# Patient Record
Sex: FEMALE | Race: WHITE | Employment: UNEMPLOYED | URBAN - METROPOLITAN AREA
[De-identification: names, ages, dates, MRNs, and addresses within clinical notes are randomized per-mention and may not be internally consistent; named-entity substitution may affect disease eponyms.]

---

## 2019-03-04 PROBLEM — N32.81 OVERACTIVE BLADDER: Status: ACTIVE | Noted: 2019-03-04

## 2019-04-05 PROBLEM — H66.90 OTITIS MEDIA: Status: ACTIVE | Noted: 2019-04-05

## 2019-04-05 PROBLEM — J98.01 ACUTE BRONCHOSPASM: Status: ACTIVE | Noted: 2019-04-05

## 2019-04-05 PROBLEM — H61.20 IMPACTED CERUMEN: Status: ACTIVE | Noted: 2019-04-05

## 2019-04-05 PROBLEM — N92.0 MENORRHAGIA WITH REGULAR CYCLE: Status: ACTIVE | Noted: 2018-04-30

## 2019-04-05 PROBLEM — Z01.818 PRE-OPERATIVE EXAMINATION: Status: ACTIVE | Noted: 2019-04-05

## 2019-04-05 PROBLEM — J45.31 MILD PERSISTENT ASTHMA WITH (ACUTE) EXACERBATION: Status: ACTIVE | Noted: 2019-04-05

## 2019-04-05 PROBLEM — L02.91 ABSCESS: Status: ACTIVE | Noted: 2017-05-08

## 2019-04-05 PROBLEM — Z86.2 H/O HYPERCOAGULABLE STATE: Status: ACTIVE | Noted: 2018-07-03

## 2019-04-05 PROBLEM — N39.0 URINARY TRACT INFECTION: Status: ACTIVE | Noted: 2019-04-05

## 2019-04-05 PROBLEM — K58.0 IRRITABLE BOWEL SYNDROME WITH DIARRHEA: Status: ACTIVE | Noted: 2018-05-01

## 2019-04-05 PROBLEM — R35.0 URINARY FREQUENCY: Status: ACTIVE | Noted: 2019-04-05

## 2019-04-05 PROBLEM — D25.1 INTRAMURAL LEIOMYOMA OF UTERUS: Status: ACTIVE | Noted: 2019-03-06

## 2019-04-05 PROBLEM — J45.30 MILD PERSISTENT ASTHMA, UNCOMPLICATED: Status: ACTIVE | Noted: 2019-04-05

## 2019-04-05 PROBLEM — B35.1 DERMATOPHYTOSIS OF NAIL: Status: ACTIVE | Noted: 2019-04-05

## 2019-04-05 PROBLEM — G31.84 MILD COGNITIVE IMPAIRMENT: Status: ACTIVE | Noted: 2018-05-01

## 2019-04-05 PROBLEM — G40.909 EPILEPSY, UNSPECIFIED, NOT INTRACTABLE, WITHOUT STATUS EPILEPTICUS (HCC): Status: ACTIVE | Noted: 2018-05-01

## 2019-04-05 PROBLEM — J30.9 ALLERGIC RHINITIS: Status: ACTIVE | Noted: 2018-05-01

## 2019-04-05 PROBLEM — Z86.718 PERSONAL HISTORY OF VENOUS THROMBOSIS AND EMBOLISM: Status: ACTIVE | Noted: 2018-05-01

## 2019-04-05 PROBLEM — R05.9 COUGH: Status: ACTIVE | Noted: 2019-04-05

## 2019-04-05 PROBLEM — L03.119 CELLULITIS AND ABSCESS OF FOOT EXCLUDING TOE: Status: ACTIVE | Noted: 2019-04-05

## 2019-04-05 PROBLEM — N93.9 ABNORMAL UTERINE BLEEDING (AUB): Status: ACTIVE | Noted: 2019-03-06

## 2019-04-05 PROBLEM — B37.31 CANDIDIASIS OF VULVA AND VAGINA: Status: ACTIVE | Noted: 2019-04-05

## 2019-04-05 PROBLEM — D68.59 OTHER PRIMARY THROMBOPHILIA (HCC): Status: ACTIVE | Noted: 2018-05-01

## 2019-04-05 PROBLEM — R30.0 DYSURIA: Status: ACTIVE | Noted: 2019-04-05

## 2019-04-05 PROBLEM — N76.0 VAGINITIS AND VULVOVAGINITIS: Status: ACTIVE | Noted: 2019-04-05

## 2019-04-05 PROBLEM — L02.619 CELLULITIS AND ABSCESS OF FOOT EXCLUDING TOE: Status: ACTIVE | Noted: 2019-04-05

## 2019-04-05 PROBLEM — F41.1 GENERALIZED ANXIETY DISORDER: Status: ACTIVE | Noted: 2018-05-01

## 2019-04-05 PROBLEM — I63.9 CEREBRAL INFARCTION (HCC): Status: ACTIVE | Noted: 2018-05-01

## 2019-04-05 PROBLEM — Z96.89 S/P INSERTION OF SPINAL CORD STIMULATOR: Status: ACTIVE | Noted: 2018-03-02

## 2019-05-29 PROBLEM — Z01.419 ENCOUNTER FOR ROUTINE GYNECOLOGICAL EXAMINATION: Status: ACTIVE | Noted: 2019-05-09

## 2019-05-29 PROBLEM — R60.9 EDEMA, UNSPECIFIED: Status: ACTIVE | Noted: 2018-04-26

## 2019-06-18 ENCOUNTER — OFFICE VISIT (OUTPATIENT)
Dept: ORTHOPEDIC SURGERY | Age: 54
End: 2019-06-18

## 2019-06-18 VITALS
HEIGHT: 68 IN | RESPIRATION RATE: 18 BRPM | HEART RATE: 62 BPM | SYSTOLIC BLOOD PRESSURE: 117 MMHG | DIASTOLIC BLOOD PRESSURE: 81 MMHG | OXYGEN SATURATION: 98 % | BODY MASS INDEX: 36.68 KG/M2 | TEMPERATURE: 97.5 F | WEIGHT: 242 LBS

## 2019-06-18 DIAGNOSIS — E66.01 SEVERE OBESITY (HCC): ICD-10-CM

## 2019-06-18 DIAGNOSIS — Z45.42 BATTERY END OF LIFE OF SPINAL CORD STIMULATOR: Primary | ICD-10-CM

## 2019-06-18 DIAGNOSIS — G89.4 CHRONIC PAIN SYNDROME: ICD-10-CM

## 2019-06-18 RX ORDER — LIDOCAINE 50 MG/G
1 PATCH TOPICAL AS NEEDED
COMMUNITY

## 2019-06-18 RX ORDER — HYDROCODONE BITARTRATE AND ACETAMINOPHEN 5; 325 MG/1; MG/1
1 TABLET ORAL AS NEEDED
COMMUNITY
End: 2020-02-11 | Stop reason: ALTCHOICE

## 2019-06-18 NOTE — PROGRESS NOTES
[address]  [address], [address]  [facility], [address]  Phone: [phone]  Fax: [phone]  INITIAL CONSULTATION  Patient: Rufino Suarez                MRN: [id number]       SSN: [id number]  YOB: 1965        AGE: 49 y.o. SEX: female  Body mass index is 36.8 kg/m². PCP: Waldo Briones MD  06/18/19    Chief Complaint   Patient presents with    Back Pain     low back pain         HISTORY OF PRESENT ILLNESS, RADIOGRAPHS, and PLAN:         HISTORY OF PRESENT ILLNESS:  Ms. Julio Reynolds is seen today at the request of Dr. Jeanne Alanis and Dr. Martha Scott. Ms. 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 Eventually, she underwent spinal cord stimulator placement in 2011 by Dr. Antonio Chau and was subsequently revised several times. She has a multi-lead paddle in place, which she uses a No Feel setting batter placed by Dr. Martha Scott. The battery is at its end of useful life and has run out, and she needs a battery replacement. She gets tremendous relief from the [facility] setting with 90% pain relief. She denies any other issue other than the severe pain now that the battery is losing its effectiveness with severe back pain and leg pain, right worse than left. She has been diagnosed of post-laminectomy syndrome with arachnoiditis. She has also had a DVT. She had what sounds like sepsis with wound sepsis, staph infection, dural injury, and the like from her initial surgeries. Her wounds are healed and dry. The battery is not working. She is concerned that on the [facility] setting, which is the most effective for her with a non-rechargeable battery, her battery only lasted one year.       ASSESSMENT/PLAN: We discussed finding other companies batteries that may last longer versus replacing it with the [employer], which gave her one years use the last time. I explained to her that I cannot state with certainty whether another companies battery will last longer as a non-rechargeable and that No Feel settings can cause higher battery drains, and that there is always a risk in changing the company that manufactured the programming and the generator. Our options are to investigate and attempt to use another companys product versus replacing it with the known, effective [employer] product. After extensive discussion, the patient wishes to proceed at this time with a [employer] revision but would like to investigate other companies products should this batter not last long. The risks, benefits, complications, and alternatives were discussed. The patient consents. cc: Ben Gonzalez D.O. Past Medical History:   Diagnosis Date    Asthma     well controlled    Coagulation disorder ([facility] [address]     hypercoagulable    Depressive disorder 1/3/2013    Epilepsy ([facility] [address]     Ill-defined condition     Benign Granuloma Annularea    OAB (overactive bladder)     Obesity (BMI 30.0-34.9) 11/3/2016    Seizures ([facility] [address]     grand mal    Spastic bladder     Thromboembolus ([facility] [address]     s/p vincenzo filter    Unspecified adverse effect of anesthesia     bronchospasm on awakening       No family history on file. Current Outpatient Medications   Medication Sig Dispense Refill    HYDROcodone-acetaminophen (NORCO) 5-325 mg per tablet Take 1 Tab by mouth.  lidocaine (LIDODERM) 5 % Apply 1 Patch to affected area.  mirabegron ER (MYRBETRIQ) 50 mg ER tablet Take 1 Tab by mouth daily. 90 Tab 3    LORazepam (ATIVAN) 0.5 mg tablet Take  by mouth.  tiZANidine (ZANAFLEX) 4 mg capsule Take 4 mg by mouth three (3) times daily.  budesonide-formoterol (SYMBICORT) 160-4.5 mcg/actuation HFAA Take 2 Puffs by inhalation two (2) times a day.       naproxen (NAPROSYN) 500 mg tablet Take 500 mg by mouth two (2) times daily (with meals).  divalproex sodium (DEPAKOTE PO) Take  by mouth.  topiramate (TOPAMAX) 100 mg tablet Take 100 mg by mouth two (2) times a day.  montelukast (SINGULAIR) 10 mg tablet Take 10 mg by mouth daily. Indications: ASTHMA PREVENTION      ibuprofen (MOTRIN) 800 mg tablet Take 800 mg by mouth as needed for Pain.  tolterodine ER (DETROL LA) 4 mg ER capsule Take 1 Cap by mouth daily. (Patient not taking: Reported on 6/18/2019) 90 Cap 3       Allergies   Allergen Reactions    Adhesive Other (comments)     Layers of skin peeled off    Dilantin [Phenytoin Sodium Extended] Other (comments)     Throat swelled up rash all over    Other Medication Other (comments)     Surgical skin prep caused layers of skin to peel off. Needs benedryl before anesthesia.     Phenobarbital Other (comments)     Throat swelled up ,rash    Rifampin Rash       Past Surgical History:   Procedure Laterality Date    HX BACK SURGERY      dural repair    HX BACK SURGERY      nerve stimulator    HX LUMBAR FUSION  2006    HX LUMBAR LAMINECTOMY  2006       Past Medical History:   Diagnosis Date    Asthma     well controlled    Coagulation disorder ([address]     hypercoagulable    Depressive disorder 1/3/2013    Epilepsy ([address]     Ill-defined condition     Benign Granuloma Annularea    OAB (overactive bladder)     Obesity (BMI 30.0-34.9) 11/3/2016    Seizures ([address]     grand mal    Spastic bladder     Thromboembolus ([address]     s/p vincenzo filter    Unspecified adverse effect of anesthesia     bronchospasm on awakening       Social History     Socioeconomic History    Marital status:      Spouse name: Not on file    Number of children: Not on file    Years of education: Not on file    Highest education level: Not on file   Occupational History    Not on file   Social Needs    Financial resource strain: Not on file    Food insecurity:     Worry: Not on file     Inability: Not on file   Juany Arechiga Transportation needs:     Medical: Not on file     Non-medical: Not on file   Tobacco Use    Smoking status: Former Smoker    Smokeless tobacco: Never Used   Substance and Sexual Activity    Alcohol use: Yes     Comment: 1 per month    Drug use: No    Sexual activity: Not on file   Lifestyle    Physical activity:     Days per week: Not on file     Minutes per session: Not on file    Stress: Not on file   Relationships    Social connections:     Talks on phone: Not on file     Gets together: Not on file     Attends Jainism service: Not on file     Active member of club or organization: Not on file     Attends meetings of clubs or organizations: Not on file     Relationship status: Not on file    Intimate partner violence:     Fear of current or ex partner: Not on file     Emotionally abused: Not on file     Physically abused: Not on file     Forced sexual activity: Not on file   Other Topics Concern    Not on file   Social History Narrative    Not on file           REVIEW OF SYSTEMS:   CONSTITUTIONAL SYMPTOMS:  Negative. EYES:  Negative. EARS, NOSE, THROAT AND MOUTH:  Negative. CARDIOVASCULAR:  Negative. RESPIRATORY:  Negative. GENITOURINARY: Per HPI. GASTROINTESTINAL:  Per HPI. INTEGUMENTARY (SKIN AND/OR BREAST):  Negative. MUSCULOSKELETAL: Per HPI.   ENDOCRINE/RHEUMATOLOGIC:  Negative. NEUROLOGICAL:  Per HPI. HEMATOLOGIC/LYMPHATIC:  Negative. ALLERGIC/IMMUNOLOGIC:  Negative. PSYCHIATRIC:  Negative. PHYSICAL EXAMINATION:   Visit Vitals  /81   Pulse 62   Temp 97.5 °F (36.4 °C)   Resp 18   Ht 5' 8\" (1.727 m)   Wt 242 lb (109.8 kg)   SpO2 98%   BMI 36.80 kg/m²    PAIN SCALE: 4/10    CONSTITUTIONAL: The patient is in no apparent distress and is alert and oriented x 3. HEENT: Normocephalic. Hearing grossly intact. NECK: Supple and symmetric. no tenderness, or masses were felt. RESPIRATORY: No labored breathing.   CARDIOVASCULAR: The carotid pulses were normal. Peripheral pulses were 2+. CHEST: Normal AP diameter and normal contour without any kyphoscoliosis. LYMPHATIC: No lymphadenopathy was appreciated in the neck, axillae or groin. SKIN:  Negative for scars, rashes, lesions, or ulcers on the right upper, right lower, left upper, left lower and trunk. NEUROLOGICAL: Alert and oriented x 3. Ambulation without assistive device. FWB. EXTREMITIES:  See musculoskeletal.  MUSCULOSKELETAL:   Head and Neck:  Negative for misalignment, asymmetry, crepitation, defects, tenderness masses or effusions.  Left Upper Extremity: Inspection, percussion and palpation performed. Serratos sign is negative.  Right Upper Extremity: Inspection, percussion and palpation performed. Serratos sign is negative.  Spine, Ribs and Pelvis: Low back pain. Inspection, percussion and palpation performed. Negative for misalignment, asymmetry, crepitation, defects, tenderness masses or effusions.  Left Lower Extremity: Inspection, percussion and palpation performed. Negative straight leg raise.  Right Lower Extremity: Inspection, percussion and palpation performed. Negative straight leg raise. SPINE EXAM:     Cervical spine: Neck is midline. Normal muscle tone. No focal atrophy is noted. Lumbar spine: No rash, ecchymosis, or gross obliquity. No focal atrophy is noted. ASSESSMENT    ICD-10-CM ICD-9-CM    1. Battery end of life of spinal cord stimulator Z45.42 V53.02    2. Severe obesity ([facility] [address] E66.01 278.01    3. Chronic pain syndrome G89.4 338.4        Written by Alivia Kilgore, as dictated by Gage Cardoza MD.    I, Dr. Gage Cardoza MD, confirm that all documentation is accurate.

## 2019-06-18 NOTE — PROGRESS NOTES
[address] [facility]   Pre-Surgical Worksheet    Patient: Lise Lopez                         MRN: [id number]     Age:  49 y.o.,      Sex: female    YOB: 1965           CAROLINE: June 18, 2019  PCP: Niyah Mukherjee MD    Allergies   Allergen Reactions    Adhesive Other (comments)     Layers of skin peeled off    Dilantin [Phenytoin Sodium Extended] Other (comments)     Throat swelled up rash all over    Other Medication Other (comments)     Surgical skin prep caused layers of skin to peel off. Needs benedryl before anesthesia.  Phenobarbital Other (comments)     Throat swelled up ,rash    Rifampin Rash         ICD-10-CM ICD-9-CM    1. Battery end of life of spinal cord stimulator Z45.42 V53.02    2. Severe obesity ([address] E66.01 278.01    3. Chronic pain syndrome G89.4 338.4        Surgery: Revise Battery. Pain Assessment   Pain Assessment  6/18/2019   Location of Pain Back   Location Modifiers Inferior   Severity of Pain 4   Quality of Pain Throbbing; Sharp;Dull   Duration of Pain Persistent   Frequency of Pain Constant   Aggravating Factors Standing; Other (Comment)   Aggravating Factors Comment sitting   Limiting Behavior Yes   Relieving Factors Rest;Other (Comment); Ice   Relieving Factors Comment laying down   Result of Injury Yes       Visit Vitals  /81   Pulse 62   Temp 97.5 °F (36.4 °C)   Resp 18   Ht 5' 8\" (1.727 m)   Wt 242 lb (109.8 kg)   SpO2 98%   BMI 36.80 kg/m²       ADL Limits: Bathing, Dressing and Cane    Spine Surgery?: Yes When 2006    Spinal Injections?: No    Physical Therapy?: No    NSAID's?: Yes    Pain Medications?: Yes  Type: Norco     In Pain Management: No    Current Outpatient Medications   Medication Sig    HYDROcodone-acetaminophen (NORCO) 5-325 mg per tablet Take 1 Tab by mouth.  lidocaine (LIDODERM) 5 % Apply 1 Patch to affected area.  mirabegron ER (MYRBETRIQ) 50 mg ER tablet Take 1 Tab by mouth daily.     LORazepam (ATIVAN) 0.5 mg tablet Take  by mouth.  tiZANidine (ZANAFLEX) 4 mg capsule Take 4 mg by mouth three (3) times daily.  budesonide-formoterol (SYMBICORT) 160-4.5 mcg/actuation HFAA Take 2 Puffs by inhalation two (2) times a day.  naproxen (NAPROSYN) 500 mg tablet Take 500 mg by mouth two (2) times daily (with meals).  divalproex sodium (DEPAKOTE PO) Take  by mouth.  topiramate (TOPAMAX) 100 mg tablet Take 100 mg by mouth two (2) times a day.  montelukast (SINGULAIR) 10 mg tablet Take 10 mg by mouth daily. Indications: ASTHMA PREVENTION    ibuprofen (MOTRIN) 800 mg tablet Take 800 mg by mouth as needed for Pain.  tolterodine ER (DETROL LA) 4 mg ER capsule Take 1 Cap by mouth daily. (Patient not taking: Reported on 6/18/2019)     No current facility-administered medications for this visit. Past Medical History:   Diagnosis Date    Asthma     well controlled    Coagulation disorder ([address]     hypercoagulable    Depressive disorder 1/3/2013    Epilepsy ([facility] [address]     Ill-defined condition     Benign Granuloma Annularea    OAB (overactive bladder)     Obesity (BMI 30.0-34.9) 11/3/2016    Seizures ([address]     grand mal    Spastic bladder     Thromboembolus ([facility] [address]     s/p vincenzo filter    Unspecified adverse effect of anesthesia     bronchospasm on awakening       Past Surgical History:   Procedure Laterality Date    HX BACK SURGERY      dural repair    HX BACK SURGERY      nerve stimulator    HX LUMBAR FUSION  2006    HX LUMBAR LAMINECTOMY  2006       Social History     Socioeconomic History    Marital status:      Spouse name: Not on file    Number of children: Not on file    Years of education: Not on file    Highest education level: Not on file   Tobacco Use    Smoking status: Former Smoker    Smokeless tobacco: Never Used   Substance and Sexual Activity    Alcohol use: Yes     Comment: 1 per month    Drug use:  No

## 2019-06-20 ENCOUNTER — HOSPITAL ENCOUNTER (OUTPATIENT)
Dept: PREADMISSION TESTING | Age: 54
Discharge: HOME | End: 2019-06-20
Payer: OTHER MISCELLANEOUS

## 2019-06-20 DIAGNOSIS — Z01.818 PRE-OP EXAMINATION: ICD-10-CM

## 2019-06-20 LAB
ALBUMIN SERPL-MCNC: 3.5 G/DL (ref 3.4–5)
ALBUMIN/GLOB SERPL: 1 {RATIO} (ref 0.8–1.7)
ALP SERPL-CCNC: 79 U/L (ref 45–117)
ALT SERPL-CCNC: 13 U/L (ref 13–56)
ANION GAP SERPL CALC-SCNC: 7 MMOL/L (ref 3–18)
APTT PPP: 25.6 SEC (ref 23–36.4)
AST SERPL-CCNC: 8 U/L (ref 15–37)
ATRIAL RATE: 64 BPM
BACTERIA SPEC CULT: NORMAL
BILIRUB SERPL-MCNC: 0.3 MG/DL (ref 0.2–1)
BUN SERPL-MCNC: 16 MG/DL (ref 7–18)
BUN/CREAT SERPL: 17 (ref 12–20)
CALCIUM SERPL-MCNC: 8.8 MG/DL (ref 8.5–10.1)
CALCULATED P AXIS, ECG09: 65 DEGREES
CALCULATED R AXIS, ECG10: 74 DEGREES
CALCULATED T AXIS, ECG11: 59 DEGREES
CHLORIDE SERPL-SCNC: 111 MMOL/L (ref 100–108)
CO2 SERPL-SCNC: 24 MMOL/L (ref 21–32)
CREAT SERPL-MCNC: 0.94 MG/DL (ref 0.6–1.3)
DIAGNOSIS, 93000: NORMAL
ERYTHROCYTE [DISTWIDTH] IN BLOOD BY AUTOMATED COUNT: 13.8 % (ref 11.6–14.5)
GLOBULIN SER CALC-MCNC: 3.4 G/DL (ref 2–4)
GLUCOSE SERPL-MCNC: 93 MG/DL (ref 74–99)
HCT VFR BLD AUTO: 39.2 % (ref 35–45)
HGB BLD-MCNC: 12.3 G/DL (ref 12–16)
INR PPP: 0.9 (ref 0.8–1.2)
MCH RBC QN AUTO: 29.7 PG (ref 24–34)
MCHC RBC AUTO-ENTMCNC: 31.4 G/DL (ref 31–37)
MCV RBC AUTO: 94.7 FL (ref 74–97)
P-R INTERVAL, ECG05: 136 MS
PLATELET # BLD AUTO: 211 K/UL (ref 135–420)
PMV BLD AUTO: 9.9 FL (ref 9.2–11.8)
POTASSIUM SERPL-SCNC: 4.2 MMOL/L (ref 3.5–5.5)
PROT SERPL-MCNC: 6.9 G/DL (ref 6.4–8.2)
PROTHROMBIN TIME: 12.3 SEC (ref 11.5–15.2)
Q-T INTERVAL, ECG07: 398 MS
QRS DURATION, ECG06: 94 MS
QTC CALCULATION (BEZET), ECG08: 410 MS
RBC # BLD AUTO: 4.14 M/UL (ref 4.2–5.3)
SERVICE CMNT-IMP: NORMAL
SODIUM SERPL-SCNC: 142 MMOL/L (ref 136–145)
VENTRICULAR RATE, ECG03: 64 BPM
WBC # BLD AUTO: 4.9 K/UL (ref 4.6–13.2)

## 2019-06-20 PROCEDURE — 85730 THROMBOPLASTIN TIME PARTIAL: CPT

## 2019-06-20 PROCEDURE — 80053 COMPREHEN METABOLIC PANEL: CPT

## 2019-06-20 PROCEDURE — 93005 ELECTROCARDIOGRAM TRACING: CPT

## 2019-06-20 PROCEDURE — 87641 MR-STAPH DNA AMP PROBE: CPT

## 2019-06-20 PROCEDURE — 85027 COMPLETE CBC AUTOMATED: CPT

## 2019-06-20 PROCEDURE — 36415 COLL VENOUS BLD VENIPUNCTURE: CPT

## 2019-06-20 PROCEDURE — 85610 PROTHROMBIN TIME: CPT

## 2019-06-30 ENCOUNTER — ANESTHESIA EVENT (OUTPATIENT)
Dept: SURGERY | Age: 54
End: 2019-06-30
Payer: OTHER MISCELLANEOUS

## 2019-07-01 ENCOUNTER — ANESTHESIA (OUTPATIENT)
Dept: SURGERY | Age: 54
End: 2019-07-01
Payer: OTHER MISCELLANEOUS

## 2019-07-01 ENCOUNTER — HOSPITAL ENCOUNTER (OUTPATIENT)
Age: 54
Setting detail: OUTPATIENT SURGERY
Discharge: HOME | End: 2019-07-01
Attending: ORTHOPAEDIC SURGERY | Admitting: ORTHOPAEDIC SURGERY
Payer: OTHER MISCELLANEOUS

## 2019-07-01 VITALS
HEART RATE: 76 BPM | OXYGEN SATURATION: 96 % | DIASTOLIC BLOOD PRESSURE: 64 MMHG | TEMPERATURE: 97.3 F | HEIGHT: 67 IN | BODY MASS INDEX: 36.97 KG/M2 | WEIGHT: 235.56 LBS | SYSTOLIC BLOOD PRESSURE: 125 MMHG | RESPIRATION RATE: 16 BRPM

## 2019-07-01 LAB — HCG UR QL: NEGATIVE

## 2019-07-01 PROCEDURE — 77030032490 HC SLV COMPR SCD KNE COVD -B: Performed by: ORTHOPAEDIC SURGERY

## 2019-07-01 PROCEDURE — 81025 URINE PREGNANCY TEST: CPT

## 2019-07-01 PROCEDURE — 74011000250 HC RX REV CODE- 250

## 2019-07-01 PROCEDURE — 74011000250 HC RX REV CODE- 250: Performed by: ORTHOPAEDIC SURGERY

## 2019-07-01 PROCEDURE — 74011250637 HC RX REV CODE- 250/637

## 2019-07-01 PROCEDURE — 77030037875 HC DRSG MEPILEX <16IN BORD MOLN -A: Performed by: ORTHOPAEDIC SURGERY

## 2019-07-01 PROCEDURE — 74011000272 HC RX REV CODE- 272: Performed by: ORTHOPAEDIC SURGERY

## 2019-07-01 PROCEDURE — 76210000021 HC REC RM PH II 0.5 TO 1 HR: Performed by: ORTHOPAEDIC SURGERY

## 2019-07-01 PROCEDURE — 77030010507 HC ADH SKN DERMBND J&J -B: Performed by: ORTHOPAEDIC SURGERY

## 2019-07-01 PROCEDURE — 74011250636 HC RX REV CODE- 250/636

## 2019-07-01 PROCEDURE — 77030003029 HC SUT VCRL J&J -B: Performed by: ORTHOPAEDIC SURGERY

## 2019-07-01 PROCEDURE — 74011250636 HC RX REV CODE- 250/636: Performed by: ANESTHESIOLOGY

## 2019-07-01 PROCEDURE — 77030018836 HC SOL IRR NACL ICUM -A: Performed by: ORTHOPAEDIC SURGERY

## 2019-07-01 PROCEDURE — 74011250637 HC RX REV CODE- 250/637: Performed by: ANESTHESIOLOGY

## 2019-07-01 PROCEDURE — 76010000138 HC OR TIME 0.5 TO 1 HR: Performed by: ORTHOPAEDIC SURGERY

## 2019-07-01 PROCEDURE — 77030020268 HC MISC GENERAL SUPPLY: Performed by: ORTHOPAEDIC SURGERY

## 2019-07-01 PROCEDURE — 76210000006 HC OR PH I REC 0.5 TO 1 HR: Performed by: ORTHOPAEDIC SURGERY

## 2019-07-01 PROCEDURE — C1767 GENERATOR, NEURO NON-RECHARG: HCPCS | Performed by: ORTHOPAEDIC SURGERY

## 2019-07-01 PROCEDURE — 76060000032 HC ANESTHESIA 0.5 TO 1 HR: Performed by: ORTHOPAEDIC SURGERY

## 2019-07-01 PROCEDURE — 77030033138 HC SUT PGA STRATFX J&J -B: Performed by: ORTHOPAEDIC SURGERY

## 2019-07-01 PROCEDURE — 74011250636 HC RX REV CODE- 250/636: Performed by: ORTHOPAEDIC SURGERY

## 2019-07-01 PROCEDURE — 77030034475 HC MISC IMPL SPN: Performed by: ORTHOPAEDIC SURGERY

## 2019-07-01 PROCEDURE — 77030020782 HC GWN BAIR PAWS FLX 3M -B: Performed by: ORTHOPAEDIC SURGERY

## 2019-07-01 RX ORDER — EPHEDRINE SULFATE/0.9% NACL/PF 25 MG/5 ML
SYRINGE (ML) INTRAVENOUS AS NEEDED
Status: DISCONTINUED | OUTPATIENT
Start: 2019-07-01 | End: 2019-07-01 | Stop reason: HOSPADM

## 2019-07-01 RX ORDER — NALOXONE HYDROCHLORIDE 0.4 MG/ML
0.4 INJECTION, SOLUTION INTRAMUSCULAR; INTRAVENOUS; SUBCUTANEOUS AS NEEDED
Status: DISCONTINUED | OUTPATIENT
Start: 2019-07-01 | End: 2019-07-01 | Stop reason: HOSPADM

## 2019-07-01 RX ORDER — SODIUM CHLORIDE 0.9 % (FLUSH) 0.9 %
5-40 SYRINGE (ML) INJECTION EVERY 8 HOURS
Status: DISCONTINUED | OUTPATIENT
Start: 2019-07-01 | End: 2019-07-01 | Stop reason: HOSPADM

## 2019-07-01 RX ORDER — ONDANSETRON 2 MG/ML
INJECTION INTRAMUSCULAR; INTRAVENOUS AS NEEDED
Status: DISCONTINUED | OUTPATIENT
Start: 2019-07-01 | End: 2019-07-01 | Stop reason: HOSPADM

## 2019-07-01 RX ORDER — HYDROMORPHONE HYDROCHLORIDE 2 MG/ML
INJECTION, SOLUTION INTRAMUSCULAR; INTRAVENOUS; SUBCUTANEOUS AS NEEDED
Status: DISCONTINUED | OUTPATIENT
Start: 2019-07-01 | End: 2019-07-01 | Stop reason: HOSPADM

## 2019-07-01 RX ORDER — ROCURONIUM BROMIDE 10 MG/ML
INJECTION, SOLUTION INTRAVENOUS AS NEEDED
Status: DISCONTINUED | OUTPATIENT
Start: 2019-07-01 | End: 2019-07-01 | Stop reason: HOSPADM

## 2019-07-01 RX ORDER — SODIUM CHLORIDE, SODIUM LACTATE, POTASSIUM CHLORIDE, CALCIUM CHLORIDE 600; 310; 30; 20 MG/100ML; MG/100ML; MG/100ML; MG/100ML
125 INJECTION, SOLUTION INTRAVENOUS CONTINUOUS
Status: DISCONTINUED | OUTPATIENT
Start: 2019-07-01 | End: 2019-07-01 | Stop reason: HOSPADM

## 2019-07-01 RX ORDER — FENTANYL CITRATE 50 UG/ML
INJECTION, SOLUTION INTRAMUSCULAR; INTRAVENOUS AS NEEDED
Status: DISCONTINUED | OUTPATIENT
Start: 2019-07-01 | End: 2019-07-01 | Stop reason: HOSPADM

## 2019-07-01 RX ORDER — LIDOCAINE HYDROCHLORIDE 20 MG/ML
INJECTION, SOLUTION EPIDURAL; INFILTRATION; INTRACAUDAL; PERINEURAL AS NEEDED
Status: DISCONTINUED | OUTPATIENT
Start: 2019-07-01 | End: 2019-07-01 | Stop reason: HOSPADM

## 2019-07-01 RX ORDER — FLUMAZENIL 0.1 MG/ML
0.2 INJECTION INTRAVENOUS
Status: DISCONTINUED | OUTPATIENT
Start: 2019-07-01 | End: 2019-07-01 | Stop reason: HOSPADM

## 2019-07-01 RX ORDER — SODIUM CHLORIDE 0.9 % (FLUSH) 0.9 %
5-40 SYRINGE (ML) INJECTION AS NEEDED
Status: DISCONTINUED | OUTPATIENT
Start: 2019-07-01 | End: 2019-07-01 | Stop reason: HOSPADM

## 2019-07-01 RX ORDER — DIPHENHYDRAMINE HYDROCHLORIDE 50 MG/ML
INJECTION, SOLUTION INTRAMUSCULAR; INTRAVENOUS AS NEEDED
Status: DISCONTINUED | OUTPATIENT
Start: 2019-07-01 | End: 2019-07-01 | Stop reason: HOSPADM

## 2019-07-01 RX ORDER — DEXAMETHASONE SODIUM PHOSPHATE 4 MG/ML
INJECTION, SOLUTION INTRA-ARTICULAR; INTRALESIONAL; INTRAMUSCULAR; INTRAVENOUS; SOFT TISSUE AS NEEDED
Status: DISCONTINUED | OUTPATIENT
Start: 2019-07-01 | End: 2019-07-01 | Stop reason: HOSPADM

## 2019-07-01 RX ORDER — CEFAZOLIN SODIUM/WATER 2 G/20 ML
2 SYRINGE (ML) INTRAVENOUS ONCE
Status: COMPLETED | OUTPATIENT
Start: 2019-07-01 | End: 2019-07-01

## 2019-07-01 RX ORDER — CIPROFLOXACIN 750 MG/1
750 TABLET, FILM COATED ORAL EVERY 12 HOURS
Qty: 14 TAB | Refills: 0 | Status: SHIPPED | OUTPATIENT
Start: 2019-07-01 | End: 2019-07-08

## 2019-07-01 RX ORDER — OXYCODONE AND ACETAMINOPHEN 5; 325 MG/1; MG/1
1 TABLET ORAL
Status: COMPLETED | OUTPATIENT
Start: 2019-07-01 | End: 2019-07-01

## 2019-07-01 RX ORDER — FENTANYL CITRATE 50 UG/ML
50 INJECTION, SOLUTION INTRAMUSCULAR; INTRAVENOUS AS NEEDED
Status: DISCONTINUED | OUTPATIENT
Start: 2019-07-01 | End: 2019-07-01 | Stop reason: HOSPADM

## 2019-07-01 RX ORDER — PROPOFOL 10 MG/ML
INJECTION, EMULSION INTRAVENOUS AS NEEDED
Status: DISCONTINUED | OUTPATIENT
Start: 2019-07-01 | End: 2019-07-01 | Stop reason: HOSPADM

## 2019-07-01 RX ORDER — ALBUTEROL SULFATE 90 UG/1
AEROSOL, METERED RESPIRATORY (INHALATION) AS NEEDED
Status: DISCONTINUED | OUTPATIENT
Start: 2019-07-01 | End: 2019-07-01 | Stop reason: HOSPADM

## 2019-07-01 RX ORDER — KETOROLAC TROMETHAMINE 30 MG/ML
INJECTION, SOLUTION INTRAMUSCULAR; INTRAVENOUS AS NEEDED
Status: DISCONTINUED | OUTPATIENT
Start: 2019-07-01 | End: 2019-07-01 | Stop reason: HOSPADM

## 2019-07-01 RX ADMIN — DIPHENHYDRAMINE HYDROCHLORIDE 25 MG: 50 INJECTION, SOLUTION INTRAMUSCULAR; INTRAVENOUS at 09:27

## 2019-07-01 RX ADMIN — KETOROLAC TROMETHAMINE 30 MG: 30 INJECTION, SOLUTION INTRAMUSCULAR; INTRAVENOUS at 09:55

## 2019-07-01 RX ADMIN — PROPOFOL 200 MG: 10 INJECTION, EMULSION INTRAVENOUS at 09:34

## 2019-07-01 RX ADMIN — ALBUTEROL SULFATE 2 PUFF: 90 AEROSOL, METERED RESPIRATORY (INHALATION) at 09:27

## 2019-07-01 RX ADMIN — ONDANSETRON 4 MG: 2 INJECTION INTRAMUSCULAR; INTRAVENOUS at 09:55

## 2019-07-01 RX ADMIN — FENTANYL CITRATE 25 MCG: 50 INJECTION, SOLUTION INTRAMUSCULAR; INTRAVENOUS at 10:41

## 2019-07-01 RX ADMIN — SODIUM CHLORIDE, SODIUM LACTATE, POTASSIUM CHLORIDE, AND CALCIUM CHLORIDE 125 ML/HR: 600; 310; 30; 20 INJECTION, SOLUTION INTRAVENOUS at 10:37

## 2019-07-01 RX ADMIN — LIDOCAINE HYDROCHLORIDE 100 MG: 20 INJECTION, SOLUTION EPIDURAL; INFILTRATION; INTRACAUDAL; PERINEURAL at 09:34

## 2019-07-01 RX ADMIN — Medication 2 G: at 09:41

## 2019-07-01 RX ADMIN — DEXAMETHASONE SODIUM PHOSPHATE 4 MG: 4 INJECTION, SOLUTION INTRA-ARTICULAR; INTRALESIONAL; INTRAMUSCULAR; INTRAVENOUS; SOFT TISSUE at 09:46

## 2019-07-01 RX ADMIN — Medication 10 MG: at 10:07

## 2019-07-01 RX ADMIN — FENTANYL CITRATE 100 MCG: 50 INJECTION, SOLUTION INTRAMUSCULAR; INTRAVENOUS at 09:30

## 2019-07-01 RX ADMIN — HYDROMORPHONE HYDROCHLORIDE 1 MG: 2 INJECTION, SOLUTION INTRAMUSCULAR; INTRAVENOUS; SUBCUTANEOUS at 09:48

## 2019-07-01 RX ADMIN — SODIUM CHLORIDE, SODIUM LACTATE, POTASSIUM CHLORIDE, AND CALCIUM CHLORIDE 125 ML/HR: 600; 310; 30; 20 INJECTION, SOLUTION INTRAVENOUS at 09:12

## 2019-07-01 RX ADMIN — OXYCODONE HYDROCHLORIDE AND ACETAMINOPHEN 1 TABLET: 5; 325 TABLET ORAL at 12:04

## 2019-07-01 RX ADMIN — ALBUTEROL SULFATE 3 PUFF: 90 AEROSOL, METERED RESPIRATORY (INHALATION) at 10:12

## 2019-07-01 RX ADMIN — ROCURONIUM BROMIDE 50 MG: 10 INJECTION, SOLUTION INTRAVENOUS at 09:34

## 2019-07-01 NOTE — PERIOP NOTES
TRANSFER - IN REPORT:    Verbal report received from [address] [place] [address], [address] [place] [address] nurse (name) on Moise Sanchezvels  being received from OR (unit) for routine progression of care      Report consisted of patients Situation, Background, Assessment and   Recommendations(SBAR). Information from the following report(s) OR Summary, Procedure Summary, Intake/Output and MAR was reviewed with the receiving nurse. Opportunity for questions and clarification was provided. Assessment completed upon patients arrival to unit and care assumed.

## 2019-07-01 NOTE — PERIOP NOTES
Spoke with PAUL Almeida in [facility], asked her to let Dr. Jeronimo Hines know that patient last had her aspirin 4 days ago. Binh Dave said that Dr. Jeronimo Hines said that it is okay to proceed.

## 2019-07-01 NOTE — ANESTHESIA POSTPROCEDURE EVALUATION
Post-Anesthesia Evaluation and Assessment    Cardiovascular Function/Vital Signs  Visit Vitals  /74   Pulse 89   Temp 36.4 °C (97.5 °F)   Resp 12   Ht 5' 7\" (1.702 m)   Wt 106.9 kg (235 lb 9 oz)   SpO2 99%   BMI 36.89 kg/m²       Patient is status post Procedure(s):  SPINAL CORD STIMULATOR BATTERY EXCHANGE(NO ANESTHESIA CONSULT), \"SPEC POP\". Nausea/Vomiting: Controlled. Postoperative hydration reviewed and adequate. Pain:  Pain Scale 1: Numeric (0 - 10) (07/01/19 1045)  Pain Intensity 1: 2 (07/01/19 1045)   Managed. Neurological Status:   Neuro (WDL): Within Defined Limits (07/01/19 1045)   At baseline. Mental Status and Level of Consciousness: Arousable. Pulmonary Status:   O2 Device: Nasal cannula (07/01/19 1030)   Adequate oxygenation and airway patent. Complications related to anesthesia: None    Post-anesthesia assessment completed. No concerns. Patient has met all discharge requirements.     Signed By: Aram Rivera MD    July 1, 2019

## 2019-07-01 NOTE — PERIOP NOTES
Reviewed PTA medication list with patient/caregiver and patient/caregiver denies any additional medications. Patient admits to having a responsible adult care for them at home for at least 24 hours after surgery. Patient denies luis daniel chewing/swallowing difficulties. Patient encouraged to use Nadia paws warming system and informed that using Nadia paws to regulate body temperature prior to a procedure has been shown to help reduce the risks of blood clots and infection. Dual skin assessment & fall risk band verification completed with Monalisa Malik RN.

## 2019-07-01 NOTE — OP NOTES
OPERATIVE NOTE    Patient: Chen Noble MRN: [id number]  SSN: [id number]    YOB: 1965  Age: 49 y.o. Sex: female      Indications: This is a 49y.o. year-old female who presents with chronic back pain. She was positive for relief from a temporary spinal cord stimulator. Previous stimulator had been placed and additional battery exchanges noted. Battery since failed and requires revision. The patient was admitted for surgery as conservative measures have failed. Date of Procedure: 7/1/2019     Preoperative Diagnosis: CHRONIC PAIN SYNDROME    Postoperative Diagnosis: Chronic Pain Syndrome with failure of Spinal Cord Stimulator Battery. Procedure: Procedure(s):  SPINAL CORD STIMULATOR BATTERY EXCHANGE(NO ANESTHESIA CONSULT), \"SPEC POP\"    Surgeon: Vazquez Jane DO    Assistant: Circ-1: Mariano Ramirez RN  Scrub Tech-1: Teofilo Dan RN-Relief: Estrellita Puente RN  Surg Asst-1: Juan Govea Staff: [facility]    Anesthesia: @ORANEST    Estimated Blood Loss: 10cc    Specimens: * No specimens in log *     Drains: none    Implants:   Implant Name Type Inv. Item Serial No.  Lot No. LRB No. Used Action   PRECISION NOVI IMPLANTABLE PULSE GENERATOR KIT   [id number] [employer]  N/A 1 Implanted       Complications: None; patient tolerated the procedure well. Procedure: The patient was greeted by anesthesia and taken to the operative suite, where the patient underwent general endotracheal anesthesia. The patient was positioned in the prone position on a standard radiolucent Min spine table with the Erick frame. The previous incision was utilized and old battery was removed. There was additional non infectious fluid encountered in the pocket. Patient had mentioned that she had a lot of mobilization of the battery after as if it were sliding in the pocket. [employer] pulse generator battery was placed.   The pocket was nice and secure around this battery. The incision was irrigated with 1000ml of sterile saline with bacitracin utilizing pulse lavage. The incision over the gluteal pocket was reapproximated with 2-0 Vicryl in subcutaneous fashion, and running 3-0 Stratofix in subcuticular fashion. A final layer of Dermabolnd skin sealant was placed as well as soft sterile dressing and tape.

## 2019-07-01 NOTE — ANESTHESIA PREPROCEDURE EVALUATION
Relevant Problems   No relevant active problems       Anesthetic History          Comments: Multiple allergies. Breaks out in rash from unknown irritant after most surgeries. Bronchospasm in PACU after most surgeries. Review of Systems / Medical History  Patient summary reviewed, nursing notes reviewed and pertinent labs reviewed    Pulmonary            Asthma : well controlled  Pertinent negatives: No smoker     Neuro/Psych     seizures: well controlled  CVA  TIA and psychiatric history (h/o depression)  Pertinent negatives: Neuromuscular disease: Left lower leg spastistiy since CVA 2006. Cardiovascular                Pertinent negatives: No hypertension, valvular problems/murmurs, dysrhythmias, angina and CHF  Exercise tolerance: >4 METS     GI/Hepatic/Renal         Renal disease (mild renal insufficiency, normal Cr, being monitored by PCP)    Pertinent negatives: No GERD and liver disease   Endo/Other      Hypothyroidism: well controlled  Obesity     Other Findings   Comments: H/o DVT s/p vincenzo filter placement         Physical Exam    Airway  Mallampati: III  TM Distance: < 4 cm  Neck ROM: decreased range of motion   Mouth opening: Normal     Cardiovascular    Rhythm: regular  Rate: normal         Dental  No notable dental hx       Pulmonary  Breath sounds clear to auscultation               Abdominal  GI exam deferred       Other Findings            Anesthetic Plan    ASA: 3  Anesthesia type: general          Induction: Intravenous  Anesthetic plan and risks discussed with: Patient      Plan GETA. Will have patient take inhaler pre-op because of h/o periop bronchospasm. Benadryl pre-op due to h/o rash perioperatively. All questions answered. Consent signed.

## 2019-07-01 NOTE — PERIOP NOTES
TRANSFER - OUT REPORT:    Verbal report given to Glynn Franco RN (name) on Tonny Bhardwaj  being transferred to Phase II (unit) for routine progression of care       Report consisted of patients Situation, Background, Assessment and   Recommendations(SBAR). Information from the following report(s) SBAR, Kardex, OR Summary and Procedure Summary was reviewed with the receiving nurse. Lines:   Peripheral IV 07/01/19 Left Antecubital (Active)   Site Assessment Clean, dry, & intact 7/1/2019 10:37 AM   Phlebitis Assessment 0 7/1/2019 10:37 AM   Infiltration Assessment 0 7/1/2019 10:37 AM   Dressing Status Clean, dry, & intact 7/1/2019 10:37 AM   Dressing Type Transparent;Tape 7/1/2019 10:37 AM   Hub Color/Line Status Infusing 7/1/2019 10:37 AM   Alcohol Cap Used No 7/1/2019  9:01 AM        Opportunity for questions and clarification was provided.       Patient transported with:   O2 @ 2 liters  Registered Nurse

## 2019-07-01 NOTE — H&P
History and Physical        Patient: Anthony Salugero               Sex: female          DOA: (Not on file)         YOB: 1965      Age:  49 y.o.        LOS:  LOS: 0 days        HPI:     Anthony Salguero is a 49 y.o. female who has a history of back pain. Their pain is typically across the lower back and is chronic in nature. She denies numbness/tingling in the same distribution of their pain. She denies weakness in the bilateral lower extremity. Their pain is worse with ambulation and better at rest. She has failed conservative care including anti-inflammatories, analgesics, physical therapy and injections. Their pain affects their activities of daily living and would like to move forward with surgical intervention. Past Medical History:   Diagnosis Date    Adverse effect of anesthesia      bronchospasm,     Asthma     well controlled    Coagulation disorder (HCC)     hypercoagulable    Depressive disorder 1/3/2013    Epilepsy ([facility]     Ill-defined condition     Benign Granuloma Annularea    OAB (overactive bladder)     Obesity (BMI 30.0-34.9) 11/3/2016    Right leg DVT ([facility]     DVT    Seizures ([facility]     grand mal    Spastic bladder     Stroke ([facility] [facility] 2006    mini-stroke    Thromboembolus [facility])     s/p vincenzo filter    Thyroid disease     Unspecified adverse effect of anesthesia     bronchospasm on awakening       Past Surgical History:   Procedure Laterality Date    HX BACK SURGERY      dural repair    HX BACK SURGERY      nerve stimulator    HX LUMBAR FUSION  2006    HX LUMBAR LAMINECTOMY  2006       History reviewed. No pertinent family history.     Social History     Socioeconomic History    Marital status:      Spouse name: Not on file    Number of children: Not on file    Years of education: Not on file    Highest education level: Not on file   Tobacco Use    Smoking status: Never Smoker    Smokeless tobacco: Never Used   Substance and Sexual Activity    Alcohol use: Yes     Comment: 1 per month    Drug use: No       Prior to Admission medications    Medication Sig Start Date End Date Taking? Authorizing Provider   levothyroxine (SYNTHROID) 25 mcg tablet Take 25 mcg by mouth Daily (before breakfast). Provider, Historical   fluticasone propion-salmeterol (ADVAIR HFA) 115-21 mcg/actuation inhaler Take 2 Puffs by inhalation two (2) times a day. Provider, Historical   aspirin delayed-release 81 mg tablet Take 81 mg by mouth daily. Provider, Historical   HYDROcodone-acetaminophen (NORCO) 5-325 mg per tablet Take 1 Tab by mouth. Provider, Historical   lidocaine (LIDODERM) 5 % Apply 1 Patch to affected area. Provider, Historical   LORazepam (ATIVAN) 0.5 mg tablet Take  by mouth. Provider, Historical   tiZANidine (ZANAFLEX) 4 mg capsule Take 4 mg by mouth three (3) times daily. Provider, Historical   naproxen (NAPROSYN) 500 mg tablet Take 500 mg by mouth two (2) times daily (with meals). Provider, Historical   divalproex sodium (DEPAKOTE PO) Take 500 mg by mouth two (2) times a day. Provider, Historical   topiramate (TOPAMAX) 100 mg tablet Take 100 mg by mouth two (2) times a day. Provider, Historical   montelukast (SINGULAIR) 10 mg tablet Take 10 mg by mouth daily. Indications: ASTHMA PREVENTION    Provider, Historical   ibuprofen (MOTRIN) 800 mg tablet Take 800 mg by mouth as needed for Pain. Provider, Historical       Allergies   Allergen Reactions    Adhesive Other (comments)     Layers of skin peeled off    Copper Rash and Swelling    Dilantin [Phenytoin Sodium Extended] Other (comments)     Throat swelled up rash all over    Other Medication Other (comments)     Surgical skin prep caused layers of skin to peel off. Needs benedryl before anesthesia.     Phenobarbital Other (comments)     Throat swelled up ,rash    Rifampin Rash       Review of Systems  A comprehensive review of systems was negative except for that written in the History of Present Illness. Physical Exam:      There were no vitals taken for this visit. Physical Exam:  General: Alert and Oriented X 3  Lungs: Clear to ausculation bilaterally  Cardiovascular: Regular Rate and Rhythm, without murmur  Abdomen: Soft, nontender with positive bowel sounds in all four quadrants  Gential/Rectal: deferred  Musculoskeletal: The paitent has full range of motion of the lumbar spine in flexion, extension and side bending bilaterally. The patient has pain with all spheres. There is not pain with internal and external rotation of the bilaterally hip. The patient is tender across the left paralumbar, right paralumbar muscles. The patient is neurologically intact in the lower extremities. There is a Negative straight leg raise. His pulses are 2+. Calves are nontender. Labs Reviewed: All lab results for the last 24 hours reviewed. Assessment/Plan     Principal Problem:    Battery end of life of spinal cord stimulator (10/7/2014)        At this time, we discussed that we will move forward with a battery exchange. The risks versus the benefits as well as the alternatives were fully explained to the patient.

## 2019-07-01 NOTE — INTERVAL H&P NOTE
H&P Update:  Lucy Pascal was seen and examined. History and physical has been reviewed. The patient has been examined. There have been no significant clinical changes since the completion of the originally dated History and Physical.  Patient identified by surgeon; surgical site was confirmed by patient and surgeon.

## 2019-07-01 NOTE — DISCHARGE INSTRUCTIONS
DISCHARGE SUMMARY from Nurse    PATIENT INSTRUCTIONS:    After general anesthesia or intravenous sedation, for 24 hours or while taking prescription Narcotics:  · Limit your activities  · Do not drive and operate hazardous machinery  · Do not make important personal or business decisions  · Do  not drink alcoholic beverages  · If you have not urinated within 8 hours after discharge, please contact your surgeon on call. Report the following to your surgeon:  · Excessive pain, swelling, redness or odor of or around the surgical area  · Temperature over 100.5  · Nausea and vomiting lasting longer than 4 hours or if unable to take medications  · Any signs of decreased circulation or nerve impairment to extremity: change in color, persistent  numbness, tingling, coldness or increase pain  · Any questions    What to do at Home:  [address]    If you experience any of the following symptoms heavy bleeding, fevers, severe pain, circulation changes, please follow up with dr Coco Wyman    *  Please give a list of your current medications to your Primary Care Provider. *  Please update this list whenever your medications are discontinued, doses are      changed, or new medications (including over-the-counter products) are added. *  Please carry medication information at all times in case of emergency situations. These are general instructions for a healthy lifestyle:    No smoking/ No tobacco products/ Avoid exposure to second hand smoke  Surgeon General's Warning:  Quitting smoking now greatly reduces serious risk to your health.     Obesity, smoking, and sedentary lifestyle greatly increases your risk for illness    A healthy diet, regular physical exercise & weight monitoring are important for maintaining a healthy lifestyle    You may be retaining fluid if you have a history of heart failure or if you experience any of the following symptoms:  Weight gain of 3 pounds or more overnight or 5 pounds in a week, increased swelling in our hands or feet or shortness of breath while lying flat in bed. Please call your doctor as soon as you notice any of these symptoms; do not wait until your next office visit. The discharge information has been reviewed with the patient and caregiver. The patient and caregiver verbalized understanding. Discharge medications reviewed with the patient and caregiver and appropriate educational materials and side effects teaching were provided. ___________________________________________________________________________________________________________________________________[facility]  Dr. Cowan Postin Instructions for Spinal Cord Stimulator placement    Diet:  1. Begin with liquids and light foods such as Jell-O and soups. 2. Advance as tolerated to your regular diet if not nauseated. First 24 hours:  1. Be in the care of a responsible adult. 2. Do not drive or operate machinery. 3. Do not drink alcoholic beverages. Wound Care:  1. Maintain your postoperative dressing for 72 hours then remove pad and replace with gauze. Then change dressing daily. 2.  A home health nurse will come to your house to help you with dressing changes. 2. Keep the surgical incisions dry until follow-up. Medications:  1. Strong oral narcotic pain medications have been prescribed for the first few days. Use only as directed. No pain medication is capable of taking away all the pain. Taking your pills at regular intervals will give you the best chance of having less pain. 2. If you need a refill PLEASE PLAN AHEAD. Call our office during regular hours (8-5). 3. Do not combine with alcoholic beverages. 4. Be careful as you walk, climb stairs or drive as mild dizziness is not unusual.  5. Do not take medications that have not been prescribed by your surgeon.   6. You may switch to over the counter pain medication of your choice as you become more comfortable. WHEN TO CALL YOUR SURGEON:  1. Unrelenting pain  2. Fever or Chills  3. Redness around incisions  4. Color change in foot or toes  5. Continuous drainage or bleeding from wounds (a small amount of drainage is expected)  6. Any other worrisome condition    WHEN TO CALL YOUR REGULAR DOCTOR:  1. Flare up of any of your regular medical conditions    WHEN TO CALL 911:  1. Chest Pain  2. Shortness of Breath  3. Any other acute serious condition    CALL THE OFFICE:  If you have severe pain unrelieved by the medications; If you have a fever of 101.0°F or greater; If you notice excessive swelling, redness, or persistent drainage from the incision or IV site; The [facility] office number is [phone] from 8:00am to 5:00pm Monday through Friday. After 5:00pm, on weekends, or holidays, please leave a message with our answering service and the doctor on-call will get back to you shortly.       Patient armband removed and shredded

## 2019-10-04 ENCOUNTER — DOCUMENTATION ONLY (OUTPATIENT)
Dept: ORTHOPEDIC SURGERY | Age: 54
End: 2019-10-04

## 2019-10-04 NOTE — PROGRESS NOTES
Spoke to Armen with [employer] advised ok to schedule patient for appointment. Stated ok to refer out for pain management. Best contact number for Armen [phone]. Attempted to contact patient to schedule appointment no answer left generic voice mail for return call to office.

## 2019-10-10 ENCOUNTER — OFFICE VISIT (OUTPATIENT)
Dept: ORTHOPEDIC SURGERY | Age: 54
End: 2019-10-10

## 2019-10-10 VITALS
BODY MASS INDEX: 36.89 KG/M2 | DIASTOLIC BLOOD PRESSURE: 84 MMHG | HEART RATE: 67 BPM | RESPIRATION RATE: 18 BRPM | SYSTOLIC BLOOD PRESSURE: 139 MMHG | HEIGHT: 67 IN | TEMPERATURE: 97.5 F

## 2019-10-10 DIAGNOSIS — I69.398 SPASTICITY DUE TO OLD STROKE: ICD-10-CM

## 2019-10-10 DIAGNOSIS — Z86.73 HISTORY OF STROKE: ICD-10-CM

## 2019-10-10 DIAGNOSIS — R25.2 SPASTICITY DUE TO OLD STROKE: ICD-10-CM

## 2019-10-10 DIAGNOSIS — M46.1 SACROILIITIS (HCC): Primary | ICD-10-CM

## 2019-10-10 DIAGNOSIS — M62.838 MUSCLE SPASM: ICD-10-CM

## 2019-10-10 RX ORDER — TIZANIDINE 4 MG/1
4 TABLET ORAL
Qty: 90 TAB | Refills: 5 | Status: SHIPPED | OUTPATIENT
Start: 2019-10-10 | End: 2020-04-07

## 2019-10-10 NOTE — H&P (VIEW-ONLY)
[address] 
[address], [address] Phone: [phone] Fax: [phone] [place] : 1965 PCP: Cornelius Miranda MD 
10/10/2019 NEW PATIENT HISTORY OF PRESENT ILLNESS Royal Bishop is a 49 y.o. female c/o chronic low back pain extending into the RLE that she describes as a burning pain. She also c/o numbness and foot drop in the LLE that has improved with surgery (L5-S1 fusion) and SCS. She continues to have some residual foot drop and shakiness/tremor of her foot along with LLE cramping. She notes that her neurologist did an emergency EEG to see if it was due to her complex regional seizures, but they were not. She has found significant relief with Tizanidine, especially with the distal LLE cramping. She notes that she did not tolerate other muscle relaxants due to somnolence. She finds significant relief from the SCS and a Lidoderm 12 hr patch. Pt reports that she has had two EMGs in the past revealing a chronic left L5 radiculopathy. Pt notes that she previously had a dx of sacroiliitis, and she previously found relief from an SI joint injection. She had a right-sided lacunar stroke affecting the temporal lobe - LLE weakness, mild LUE weakness, cognitive impairment, smiling on the right side. 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 She saw Dr. Monse Sewell 19 in regards to a spinal cord stimulator malfunction. She had SCS placement in  by Dr. Saira Riddle, and it was subsequently revised several times. She has a mutli-lead paddle in place, which she uses a \"No Feel\" setting better placed by Dr. Rona Merino. She reported that she has had multiple issues with the SCS's over the years related to battery life. Due to her cognitive impairment, she is unable to use a rechargable batteries.   Per Dr. Monse Sewell, she has also had a DVT.  She had what sounds like sepsis with wound sepsis, staph infection, dural injury, and the like from her initial surgeries. She notes that she has since had the SCS corrected. She previously worked as a Physical Therapist, so she performs her stretches and exercises at home, but is willing to attend formal PT. ASSESSMENT Her RLE symptoms are likely related to sacroiliitis on the R. She had a 4/5 positive sacroiliitis provocative testing on the R. Her LLE symptoms are likely residual neurological symptoms related to her stroke and is managed with Tizanidine and Lidocaine patches. It is unlikely that more spinal intervention will be helpful outside of management of her spinal stimulator. Given her track record of issues after surgery, it would be appropriate to avoid further surgery at all costs. PLAN 1. Refill Tizanidine 4 mg TID PRN. 2. Referral to PT Adriane Ulrich 3. Right SI joint injection. 4. Sacroiliac belt. Pt will f/u in 2 weeks after injection or sooner if needed. Diagnoses and all orders for this visit: 
 
1. Sacroiliitis (HCC) 
-     REFERRAL TO PHYSICAL THERAPY 
-     SCHEDULE SURGERY 
-     AMB SUPPLY ORDER 
 
2. History of stroke 
-     REFERRAL TO PHYSICAL THERAPY 
-     tiZANidine (ZANAFLEX) 4 mg tablet; Take 1 Tab by mouth three (3) times daily as needed for Pain. 3. Muscle spasm 
-     REFERRAL TO PHYSICAL THERAPY 
-     tiZANidine (ZANAFLEX) 4 mg tablet; Take 1 Tab by mouth three (3) times daily as needed for Pain. 4. Spasticity due to old stroke -     tiZANidine (ZANAFLEX) 4 mg tablet; Take 1 Tab by mouth three (3) times daily as needed for Pain. CHIEF COMPLAINT Kindra Diamond is seen today in consultation at the request of Melissa Brambila MD for complaints of chronic low back pain radiating into BLE (R>L). PAST MEDICAL HISTORY Past Medical History:  
Diagnosis Date  Adverse effect of anesthesia   
  bronchospasm,  Asthma well controlled  Coagulation disorder (HCC)   
 hypercoagulable  Depressive disorder 1/3/2013  Epilepsy ([facility] [address]  Ill-defined condition Benign Granuloma Annularea  OAB (overactive bladder)  Obesity (BMI 30.0-34.9) 11/3/2016  Right leg DVT ([facility] [address] DVT  Seizures ([facility] [address] grand mal  
 Spastic bladder  Stroke [facility]) 2006  
 mini-stroke  Thromboembolus ([facility] [address]   
 s/p vincenzo filter  Thyroid disease  Unspecified adverse effect of anesthesia   
 bronchospasm on awakening Past Surgical History:  
Procedure Laterality Date  HX BACK SURGERY    
 dural repair  HX BACK SURGERY    
 nerve stimulator  HX LUMBAR FUSION  2006  HX LUMBAR LAMINECTOMY  2006 MEDICATIONS Current Outpatient Medications Medication Sig Dispense Refill  levothyroxine (SYNTHROID) 25 mcg tablet Take 25 mcg by mouth Daily (before breakfast).  fluticasone propion-salmeterol (ADVAIR HFA) 115-21 mcg/actuation inhaler Take 2 Puffs by inhalation two (2) times a day.  HYDROcodone-acetaminophen (NORCO) 5-325 mg per tablet Take 1 Tab by mouth.  lidocaine (LIDODERM) 5 % Apply 1 Patch to affected area.  LORazepam (ATIVAN) 0.5 mg tablet Take  by mouth.  tiZANidine (ZANAFLEX) 4 mg capsule Take 4 mg by mouth three (3) times daily.  divalproex sodium (DEPAKOTE PO) Take 500 mg by mouth two (2) times a day.  topiramate (TOPAMAX) 100 mg tablet Take 100 mg by mouth two (2) times a day.  montelukast (SINGULAIR) 10 mg tablet Take 10 mg by mouth daily. Indications: ASTHMA PREVENTION    
 
 
ALLERGIES Allergies Allergen Reactions  Adhesive Other (comments) Layers of skin peeled off  Copper Rash and Swelling  Dilantin [Phenytoin Sodium Extended] Other (comments) Throat swelled up rash all over  Other Medication Other (comments) Surgical skin prep caused layers of skin to peel off. Needs benedryl before anesthesia.  Phenobarbital Other (comments) Throat swelled up ,rash  Rifampin Rash SOCIAL HISTORY   
[unfilled] FAMILY HISTORY No family history on file. REVIEW OF SYSTEMS Review of Systems Musculoskeletal: Positive for back pain. BLE paraesthesia PHYSICAL EXAMINATION There were no vitals taken for this visit. Pain Assessment  6/18/2019 Location of Pain Back Location Modifiers Inferior Severity of Pain 4 Quality of Pain Throbbing; Sharp;Dull Duration of Pain Persistent Frequency of Pain Constant Aggravating Factors Standing; Other (Comment) Aggravating Factors Comment sitting Limiting Behavior Yes Relieving Factors Rest;Other (Comment); Ice Relieving Factors Comment laying down Result of Injury Yes Constitutional:  Well developed, well nourished, in no acute distress. Psychiatric: Affect and mood are appropriate. HEENT: Normocephalic, atraumatic. Extraocular movements intact. Integumentary: No rashes or abrasions noted on exposed areas. Cardiovascular: Regular rate and rhythm. Pulmonary: Clear to auscultation bilaterally. SPINE/MUSCULOSKELETAL EXAM 
 
Cervical spine: 
Neck is midline. Normal muscle tone. No focal atrophy is noted. ROM pain free. Shoulder ROM intact. No tenderness to palpation. Negative Spurling's sign. Negative Tinel's sign. Negative Serrato's sign. Sensation in the bilateral arms grossly intact to light touch. Lumbar spine: No rash, ecchymosis, or gross obliquity. No fasciculations. No focal atrophy is noted. No pain with hip ROM. Full range of motion. No tenderness to palpation. No tenderness to palpation at the sciatic notch. SI joints tender bilaterally, R>L Trochanters non tender. Sensation in the bilateral legs grossly intact to light touch. LEFT RIGHT CLAY TEST - +  
P4 TEST - + COMPRESSION TEST - -  
DISTRACTION TEST - + GAENSLEN'S TEST - + MOTOR:   
  Biceps  Triceps Deltoids Wrist Ext Wrist Flex Hand Intrin Right 5/5 5/5 5/5 5/5 5/5 5/5 Left 5/5 5/5 5/5 5/5 5/5 5/5 Hip Flex  Quads Hamstrings Ankle DF EHL Ankle PF Right 5/5 5/5 5/5 5/5 5/5 5/5 Left 5/5 5/5 5/5 5/5 5/5 5/5 DTRs are 1+ biceps, triceps, brachioradialis, patella, and Achilles. Negative Straight Leg raise. Squat not tested. No difficulty with tandem gait. Ambulation with single point cane. FWB. RADIOGRAPHS Lumbar XR images taken on 2/16/18 personally reviewed with patient: 
Right posterior upper hip generator device with dorsal intraspinal stimulator wires, and the T10-11 level with the wire tips at the T7-T8 level. Postoperative changes of previous L5 decompression laminectomy with solid and are osseous circumferential fusion at L5-S1. No findings of hardware failure. A right-sided IVC filter is present at the L2-L3 level. Mild multilevel degenerative spondylosis similar prior examination with no evidence of listhesis. The vertebral bodies maintain normal height and alignment. No acute obstructive osseous abnormality. ___________________ IMPRESSION IMPRESSION: 
 
1. Stable post operative circumferential fusion L5-S1 with L5 decompression laminectomy. 2.  Dorsal stimulator wire tip at the T7-T8 level. 3.  Mild degenerative lumbar spine spondylosis. No acute obstructive osseous abnormality.  reviewed Ms. Yessi Lopez has a reminder for a \"due or due soon\" health maintenance. I have asked that she contact her primary care provider for follow-up on this health maintenance. 44 minutes of face-to-face contact were spent with the patient during today's visit extensively discussing symptoms and treatment plan. All questions were answered. More than half of this visit today was spent on counseling. Written by Karlo Ott, as dictated by Dr. Bard Larson. I, Dr. Estrellita Singh, confirm that all documentation is accurate.

## 2019-10-10 NOTE — PROGRESS NOTES
[address]  [address], [address]  [place], [address] [place] [address] Street  Phone: [phone]  Fax: [phone]        Polina Snowden  : 1965  PCP: Dinorah Burnham MD  10/10/2019    NEW PATIENT      HISTORY OF PRESENT ILLNESS  Jerrica Gudino is a 49 y.o. female c/o chronic low back pain extending into the RLE that she describes as a burning pain. She also c/o numbness and foot drop in the LLE that has improved with surgery (L5-S1 fusion) and SCS. She continues to have some residual foot drop and shakiness/tremor of her foot along with LLE cramping. She notes that her neurologist did an emergency EEG to see if it was due to her complex regional seizures, but they were not. She has found significant relief with Tizanidine, especially with the distal LLE cramping. She notes that she did not tolerate other muscle relaxants due to somnolence. She finds significant relief from the SCS and a Lidoderm 12 hr patch. Pt reports that she has had two EMGs in the past revealing a chronic left L5 radiculopathy. Pt notes that she previously had a dx of sacroiliitis, and she previously found relief from an SI joint injection. She had a right-sided lacunar stroke affecting the temporal lobe - LLE weakness, mild LUE weakness, cognitive impairment, smiling on the right side. 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 She saw Dr. Job Silva 19 in regards to a spinal cord stimulator malfunction. She had SCS placement in  by Dr. Ramón Brown, and it was subsequently revised several times. She has a mutli-lead paddle in place, which she uses a \"No Feel\" setting better placed by Dr. Sarah Molina. She reported that she has had multiple issues with the SCS's over the years related to battery life. Due to her cognitive impairment, she is unable to use a rechargable batteries. Per Dr. Job Silva, she has also had a DVT.   She had what sounds like sepsis with wound sepsis, staph infection, dural injury, and the like from her initial surgeries. She notes that she has since had the SCS corrected. She previously worked as a Physical Therapist, so she performs her stretches and exercises at home, but is willing to attend formal PT.     ASSESSMENT  Her RLE symptoms are likely related to sacroiliitis on the R. She had a 4/5 positive sacroiliitis provocative testing on the R. Her LLE symptoms are likely residual neurological symptoms related to her stroke and is managed with Tizanidine and Lidocaine patches. It is unlikely that more spinal intervention will be helpful outside of management of her spinal stimulator. Given her track record of issues after surgery, it would be appropriate to avoid further surgery at all costs. PLAN  1. Refill Tizanidine 4 mg TID PRN. 2. Referral to PT Devyn Cline  3. Right SI joint injection. 4. Sacroiliac belt. Pt will f/u in 2 weeks after injection or sooner if needed. Diagnoses and all orders for this visit:    1. Sacroiliitis (HCC)  -     REFERRAL TO PHYSICAL THERAPY  -     SCHEDULE SURGERY  -     AMB SUPPLY ORDER    2. History of stroke  -     REFERRAL TO PHYSICAL THERAPY  -     tiZANidine (ZANAFLEX) 4 mg tablet; Take 1 Tab by mouth three (3) times daily as needed for Pain. 3. Muscle spasm  -     REFERRAL TO PHYSICAL THERAPY  -     tiZANidine (ZANAFLEX) 4 mg tablet; Take 1 Tab by mouth three (3) times daily as needed for Pain. 4. Spasticity due to old stroke  -     tiZANidine (ZANAFLEX) 4 mg tablet; Take 1 Tab by mouth three (3) times daily as needed for Pain. CHIEF COMPLAINT  Radha Girard is seen today in consultation at the request of Tammy Otoole MD for complaints of chronic low back pain radiating into BLE (R>L).        PAST MEDICAL HISTORY   Past Medical History:   Diagnosis Date    Adverse effect of anesthesia      bronchospasm,     Asthma     well controlled    Coagulation disorder ([facility]     hypercoagulable    Depressive disorder 1/3/2013    [facility]     Ill-defined condition     Benign Granuloma Annularea    OAB (overactive bladder)     Obesity (BMI 30.0-34.9) 11/3/2016    Right leg DVT (HCC)     DVT    Seizures (HCC)     grand mal    Spastic bladder     Stroke ([facility] [facility] 2006    mini-stroke    Thromboembolus [facility])     s/p vincenzo filter    Thyroid disease     Unspecified adverse effect of anesthesia     bronchospasm on awakening       Past Surgical History:   Procedure Laterality Date    HX BACK SURGERY      dural repair    HX BACK SURGERY      nerve stimulator    HX LUMBAR FUSION  2006    HX LUMBAR LAMINECTOMY  2006       MEDICATIONS    Current Outpatient Medications   Medication Sig Dispense Refill    levothyroxine (SYNTHROID) 25 mcg tablet Take 25 mcg by mouth Daily (before breakfast).  fluticasone propion-salmeterol (ADVAIR HFA) 115-21 mcg/actuation inhaler Take 2 Puffs by inhalation two (2) times a day.  HYDROcodone-acetaminophen (NORCO) 5-325 mg per tablet Take 1 Tab by mouth.  lidocaine (LIDODERM) 5 % Apply 1 Patch to affected area.  LORazepam (ATIVAN) 0.5 mg tablet Take  by mouth.  tiZANidine (ZANAFLEX) 4 mg capsule Take 4 mg by mouth three (3) times daily.  divalproex sodium (DEPAKOTE PO) Take 500 mg by mouth two (2) times a day.  topiramate (TOPAMAX) 100 mg tablet Take 100 mg by mouth two (2) times a day.  montelukast (SINGULAIR) 10 mg tablet Take 10 mg by mouth daily. Indications: ASTHMA PREVENTION         ALLERGIES  Allergies   Allergen Reactions    Adhesive Other (comments)     Layers of skin peeled off    Copper Rash and Swelling    Dilantin [Phenytoin Sodium Extended] Other (comments)     Throat swelled up rash all over    Other Medication Other (comments)     Surgical skin prep caused layers of skin to peel off. Needs benedryl before anesthesia.     Phenobarbital Other (comments)     Throat swelled up ,rash    Rifampin Rash          SOCIAL HISTORY    [unfilled]    FAMILY HISTORY  No family history on file. REVIEW OF SYSTEMS  Review of Systems   Musculoskeletal: Positive for back pain. BLE paraesthesia         PHYSICAL EXAMINATION  There were no vitals taken for this visit. Pain Assessment  6/18/2019   Location of Pain Back   Location Modifiers Inferior   Severity of Pain 4   Quality of Pain Throbbing; Sharp;Dull   Duration of Pain Persistent   Frequency of Pain Constant   Aggravating Factors Standing; Other (Comment)   Aggravating Factors Comment sitting   Limiting Behavior Yes   Relieving Factors Rest;Other (Comment); Ice   Relieving Factors Comment laying down   Result of Injury Yes         Constitutional:  Well developed, well nourished, in no acute distress. Psychiatric: Affect and mood are appropriate. HEENT: Normocephalic, atraumatic. Extraocular movements intact. Integumentary: No rashes or abrasions noted on exposed areas. Cardiovascular: Regular rate and rhythm. Pulmonary: Clear to auscultation bilaterally. SPINE/MUSCULOSKELETAL EXAM    Cervical spine:  Neck is midline. Normal muscle tone. No focal atrophy is noted. ROM pain free. Shoulder ROM intact. No tenderness to palpation. Negative Spurling's sign. Negative Tinel's sign. Negative Serrato's sign. Sensation in the bilateral arms grossly intact to light touch. Lumbar spine:  No rash, ecchymosis, or gross obliquity. No fasciculations. No focal atrophy is noted. No pain with hip ROM. Full range of motion. No tenderness to palpation. No tenderness to palpation at the sciatic notch. SI joints tender bilaterally, R>L   Trochanters non tender. Sensation in the bilateral legs grossly intact to light touch.          LEFT RIGHT   CLAY TEST - +   P4 TEST - +   COMPRESSION TEST - -   DISTRACTION TEST - +   GAENSLEN'S TEST - +         MOTOR:      Biceps  Triceps Deltoids Wrist Ext Wrist Flex Hand Intrin   Right 5/5 5/5 5/5 5/5 5/5 5/5   Left 5/5 5/5 5/5 5/5 5/5 5/5             Hip Flex  Quads Hamstrings Ankle DF EHL Ankle PF   Right 5/5 5/5 5/5 5/5 5/5 5/5   Left 5/5 5/5 5/5 5/5 5/5 5/5     DTRs are 1+ biceps, triceps, brachioradialis, patella, and Achilles. Negative Straight Leg raise. Squat not tested. No difficulty with tandem gait. Ambulation with single point cane. FWB. RADIOGRAPHS  Lumbar XR images taken on 2/16/18 personally reviewed with patient:  Right posterior upper hip generator device with dorsal intraspinal stimulator wires, and the T10-11 level with the wire tips at the T7-T8 level. Postoperative changes of previous L5 decompression laminectomy with solid and are osseous circumferential fusion at L5-S1. No findings of hardware failure. A right-sided IVC filter is present at the L2-L3 level. Mild multilevel degenerative spondylosis similar prior examination with no evidence of listhesis. The vertebral bodies maintain normal height and alignment. No acute obstructive osseous abnormality. ___________________  IMPRESSION  IMPRESSION:    1. Stable post operative circumferential fusion L5-S1 with L5 decompression laminectomy. 2.  Dorsal stimulator wire tip at the T7-T8 level. 3.  Mild degenerative lumbar spine spondylosis. No acute obstructive osseous abnormality.  reviewed    Ms. Mariya Velez has a reminder for a \"due or due soon\" health maintenance. I have asked that she contact her primary care provider for follow-up on this health maintenance. 44 minutes of face-to-face contact were spent with the patient during today's visit extensively discussing symptoms and treatment plan. All questions were answered. More than half of this visit today was spent on counseling. Written by Sasha Blake, as dictated by Dr. Umair Griffiths. I, Dr. Umair Griffiths, confirm that all documentation is accurate.

## 2019-10-14 ENCOUNTER — TELEPHONE (OUTPATIENT)
Dept: ORTHOPEDIC SURGERY | Age: 54
End: 2019-10-14

## 2019-10-14 DIAGNOSIS — Z86.73 HISTORY OF STROKE: ICD-10-CM

## 2019-10-14 DIAGNOSIS — M62.838 MUSCLE SPASM: ICD-10-CM

## 2019-10-14 DIAGNOSIS — M46.1 SACROILIITIS (HCC): ICD-10-CM

## 2019-10-14 DIAGNOSIS — M62.89 PELVIC FLOOR DYSFUNCTION IN FEMALE: Primary | ICD-10-CM

## 2019-10-14 NOTE — TELEPHONE ENCOUNTER
Received copy of [facility] records as requested. Dr. Alberto Ho reviewed notes and stated he will change up patient's PT order to address Pelvic floor first prior to SI. Called patient left voicemail to return call.

## 2019-10-16 ENCOUNTER — APPOINTMENT (OUTPATIENT)
Dept: PHYSICAL THERAPY | Age: 54
End: 2019-10-16

## 2019-10-16 ENCOUNTER — TELEPHONE (OUTPATIENT)
Dept: ORTHOPEDIC SURGERY | Age: 54
End: 2019-10-16

## 2019-10-16 DIAGNOSIS — M46.1 SACROILIITIS (HCC): Primary | ICD-10-CM

## 2019-10-16 DIAGNOSIS — M62.838 MUSCLE SPASM: ICD-10-CM

## 2019-10-16 DIAGNOSIS — M62.89 PELVIC FLOOR DYSFUNCTION IN FEMALE: ICD-10-CM

## 2019-10-16 DIAGNOSIS — Z86.73 HISTORY OF STROKE: ICD-10-CM

## 2019-10-16 NOTE — TELEPHONE ENCOUNTER
Received call from Paul Bass with [employer] [facility] [place])     She advised she received a voicemail from patient stating DA wants patient to go to the [address] [place] [address] for aqua therapy but Paul Bass advised [facility] doesn't have that program there. If patient needs aqua therapy then she should stay with [facility] [place] for aqua therapy. I advised would send clinical a note.        If needed, Paul Bass can be reached at: [phone]

## 2019-10-16 NOTE — TELEPHONE ENCOUNTER
Received another call from Gaston Cerna who advised she spoke with the patient and gained some clarity. She advised DA wanted patient to go to [address] [place] [address] for the pelvic floor program. Gaston Cerna advised the pelvic floor [facility]. Patient will have to go to a different company for pelvic floor program.     Gaston Cerna wants to know if HARSHIL wants patient to complete the aqua therapy program at the [facility] location - patient has an appt scheduled for today.      Gaston Cerna can be reached at: [phone]

## 2019-10-16 NOTE — TELEPHONE ENCOUNTER
Estephania Garcia with [employer] needs to know if the patient's Pelvic Floor Therapy is need for workers comp injury to her back. Please call Estephania Garcia back at [phone].  Will need Pelvic Floor Therapy faxed to her [phone]

## 2019-10-16 NOTE — TELEPHONE ENCOUNTER
Please discuss with Dr. Erma Wood.  Not clear in the notes, only states: Referral to PT Addy Mobley)

## 2019-10-16 NOTE — TELEPHONE ENCOUNTER
[facility] is located near patient and offers Pelvic floor therapy. Patient referral will be changed again. Patient is aware.

## 2019-10-17 NOTE — TELEPHONE ENCOUNTER
Spoke with Annemarie Fitzgerald from [employer] and faxed the order for PT to her and she will set up PT

## 2019-10-22 ENCOUNTER — HOSPITAL ENCOUNTER (OUTPATIENT)
Age: 54
Setting detail: OUTPATIENT SURGERY
Discharge: HOME | End: 2019-10-22
Attending: PHYSICAL MEDICINE & REHABILITATION | Admitting: PHYSICAL MEDICINE & REHABILITATION
Payer: OTHER MISCELLANEOUS

## 2019-10-22 ENCOUNTER — APPOINTMENT (OUTPATIENT)
Dept: GENERAL RADIOLOGY | Age: 54
End: 2019-10-22
Attending: PHYSICAL MEDICINE & REHABILITATION
Payer: OTHER MISCELLANEOUS

## 2019-10-22 VITALS
SYSTOLIC BLOOD PRESSURE: 137 MMHG | RESPIRATION RATE: 18 BRPM | HEIGHT: 67 IN | OXYGEN SATURATION: 100 % | TEMPERATURE: 97.8 F | WEIGHT: 235 LBS | DIASTOLIC BLOOD PRESSURE: 47 MMHG | BODY MASS INDEX: 36.88 KG/M2 | HEART RATE: 75 BPM

## 2019-10-22 LAB — HCG UR QL: NEGATIVE

## 2019-10-22 PROCEDURE — 74011636320 HC RX REV CODE- 636/320: Performed by: PHYSICAL MEDICINE & REHABILITATION

## 2019-10-22 PROCEDURE — 81025 URINE PREGNANCY TEST: CPT

## 2019-10-22 PROCEDURE — 77030039433 HC TY MYLEOGRAM BD -B: Performed by: PHYSICAL MEDICINE & REHABILITATION

## 2019-10-22 PROCEDURE — 74011250636 HC RX REV CODE- 250/636: Performed by: PHYSICAL MEDICINE & REHABILITATION

## 2019-10-22 PROCEDURE — 76010000009 HC PAIN MGT 0 TO 30 MIN PROC: Performed by: PHYSICAL MEDICINE & REHABILITATION

## 2019-10-22 PROCEDURE — 74011000250 HC RX REV CODE- 250: Performed by: PHYSICAL MEDICINE & REHABILITATION

## 2019-10-22 RX ORDER — DIAZEPAM 5 MG/1
2.5-1 TABLET ORAL ONCE
Status: DISCONTINUED | OUTPATIENT
Start: 2019-10-22 | End: 2019-10-22 | Stop reason: HOSPADM

## 2019-10-22 RX ORDER — DEXAMETHASONE SODIUM PHOSPHATE 100 MG/10ML
INJECTION INTRAMUSCULAR; INTRAVENOUS AS NEEDED
Status: DISCONTINUED | OUTPATIENT
Start: 2019-10-22 | End: 2019-10-22 | Stop reason: HOSPADM

## 2019-10-22 RX ORDER — LIDOCAINE HYDROCHLORIDE 10 MG/ML
INJECTION, SOLUTION EPIDURAL; INFILTRATION; INTRACAUDAL; PERINEURAL AS NEEDED
Status: DISCONTINUED | OUTPATIENT
Start: 2019-10-22 | End: 2019-10-22 | Stop reason: HOSPADM

## 2019-10-22 NOTE — INTERVAL H&P NOTE
H&P Update:  Kindra Diamond was seen and briefly examined. History and physical has been reviewed.  There have been no significant clinical changes since the completion of the originally dated History and Physical.

## 2019-10-22 NOTE — PROCEDURES
Procedure Note    Patient Name: Eduardo Briseno    Date of Procedure: October 22, 2019    Preoperative Diagnosis:Sacroiliac Dysfunction    Post Operative Diagnosis: same    Procedure: SI Joint Injection, Intra-articular and extra-articular - Unilateral  right    Consent: Informed consent was obtained prior to the procedure. The patient was given the opportunity to ask questions regarding the procedure and its associated risks. In addition to the potential risks associated with the procedure itself, the patient was informed both verbally and in writing of potential side effects of the use of corticosteroids. The patient appeared to comprehend the informed consent and desired to have the procedure performed. Procedure: The patient was placed in the prone position on the flouroscopy table and the back was prepped and draped in the usual sterile manner. After local Lidocaine 1% infiltration, a #22 gauge spinal needle was then advanced to lie within the Sacroiliac Joint. Yes a small amount of Isovue was used to confirm placement, no vascular uptake was identified. A total of 10 mg of Dexamethasone  and 5 ml of Lidocaine was introduced in and around the joint. The injection area was cleaned and bandaids applied. No excessive bleeding was noted. Patient dressed and was discharged to home with instructions. Discussion:  The patient tolerated the procedure well.         Natalia Owen MD  October 22, 2019

## 2019-10-22 NOTE — DISCHARGE INSTRUCTIONS
[facility]   ([facility])  Dr. Shivani Jalloh, Dr. Alexander Aguilar, Dr. Eunice Skiff not drive a car, operate heavy machinery or dangerous equipment for 24 hours. * Activity as tolerated; rest for the remainder of the day. * Resume pre-block medications including those for your family doctor. * Do not drink alcoholic beverages for 24 hours. Alcohol and the medications you have received may interact and cause an adverse reaction. * You may feel better this evening and worse tomorrow, as the numbing medications wears off and the steroid has yet to begin to work. After 48 hrs the steroid should begin to release bringing you relief. * You may shower this evening and remove any bandages. * Avoid hot tubs and heating pads for 24 hours. You may use cold packs on the procedure site as tolerated for the first 24 hours. * If a headache develops, drink plenty of fluids and rest.  Take over the counter medications for headache if needed. If the headache continues longer than 24 hours, call MD at the [address] [place] [address]. [phone]    * Continue taking pain medications as needed. * You may resume your regular diet if tolerated. Otherwise, start with sips of water and advance slowly. * If Diabetic: check your blood sugar three times a day for the next 3 days. If your sugar is greater than 300 call your family doctor. If your sugar is greater than 400, have someone transport you to the nearest Emergency Room. * If you experience any of the following problems, Please Call the [address] [place] [address] at [phone].         * Shortness of Breath    * Fever of 101 or higher    * Nausea / Vomiting    * Severe Headache    * Weakness or numbness in arms or legs that is not      resolving    * Prolonged increase in pain greater than 4 days      DISCHARGE SUMMARY from Nurse      PATIENT INSTRUCTIONS:    After oral sedation, for 24 hours or while taking prescription Narcotics:  · Limit your activities  · Do not drive and operate hazardous machinery  · Do not make important personal or business decisions  · Do  not drink alcoholic beverages  · If you have not urinated within 8 hours after discharge, please contact your surgeon on call. Report the following to your surgeon:  · Excessive pain, swelling, redness or odor of or around the surgical area  · Temperature over 101  · Nausea and vomiting lasting longer than 4 hours or if unable to take medications  · Any signs of decreased circulation or nerve impairment to extremity: change in color, persistent  numbness, tingling, coldness or increase pain  · Any questions            What to do at Home:  Recommended activity: Activity as tolerated, NO DRIVING FOR 12 Hours post injection          *  Please give a list of your current medications to your Primary Care Provider. *  Please update this list whenever your medications are discontinued, doses are      changed, or new medications (including over-the-counter products) are added. *  Please carry medication information at all times in case of emergency situations. These are general instructions for a healthy lifestyle:    No smoking/ No tobacco products/ Avoid exposure to second hand smoke    Surgeon General's Warning:  Quitting smoking now greatly reduces serious risk to your health. Obesity, smoking, and sedentary lifestyle greatly increases your risk for illness    A healthy diet, regular physical exercise & weight monitoring are important for maintaining a healthy lifestyle    You may be retaining fluid if you have a history of heart failure or if you experience any of the following symptoms:  Weight gain of 3 pounds or more overnight or 5 pounds in a week, increased swelling in our hands or feet or shortness of breath while lying flat in bed.   Please call your doctor as soon as you notice any of these symptoms; do not wait until your next office visit. Recognize signs and symptoms of STROKE:    F-face looks uneven    A-arms unable to move or move unevenly    S-speech slurred or non-existent    T-time-call 911 as soon as signs and symptoms begin-DO NOT go       Back to bed or wait to see if you get better-TIME IS BRAIN.

## 2019-11-12 ENCOUNTER — OFFICE VISIT (OUTPATIENT)
Dept: ORTHOPEDIC SURGERY | Age: 54
End: 2019-11-12

## 2019-11-12 VITALS
WEIGHT: 240.6 LBS | SYSTOLIC BLOOD PRESSURE: 129 MMHG | RESPIRATION RATE: 18 BRPM | DIASTOLIC BLOOD PRESSURE: 85 MMHG | TEMPERATURE: 97.5 F | HEART RATE: 69 BPM | BODY MASS INDEX: 37.76 KG/M2 | HEIGHT: 67 IN

## 2019-11-12 DIAGNOSIS — M62.838 MUSCLE SPASM: ICD-10-CM

## 2019-11-12 DIAGNOSIS — R25.2 SPASTICITY DUE TO OLD STROKE: ICD-10-CM

## 2019-11-12 DIAGNOSIS — M62.89 PELVIC FLOOR DYSFUNCTION IN FEMALE: ICD-10-CM

## 2019-11-12 DIAGNOSIS — Z86.73 HISTORY OF STROKE: ICD-10-CM

## 2019-11-12 DIAGNOSIS — I69.398 SPASTICITY DUE TO OLD STROKE: ICD-10-CM

## 2019-11-12 DIAGNOSIS — M46.1 SACROILIITIS (HCC): Primary | ICD-10-CM

## 2019-11-12 NOTE — PROGRESS NOTES
[address]  [address], [address]  [facility], [address]  Phone: [phone]  Fax: [phone]        Margarita Reece  : 1965  PCP: Asia Lacy MD  2019    PROGRESS NOTE      HISTORY OF PRESENT ILLNESS  Eduardo Briseno is a 49 y.o. female who was seen as a new patient 10/10/19 with c/o chronic low back pain extending into the RLE that she describes as a burning pain. She also c/o numbness and foot drop in the LLE that has improved with surgery (L5-S1 fusion) and SCS. She continues to have some residual foot drop and shakiness/tremor of her foot along with LLE cramping. She notes that her neurologist did an emergency EEG to see if it was due to her complex regional seizures, but they were not. She has found significant relief with Tizanidine, especially with the distal LLE cramping. She notes that she did not tolerate other muscle relaxants due to somnolence. She finds significant relief from the SCS and a Lidoderm 12 hr patch. Pt reports that she has had two EMGs in the past revealing a chronic left L5 radiculopathy. Pt notes that she previously had a dx of sacroiliitis, and she previously found relief from an SI joint injection. She had a right-sided lacunar stroke affecting the temporal lobe - LLE weakness, mild LUE weakness, cognitive impairment, smiling on the right side. 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 She saw Dr. Jeffry Coronel 19 in regards to a spinal cord stimulator malfunction. She had SCS placement in  by Dr. Indu Mari, and it was subsequently revised several times. She has a mutli-lead paddle in place, which she uses a \"No Feel\" setting better placed by Dr. Nathan Child. She reported that she has had multiple issues with the SCS's over the years related to battery life. Due to her cognitive impairment, she is unable to use a rechargable batteries.   Per  Job Silva, she has also had a DVT.  She had what sounds like sepsis with wound sepsis, staph infection, dural injury, and the like from her initial surgeries. She notes that she has since had the SCS corrected. She previously worked as a Physical Therapist, so she performs her stretches and exercises at home, but is willing to attend formal PT. I refilled her Tizanidine, referred her to pelvic floor PT, and prescribed an SI belt. She was also referred for a right SI joint injection. Jerrica Gudino comes in to the office today for f/u. She had a  Right SI joint injection (10/22/19; Dr. Sania Gomez) that provided significant relief. Pt notes that since she had the injection, she has already doubled her daily steps. She has been using Tizanidine less. She has not begun PT yet, but is scheduled to begin pelvic floor therapy at [facility] soon. She continues to take Tizanidine, Topamax, and Depakote prescribed by her neurologist. She maintains an HEP of walking and yoga. She rates her pain as a 3/10 today. ASSESSMENT  Her RLE symptoms are likely related to sacroiliitis on the R that was significantly improved from a right SI joint injection. Her LLE symptoms are likely residual neurological symptoms related to her stroke and is managed with Tizanidine and Lidocaine patches. It is unlikely that more spinal intervention will be helpful outside of management of her spinal stimulator. Given her track record of issues after surgery, it would be appropriate to avoid further surgery at all costs. PLAN  1. We discussed adding variety to her HEP. 2. Proceed with pelvic floor PT ([place])    Pt will f/u in 3 months or sooner as needed. Diagnoses and all orders for this visit:    1. Sacroiliitis ([facility] [address]    2. History of stroke    3. Muscle spasm    4.  Pelvic floor dysfunction in female               PAST MEDICAL HISTORY   Past Medical History:   Diagnosis Date    Adverse effect of anesthesia      bronchospasm,     Asthma well controlled    Coagulation disorder ([facility] [address]     hypercoagulable    Depressive disorder 1/3/2013    Epilepsy ([facility] [address]     Ill-defined condition     Benign Granuloma Annularea    OAB (overactive bladder)     Obesity (BMI 30.0-34.9) 11/3/2016    Right leg DVT (HCC)     DVT    Seizures (HCC)     grand mal    Spastic bladder     Stroke ([facility] [address] 2006    mini-stroke    Thromboembolus [facility])     s/p vincenzo filter    Thyroid disease     Unspecified adverse effect of anesthesia     bronchospasm on awakening       Past Surgical History:   Procedure Laterality Date    HX BACK SURGERY      dural repair    HX BACK SURGERY      nerve stimulator    HX LUMBAR FUSION  2006    HX LUMBAR LAMINECTOMY  2006   . MEDICATIONS    Current Outpatient Medications   Medication Sig Dispense Refill    tiZANidine (ZANAFLEX) 4 mg tablet Take 1 Tab by mouth three (3) times daily as needed for Pain. 90 Tab 5    levothyroxine (SYNTHROID) 25 mcg tablet Take 25 mcg by mouth Daily (before breakfast).  fluticasone propion-salmeterol (ADVAIR HFA) 115-21 mcg/actuation inhaler Take 2 Puffs by inhalation two (2) times a day.  lidocaine (LIDODERM) 5 % Apply 1 Patch to affected area as needed.  LORazepam (ATIVAN) 0.5 mg tablet Take  by mouth.  divalproex sodium (DEPAKOTE PO) Take 500 mg by mouth two (2) times a day.  topiramate (TOPAMAX) 100 mg tablet Take 100 mg by mouth two (2) times a day.  montelukast (SINGULAIR) 10 mg tablet Take 10 mg by mouth daily. Indications: ASTHMA PREVENTION      HYDROcodone-acetaminophen (NORCO) 5-325 mg per tablet Take 1 Tab by mouth as needed. ALLERGIES  Allergies   Allergen Reactions    Adhesive Other (comments)     Layers of skin peeled off    Copper Rash and Swelling    Dilantin [Phenytoin Sodium Extended] Other (comments)     Throat swelled up rash all over    Other Medication Other (comments)     Surgical skin prep caused layers of skin to peel off.  Needs benedryl before anesthesia.  Phenobarbital Other (comments)     Throat swelled up ,rash    Rifampin Rash          SOCIAL HISTORY    Social History     Socioeconomic History    Marital status:      Spouse name: Not on file    Number of children: Not on file    Years of education: Not on file    Highest education level: Not on file   Tobacco Use    Smoking status: Never Smoker    Smokeless tobacco: Never Used   Substance and Sexual Activity    Alcohol use: Yes     Comment: 1 per month    Drug use: No       FAMILY HISTORY  No family history on file. REVIEW OF SYSTEMS  Review of Systems   Musculoskeletal: Positive for back pain. BLE paraesthesia           PHYSICAL EXAMINATION  Visit Vitals  /85   Pulse 69   Temp 97.5 °F (36.4 °C) (Oral)   Resp 18   Ht 5' 7\" (1.702 m)   Wt 240 lb 9.6 oz (109.1 kg)   BMI 37.68 kg/m²       Pain Assessment  11/12/2019   Location of Pain Back   Location Modifiers -   Severity of Pain 3   Quality of Pain Aching; Sharp   Quality of Pain Comment -   Duration of Pain Persistent   Frequency of Pain Constant   Aggravating Factors -   Aggravating Factors Comment -   Limiting Behavior -   Relieving Factors -   Relieving Factors Comment -   Result of Injury -           Constitutional:  Well developed, well nourished, in no acute distress. Psychiatric: Affect and mood are appropriate. Integumentary: No rashes or abrasions noted on exposed areas. SPINE/MUSCULOSKELETAL EXAM    Cervical spine:  Neck is midline. Normal muscle tone. No focal atrophy is noted. ROM pain free. Shoulder ROM intact. No tenderness to palpation. Negative Spurling's sign. Negative Tinel's sign. Negative Serrato's sign. Sensation in the bilateral arms grossly intact to light touch.      Lumbar spine:  No rash, ecchymosis, or gross obliquity.    No fasciculations. No focal atrophy is noted. No pain with hip ROM. Full range of motion. No tenderness to palpation. No tenderness to palpation at the sciatic notch. SI joints tender bilaterally, R>L   Trochanters non tender. Sensation in the bilateral legs grossly intact to light touch.             LEFT RIGHT   CLAY TEST - +   P4 TEST - +   COMPRESSION TEST - -   DISTRACTION TEST - +   GAENSLEN'S TEST - +       MOTOR:      Biceps  Triceps Deltoids Wrist Ext Wrist Flex Hand Intrin   Right 5/5 5/5 5/5 5/5 5/5 5/5   Left 5/5 5/5 5/5 5/5 5/5 5/5             Hip Flex  Quads Hamstrings Ankle DF EHL Ankle PF   Right 5/5 5/5 5/5 5/5 5/5 5/5   Left 5/5 5/5 5/5 5/5 5/5 5/5     DTRs are 1+ biceps, triceps, brachioradialis, patella, and Achilles.     Negative Straight Leg raise. Squat not tested. No difficulty with tandem gait.      Ambulation with single point cane. FWB.       RADIOGRAPHS  Lumbar XR images taken on 2/16/18 personally reviewed with patient:  Right posterior upper hip generator device with dorsal intraspinal stimulator wires, and the T10-11 level with the wire tips at the T7-T8 level. Postoperative changes of previous L5 decompression laminectomy with solid and are osseous circumferential fusion at L5-S1. No findings of hardware failure. A right-sided IVC filter is present at the L2-L3 level. Mild multilevel degenerative spondylosis similar prior examination with no evidence of listhesis. The vertebral bodies maintain normal height and alignment. No acute obstructive osseous abnormality. ___________________  IMPRESSION  IMPRESSION:    1. Stable post operative circumferential fusion L5-S1 with L5 decompression laminectomy. 2.  Dorsal stimulator wire tip at the T7-T8 level. 3.  Mild degenerative lumbar spine spondylosis. No acute obstructive osseous abnormality.     30 minutes of face-to-face contact were spent with the patient during today's visit extensively discussing symptoms and treatment plan. All questions were answered. More than half of this visit today was spent on counseling.      Written by Didi Low as dictated by Juliana Hayden MD

## 2019-11-14 ENCOUNTER — HOSPITAL ENCOUNTER (OUTPATIENT)
Dept: PHYSICAL THERAPY | Age: 54
Discharge: HOME | End: 2019-11-14
Payer: OTHER MISCELLANEOUS

## 2019-11-14 PROCEDURE — 97163 PT EVAL HIGH COMPLEX 45 MIN: CPT

## 2019-11-14 PROCEDURE — 97110 THERAPEUTIC EXERCISES: CPT

## 2019-11-14 PROCEDURE — 97530 THERAPEUTIC ACTIVITIES: CPT

## 2019-11-14 NOTE — PROGRESS NOTES
[facility]  [address], [address] [facility] [address]  Ph [phone]  Fx [phone]    Plan of Care/ Statement of Necessity for Physical Therapy Services    Patient name: Angie Osborne Start of Care: 2019   Referral source: Gini Khan MD : 1965    Medical Diagnosis: Low back pain [M54.5]  Right buttock pain [M79.18]  Sacroiliac pain [M53.3]   Onset Date:    Treatment Diagnosis: LBP/SI dysfunction/radiculopathy/spasticity/gait imbalance/pelvic floor dysfunction                                              ICD-10: M46.1, M54.5,R26.81, M62.89   Prior Hospitalization: see medical history Provider#: [id number]   Medications: Verified on Patient summary List    Comorbidities: left leg spasticity, gait dysfunction, s/p TIA/CVA, PF dysfunction, bilateral RCT   Prior Level of Function: 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 Assessment/ key information: 49 YOF is presenting to PT with c/o fluctuating levels of LB/buttock pain/paresthesia, intermittent saddle type numbness, pain ranging from 3-8/10, pelvic floor dysfunction in regards to fecal/bladder incontinence and limited function overall as per self reported FOTO score of 40/100. Patient is s/p history of 3 spinal surgeries with complications including chord injury (as per patient) and pelvic floor dysfunction, s/p TIA/CVA. She is using a Neuro-stimulator for pain management. Objective findings include limited spinal ROM , postural alignment /asymmetries, altered gait pattern (LS overcompensation, stepper type gait with DF weakness), left leg spasticity, marked core and leg strength deficit, TTP over LS and right SI region, difficulty with transfers, balance deficit and limited overall functional tolerances.  Patient is a good candidate for PT to address above deficits and would benefit from a dynamic AFO to improve her gait pattern, left foot clearance and reduce compensatory LS movements. Evaluation Complexity History HIGH Complexity :3+ comorbidities / personal factors will impact the outcome/ POC ; Examination HIGH Complexity : 4+ Standardized tests and measures addressing body structure, function, activity limitation and / or participation in recreation  ;Presentation HIGH Complexity : Unstable and unpredictable characteristics  ; Clinical Decision Making MEDIUM Complexity : FOTO score of 26-74 : FOTO score = an established functional score where 100 = no disability  Overall Complexity Rating: HIGH  Problem List: pain affecting function, decrease ROM, decrease strength, impaired gait/ balance, decrease ADL/ functional abilitiies, decrease activity tolerance, decrease flexibility/ joint mobility and decrease transfer abilities   Treatment Plan may include any combination of the following: Therapeutic exercise, Therapeutic activities, Neuromuscular re-education, Physical agent/modality, Gait/balance training, Manual therapy, Patient education and Other: pelvic floor dysfunction  Patient / Family readiness to learn indicated by: trying to perform skills and interest  Persons(s) to be included in education: patient (P)  Barriers to Learning/Limitations: None  Measures taken if barriers to learning: n/a  Patient Goal (s): get better, be able to function, improve my pelvic floor  Patient Self Reported Health Status: fair  Rehabilitation Potential: good    Short Term Goals: To be accomplished in 4 weeks:   1. Patient is compliant with daily HEP to assure progress in function and improved breathing pattern  Status at Eval: patient education    2.  Patient is willing to use AFO to improve gait pattern, left foot clearance and reduction in compensatory LS movements in order to reduce LBP  Status at Eval: patient education in regards to AFO, patient was given info materials    3. Reduction in pain to <or= 5/10 for increased ease with basic function and ADL  Status at Eval: pain ranging 3-8/10    Long Term Goals: To be accomplished in 8 weeks:   1. Improved FOTO score to >or= 48/100 as evidence of improved function  Status at Eval: FOTO 40/100    2. Improved gait pattern and functional speed as evidenced by improved TUG score by >or= 4 seconds  Status at Eval: altered gait, slowed speed, left leg spasticity, lack of foot clearance compensated through left hip hiking, TUG 32 seconds    3. Reduction in pain to <or= 3/10 with ADL for improved overall function and tolerance to ADL  Status at Eval: pain 3-8/10    4. Improved functional core strength as evidenced by ability to perform prone plank for >or= 5 seconds without increase in pain to improve functional LS stability  Status at Eval: extremely weak core and PF,  unable to lie in supine or prone position due to pain    5. Patient reports improved control of bowel and bladder for improved quality of life  Status at Eval: patient reports intermittent bowel and bladder incontinence, c/o PF spasms    Frequency / Duration: Patient to be seen 3 times per week for 8 weeks. Patient/ Caregiver education and instruction: Diagnosis, prognosis, exercises   [x]  Plan of care has been reviewed with PTA    Certification Period: 11/14/19-1/13/20  Clarissa Galeas PT 11/14/2019 11:19 AM    ________________________________________________________________________    I certify that the above Therapy Services are being furnished while the patient is under my care. I agree with the treatment plan and certify that this therapy is necessary.     Physician's Signature:_____________________Date:____________TIME:________    [employer], Date and Time must be completed for valid certification **  Please sign and return to [facility] [address], [address] [facility]  Ph [phone]  Fx [phone]

## 2019-11-14 NOTE — PROGRESS NOTES
PT DAILY TREATMENT NOTE/LUMBAR EVAL 10-18    Patient Name: Hiwot Ambriz  Date:2019  : 1965  [x]  Patient  Verified  Payor: Armida Bucio / Plan: [facility] [employer] MEDICARE CHOICE PPO/PFFS / Product Type: Managed Care Medicare /    In time:1115  Out time:1210  Total Treatment Time (min): 55  Visit #: 1 of 16    Medicare/BCBS Only   Total Timed Codes (min):  40 1:1 Treatment Time:  55     Treatment Area: Low back pain [M54.5]  Right buttock pain [M79.18]  Sacroiliac pain [M53.3]  SUBJECTIVE  Pain Level (0-10 scale): 4-5 LB  []constant []intermittent []improving []worsening []no change since onset    Any medication changes, allergies to medications, adverse drug reactions, diagnosis change, or new procedure performed?: [x] No    [] Yes (see summary sheet for update)  Subjective functional status/changes:\"I got to get better. I am starting to get anxiety. \"   49 YOF patient is reporting to PT with LB and intermittent right buttock pain ranging from 3-8/10 . 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 2006 2 surgeries for laminectomy followed by fusion in . Patient states that during the fusion surgery her nerve root and dura were damaged resulting in partial chord injury. Since then she has been having left leg weakness, leg spasticity and bowel/bladder incontinence. She also reports s/p CVA while on ICU after one of the surgeries resulting in CVA/right hemiparesis. 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 Patient is interested in Pelvic Floor therapy to address PF dysfunction and tension. As part of pain management she has had multiple SI injections and is also using a Neuro-stimulator. During a recent battery replacement surgery she suffered from a TIA resulting in increase of her leg spasticity.  Patient reports 80-85% relief of current pain with stimulator. 2 previous falls down her stairs post surgeries have resulted in bilateral RC injuries. Recent onset of anxiety attacks.        PLOF: chronic LB and buttock pain left  Limitations to PLOF: difficulty with all ADL as a result of LBP/leg spasticity  Mechanism of Injury: initial work related injury 2006, 3 spinal surgeries including laminectomy and fusion, chord injury during surgery as per patient resulting in left leg weakness/spasticity, partial loss of bowel/bladder control  Current symptoms/Complaints: right buttock/SI/LBP, left leg spasticity/weakness, bowel/bladder incontinence,   Previous Treatment/Compliance:FCE's x2, PT  PMHx/Surgical Hx: laminectomy and fusion 2006  Work Hx: previous work as PT, unable to work since injury and resulting surgeries in 2006, FCE determined inability to lift >35#, working from home as therapist  Living Situation: with , 2 level home with bedroom downstairs  Pt Goals: get better, be able to function, improve my pelvic floor  Barriers: []pain []financial []time []transportation []other  Motivation: excellent  Substance use: []Alcohol []Tobacco []other:   FABQ Score: []low []elevate  Cognition: A & O x 3    Other:    OBJECTIVE/EXAMINATION  Domestic Life: limited  Activity/Recreational Limitations: limited  Mobility: limited  Self Care: independent        Modality rationale: decrease pain and increase tissue extensibility to improve the patients ability to perform ADL   Min Type Additional Details    [] Estim:  []Unatt       []IFC  []Premod                        []Other:  []w/ice   []w/heat  Position:  Location:    [] Estim: []Att    []TENS instruct  []NMES                    []Other:  []w/US   []w/ice   []w/heat  Position:  Location:    []  Traction: [] Cervical       []Lumbar                       [] Prone          []Supine                       []Intermittent   []Continuous Lbs:  [] before manual  [] after manual    []  Ultrasound: []Continuous   [] Pulsed                           []1MHz   []3MHz Location:  W/cm2:    []  Iontophoresis with dexamethasone         Location: [] Take home patch   [] In clinic   10 combined with TA []  Ice     []  heat  []  Ice massage  []  Laser   []  Anodyne Position:left SL with right leg on bolster  Location:right buttock/LB    []  Laser with stim  []  Other: Position:  Location:    []  Vasopneumatic Device Pressure:       [] lo [] med [] hi   Temperature: [] lo [] med [] hi     []redness - adverse reaction:     20 min [x]Eval                  []Re-Eval       10 min Therapeutic Exercise:  [x] See flow sheet :   Rationale: increase ROM, increase strength, improve coordination, improve balance and increase proprioception to improve the patients ability to return to PLOF    25 min Therapeutic Activity:  [x]  See flow sheet :patient education about proper breathing pattern and balloon breathing to improve core activation and reduce strain on pelvic floor, STM right SI region/piriformis in SL   Rationale: increase ROM, increase strength, improve coordination, improve balance and increase proprioception  to improve the patients ability to return to PLOF               With   [] TE   [x] TA   [] neuro   [] other: Patient Education: [x] Review HEP    [] Progressed/Changed HEP based on:   [x] positioning   [] body mechanics   [] transfers   [] heat/ice application    [] other:      Other Objective/Functional Measures: as per eval    Physical Therapy Evaluation - Lumbar Spine ([facility])    SUBJECTIVE  Chief Complaint:LB, right buttock pain/numbness, left leg spasticity/weakness, spastic pelvic floor affected by extremely hot and cold weather    Mechanism of injury:multiple spinal surgeries post work injury    Symptoms:  Aggravated by:   [x] Bending [x] Sitting [] Standing [x] Walking   [] Moving [] Cough [] Sneeze [] Valsalva   [] AM  [] PM  Lying:  [] sup   [] pro   [x] sidelying right, patient has not been in a prone position for extended time   [x] Other:altered gait pattern due to leg spasticity     Eased by:    [] Bending [] Sitting [x] Standing/spinal Ext [] Walking   [] Moving [] AM  [] PM  Lying: [] sup  [] pro  [x] sidelying left   [x] Other:rest, stretching     General Health:  Red Flags Indicated? [x] Yes    [] No  [] Yes [] No Recent weight change (If yes, due to dieting?  [] Yes  [] No)   [x] Yes [] No Weakness in legs during walking, left leg   [] Yes [] No Unremitting pain at night  [] Yes [] No Abdominal pain or problems  [] Yes [] No Rectal bleeding  [] Yes [] No Feet more cold or painful in cold weather  [] Yes [] No Menstrual irregularities  [] Yes [] No Blood or pain with urination  [x] Yes [] No Dysfunction of bowel or bladder due to partial chord injury  [] Yes [] No Recent illness within past 3 weeks (i.e, cold, flu)  [x] Yes [] No Numbness/tingling in buttock/genitalia region, intermittent and dependent on posture, mostly with Flex    Past History/Treatments: PT post surgeries    Diagnostic Tests: [] Lab work [x] X-rays    [] CT [x] MRI     [] Other:  Results:EMG    Functional Status  Prior level of function:full function prior to initial work injury, full time work  Present functional limitations:altered gait, limited ADL, lifting , material handling  What position do you sleep in?:    OBJECTIVE  Posture:FHP, protracted shoulders  Lateral Shift: [] R    [] L     [x] +  [] -, scoliosis in seated position due to increased WB on left hemipelvis to reduce LBP/paresthesia  Kyphosis: [x] Increased [] Decreased   []  WNL  Lordosis:  [x] Increased [] Decreased   [] WNL  Pelvic symmetry: [] WNL    [x] Other:asymmetry due to left leg spasticity and asymmetrical gait pattern    Gait:  [] Normal     [x] Abnormal:at times with SC, currently no AD, deficit in foot clearance due to spasticity (left stepper gait) and weakness causing compensatory LS hyperlordosis and left hip hiking which is contributing to increased LS tension , asymmetries, muscle imbalance and abnormal movement pattern    Active Movements: [] N/A   [] Too acute   [] Other:  ROM % AROM % PROM Comments:pain, area   Forward flexion 40-60 50%  Onset of pain/saddle type numbness with prolonged posture   Extension 20-30 25%  Reduction in chord symptoms, paresthesia   SB right 20-30 25%  pain   SB left 20-30 25%  pain   Rotation right 5-10 25%  tight   Rotation left 5-10 25%  tight         Neuro Screen [] WNL  Myotome/Dermatome/Reflexes:reflexes tested, no response,  Comments:patient reports that her reflexes have always been weak    Dural Mobility:  SLR Sitting: [x] R    [x] L    [] +    [x] -  @ (degrees):           Supine: [x] R    [x] L    [] +    [x] -  @ (degrees):   Slump Test: [] R    [] L    [] +    [] -  @ (degrees):   Prone Knee Bend: [] R    [] L    [] +    [] -     Palpation  [] Min  [x] Mod  [] Severe    Location:right SI and LS      Strength   L(0-5) R (0-5) N/T   Hip Flexion (L1,2) 3- tone 4- []   Knee Extension (L3,4) 4 5 []   Ankle Dorsiflexion (L4) 4- 5- []   Great Toe Extension (L5) 4- 5- []   Ankle Plantarflexion (S1) 2 3- []   Knee Flexion (S1,2) 3-* 4 []   Upper Abdominals 3- 3- []   Lower Abdominals 3- 3- []   Paraspinals   []   Back Rotators   []   Gluteus Nick   []   Other * due to spasticity   []     Special Tests  Lumbar:  Lumb.  Compression: [] Pos  [] Neg               Lumbar Distraction:   [] Pos  [] Neg    Quadrant:  [] Pos  [] Neg   [] Flex  [] Ext    Sacroilliac:  Gaenslen's: [] R    [] L    [] +    [] -     Compression: [] +    [] -     Gapping:  [] +    [] -     Thigh Thrust: [] R    [] L    [] +    [] -     Leg Length: [] +    [] -   Position:    Crests:    ASIS:    PSIS:    Sacral Sulcus:    Mobility: Standing flex:     Sitting flex:     Supine to sit:     Prone knee bend:         Hip: Gladystine Avers:  [] R    [] L    [] +    [] -     Scour:  [] R    [] L    [] +    [] -     Piriformis: [] R    [] L    [] +    [] - Deficits: Benjy's: [] R    [] L    [] +    [] - , unable to test    Angely Sierra: [x] R    [x] L    [x] +    [] -     Hamstrings 90/90:70/70    Gastrocsoleus (to neutral): Right:+ Left:+       Global Muscular Weakness:  Abdominals:weak  Quadratus Lumborum:tight  Paraspinals:LS tight  Other:    Other tests/comments:left leg spasticity, pelvic floor weakness, scoliosis       Pain Level (0-10 scale) post treatment: 2-3    ASSESSMENT/Changes in Function: limited tolerance to seated spinal Flex, responding well to gentle spinal Ext in standing and STM, poor tolerance to supine position as demonstrated during last visit     Patient will continue to benefit from skilled PT services to address ROM deficits, address strength deficits, analyze and address soft tissue restrictions, analyze and modify body mechanics/ergonomics and assess and modify postural abnormalities to attain remaining goals.      [x]  See Plan of Care  []  See progress note/recertification  []  See Discharge Summary         Progress towards goals / Updated goals:  Reduction in pain    PLAN  []  Upgrade activities as tolerated     [x]  Continue plan of care with focus on core strength, alignment and PF  []  Update interventions per flow sheet       []  Discharge due to:_  []  Other:_      Josesito Conroy, PT 11/14/2019  11:19 AM

## 2019-11-15 ENCOUNTER — HOSPITAL ENCOUNTER (OUTPATIENT)
Dept: PHYSICAL THERAPY | Age: 54
Discharge: HOME | End: 2019-11-15
Payer: OTHER MISCELLANEOUS

## 2019-11-15 PROCEDURE — 97530 THERAPEUTIC ACTIVITIES: CPT

## 2019-11-15 PROCEDURE — 97140 MANUAL THERAPY 1/> REGIONS: CPT

## 2019-11-15 PROCEDURE — 97110 THERAPEUTIC EXERCISES: CPT

## 2019-11-15 NOTE — PROGRESS NOTES
PT DAILY TREATMENT NOTE    Patient Name: Mikel Thurston  Date:11/15/2019  : 1965  [x]  Patient  Verified  Payor: Mel Sat / Plan: [id number] / Product Type: Workers Comp /    In [employer] time:1205  Total Treatment Time (min): 65  Total Timed Codes (min): 55  1:1 Treatment Time (MC/BCBS only): 55    Visit #: 2 of 16    Treatment Area: Low back pain [M54.5]  Right buttock pain [M79.18]  Sacroiliac pain [M53.3]    SUBJECTIVE  Pain Level (0-10 scale): 7-8  Any medication changes, allergies to medications, adverse drug reactions, diagnosis change, or new procedure performed?: [x] No    [] Yes (see summary sheet for update)  Subjective functional status/changes:   [] No changes reported  LB/ buttock pain/paresthesia left to knee level and right to mid thigh/posterior, patient reports at times having saddle type numbness romaine with flexed postures  She reports that she had significant reduction in pain/numbness after recent injection but increased symptoms after straining during defacation when rushing to make it to her first PT visit on 19.  Also reports going to speech therapy for receptive aphasia (speed of speech processing affected)    OBJECTIVE  Modalities Rationale:     decrease pain and increase tissue extensibility to improve patient's ability to improve pain with ADL   min [] Estim, type/location:                                      []  att     []  unatt     []  w/US     []  w/ice    []  w/heat    min []  Mechanical Traction: type/lbs                   []  pro   []  sup   []  int   []  cont    []  before manual    []  after manual    min []  Ultrasound, settings/location:      min []  Iontophoresis w/ dexamethasone, location:                                               []  take home patch       []  in clinic   10 min []  Ice     [x]  Heat    location/position: Left SL with airex pad between knees/feet    min []  Vasopneumatic Device, press/temp: min []  Other:    [] Skin assessment post-treatment (if applicable):    []  intact    []  redness- no adverse reaction     []redness - adverse reaction:            15 min Therapeutic Exercise:  [x] See flow sheet :   Rationale: increase ROM, increase strength, improve coordination, improve balance and increase proprioception to improve the patients ability to maintain improved alignment with ADL    30 min Therapeutic Activity:  [x]  See flow sheet :further testing , discussed benefit from Turbomed AFO to improve walking pattern, reduction in fatigue and better foot clearance to reduce LS compensatory hyperlordosis, balance activities, facilitation of postural symmetry in sitting /standing   Rationale: More symmetrical alignment  to improve the patients ability to perform ADL       10 min Manual Therapy:  Gentle STM/MFR right SI/buttock region in left SL   Rationale: decrease pain to improve tolerance to dynamic activities with ADL              With   [] TE   [] TA   [] neuro   [] other: Patient Education: [x] Review HEP    [] Progressed/Changed HEP based on:   [] positioning   [] body mechanics   [] transfers   [] heat/ice application    [] other:      Other Objective/Functional Measures:   Stress/anxiety affecting breathing pattern, tremor, tone and speech during therapy   Patient hesitant in regards to AFO, was given info material  Onset of increased saddle type numbness, tremors, anxiety and SOB with seated spinal Flexion  Scoliotic alignment in sitting due to marked increase in WB on left hemipelvis to control symptoms  In standing patient demonstrates increased right leg WB due to deficit in left leg control/spasticity         Pain Level (0-10 scale) post treatment: 3    ASSESSMENT/Changes in Function: reduction in pain after STM and positioning with MH in left SL , even in upright posture still reporting 3/10 pain     Patient will continue to benefit from skilled PT services to address functional mobility deficits, address ROM deficits, address strength deficits, analyze and address soft tissue restrictions, analyze and cue movement patterns and assess and modify postural abnormalities to attain remaining goals.      [x]  See Plan of Care           Progress towards goals / Updated goals:  pending    PLAN  []  Upgrade activities as tolerated     [x]  Continue plan of care with focus on core strength, tone inhibition, facilitation of normal right leg movement patterns, gait training with AFO to reduce compensation, PF therapy and pain/symptom control  []  Update interventions per flow sheet       []  Discharge due to:_  []  Other:_      Britt Osuna PT 11/15/2019  12:04 PM    Future Appointments   Date Time Provider Catherine Sanchez   11/19/2019  9:00 AM Elfida Mar, [facility]   11/20/2019  9:45 AM Mimi Carbajal MD [id number] [facility]   11/22/2019  9:00 AM Rocky Echavarria [facility]   11/25/2019  8:30 AM Elfida Mar, [facility]   11/27/2019  8:30 AM Elfida Mar, [facility]   12/2/2019  9:30 AM Elfida Mar, [facility]   12/4/2019  9:00 AM Rocky Echavarria PT [facility]   12/9/2019 10:00 AM Elfida Mar, [facility]   12/11/2019  9:00 AM Rocky Echavarria PT [facility]   12/16/2019  9:00 AM Elfida Mar, [facility]   12/18/2019  9:00 AM Rocky Echavarria PT [facility]   12/23/2019  9:00 AM Rocky Echavarria PT [facility]   12/27/2019 10:00 AM Elfida Mar, [facility]   12/30/2019  9:15 AM Elfida Mar, [facility]   1/2/2020 10:00 AM Elfida Mar, [facility]   1/6/2020  9:00 AM Shantelle Collier PTA [facility]   1/8/2020  9:00 AM Rocky Echavarria PT [facility]   2/11/2020 11:15 AM Avril Kaplan  [id number]

## 2019-11-19 ENCOUNTER — HOSPITAL ENCOUNTER (OUTPATIENT)
Dept: PHYSICAL THERAPY | Age: 54
Discharge: HOME | End: 2019-11-19
Payer: OTHER MISCELLANEOUS

## 2019-11-19 PROCEDURE — 97530 THERAPEUTIC ACTIVITIES: CPT

## 2019-11-19 PROCEDURE — 97112 NEUROMUSCULAR REEDUCATION: CPT

## 2019-11-19 PROCEDURE — 97140 MANUAL THERAPY 1/> REGIONS: CPT

## 2019-11-19 NOTE — PROGRESS NOTES
PT DAILY TREATMENT NOTE    Patient Name: Hans Miller  Date:2019  : 1965  [x]  Patient  Verified  Payor: Xavier Duggan / Plan: [id number] / Product Type: Workers Comp /    In [employer] time:1055  Total Treatment Time (min): 50  Total Timed Codes (min): 50  1:1 Treatment Time (MC only): 50   Visit #: 3 of 16    Treatment Area: Low back pain [M54.5]  Right buttock pain [M79.18]  Sacroiliac pain [M53.3]    SUBJECTIVE  Pain Level (0-10 scale): 7/10  Any medication changes, allergies to medications, adverse drug reactions, diagnosis change, or new procedure performed?: [x] No    [] Yes (see summary sheet for update)  Subjective functional status/changes:   [] No changes reported  Pt states that she is having a hard time with the exercises because they seem to be exacerbating her symptoms.   She states that she is having fecal and urinary incontinence partially caused by spasticity of both bowel and bladder    OBJECTIVE    Modalities Rationale:     decrease pain and increase tissue extensibility to improve patient's ability to complete ADLs   min [] Estim, type/location:                                      []  att     []  unatt     []  w/US     []  w/ice    []  w/heat    min []  Mechanical Traction: type/lbs                   []  pro   []  sup   []  int   []  cont    []  before manual    []  after manual    min []  Ultrasound, settings/location:      min []  Iontophoresis w/ dexamethasone, location:                                               []  take home patch       []  in clinic   With treatment min []  Ice     [x]  Heat    location/position: Inclined bound angle groin    min []  Vasopneumatic Device, press/temp:     min []  Other:    [x] Skin assessment post-treatment (if applicable):    [x]  intact    [x]  redness- no adverse reaction     []redness - adverse reaction:          27 min Therapeutic ACTIVITY:  [] See flow sheet :   Rationale: increase ROM, increase strength, improve coordination, improve balance and increase proprioception to improve the patients ability to complete ADLs    15 min Neuromuscular Re-education:  []  See flow sheet :restorative yoga   Rationale: increase ROM, improve coordination and increase proprioception  to improve the patients ability to empty bowel and bladder with ease    8 min Manual Therapy:  Right hip PROM manual stretching   Rationale: decrease pain, increase ROM, increase tissue extensibility, decrease edema  and decrease trigger points to improve the patients ability to ambulate community distances    With   [] TE   [] TA   [x] neuro   [] other: Patient Education: [x] Review HEP    [] Progressed/Changed HEP based on:   [] positioning   [] body mechanics   [] transfers   [x] heat/ice application    [x] other: inclined bound angle at home with heat     Other Objective/Functional Measures:     Pain Level (0-10 scale) post treatment: 6    ASSESSMENT/Changes in Function: Pt demonstrates significant pain with movement and certain positions. Educated pt extensively on fecal incontinence and bowel irritants providing handouts and bowel logs to help ascertain bowel irritants. Pt returned demonstration of inclined bound angle which she tolerated and felt increased relaxation for 10 minutes however started to flare after 10 minutes. Pt encouraged to set up at home and hold for 10 minutes or less to help relax pelvic floor. Patient will continue to benefit from skilled PT services to address functional mobility deficits, address ROM deficits, address strength deficits, analyze and address soft tissue restrictions, analyze and cue movement patterns, analyze and modify body mechanics/ergonomics and assess and modify postural abnormalities to attain remaining goals. []  See Plan of Care  []  See progress note/recertification  []  See Discharge Summary         Progress towards goals / Updated goals:  Short Term Goals:  To be accomplished in 4 weeks:               1. Patient is compliant with daily HEP to assure progress in function and improved breathing pattern  Status at Eval: patient education  Current: Compliant but challenged     2. Patient is willing to use AFO to improve gait pattern, left foot clearance and reduction in compensatory LS movements in order to reduce LBP  Status at Eval: patient education in regards to AFO, patient was given info materials     3. Reduction in pain to <or= 5/10 for increased ease with basic function and ADL  Status at Eval: pain ranging 3-8/10  Current: Little change 11-19-19     Long Term Goals: To be accomplished in 8 weeks:               1. Improved FOTO score to >or= 48/100 as evidence of improved function  Status at Eval: FOTO 40/100     2. Improved gait pattern and functional speed as evidenced by improved TUG score by >or= 4 seconds  Status at Eval: altered gait, slowed speed, left leg spasticity, lack of foot clearance compensated through left hip hiking, TUG 32 seconds     3. Reduction in pain to <or= 3/10 with ADL for improved overall function and tolerance to ADL  Status at Eval: pain 3-8/10     4. Improved functional core strength as evidenced by ability to perform prone plank for >or= 5 seconds without increase in pain to improve functional LS stability  Status at Eval: extremely weak core and PF,  unable to lie in supine or prone position due to pain     5. Patient reports improved control of bowel and bladder for improved quality of life  Status at Eval: patient reports intermittent bowel and bladder incontinence, c/o PF spasms   Current: Pt educated on bowel irritants, provided with bowel logs and sent home with PF stretches for home.     PLAN  []  Upgrade activities as tolerated     []  Continue plan of care  []  Update interventions per flow sheet       []  Discharge due to:_  []  Other:_      Yola Dumont, MARTY 11/19/2019  9:20 AM    Future Appointments   Date Time Provider Catherine Sanchez   11/22/2019  9:00 AM Jessiemichelle Kendall, [facility]   11/25/2019  8:30 AM Grupo Stark [facility]   11/27/2019  8:30 AM Grupo Stark [facility]   12/2/2019  9:30 AM Grupo Stark [facility]   12/4/2019  9:00 AM Jessiemichelle Kendall, [facility]   12/9/2019 10:00 AM Grupo Stark, [facility]   12/11/2019  9:00 AM Jessiemichelle Kendall, [facility]   12/16/2019  9:00 AM Grupo Stark [facility]   12/18/2019  9:00 AM Jessiemichelle Kendall, [facility]   12/23/2019  9:00 AM Jessiemichelle Kendall, [facility]   12/27/2019 10:00 AM Grupo Stark [facility]   12/30/2019  9:15 AM Grupo Stark [facility]   1/2/2020 10:00 AM Grupo Stark [facility]   1/6/2020  9:00 AM Grupo Stark [facility]   1/8/2020  9:00 AM Jessiemichelle Kendall, [facility]   2/11/2020 11:15 AM Dalton Freedman  [address]

## 2019-11-22 ENCOUNTER — HOSPITAL ENCOUNTER (OUTPATIENT)
Dept: PHYSICAL THERAPY | Age: 54
Discharge: HOME | End: 2019-11-22
Payer: OTHER MISCELLANEOUS

## 2019-11-22 PROCEDURE — 97110 THERAPEUTIC EXERCISES: CPT

## 2019-11-22 PROCEDURE — 97530 THERAPEUTIC ACTIVITIES: CPT

## 2019-11-22 NOTE — PROGRESS NOTES
PT DAILY TREATMENT NOTE    Patient Name: Allison Martinez  Date:2019  : 1965  [x]  Patient  Verified  Payor: Latasha Giles / Plan: [id number] / Product Type:  Workers Comp /    In [employer] time:1005  Total Treatment Time (min): 67  Total Timed Codes (min): 67  1:1 Treatment Time (MC/BCBS only): n/a    Visit #: 4 of 12     Treatment Area: Low back pain [M54.5]  Right buttock pain [M79.18]  Sacroiliac pain [M53.3]    SUBJECTIVE  Pain Level (0-10 scale): 3 LB/buttock  Any medication changes, allergies to medications, adverse drug reactions, diagnosis change, or new procedure performed?: [x] No    [] Yes (see summary sheet for update)  Subjective functional status/changes:   [] No changes reported  Patient reports being more open to get an AFO but would prefer to get al [employer] AFO    OBJECTIVE  Modalities Rationale:     decrease pain and increase tissue extensibility to improve patient's ability to perform ADL with more ease   min [] Estim, type/location:                                      []  att     []  unatt     []  w/US     []  w/ice    []  w/heat    min []  Mechanical Traction: type/lbs                   []  pro   []  sup   []  int   []  cont    []  before manual    []  after manual    min []  Ultrasound, settings/location:      min []  Iontophoresis w/ dexamethasone, location:                                               []  take home patch       []  in clinic   PD min []  Ice     []  Heat    location/position:     min []  Vasopneumatic Device, press/temp:     min []  Other:            20 min Therapeutic Exercise:  [x] See flow sheet :   Rationale: increase ROM, increase strength, improve coordination, improve balance and increase proprioception to improve the patients ability to perform ADL    47 min Therapeutic Activity:  [x]  See flow sheet :floor transfer, initiated prone ex with focus on Gluts and quad stretching, QP activities on Airex pads to minimize knee pain, initiated 90/90 activities for core activation but patient unable to tolerate > 5 min due to 'pressure' on LB including wires of stimulator. Rationale: increase ROM and control leg tone during functional activities  to improve the patients ability to perform ADL               With   [] TE   [] TA   [] neuro   [] other: Patient Education: [x] Review HEP    [] Progressed/Changed HEP based on:   [] positioning   [] body mechanics   [] transfers   [] heat/ice application    [] other:      Other Objective/Functional Measures:   mildly increased numbness left posterior leg and rectal region with 5 min on Nustep- bike might be a better option for warm up due to less flexed LS position  Patient reports right knee pain with kneeling but is eager to try transfer, reports hx of left patella dislocation during fall in 2008  Marked trembling/SOB after doing just 5 reps of hip Ext in standing, also reports of left low arm numbness  Requires multiple rest periods to calm down symptoms  Patient also reports:\"2006 post back surgery filter was implanted after I had the stroke to prevent further blood clots\"  \"I think I have a blood clot in my arm\"  Staying on aspirin as a preventative to blood clots  Patient interested in getting Rasheed MOLINAO, will contract rep for trial  Noted SOB/hyperventilation/tremors after multiple PPT in 90/90 and advised patient to transfer into SL to calm symptoms  Patient states that 90/90 position on hard treatment surface causes compression of LS stimulator wires  Patient required min assitance of 2 to transfer from 1/2 kneeling to standing  Excellent tolerance to prone position activities    Lengthy discussion about current intermittent symptoms of SOB/hyperventilation associated with trembling/tremor in trunk /arms and  left low arm numbness which are similar to symptoms of an anxiety attack.  By no means am I implying that the patient is suffering from anxiety attacks but is just demonstrating similar symptoms during certain activities while attending therapy. Patient is insisting that her symptoms are physiological in nature and not of mental origin. Pain Level (0-10 scale) post treatment: 3    ASSESSMENT/Changes in Function: see under objective measures    Patient will continue to benefit from skilled PT services to address ROM deficits, address strength deficits, analyze and address soft tissue restrictions, analyze and cue movement patterns, assess and modify postural abnormalities and address imbalance/dizziness to attain remaining goals. [x]  See Plan of Care  []  See progress note/recertification  []  See Discharge Summary         Progress towards goals / Updated goals:  ASSESSMENT/Changes in Function: patient tolerated activities well with only minimal increase in back pain during certain positions. Patient will continue to benefit from skilled PT services to address functional mobility deficits, address ROM deficits, address strength deficits, analyze and address soft tissue restrictions, analyze and cue movement patterns, analyze and modify body mechanics/ergonomics and assess and modify postural abnormalities to attain remaining goals. [x]? See Plan of Care  []? See progress note/recertification  []? See Discharge Summary         Progress towards goals / Updated goals:  Short Term Goals: To be accomplished in 4 weeks:               1. Patient is compliant with daily HEP to assure progress in function and improved breathing pattern  Status at [facility]: patient education  Current: Compliant but challenged     2.  Patient is willing to use AFO to improve gait pattern, left foot clearance and reduction in compensatory LS movements in order to reduce LBP  Status at Eval: patient education in regards to AFO, patient was given info materials  Status on 11/22/19: patient is interested in trial of [employer] AFO, rep will be contacted     3. Reduction in pain to <or= 5/10 for increased ease with basic function and ADL  Status at [facility]: pain ranging 3-8/10  Current: Little change 11-19-19     Long Term Goals: To be accomplished in 8 weeks:               1. Improved FOTO score to >or= 48/100 as evidence of improved function  Status at [facility]: FOTO 40/100     2. Improved gait pattern and functional speed as evidenced by improved TUG score by >or= 4 seconds  Status at Eval: altered gait, slowed speed, left leg spasticity, lack of foot clearance compensated through left hip hiking, TUG 32 seconds     3. Reduction in pain to <or= 3/10 with ADL for improved overall function and tolerance to ADL  Status at [facility]: pain 3-8/10     4. Improved functional core strength as evidenced by ability to perform prone plank for >or= 5 seconds without increase in pain to improve functional LS stability  Status at [facility]: extremely weak core and PF,  unable to lie in supine or prone position due to pain  Status on 11/22/19: good tolerance to floor transfer and prone position, progressing     5.  Patient reports improved control of bowel and bladder for improved quality of life  Status at Eval: patient reports intermittent bowel and bladder incontinence, c/o PF spasms   Current: Pt educated on bowel irritants, provided with bowel logs and sent home with PF stretches for home.       PLAN  []  Upgrade activities as tolerated     [x]  Continue plan of care  []  Update interventions per flow sheet       []  Discharge due to:_  []  Other:_      Shahana East, PT 11/22/2019  9:04 AM    Future Appointments   Date Time Provider Catherine Sanchez   11/25/2019  8:30 AM Joel Tate PTA [facility]   11/27/2019  8:30 AM Joel Tate [facility]   12/2/2019  9:30 AM Joel Tate [facility]   12/4/2019  9:00 AM Joel Tate [facility]   12/9/2019 10:00 AM Joel Tate [facility]   12/11/2019  9:00 AM Joel Tate [facility]   12/16/2019  9:00 AM [facility] 12/18/2019  9:00 AM Esdras Ferreira PT [facility]   12/23/2019  9:00 AM Esdras Ferreira PT [facility]   12/27/2019 10:00 AM Alyx Etienne [facility]   12/30/2019  9:15 AM Alyx Etienne PTA [facility]   1/2/2020 10:00 AM Alyx Etienne PTA [facility]   1/6/2020  9:00 AM Alyx Etienne PTA [facility]   1/8/2020  9:00 AM Esdras Ferreira PT [facility]   2/11/2020 11:15 AM Sorin Simeon  [address]

## 2019-11-25 ENCOUNTER — HOSPITAL ENCOUNTER (OUTPATIENT)
Dept: PHYSICAL THERAPY | Age: 54
Discharge: HOME | End: 2019-11-25
Payer: OTHER MISCELLANEOUS

## 2019-11-25 PROCEDURE — 97140 MANUAL THERAPY 1/> REGIONS: CPT

## 2019-11-25 PROCEDURE — 97110 THERAPEUTIC EXERCISES: CPT

## 2019-11-25 NOTE — PROGRESS NOTES
PT DAILY TREATMENT NOTE    Patient Name: Aileen Wilburn  Date:2019  : 1965  [x]  Patient  Verified  Payor: Thiago Dm / Plan: [id number] / Product Type: Workers Comp /    In [employer] time:940  Total Treatment Time (min): 67  Total Timed Codes (min): 57  1:1 Treatment Time ( only): 57   Visit #: 5 of 12    Treatment Area: Low back pain [M54.5]  Right buttock pain [M79.18]  Sacroiliac pain [M53.3]    SUBJECTIVE  Pain Level (0-10 scale): 3/10  Any medication changes, allergies to medications, adverse drug reactions, diagnosis change, or new procedure performed?: [x] No    [] Yes (see summary sheet for update)  Subjective functional status/changes:   [] No changes reported  Pt reports that she has been having spasms in her left glute that make her feel like her hip is subluxing.   OBJECTIVE    Modalities Rationale:     decrease pain and increase tissue extensibility to improve patient's ability to ambulate community distances   min [] Estim, type/location:                                      []  att     []  unatt     []  w/US     []  w/ice    []  w/heat    min []  Mechanical Traction: type/lbs                   []  pro   []  sup   []  int   []  cont    []  before manual    []  after manual    min []  Ultrasound, settings/location:      min []  Iontophoresis w/ dexamethasone, location:                                               []  take home patch       []  in clinic   10 min []  Ice     [x]  Heat    location/position: Right sidelying left hip    min []  Vasopneumatic Device, press/temp:     min []  Other:    [x] Skin assessment post-treatment (if applicable):    [x]  intact    [x]  redness- no adverse reaction     []redness - adverse reaction:          42 min Therapeutic Exercise:  [] See flow sheet :   Rationale: increase ROM, increase strength, improve coordination, improve balance and increase proprioception to improve the patients ability to ambulate community distances     15 min Manual:  []  See flow sheet :DTM to left QL, glute medius, ITB, manual hip flexor and hamstring stretch on left   Rationale: increase ROM, increase strength, improve coordination, improve balance and increase proprioception  to improve the patients ability to ambulate community distances            With   [x] TE   [] TA   [] neuro   [] other: Patient Education: [x] Review HEP    [] Progressed/Changed HEP based on:   [x] positioning   [x] body mechanics   [x] transfers   [] heat/ice application    [] other:      Other Objective/Functional Measures:   Pain Level (0-10 scale) post treatment: 1    ASSESSMENT/Changes in Function: Pt demonstrates improving balance in modified rhomberg, requiring intermittent use of hands only. She continues to transfer and complete bed mobility slowly due to tone and pain which improved slightly with manual work. Patient will continue to benefit from skilled PT services to address functional mobility deficits, address ROM deficits, address strength deficits, analyze and address soft tissue restrictions, analyze and cue movement patterns, analyze and modify body mechanics/ergonomics and assess and modify postural abnormalities to attain remaining goals. []  See Plan of Care  []  See progress note/recertification  []  See Discharge Summary         Progress towards goals / Updated goals:  Short Term Goals: To be accomplished in 4 weeks:               1. Patient is compliant with daily HEP to assure progress in function and improved breathing pattern  Status at Eval: patient education  Current: Compliant but challenged     2.  Patient is willing to use AFO to improve gait pattern, left foot clearance and reduction in compensatory LS movements in order to reduce LBP  Status at Eval: patient education in regards to AFO, patient was given info materials  Status on 11/22/19: patient is interested in trial of [facility] AFO, rep will be contacted     3. Reduction in pain to <or= 5/10 for increased ease with basic function and ADL  Status at Eval: pain ranging 3-8/10  Current: Little change 11-19-19     Long Term Goals: To be accomplished in 8 weeks:               1. Improved FOTO score to >or= 48/100 as evidence of improved function  Status at Eval: FOTO 40/100     2. Improved gait pattern and functional speed as evidenced by improved TUG score by >or= 4 seconds  Status at Eval: altered gait, slowed speed, left leg spasticity, lack of foot clearance compensated through left hip hiking, TUG 32 seconds     3. Reduction in pain to <or= 3/10 with ADL for improved overall function and tolerance to ADL  Status at Eval: pain 3-8/10     4. Improved functional core strength as evidenced by ability to perform prone plank for >or= 5 seconds without increase in pain to improve functional LS stability  Status at Eval: extremely weak core and PF,  unable to lie in supine or prone position due to pain  Status on 11/22/19: good tolerance to floor transfer and prone position, progressing     5.  Patient reports improved control of bowel and bladder for improved quality of life  Status at Eval: patient reports intermittent bowel and bladder incontinence, c/o PF spasms   Current: Pt educated on bowel irritants, provided with bowel logs and sent home with PF stretches for home.       PLAN  []  Upgrade activities as tolerated     []  Continue plan of care  []  Update interventions per flow sheet       []  Discharge due to:_  []  Other:_      Roger Gupta PTA 11/25/2019  8:37 AM    Future Appointments   Date Time Provider Catherine Sanchez   11/27/2019  8:30 AM Huy Oneida [facility]   12/2/2019  9:30 AM Huy Oneida, [facility]   12/4/2019  9:00 AM Huy Oneida [facility]   12/9/2019 10:00 AM Huy Oneida [facility]   12/11/2019  9:00 AM Huy Nguyeno [facility]   12/16/2019  9:00 AM [facility]   12/18/2019 9:00 AM Armida Denton [facility]   12/23/2019  9:00 AM Armida Denton [facility]   12/27/2019 10:00 AM [place] [facility]   12/30/2019  9:15 AM [place] [facility]   1/2/2020 10:00 AM Delia Sizer, [facility]   1/6/2020  9:00 AM Delia Sizer, [facility]   1/8/2020  9:00 AM Armida Denton [facility]   2/11/2020 11:15 AM Vickey Tucker  [address]

## 2019-11-27 ENCOUNTER — HOSPITAL ENCOUNTER (OUTPATIENT)
Dept: PHYSICAL THERAPY | Age: 54
Discharge: HOME | End: 2019-11-27
Payer: OTHER MISCELLANEOUS

## 2019-11-27 PROCEDURE — 97110 THERAPEUTIC EXERCISES: CPT

## 2019-11-27 PROCEDURE — 97140 MANUAL THERAPY 1/> REGIONS: CPT

## 2019-11-27 NOTE — PROGRESS NOTES
PT DAILY TREATMENT NOTE    Patient Name: Pamela Morales  Date:2019  : 1965  [x]  Patient  Verified  Payor: Fela Souza / Plan: [id number] / Product Type: Workers Comp /    In Cap That time:936  Total Treatment Time (min): 63  Total Timed Codes (min): 53  1:1 Treatment Time ( only): 53   Visit #: 6 of 12    Treatment Area: Low back pain [M54.5]  Right buttock pain [M79.18]  Sacroiliac pain [M53.3]    SUBJECTIVE  Pain Level (0-10 scale): 1-2/10  Any medication changes, allergies to medications, adverse drug reactions, diagnosis change, or new procedure performed?: [x] No    [] Yes (see summary sheet for update)  Subjective functional status/changes:   [] No changes reported  Ptt reports that she was sore after the last visit but that she has had less pain since the last visit.     OBJECTIVE    Modalities Rationale:     decrease pain and increase tissue extensibility to improve patient's ability to ambulate community distances   min [] Estim, type/location:                                      []  att     []  unatt     []  w/US     []  w/ice    []  w/heat    min []  Mechanical Traction: type/lbs                   []  pro   []  sup   []  int   []  cont    []  before manual    []  after manual    min []  Ultrasound, settings/location:      min []  Iontophoresis w/ dexamethasone, location:                                               []  take home patch       []  in clinic   10 min []  Ice     [x]  Heat    location/position: Prone low back    min []  Vasopneumatic Device, press/temp:     min []  Other:    [x] Skin assessment post-treatment (if applicable):    [x]  intact    [x]  redness- no adverse reaction     []redness - adverse reaction:          28 min Therapeutic Exercise:  [] See flow sheet :   Rationale: increase ROM, increase strength, improve coordination, improve balance and increase proprioception to improve the patients ability to ambulate commnity distances      25 min Manual Therapy: myofascial leg pull on left to correct left posterior innominate rotation, bilateral hip PROM, bilateral myofascial arm pulls, MFR to diaphragm   Rationale: decrease pain, increase ROM, increase tissue extensibility, decrease edema  and decrease trigger points to improve the patients ability to ambulate community distances    With   [x] TE   [] TA   [] neuro   [] other: Patient Education: [x] Review HEP    [] Progressed/Changed HEP based on:   [] positioning   [] body mechanics   [] transfers   [] heat/ice application    [] other:      Other Objective/Functional Measures:       Pain Level (0-10 scale) post treatment: 1    ASSESSMENT/Changes in Function: Pt demonstrates left posterior innominate rotation that corrected with myofascial work. She is very sensitive to hip mobility work with increased pain noted. Pt tolerated staggered balance and narrowed stance with head turns and eyes closed without LOB however close CGA. Patient will continue to benefit from skilled PT services to address functional mobility deficits, address ROM deficits, address strength deficits, analyze and address soft tissue restrictions, analyze and cue movement patterns, analyze and modify body mechanics/ergonomics, assess and modify postural abnormalities and address imbalance/dizziness to attain remaining goals. []  See Plan of Care  []  See progress note/recertification  []  See Discharge Summary         Progress towards goals / Updated goals:  Short Term Goals: To be accomplished in 4 weeks:               1. Patient is compliant with daily HEP to assure progress in function and improved breathing pattern  Status at Eval: patient education  Current: Compliant but challenged     2.  Patient is willing to use AFO to improve gait pattern, left foot clearance and reduction in compensatory LS movements in order to reduce LBP  Status at Eval: patient education in regards to AFO, patient was given info materials  Status on 11/22/19: patient is interested in trial of Rasheed MOLINAO, rep will be contacted     3. Reduction in pain to <or= 5/10 for increased ease with basic function and ADL  Status at Eval: pain ranging 3-8/10  Current: 1-3/10 MET 11-27-19     Long Term Goals: To be accomplished in 8 weeks:               1. Improved FOTO score to >or= 48/100 as evidence of improved function  Status at Eval: FOTO 40/100     2. Improved gait pattern and functional speed as evidenced by improved TUG score by >or= 4 seconds  Status at Eval: altered gait, slowed speed, left leg spasticity, lack of foot clearance compensated through left hip hiking, TUG 32 seconds     3. Reduction in pain to <or= 3/10 with ADL for improved overall function and tolerance to ADL  Status at Eval: pain 3-8/10  Current: Progressing 1-3/10 11-27-19     4. Improved functional core strength as evidenced by ability to perform prone plank for >or= 5 seconds without increase in pain to improve functional LS stability  Status at Eval: extremely weak core and PF,  unable to lie in supine or prone position due to pain  Status on 11/22/19: good tolerance to floor transfer and prone position, progressing     5.  Patient reports improved control of bowel and bladder for improved quality of life  Status at Eval: patient reports intermittent bowel and bladder incontinence, c/o PF spasms   Current: Pt educated on bowel irritants, provided with bowel logs and sent home with PF stretches for home.          PLAN  []  Upgrade activities as tolerated     []  Continue plan of care  []  Update interventions per flow sheet       []  Discharge due to:_  []  Other:_      Yola Dumont PTA 11/27/2019  8:35 AM    Future Appointments   Date Time Provider Catherine Sanchez   12/2/2019  9:30 AM Giana Oconnor PTA [facility]   12/4/2019  9:00 AM Giana Oconnor PTA [facility]   12/9/2019 10:00 AM Giana Oconnor [facility]   12/11/2019  9:00 AM Trung Shi Luis [facility]   12/16/2019  9:00 AM Lillian Lopez [email or web address]   12/18/2019  9:00 AM Otemilyia Cathyase, [facility]   12/23/2019  9:00 AM Otemilyia Cathyase, [facility]   12/27/2019 10:00 AM Lillian Lopez [email or web address]   12/30/2019  9:15 AM Lillian Lopez [email or web address]   1/2/2020 10:00 AM Lillian Lopez [email or web address]   1/6/2020  9:00 AM Lillian Lopez [email or web address]   1/8/2020  9:00 AM Malinda Keller, [facility]   2/11/2020 11:15 AM Jay Clements  [address]

## 2019-12-02 ENCOUNTER — HOSPITAL ENCOUNTER (OUTPATIENT)
Dept: PHYSICAL THERAPY | Age: 54
Discharge: HOME | End: 2019-12-02
Payer: OTHER MISCELLANEOUS

## 2019-12-02 PROCEDURE — 97530 THERAPEUTIC ACTIVITIES: CPT

## 2019-12-02 PROCEDURE — 97140 MANUAL THERAPY 1/> REGIONS: CPT

## 2019-12-02 NOTE — PROGRESS NOTES
PT DAILY TREATMENT NOTE    Patient Name: Allison Martinez  Date:2019  : 1965  [x]  Patient  Verified  Payor: Afua Paredes / Plan: [facility] HUMAN MEDICARE CHOICE PPO/PFFS / Product Type: Managed Care Medicare /    In time:923  Out time:1024  Total Treatment Time (min): 61  Total Timed Codes (min): 51  1:1 Treatment Time (MC only): 51   Visit #: 7 of 12    Treatment Area: Low back pain [M54.5]  Right buttock pain [M79.18]  Sacroiliac pain [M53.3]    SUBJECTIVE  Pain Level (0-10 scale): 410  Any medication changes, allergies to medications, adverse drug reactions, diagnosis change, or new procedure performed?: [x] No    [] Yes (see summary sheet for update)  Subjective functional status/changes:   [] No changes reported  Pt states that she has had a flare after every visit the last three times with noted trigger points in her abdomen and left thigh. She states that she wants to pursue the AFO to help her pelvis  But also feels that she may be doing too much at therapy.     OBJECTIVE    Modalities Rationale:     decrease pain and increase tissue extensibility to improve patient's ability to ambulate community distances   min [] Estim, type/location:                                      []  att     []  unatt     []  w/US     []  w/ice    []  w/heat    min []  Mechanical Traction: type/lbs                   []  pro   []  sup   []  int   []  cont    []  before manual    []  after manual    min []  Ultrasound, settings/location:      min []  Iontophoresis w/ dexamethasone, location:                                               []  take home patch       []  in clinic   10 min []  Ice     [x]  Heat    location/position: 90/90 groin, abdomen    min []  Vasopneumatic Device, press/temp:     min []  Other:    [x] Skin assessment post-treatment (if applicable):    [x]  intact    [x]  redness- no adverse reaction     []redness - adverse reaction:          15 min Therapeutic Activity:  []  See flow sheet :PF therapy, self management of symptoms   Rationale: increase ROM, increase strength, improve coordination, improve balance and increase proprioception  to improve the patients ability to ambulate community distances     36 min Manual Therapy: myofascial leg pull on left, PROM bilateral hips, MFR to abdomen and diaphragm   Rationale: decrease pain, increase ROM, increase tissue extensibility, decrease edema  and decrease trigger points to improve the patients ability to ambulate community distance          With   [] TE   [] TA   [] neuro   [] other: Patient Education: [x] Review HEP    [] Progressed/Changed HEP based on:   [] positioning   [] body mechanics   [] transfers   [] heat/ice application    [x] other: PF therapy     Other Objective/Functional Measures:       Pain Level (0-10 scale) post treatment: 1/10    ASSESSMENT/Changes in Function: Pt demonstrates continued tone and spasticity in left leg however right hip PROM very limited today. Pt reports lower pain with manual work alone and manual combined with therex/NMED post treatment however with pt's reports of increased pain post combination treatment treated with manual work alone. Educated pt extensively on pelvic floor therapy and limitations treating PF without proper PF evaluation. Pt encouraged to seek PF therapy when able. Patient will continue to benefit from skilled PT services to address functional mobility deficits, address ROM deficits, address strength deficits, analyze and address soft tissue restrictions, analyze and cue movement patterns, analyze and modify body mechanics/ergonomics, assess and modify postural abnormalities and address imbalance/dizziness to attain remaining goals. []  See Plan of Care  []  See progress note/recertification  []  See Discharge Summary         Progress towards goals / Updated goals:  Short Term Goals: To be accomplished in 4 weeks:               1.  Patient is compliant with daily HEP to assure progress in function and improved breathing pattern  Status at Eval: patient education  Current: Compliant but challenged     2. Patient is willing to use AFO to improve gait pattern, left foot clearance and reduction in compensatory LS movements in order to reduce LBP  Status at Eval: patient education in regards to AFO, patient was given info materials  Status on 11/22/19: patient is interested in trial of Rasheed AFO, rep will be contacted   Current: AFO ordered 12-2-19  3. Reduction in pain to <or= 5/10 for increased ease with basic function and ADL  Status at Eval: pain ranging 3-8/10  Current: 1-3/10 MET 11-27-19     Long Term Goals: To be accomplished in 8 weeks:               1. Improved FOTO score to >or= 48/100 as evidence of improved function  Status at Eval: FOTO 40/100     2. Improved gait pattern and functional speed as evidenced by improved TUG score by >or= 4 seconds  Status at Eval: altered gait, slowed speed, left leg spasticity, lack of foot clearance compensated through left hip hiking, TUG 32 seconds  Current: No change due to lack of foot clearance 12-2-19     3. Reduction in pain to <or= 3/10 with ADL for improved overall function and tolerance to ADL  Status at Eval: pain 3-8/10  Current: Progressing 1-3/10 11-27-19   Current: NO CHANGE SINCE START OF CARE with recent flare of pain 12-2-19  4. Improved functional core strength as evidenced by ability to perform prone plank for >or= 5 seconds without increase in pain to improve functional LS stability  Status at Eval: extremely weak core and PF,  unable to lie in supine or prone position due to pain  Status on 11/22/19: good tolerance to floor transfer and prone position, progressing  Current: Pt asked to reduce challenge due to increased pain after last 3 visits 12-2-19     5.  Patient reports improved control of bowel and bladder for improved quality of life  Status at Eval: patient reports intermittent bowel and bladder incontinence, c/o PF spasms   Current: Pt educated on bowel irritants, provided with bowel logs and sent home with PF stretches for home.      Current: Pt treated manually externally to pelvis, to release diaphragm and pelvic floor. Pt tolerated but pt has not noticed any change since start of care due to limitations with pelvic floor treatment. Pt educated on need to seek PF referral/treatment when PF therapist arrives to resolve pain.     PLAN  []  Upgrade activities as tolerated     [x]  Continue plan of care  []  Update interventions per flow sheet       []  Discharge due to:_  []  Other:_      Alexandra Celis PTA 12/2/2019  10:11 AM    Future Appointments   Date Time Provider Catherine Sanchez   [facility]2019 [facility] [place] [facility]   12/9/2019 10:00 AM [facility]   12/11/2019  9:00 AM [facility]   12/16/2019  [facility] [place]   12/18/2019  9:00 AM Quentin Hampton PT [facility]   12/23/2019  9:00 AM Quentin Hampton PT [facility]   12/27/2019 10:00 [facility] [place]   12/30/2019  9:15 [facility] [place]   1/2/2020 10:00 [facility] [place]   1/6/2020  9:[facility] [place] [facility]   1/8/2020  9:00 AM Quentin Hampton PT [facility]   2/11/2020 11:15 AM Zion Sommer  [address]

## 2019-12-04 ENCOUNTER — HOSPITAL ENCOUNTER (OUTPATIENT)
Dept: PHYSICAL THERAPY | Age: 54
Discharge: HOME | End: 2019-12-04
Payer: OTHER MISCELLANEOUS

## 2019-12-04 PROCEDURE — 97140 MANUAL THERAPY 1/> REGIONS: CPT

## 2019-12-04 PROCEDURE — 97110 THERAPEUTIC EXERCISES: CPT

## 2019-12-04 NOTE — PROGRESS NOTES
PT DAILY TREATMENT NOTE    Patient Name: Kindra Diamond  Date:2019  : 1965  [x]  Patient  Verified  Payor: Gretta Patel / Plan: [facility] HUMAN MEDICARE CHOICE PPO/PFFS / Product Type: Managed Care Medicare /    In time:1000  Out time:1100  Total Treatment Time (min): 60  Total Timed Codes (min): 50  1:1 Treatment Time (MC only): 50  Visit #: 8 of 12    Treatment Area: Low back pain [M54.5]  Right buttock pain [M79.18]  Sacroiliac pain [M53.3]    SUBJECTIVE  Pain Level (0-10 scale): 3/10  Any medication changes, allergies to medications, adverse drug reactions, diagnosis change, or new procedure performed?: [x] No    [] Yes (see summary sheet for update)  Subjective functional status/changes:   [] No changes reported  Pt states that she had more relief after abdominal myofascial work that really lasted.     OBJECTIVE    Modalities Rationale:     decrease pain and increase tissue extensibility to improve patient's ability to ambulate community distance   min [] Estim, type/location:                                      []  att     []  unatt     []  w/US     []  w/ice    []  w/heat    min []  Mechanical Traction: type/lbs                   []  pro   []  sup   []  int   []  cont    []  before manual    []  after manual    min []  Ultrasound, settings/location:      min []  Iontophoresis w/ dexamethasone, location:                                               []  take home patch       []  in clinic   10 min []  Ice     [x]  Heat    location/position: 90/90 low back and abdomen    min []  Vasopneumatic Device, press/temp:     min []  Other:    [x] Skin assessment post-treatment (if applicable):    [x]  intact    [x]  redness- no adverse reaction     []redness - adverse reaction:            30   min Therapeutic Exercise:  [] See flow sheet :   Rationale: increase ROM, increase strength, improve coordination, improve balance and increase proprioception to improve the patients ability to ambulate community distances      20 min Manual Therapy:  MFR to abdomen and SI joint, myofascial leg pull on left   Rationale: decrease pain, increase ROM, increase tissue extensibility, decrease edema  and decrease trigger points to improve the patients ability to ambulate community distances            With   [] TE   [] TA   [] neuro   [] other: Patient Education: [x] Review HEP    [] Progressed/Changed HEP based on:   [] positioning   [] body mechanics   [] transfers   [] heat/ice application    [] other:      Other Objective/Functional Measures:     Pain Level (0-10 scale) post treatment: 4/10    ASSESSMENT/Changes in Function: Pt demonstrates improved softening through abdomen and neutral innominate today for first time since start of care. She continues to be challenged with addition of therex with slight increase in pain. Encouraged pt to slowly incorporate exercise at home within tolerance    Patient will continue to benefit from skilled PT services to address functional mobility deficits, address ROM deficits, address strength deficits, analyze and address soft tissue restrictions, analyze and cue movement patterns, analyze and modify body mechanics/ergonomics, assess and modify postural abnormalities and address imbalance/dizziness to attain remaining goals. []  See Plan of Care  []  See progress note/recertification  []  See Discharge Summary         Progress towards goals / Updated goals:  Short Term Goals: To be accomplished in 4 weeks:               1. Patient is compliant with daily HEP to assure progress in function and improved breathing pattern  Status at Eval: patient education  Current: Compliant but challenged     2.  Patient is willing to use AFO to improve gait pattern, left foot clearance and reduction in compensatory LS movements in order to reduce LBP  Status at Eval: patient education in regards to AFO, patient was given info materials  Status on 11/22/19: patient is interested in trial of Rasheed AFO, rep will be contacted   Current: AFO ordered 12-2-19    3. Reduction in pain to <or= 5/10 for increased ease with basic function and ADL  Status at Eval: pain ranging 3-8/10  Current: 1-3/10 MET 11-27-19     Long Term Goals: To be accomplished in 8 weeks:               1. Improved FOTO score to >or= 48/100 as evidence of improved function  Status at Eval: FOTO 40/100     2. Improved gait pattern and functional speed as evidenced by improved TUG score by >or= 4 seconds  Status at Eval: altered gait, slowed speed, left leg spasticity, lack of foot clearance compensated through left hip hiking, TUG 32 seconds  Current: No change due to lack of foot clearance 12-2-19     3. Reduction in pain to <or= 3/10 with ADL for improved overall function and tolerance to ADL  Status at Eval: pain 3-8/10  Current: Progressing 1-3/10 11-27-19   Current: NO CHANGE SINCE START OF CARE with recent flare of pain 12-2-19  4. Improved functional core strength as evidenced by ability to perform prone plank for >or= 5 seconds without increase in pain to improve functional LS stability  Status at Eval: extremely weak core and PF,  unable to lie in supine or prone position due to pain  Status on 11/22/19: good tolerance to floor transfer and prone position, progressing  Current: Pt asked to reduce challenge due to increased pain after last 3 visits 12-2-19     5. Patient reports improved control of bowel and bladder for improved quality of life  Status at Eval: patient reports intermittent bowel and bladder incontinence, c/o PF spasms   Current: Pt educated on bowel irritants, provided with bowel logs and sent home with PF stretches for home.      Current: Pt treated manually externally to pelvis, to release diaphragm and pelvic floor. Pt tolerated but pt has not noticed any change since start of care due to limitations with pelvic floor treatment.   Pt educated on need to seek PF referral/treatment when PF therapist arrives to resolve pain.       PLAN  []  Upgrade activities as tolerated     [x]  Continue plan of care  []  Update interventions per flow sheet       []  Discharge due to:_  []  Other:_      Yoana Fitch PTA 12/4/2019  10:29 AM    Future Appointments   Date Time Provider Catherine Sanchez   12/9/2019 10:00 AM Reyes Phillips, [facility]   12/11/2019  9:00 AM Reyes Phillips, [facility]   12/16/2019  9:00 AM Reyes Phillips, [facility]   12/18/2019  9:00 AM Enrico Carrasquillo [facility]   12/23/2019  9:00 AM Jovan Danielle [facility]   12/27/2019 10:00 AM Reyes Phillips, [facility]   12/30/2019  9:15 AM Reyes Phillips, [facility]   1/2/2020 10:00 AM Reyes Phillips, [facility]   1/6/2020  9:15 AM Reyes Phillips, [facility]   1/8/2020  9:00 AM Enrico Carrasquillo [facility]   2/11/2020 11:15 AM Cookie Humphreys  [address]

## 2019-12-09 ENCOUNTER — HOSPITAL ENCOUNTER (OUTPATIENT)
Dept: PHYSICAL THERAPY | Age: 54
Discharge: HOME | End: 2019-12-09
Payer: OTHER MISCELLANEOUS

## 2019-12-09 PROCEDURE — 97530 THERAPEUTIC ACTIVITIES: CPT

## 2019-12-09 PROCEDURE — 97110 THERAPEUTIC EXERCISES: CPT

## 2019-12-09 NOTE — PROGRESS NOTES
PT DAILY TREATMENT NOTE    Patient Name: Isabelle Frazier  Date:2019  : 1965  [x]  Patient  Verified  Payor: Donnell Curling / Plan: [facility] HUMAN MEDICARE CHOICE PPO/PFFS / Product Type: Managed Care Medicare /    In time:1000  Out time:1045  Total Treatment Time (min): 45  Total Timed Codes (min): 45  1:1 Treatment Time (MC only): 45   Visit #: 9 of 12    Treatment Area: Low back pain [M54.5]  Right buttock pain [M79.18]  Sacroiliac pain [M53.3]    SUBJECTIVE  Pain Level (0-10 scale): 3/10  Any medication changes, allergies to medications, adverse drug reactions, diagnosis change, or new procedure performed?: [x] No    [] Yes (see summary sheet for update)  Subjective functional status/changes:   [] No changes reported  Pt reports that she slept great and was able to do up to 10 squats each day of the weekend    OBJECTIVE        30 min Therapeutic Exercise:  [] See flow sheet :   Rationale: increase ROM, increase strength, improve coordination, improve balance and increase proprioception to improve the patients ability to ambulate safely through the community    15 min Therapeutic Activity:  []  See flow sheet :orthotic assessment with Rasheed   Rationale: increase ROM, improve coordination, improve balance and increase proprioception  to improve the patients ability to ambulate safely           With   [x] TE   [x] TA   [] neuro   [] other: Patient Education: [x] Review HEP    [] Progressed/Changed HEP based on:   [] positioning   [] body mechanics   [] transfers   [] heat/ice application    [] other:      Other Objective/Functional Measures:      Pain Level (0-10 scale) post treatment: 4/10    ASSESSMENT/Changes in Function: Pt demonstrates improving strength and tolerance to therex today. Orthotic/brace consultant attempted to fit her for a brace however his prefit braces were not a good fit for her.   He will contact [facility] to ask for a consult as she needs a custom orthotic to address her supination with ambulation/fatigue. Patient will continue to benefit from skilled PT services to address functional mobility deficits, address ROM deficits, address strength deficits, analyze and address soft tissue restrictions, analyze and cue movement patterns, analyze and modify body mechanics/ergonomics, assess and modify postural abnormalities and address imbalance/dizziness to attain remaining goals. []  See Plan of Care  []  See progress note/recertification  []  See Discharge Summary         Progress towards goals / Updated goals:  Short Term Goals: To be accomplished in 4 weeks:               1. Patient is compliant with daily HEP to assure progress in function and improved breathing pattern  Status at Eval: patient education  Current: Compliant but challenged     2. Patient is willing to use AFO to improve gait pattern, left foot clearance and reduction in compensatory LS movements in order to reduce LBP  Status at Eval: patient education in regards to AFO, patient was given info materials  Status on 11/22/19: patient is interested in trial of [employer] AFO, rep will be contacted   Current: AFO ordered 12-2-19   Current: Attempted to fit however prefit braces that will not address her supination with ambulation. Cesar Clements will be contacted to provide custom orthotic 12-9-19     3. Reduction in pain to <or= 5/10 for increased ease with basic function and ADL  Status at Eval: pain ranging 3-8/10  Current: 1-3/10 MET 11-27-19     Long Term Goals: To be accomplished in 8 weeks:               1. Improved FOTO score to >or= 48/100 as evidence of improved function  Status at [facility]: FOTO 40/100     2. Improved gait pattern and functional speed as evidenced by improved TUG score by >or= 4 seconds  Status at Eval: altered gait, slowed speed, left leg spasticity, lack of foot clearance compensated through left hip hiking, TUG 32 seconds  Current: No change due to lack of foot clearance 12-2-19     3.  Reduction in pain to <or= 3/10 with ADL for improved overall function and tolerance to ADL  Status at [facility]: pain 3-8/10  Current: Progressing 1-3/10 11-27-19   Current: NO CHANGE SINCE START OF CARE with recent flare of pain 12-2-19  4. Improved functional core strength as evidenced by ability to perform prone plank for >or= 5 seconds without increase in pain to improve functional LS stability  Status at [facility]: extremely weak core and PF,  unable to lie in supine or prone position due to pain  Status on 11/22/19: good tolerance to floor transfer and prone position, progressing  Current: Pt asked to reduce challenge due to increased pain after last 3 visits 12-2-19     5.  Patient reports improved control of bowel and bladder for improved quality of life  Status at [facility]: patient reports intermittent bowel and bladder incontinence, c/o PF spasms   Current: Pt educated on bowel irritants, provided with bowel logs and sent home with PF stretches for home.      Current: Pt treated manually externally to pelvis, to release diaphragm and pelvic floor.  Pt tolerated but pt has not noticed any change since start of care due to limitations with pelvic floor treatment.  Pt educated on need to seek PF referral/treatment when PF therapist arrives to resolve pain.       PLAN  []  Upgrade activities as tolerated     []  Continue plan of care  []  Update interventions per flow sheet       []  Discharge due to:_  []  Other:_      Dima Laguerre PTA 12/9/2019  10:08 AM    Future Appointments   Date Time Provider Catherine Sanchez   12/11/2019  9:00 AM Erich Mehta [facility]   12/16/2019  9:00 AM Erich Mehta [facility]   12/18/2019  9:00 AM Lucy Lama PT [facility]   12/23/2019  9:00 AM Namita Davis PT [facility]   12/27/2019 10:00 AM Erich Mehta [facility]   12/30/2019  9:15 AM Erich Mehta [facility]   1/2/2020 10:00 AM Erich Mehta [facility]   1/6/2020  9:15 AM Erich Mehta PTA [facility] 1/8/2020  9:00 AM Luz Elena Eubanks PT [facility]   2/11/2020 11:15 AM River Martel  [address]

## 2019-12-11 ENCOUNTER — HOSPITAL ENCOUNTER (OUTPATIENT)
Dept: PHYSICAL THERAPY | Age: 54
Discharge: HOME | End: 2019-12-11
Payer: OTHER MISCELLANEOUS

## 2019-12-11 PROCEDURE — 97140 MANUAL THERAPY 1/> REGIONS: CPT

## 2019-12-11 PROCEDURE — 97530 THERAPEUTIC ACTIVITIES: CPT

## 2019-12-11 PROCEDURE — 97110 THERAPEUTIC EXERCISES: CPT

## 2019-12-11 NOTE — PROGRESS NOTES
[facility] at [address] Memo Anguiano, [address]  Ph [phone]  Fx [phone]    Physical Therapy Progress Note       Patient name: Royal Bishop Start of Care: 2019   Referral source: Sorin Simeon MD : 1965                Medical Diagnosis: Low back pain [M54.5]  Right buttock pain [M79.18]  Sacroiliac pain [M53.3]    Onset Date:                Treatment Diagnosis: LBP/SI dysfunction/radiculopathy/spasticity/gait imbalance/pelvic floor dysfunction                                              ICD-10: M46.1, M54.5,R26.81, M62.89   Prior Hospitalization: see medical history Provider#: [id number]   Medications: Verified on Patient summary List    Comorbidities: left leg spasticity, gait dysfunction, s/p TIA/CVA, PF dysfunction, bilateral RCT   Prior Level of Function: 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 She continues to have pelvic floor issues that have not resolved as she needs specific pelvic floor therapy. She will pursue a specific referral and the focus will be on gait/mobility with addition of orthotic and pain control for LS. [address] [facility] will be contacted in order to initiate AFO. I recommend continuation of PT for 4 visits/ 1 visit per week prior to initiation of PFT.      Patient will continue to benefit from skilled PT services to address functional mobility deficits, address ROM deficits, address strength deficits, analyze and address soft tissue restrictions, analyze and cue movement patterns, analyze and modify body mechanics/ergonomics, assess and modify postural abnormalities and address imbalance/dizziness to attain remaining goals.               Progress towards goals / Updated goals:  Short Term Goals: To be accomplished in 4 weeks:               1. Patient is compliant with daily HEP to assure progress in function and improved breathing pattern  Status at Eval: patient education  Current: Compliant but challenged     2. Patient is willing to use AFO to improve gait pattern, left foot clearance and reduction in compensatory LS movements in order to reduce LBP  Status at Eval: patient education in regards to AFO, patient was given info materials  Status on 11/22/19: [facility], [facility] will be contacted   Current: AFO ordered 12-2-19   Current: Attempted to fit however prefit braces that will not address her supination with ambulation. Mirtha Burris will be contacted to provide custom orthotic 12-9-19   Current: In contact with      3. Reduction in pain to <or= 5/10 for increased ease with basic function and ADL  Status at Eval: pain ranging 3-8/10  Current: 1-3/10 MET 11-27-19     Long Term Goals: To be accomplished in 8 weeks:               1. Improved FOTO score to >or= 48/100 as evidence of improved function  Status at Eval: FOTO 40/100   Current: 39/100 SLIGHT REGRESSION 12-11-19    2. Improved gait pattern and functional speed as evidenced by improved TUG score by >or= 4 seconds  Status at Eval: altered gait, slowed speed, left leg spasticity, lack of foot clearance compensated through left hip hiking, TUG 32 seconds  Current: No change due to lack of foot clearance 12-2-19   Current: 15 seconds 12-11-19    3. Reduction in pain to <or= 3/10 with ADL for improved overall function and tolerance to ADL  Status at Eval: pain 3-8/10  Current: Progressing 1-3/10 11-27-19   Current: NO CHANGE SINCE START OF CARE with recent flare of pain 12-2-19  Current: 2-4/10 NEARLY MET 12-11-19    4.  Improved functional core strength as evidenced by ability to perform prone plank for >or= 5 seconds without increase in pain to improve functional LS stability  Status at [facility]: extremely weak core and PF,  unable to lie in supine or prone position due to pain  Status on 11/22/19: good tolerance to floor transfer and prone position, progressing  Current: Pt asked to reduce challenge due to increased pain after last 3 visits 12-2-19     5.  Patient reports improved control of bowel and bladder for improved quality of life  Status at Eval: patient reports intermittent bowel and bladder incontinence, c/o PF spasms   Current: Pt educated on bowel irritants, provided with bowel logs and sent home with PF stretches for home.       Key Functional Changes: improved speed with ambulation, improved strength      ASSESSMENT/RECOMMENDATIONS:  [x]Continue therapy per initial plan/protocol at a frequency of  1 x per week for 4 weeks while patient is being fitted for proper AFO to improve gait pattern and reduce compensatory LS movements  []Continue therapy with the following recommended changes:_____________________ _____________________________ ________________________________________  []Discontinue therapy progressing towards or have reached established goals  []Discontinue therapy due to lack of appreciable progress towards goals  []Discontinue therapy due to lack of attendance or compliance  []Await Physician's recommendations/decisions regarding therapy  []Other:________________________________________________________________    Thank you for this referral.   Jess Cruz, PT 12/11/2019 10:24 AM    NOTE TO PHYSICIAN:  PLEASE COMPLETE THE ORDERS BELOW AND   FAX TO [facility]: [phone]  If you are unable to process this request in 24 hours please contact our office: [phone]      ____ I have read the above report and request that my patient continue therapy with the following changes/special instructions:  ____ I have read the above report and request that my patient be discharged from therapy    Physician's Signature:____________Date:_________TIME:________    [facility], Date and Time must be completed for valid certification **

## 2019-12-11 NOTE — PROGRESS NOTES
PT DAILY TREATMENT NOTE    Patient Name: Dejah Rinaldi  Date:2019  : 1965  [x]  Patient  Verified  Payor: Keshia Hearing / [facility]/PFFS / Product Type: Managed Care Medicare /    In time:858  Out time:1006  Total Treatment Time (min): 68  Total Timed Codes (min): 58  1:1 Treatment Time ( only): 58   Visit #: 10 of 12    Treatment Area: Low back pain [M54.5]  Right buttock pain [M79.18]  Sacroiliac pain [M53.3]    SUBJECTIVE  Pain Level (0-10 scale): 4/10  Any medication changes, allergies to medications, adverse drug reactions, diagnosis change, or new procedure performed?: [x] No    [] Yes (see summary sheet for update)  Subjective functional status/changes:   [] No changes reported  Pt reports that she is going upstairs with increased ease however still challenged with long distances and mobility. She does report overall reduction in pain 2-10.   She is interested in pursuing pelvic floor therapy to address ongoing PF issues    OBJECTIVE    Modalities Rationale:     decrease pain and increase tissue extensibility to improve patient's ability to ambulate community distances   min [] Estim, type/location:                                      []  att     []  unatt     []  w/US     []  w/ice    []  w/heat    min []  Mechanical Traction: type/lbs                   []  pro   []  sup   []  int   []  cont    []  before manual    []  after manual    min []  Ultrasound, settings/location:      min []  Iontophoresis w/ dexamethasone, location:                                               []  take home patch       []  in clinic   10 min []  Ice     [x]  Heat    location/position: Low back, 90/90    min []  Vasopneumatic Device, press/temp:     min []  Other:    [x] Skin assessment post-treatment (if applicable):    [x]  intact    [x]  redness- no adverse reaction     []redness - adverse reaction:          38 min Therapeutic Exercise:  [] See flow sheet :   Rationale: increase ROM, increase strength, improve coordination, improve balance and increase proprioception to improve the patients ability to ambulate community distances    10 min Therapeutic Activity:  []  See flow sheet :mobility, PF therapy   Rationale: increase ROM, improve coordination and increase proprioception  to improve the patients ability to ambulate community distances     10 min Manual Therapy:  Myofascial arm pulls, MFR to abdomen and sacrum   Rationale: decrease pain, increase ROM, increase tissue extensibility, decrease edema  and decrease trigger points to improve the patients ability to ambulate community distances          With   [x] TE   [] TA   [] neuro   [] other: Patient Education: [x] Review HEP    [] Progressed/Changed HEP based on:   [x] positioning   [x] body mechanics   [] transfers   [] heat/ice application    [] other:      Other Objective/Functional Measures:      Pain Level (0-10 scale) post treatment: 2/10    ASSESSMENT/Changes in Function: Pt demonstrates improved strengthening and mobility with noted reduction in pain since start of care. She continues to have pelvic floor issues that have not resolved as she needs specifica pelvic floor therapy. She will pursue a specific referral and the focus will be on gait/mobility with addition of orthotic. Conferred with PT    Patient will continue to benefit from skilled PT services to address functional mobility deficits, address ROM deficits, address strength deficits, analyze and address soft tissue restrictions, analyze and cue movement patterns, analyze and modify body mechanics/ergonomics, assess and modify postural abnormalities and address imbalance/dizziness to attain remaining goals. []  See Plan of Care  []  See progress note/recertification  []  See Discharge Summary         Progress towards goals / Updated goals:  Short Term Goals: To be accomplished in 4 weeks:               1.  Patient is compliant with daily HEP to assure progress in function and improved breathing pattern  Status at Eval: patient education  Current: Compliant but challenged     2. Patient is willing to use AFO to improve gait pattern, left foot clearance and reduction in compensatory LS movements in order to reduce LBP  Status at Eval: patient education in regards to AFO, patient was given info materials  Status on 11/22/19: patient is interested in trial of [employer] AFO, rep will be contacted   Current: AFO ordered 12-2-19   Current: Attempted to fit however prefit braces that will not address her supination with ambulation. Meena Adan will be contacted to provide custom orthotic 12-9-19   Current: In contact with    3. Reduction in pain to <or= 5/10 for increased ease with basic function and ADL  Status at Eval: pain ranging 3-8/10  Current: 1-3/10 MET 11-27-19     Long Term Goals: To be accomplished in 8 weeks:               1. Improved FOTO score to >or= 48/100 as evidence of improved function  Status at Eval: FOTO 40/100   Current: 39/100 SLIGHT REGRESSION 12-11-19  2. Improved gait pattern and functional speed as evidenced by improved TUG score by >or= 4 seconds  Status at Eval: altered gait, slowed speed, left leg spasticity, lack of foot clearance compensated through left hip hiking, TUG 32 seconds  Current: No change due to lack of foot clearance 12-2-19   Current: 15 seconds 12-11-19  3. Reduction in pain to <or= 3/10 with ADL for improved overall function and tolerance to ADL  Status at Eval: pain 3-8/10  Current: Progressing 1-3/10 11-27-19   Current: NO CHANGE SINCE START OF CARE with recent flare of pain 12-2-19  Current: 2-4/10 NEARLY MET 12-11-19  4.  Improved functional core strength as evidenced by ability to perform prone plank for >or= 5 seconds without increase in pain to improve functional LS stability  Status at Eval: extremely weak core and PF,  unable to lie in supine or prone position due to pain  Status on 11/22/19: good tolerance to floor transfer and prone position, progressing  Current: Pt asked to reduce challenge due to increased pain after last 3 visits 12-2-19     5.  Patient reports improved control of bowel and bladder for improved quality of life  Status at al: patient reports intermittent bowel and bladder incontinence, c/o PF spasms   Current: Pt educated on bowel irritants, provided with bowel logs and sent home with PF stretches for home.      Current: Pt treated manually externally to pelvis, to release diaphragm and pelvic floor.  Pt tolerated but pt has not noticed any change since start of care due to limitations with pelvic floor treatment.  Pt educated on need to seek PF referral/treatment when PF therapist arrives to resolve pain.          PLAN  []  Upgrade activities as tolerated     []  Continue plan of care  []  Update interventions per flow sheet       []  Discharge due to:_  []  Other:_      Yoana Fitch PTA 12/11/2019  9:15 AM    Future Appointments   Date Time Provider Catherine Sanchez   12/16/2019  9:00 AM Reyes Phillips, [facility]   12/18/2019  9:00 AM Enrico Carrasquillo PT [facility]   12/23/2019  9:00 AM Jovan Danielle [facility]   12/27/2019 10:00 AM Reyes Phillips, [facility]   12/30/2019  9:15 AM Reyes Phillips, [facility]   1/2/2020 10:00 AM Reyes Phillips, [facility]   1/6/2020  9:15 AM Reyes Phillips, [facility]   1/8/2020  9:00 AM Enrico Carrasquillo PT [facility]   2/11/2020 11:15 AM Cookie Humphreys  [address]

## 2019-12-16 ENCOUNTER — APPOINTMENT (OUTPATIENT)
Dept: PHYSICAL THERAPY | Age: 54
End: 2019-12-16
Payer: OTHER MISCELLANEOUS

## 2019-12-18 ENCOUNTER — HOSPITAL ENCOUNTER (OUTPATIENT)
Dept: PHYSICAL THERAPY | Age: 54
Discharge: HOME | End: 2019-12-18
Payer: OTHER MISCELLANEOUS

## 2019-12-18 ENCOUNTER — TELEPHONE (OUTPATIENT)
Dept: ORTHOPEDIC SURGERY | Age: 54
End: 2019-12-18

## 2019-12-18 DIAGNOSIS — R29.898 LEFT LEG WEAKNESS: ICD-10-CM

## 2019-12-18 DIAGNOSIS — M21.372 LEFT FOOT DROP: Primary | ICD-10-CM

## 2019-12-18 PROCEDURE — 97124 MASSAGE THERAPY: CPT

## 2019-12-18 PROCEDURE — 97110 THERAPEUTIC EXERCISES: CPT

## 2019-12-18 NOTE — TELEPHONE ENCOUNTER
Patient called stating that her physical therapist sent over a letter on 12/11/19 stating that the patient needs a referral an orthotic for her leg. She asked if this referral can be written and sent to [facility] in [facility]. Please advise patient at [phone] when completed.

## 2019-12-18 NOTE — PROGRESS NOTES
PT DAILY TREATMENT NOTE    Patient Name: Juan Luis Balderas  Date:2019  : 1965  [x]  Patient  Verified  Payor: Mac Blood / Plan: [facility] [employer] MEDICARE CHOICE PPO/PFFS / Product Type: Managed Care Medicare /    In time:903  Out time:1005  Total Treatment Time (min): 62  Total Timed Codes (min): 52  1:1 Treatment Time (MC/BCBS only): n/a    Visit #: 11 of 14    Treatment Area: Low back pain [M54.5]  Right buttock pain [M79.18]  Sacroiliac pain [M53.3]    SUBJECTIVE  Pain Level (0-10 scale): 0  Any medication changes, allergies to medications, adverse drug reactions, diagnosis change, or new procedure performed?: [x] No    [] Yes (see summary sheet for update)  Subjective functional status/changes:   [] No changes reported  Reports doing pretty good. Doing stairs at her house 2-3x daily which she hasn't been able to do for years. Still descending at a 45 deg ankle. Feels like left pelvic/SI is giving out at times associated with sharp pain. Spasticity left leg seems to be also triggering factor. Injections have been mostly on right side. Feels like back is getting stronger. Reports overall increase in tremor.          OBJECTIVE  Modalities Rationale:     decrease pain and increase tissue extensibility to improve patient's ability to return to more active life style   min [] Estim, type/location:                                      []  att     []  unatt     []  w/US     []  w/ice    []  w/heat    min []  Mechanical Traction: type/lbs                   []  pro   []  sup   []  int   []  cont    []  before manual    []  after manual    min []  Ultrasound, settings/location:      min []  Iontophoresis w/ dexamethasone, location:                                               []  take home patch       []  in clinic   10 min []  Ice     [x]  Heat    location/position: 90/90    min []  Vasopneumatic Device, press/temp:     min []  Other:            42 min Therapeutic Exercise:  [x] See flow sheet :focus on tone inhibition, facilitation of pelvic stability and reduction in left SI pain   Rationale: increase ROM and increase strength to improve the patients ability to perform ADL with minimal pain      10 min Manual Therapy:  Gentle left SI mobs/passive ROM/stretching, left leg distraction for pain control   Rationale: decrease pain to improve functional tolerances              With   [x] TE   [] TA   [] neuro   [] other: Patient Education: [x] Review HEP    [] Progressed/Changed HEP based on:   [] positioning   [] body mechanics   [] transfers   [] heat/ice application    [] other:      Other Objective/Functional Measures:   mild pinching left SI pain during activities, relieved with manual leg distraction  Patient to contact MD office for AFO script  Script needs to be faxed to  to initiate appointment for AFO     Pain Level (0-10 scale) post treatment: 0    ASSESSMENT/Changes in Function: good tolerance to activities with intermittent left SI pain and occasional increase in left leg spasticity    Patient will continue to benefit from skilled PT services to address ROM deficits, address strength deficits, analyze and address soft tissue restrictions and analyze and cue movement patterns to attain remaining goals. [x]  See Plan of Care  []  See progress note/recertification  []  See Discharge Summary         Progress towards goals / Updated goals:  Short Term Goals: To be accomplished in 4 weeks:               1. Patient is compliant with daily HEP to assure progress in function and improved breathing pattern  Status at Eval: patient education  Current: Compliant but challenged     2.  Patient is willing to use AFO to improve gait pattern, left foot clearance and reduction in compensatory LS movements in order to reduce LBP  Status at Eval: patient education in regards to AFO, patient was given info materials  Status on 11/22/19: patient is interested in trial of [facility] AFO, rep will be contacted   Current: AFO ordered 12-2-19   Current: Attempted to fit however prefit braces that will not address her supination with ambulation. Liane Sarah will be contacted to provide custom orthotic 12-9-19   Current: In contact with       3. Reduction in pain to <or= 5/10 for increased ease with basic function and ADL  Status at Eval: pain ranging 3-8/10  Current: 1-3/10 MET 11-27-19     Long Term Goals: To be accomplished in 8 weeks:               1. Improved FOTO score to >or= 48/100 as evidence of improved function  Status at Eval: FOTO 40/100   Current: 39/100 SLIGHT REGRESSION 12-11-19     2. Improved gait pattern and functional speed as evidenced by improved TUG score by >or= 4 seconds  Status at Eval: altered gait, slowed speed, left leg spasticity, lack of foot clearance compensated through left hip hiking, TUG 32 seconds  Current: No change due to lack of foot clearance 12-2-19   Current: 15 seconds 12-11-19, goal met     3. Reduction in pain to <or= 3/10 with ADL for improved overall function and tolerance to ADL  Status at Eval: pain 3-8/10  Current: Progressing 1-3/10 11-27-19   Current: NO CHANGE SINCE START OF CARE with recent flare of pain 12-2-19  Current: 2-4/10 NEARLY MET 12-11-19     4. Improved functional core strength as evidenced by ability to perform prone plank for >or= 5 seconds without increase in pain to improve functional LS stability  Status at Eval: extremely weak core and PF,  unable to lie in supine or prone position due to pain  Status on 11/22/19: good tolerance to floor transfer and prone position, progressing  Current: Pt asked to reduce challenge due to increased pain after last 3 visits 12-2-19     5.  Patient reports improved control of bowel and bladder for improved quality of life  Status at Eval: patient reports intermittent bowel and bladder incontinence, c/o PF spasms   Current: Pt educated on bowel irritants, provided with bowel logs and sent home with PF stretches for home.     PLAN  []  Upgrade activities as tolerated     [x]  Continue plan of care  []  Update interventions per flow sheet       []  Discharge due to:_  []  Other:_      Yue Sparks PT 12/18/2019  9:09 AM    Future Appointments   Date Time Provider Catherine Sanchez   12/20/2019  9:00 AM Burke Araujo [facility]   1/2/2020 10:00 AM Oh Farrar, PTA [facility]   1/8/2020  9:00 AM Jason Mcdaniels, PT [facility]   2/11/2020 11:15 AM Germain Shearer  [address]

## 2019-12-19 NOTE — TELEPHONE ENCOUNTER
MD requested to contact PT and see what they need from him. Spoke with Therapist who stated the patient was interested in a otterbox AFO and she would just need a generalized Left AFO order. VO received, Order printed awaiting MD signature.

## 2019-12-20 ENCOUNTER — HOSPITAL ENCOUNTER (OUTPATIENT)
Dept: PHYSICAL THERAPY | Age: 54
Discharge: HOME | End: 2019-12-20
Payer: OTHER MISCELLANEOUS

## 2019-12-20 PROCEDURE — 97110 THERAPEUTIC EXERCISES: CPT

## 2019-12-20 PROCEDURE — 97140 MANUAL THERAPY 1/> REGIONS: CPT

## 2019-12-20 NOTE — PROGRESS NOTES
PT DAILY TREATMENT NOTE    Patient Name: Haris Edwards  Date:2019  : 1965  [x]  Patient  Verified  Payor: Tracy Fraire / Plan: [facility] [employer] MEDICARE CHOICE PPO/PFFS / Product Type: Managed Care Medicare /    In time:9:02  Out time:10:07  Total Treatment Time (min): 65  Total Timed Codes (min): 65  1:1 Treatment Time (MC only): NA   Visit #: 12  14    Treatment Area: Low back pain [M54.5]  Right buttock pain [M79.18]  Sacroiliac pain [M53.3]      SUBJECTIVE  Pain Level (0-10 scale): 1/10 sore  Any medication changes, allergies to medications, adverse drug reactions, diagnosis change, or new procedure performed?: [x] No    [] Yes (see summary sheet for update)  Subjective functional status/changes:   [] No changes reported  \"I don't have a lot of pain at the moment, more sore. \"    OBJECTIVE      Modalities Rationale:     decrease edema, decrease inflammation and decrease pain to improve patient's ability to return to prior level of physical activity. 10 min []  Ice     [x]  Heat    location/position: Supine/low back   [x] Skin assessment post-treatment (if applicable):    [x]  intact    []  redness- no adverse reaction     []redness - adverse reaction:          45 min Therapeutic Exercise:  [x] See flow sheet :   Rationale: increase ROM, increase strength and improve coordination to improve the patients ability to return to prior level of physical activity. 10 min Manual Therapy:  DTM to Left gluteal and piriformis, Distraction to left    Rationale: decrease pain, increase ROM and increase tissue extensibility to improve the patients ability to return to prior level of physical activity.      With   [] TE   [] TA   [] neuro   [] other: Patient Education: [x] Review HEP    [] Progressed/Changed HEP based on:   [] positioning   [] body mechanics   [] transfers   [] heat/ice application    [] other:      Other Objective/Functional Measures:      Pain Level (0-10 scale) post treatment: 0/10    ASSESSMENT/Changes in Function: Pt tolerated session well. Pt demonstrated moderate difficulty with bed mobility towards end of tx but able to perform. Pt received MHP post manual tx. Patient will continue to benefit from skilled PT services to modify and progress therapeutic interventions, address functional mobility deficits, address ROM deficits, address strength deficits, analyze and address soft tissue restrictions, analyze and cue movement patterns, analyze and modify body mechanics/ergonomics and assess and modify postural abnormalities to attain remaining goals. []  See Plan of Care  []  See progress note/recertification  []  See Discharge Summary         Progress towards goals / Updated goals:  Short Term Goals: To be accomplished in 4 weeks:               1. Patient is compliant with daily HEP to assure progress in function and improved breathing pattern  Status at Eval: patient education  Current: Compliant but challenged     2. Patient is willing to use AFO to improve gait pattern, left foot clearance and reduction in compensatory LS movements in order to reduce LBP  Status at Eval: patient education in regards to AFO, patient was given info materials  Status on 11/22/19: patient is interested in trial of [facility] AFO, rep will be contacted   Current: AFO ordered 12-2-19   Current: Attempted to fit however prefit braces that will not address her supination with ambulation. Juanis Garcia will be contacted to provide custom orthotic 12-9-19   Current: Pt reported AFO is confirmed and will be with patient soon 12/20/19     3. Reduction in pain to <or= 5/10 for increased ease with basic function and ADL  Status at Eval: pain ranging 3-8/10  Current: 1-3/10 MET 11-27-19     Long Term Goals: To be accomplished in 8 weeks:               1. Improved FOTO score to >or= 48/100 as evidence of improved function  Status at Eval: FOTO 40/100   Current: 39/100 SLIGHT REGRESSION 12-11-19     2.  Improved gait pattern and functional speed as evidenced by improved TUG score by >or= 4 seconds  Status at Eval: altered gait, slowed speed, left leg spasticity, lack of foot clearance compensated through left hip hiking, TUG 32 seconds  Current: No change due to lack of foot clearance 12-2-19   Current: 15 seconds 12-11-19, goal met     3. Reduction in pain to <or= 3/10 with ADL for improved overall function and tolerance to ADL  Status at [facility]: pain 3-8/10  Current: Progressing 1-3/10 11-27-19   Current: NO CHANGE SINCE START OF CARE with recent flare of pain 12-2-19  Current: 2-4/10 NEARLY MET 12-11-19     4. Improved functional core strength as evidenced by ability to perform prone plank for >or= 5 seconds without increase in pain to improve functional LS stability  Status at [facility]: extremely weak core and PF,  unable to lie in supine or prone position due to pain  Status on 11/22/19: good tolerance to floor transfer and prone position, progressing  Current: Pt asked to reduce challenge due to increased pain after last 3 visits 12-2-19     5. Patient reports improved control of bowel and bladder for improved quality of life  Status at Eval: patient reports intermittent bowel and bladder incontinence, c/o PF spasms   Current: Pt educated on bowel irritants, provided with bowel logs and sent home with PF stretches for home.     PLAN  [x]  Upgrade activities as tolerated     [x]  Continue plan of care  []  Update interventions per flow sheet       []  Discharge due to:_  []  Other:_      Romina Veronica PTA 12/20/2019  9:16 AM    Future Appointments   Date Time Provider Catherine Sanchez   1/2/2020 10:00 AM Sherley Lee PTA [facility]   1/8/2020  9:00 AM Jeanna Stevenson PT [facility]   2/11/2020 11:15 AM Concetta Matthew  [address]

## 2019-12-23 ENCOUNTER — APPOINTMENT (OUTPATIENT)
Dept: PHYSICAL THERAPY | Age: 54
End: 2019-12-23
Payer: OTHER MISCELLANEOUS

## 2019-12-27 ENCOUNTER — APPOINTMENT (OUTPATIENT)
Dept: PHYSICAL THERAPY | Age: 54
End: 2019-12-27
Payer: OTHER MISCELLANEOUS

## 2019-12-30 ENCOUNTER — APPOINTMENT (OUTPATIENT)
Dept: PHYSICAL THERAPY | Age: 54
End: 2019-12-30
Payer: OTHER MISCELLANEOUS

## 2020-01-02 ENCOUNTER — HOSPITAL ENCOUNTER (OUTPATIENT)
Dept: PHYSICAL THERAPY | Age: 55
Discharge: HOME | End: 2020-01-02
Payer: OTHER MISCELLANEOUS

## 2020-01-02 PROCEDURE — 97110 THERAPEUTIC EXERCISES: CPT

## 2020-01-02 NOTE — PROGRESS NOTES
PT DAILY TREATMENT NOTE    Patient Name: Laura Sole  Date:2020  : 1965  [x]  Patient  Verified  Payor: Nico Chacon / Plan: [facility] [employer] MEDICARE CHOICE PPO/PFFS / Product Type: Managed Care Medicare /    In time:1002  Out time:1103  Total Treatment Time (min): 61  Total Timed Codes (min): 51  1:1 Treatment Time ( only): 51   Visit #: 13 of 14    Treatment Area: Low back pain [M54.5]  Right buttock pain [M79.18]  Sacroiliac pain [M53.3]    SUBJECTIVE  Pain Level (0-10 scale): 0  Any medication changes, allergies to medications, adverse drug reactions, diagnosis change, or new procedure performed?: [x] No    [] Yes (see summary sheet for update)  Subjective functional status/changes:   [] No changes reported  Pt reports that she has been feeling so much better, so good she carried her groceries in from the store.     OBJECTIVE    Modalities Rationale:     decrease pain and increase tissue extensibility to improve patient's ability to stand more than 30 minutes   min [] Estim, type/location:                                      []  att     []  unatt     []  w/US     []  w/ice    []  w/heat    min []  Mechanical Traction: type/lbs                   []  pro   []  sup   []  int   []  cont    []  before manual    []  after manual    min []  Ultrasound, settings/location:      min []  Iontophoresis w/ dexamethasone, location:                                               []  take home patch       []  in clinic   `10 min []  Ice     [x]  Heat    location/position: Left sidelying, right hip    min []  Vasopneumatic Device, press/temp:     min []  Other:    [x] Skin assessment post-treatment (if applicable):    [x]  intact    [x]  redness- no adverse reaction     []redness - adverse reaction:          51 min Therapeutic Exercise:  [] See flow sheet :   Rationale: increase ROM, increase strength, improve coordination, improve balance and increase proprioception to improve the patients ability to stand more than 30 minutes        With   [] TE   [] TA   [] neuro   [] other: Patient Education: [x] Review HEP    [] Progressed/Changed HEP based on:   [] positioning   [] body mechanics   [] transfers   [] heat/ice application    [] other:      Other Objective/Functional Measures:     Pain Level (0-10 scale) post treatment:2-3/10    ASSESSMENT/Changes in Function: Pt demonstrates increased tone and pain with supine position today. She wasn't able to complete all tasks at the same frequency due to apparent fatigue. Pt encouraged to be more careful with activities and energy. Patient will continue to benefit from skilled PT services to address functional mobility deficits, address ROM deficits, address strength deficits, analyze and address soft tissue restrictions, analyze and cue movement patterns, analyze and modify body mechanics/ergonomics, assess and modify postural abnormalities and address imbalance/dizziness to attain remaining goals. []  See Plan of Care  []  See progress note/recertification  []  See Discharge Summary         Progress towards goals / Updated goals:  Short Term Goals: To be accomplished in 4 weeks:               1. Patient is compliant with daily HEP to assure progress in function and improved breathing pattern  Status at [facility]: patient education  Current: Compliant but challenged     2. Patient is willing to use AFO to improve gait pattern, left foot clearance and reduction in compensatory LS movements in order to reduce LBP  Status at [facility]: patient education in regards to AFO, patient was given info materials  Status on 11/22/19: patient is interested in trial of [facility] AFO, rep will be contacted   Current: AFO ordered 12-2-19   Current: Attempted to fit however prefit braces that will not address her supination with ambulation. Juliocesar Miranda will be contacted to provide custom orthotic 12-9-19   Current: In contact with [facility]      3.  Reduction in pain to <or= 5/10 for increased ease with basic function and ADL  Status at Eval: pain ranging 3-8/10  Current: 1-3/10 MET 11-27-19     Long Term Goals: To be accomplished in 8 weeks:               1. Improved FOTO score to >or= 48/100 as evidence of improved function  Status at Eval: FOTO 40/100   Current: 39/100 SLIGHT REGRESSION 12-11-19     2. Improved gait pattern and functional speed as evidenced by improved TUG score by >or= 4 seconds  Status at Eval: altered gait, slowed speed, left leg spasticity, lack of foot clearance compensated through left hip hiking, TUG 32 seconds  Current: No change due to lack of foot clearance 12-2-19   Current: 15 seconds 12-11-19, goal met     3. Reduction in pain to <or= 3/10 with ADL for improved overall function and tolerance to ADL  Status at Eval: pain 3-8/10  Current: Progressing 1-3/10 11-27-19   Current: NO CHANGE SINCE START OF CARE with recent flare of pain 12-2-19  Current: 2-4/10 NEARLY MET 12-11-19     4. Improved functional core strength as evidenced by ability to perform prone plank for >or= 5 seconds without increase in pain to improve functional LS stability  Status at Eval: extremely weak core and PF,  unable to lie in supine or prone position due to pain  Status on 11/22/19: good tolerance to floor transfer and prone position, progressing  Current: Pt asked to reduce challenge due to increased pain after last 3 visits 12-2-19     5.  Patient reports improved control of bowel and bladder for improved quality of life  Status at Eval: patient reports intermittent bowel and bladder incontinence, c/o PF spasms   Current: Pt educated on bowel irritants, provided with bowel logs and sent home with PF stretches for home.       PLAN  []  Upgrade activities as tolerated     []  Continue plan of care  []  Update interventions per flow sheet       []  Discharge due to:_  []  Other:_      Luz Calvert PTA 1/2/2020  9:43 AM    Future Appointments   Date Time Provider Catherine Sanchez   1/2/2020 10:00 AM Gabe James, [facility]   1/8/2020  9:00 AM Nathalia Cantrell [facility] [place]   2/11/2020 11:15 AM Isatu Morris MD [facility]

## 2020-01-06 ENCOUNTER — APPOINTMENT (OUTPATIENT)
Dept: PHYSICAL THERAPY | Age: 55
End: 2020-01-06
Payer: OTHER MISCELLANEOUS

## 2020-01-08 ENCOUNTER — APPOINTMENT (OUTPATIENT)
Dept: PHYSICAL THERAPY | Age: 55
End: 2020-01-08
Payer: OTHER MISCELLANEOUS

## 2020-01-08 ENCOUNTER — TELEPHONE (OUTPATIENT)
Dept: ORTHOPEDIC SURGERY | Age: 55
End: 2020-01-08

## 2020-01-08 NOTE — TELEPHONE ENCOUNTER
Patient states she takes the Tizanidine 4mg once a day. She wants to know if she can increase to twice a day because her pain is increasing.     Her# [phone]

## 2020-01-16 ENCOUNTER — APPOINTMENT (OUTPATIENT)
Dept: PHYSICAL THERAPY | Age: 55
End: 2020-01-16
Payer: OTHER MISCELLANEOUS

## 2020-02-04 ENCOUNTER — HOSPITAL ENCOUNTER (OUTPATIENT)
Dept: PHYSICAL THERAPY | Age: 55
Discharge: HOME | End: 2020-02-04
Payer: OTHER MISCELLANEOUS

## 2020-02-04 PROCEDURE — 97140 MANUAL THERAPY 1/> REGIONS: CPT

## 2020-02-04 PROCEDURE — 97110 THERAPEUTIC EXERCISES: CPT

## 2020-02-04 PROCEDURE — 97116 GAIT TRAINING THERAPY: CPT

## 2020-02-04 NOTE — PROGRESS NOTES
PT DAILY TREATMENT NOTE    Patient Name: Julianna Gao  Date:2020  : 1965  [x]  Patient  Verified  Payor: Spencer Dave / Plan: [facility] [employer] MEDICARE CHOICE PPO/PFFS / Product Type: Managed Care Medicare /    In time:0900  Out time:0955  Total Treatment Time (min): 55  Total Timed Codes (min): 45  1:1 Treatment Time (MC only): 45   Visit #: 14 of 14    Treatment Area: Low back pain [M54.5]  Right buttock pain [M79.18]  Sacroiliac pain [M53.3]    SUBJECTIVE  Pain Level (0-10 scale): 2/10  Any medication changes, allergies to medications, adverse drug reactions, diagnosis change, or new procedure performed?: [x] No    [] Yes (see summary sheet for update)  Subjective functional status/changes:   [] No changes reported  Pt reports getting AFO, can walk longer with increased stride, decreased energy consumption, and can perform activities with more ease. Pt reports inability to walk up steps possibly due to angle of AFO. OBJECTIVE      28 min Therapeutic Exercise:  [x] See flow sheet :   Rationale: increase strength and improve coordination to improve the patients ability to remain stable during SLS during gait activities. 7 min Manual Therapy:  L hamstring static stretch, contract/relaxL hip flexor   Rationale: increase ROM and increase tissue extensibility to improve the patients ability to wait without gait deviations. 10 min Gait Trainin feet with AFO device on level surfaces with SBA level of assistance   Rationale: To improve ambulation safety and efficiency in order to improve patient's ability to safely ambulate at home for self care.            With   [] TE   [] TA   [] neuro   [] other: Patient Education: [x] Review HEP    [] Progressed/Changed HEP based on:   [] positioning   [] body mechanics   [] transfers   [] heat/ice application    [] other:        Pain Level (0-10 scale) post treatment: 2/10    ASSESSMENT/Changes in Function: Pt is limited somewhat by fatigue and spasticity during session, but shows good motivation and effort. Pt displays increased knee flexion and lack of hip extension during gait activities, potentially due to tightness in hip flexors and fit of AFO. Pt will continue to benefit from SLS activities to strengthen hips and core for increased efficiency during ambulation. Patient will continue to benefit from skilled PT services to modify and progress therapeutic interventions, address functional mobility deficits, address ROM deficits, address strength deficits, analyze and cue movement patterns and analyze and modify body mechanics/ergonomics to attain remaining goals. []  See Plan of Care  []  See progress note/recertification  [x]  See Discharge Summary         Progress towards goals / Updated goals:  Short Term Goals: To be accomplished in 4 weeks:               1.  Patient is compliant with daily HEP to assure progress in function and improved breathing pattern  Status at [facility]: patient education  2/4/20: Compliant and have added yoga exercises, goal MET     2. Patient is willing to use AFO to improve gait pattern, left foot clearance and reduction in compensatory LS movements in order to reduce LBP  Status at [facility]: patient education in regards to AFO, patient was given info materials  Status on 11/22/19: patient is interested in trial of [facility] AFO, rep will be contacted  12-2-19: AFO ordered    2/4/20: Pt compliant with wearing schedule      3. Reduction in pain to <or= 5/10 for increased ease with basic function and ADL  Status at [facility]: pain ranging 3-8/10  11-27-19: 1-3/10 MET      Long Term Goals: To be accomplished in 8 weeks:   1. Improved FOTO score to >or= 48/100 as evidence of improved function  Status at [facility]: FOTO 40/100  12-11-19: 39/100 SLIGHT REGRESSION  2/4/20:  44/100, 50% MET     2. Improved gait pattern and functional speed as evidenced by improved TUG score by >or= 4 seconds  Status at [facility]: altered gait, slowed speed, left leg spasticity, lack of foot clearance compensated through left hip hiking, TUG 32 seconds  Current: No change due to lack of foot clearance 12-2-19 12-11-19: 15 seconds , goal MET     3. Reduction in pain to <or= 3/10 with ADL for improved overall function and tolerance to ADL  Status at [facility]: pain 3-8/10  12-11-19: 2-4/10   2/4/20: 2-3/10,  MET      4. Improved functional core strength as evidenced by ability to perform prone plank for >or= 5 seconds without increase in pain to improve functional LS stability  Status at [facility]: extremely weak core and PF,  unable to lie in supine or prone position due to pain  Status on 11/22/19: good tolerance to floor transfer and prone position, progressing  12-2-19: Pt asked to reduce challenge due to increased pain after last 3 visits, Goal modified and d/c     5.  Patient reports improved control of bowel and bladder for improved quality of life  Status at Eval: patient reports intermittent bowel and bladder incontinence, c/o PF spasms  2/4/20: Pt compliant with PF stretches for home, goal MET     PLAN  []  Upgrade activities as tolerated     []  Continue plan of care  []  Update interventions per flow sheet       [x]  Discharge due to: Patient has reached or is progressing toward set goals  []  Other:_      Wesly Sage 2/4/2020  8:59 AM    Future Appointments   Date Time Provider Catherine Sanchez   2/4/2020  9:00 AM [facility]   2/11/2020 11:15 AM Antoinette Russ  [id number]

## 2020-02-04 NOTE — PROGRESS NOTES
[facility]  [address] [place], [address]  Ph [phone]  Fx [phone]    Physical Therapy Discharge Summary    [facility]   Referral source: Rohith Moreno MD : 1965                Medical Diagnosis: Low back pain [M54.5]  Right buttock pain [M79.18]  Sacroiliac pain [M53.3]    Onset Date:                Treatment Diagnosis: LBP/SI dysfunction/radiculopathy/spasticity/gait imbalance/pelvic floor dysfunction                                              ICD-10: M46.1, M54.5,R26.81, M62.89   Prior Hospitalization: see medical history Provider#: [id number]   Medications: Verified on Patient summary List    Comorbidities: left leg spasticity, gait dysfunction, s/p TIA/CVA, PF dysfunction, bilateral RCT   Prior Level of Function: 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 Pt is adherent to HEP and was recently fitted for L AFO to improve gait mechanics. Pt has achieved set goals and has reduced pain and increased energy during ADLs. Pt has been instructed to return to PT if symptoms are exacerbated. Short Term Goals: To be accomplished in 4 weeks:               1.  Patient is compliant with daily HEP to assure progress in function and improved breathing pattern  Status at [facility]: patient education  20: Compliant and have added yoga exercises, goal MET     2. Patient is willing to use AFO to improve gait pattern, left foot clearance and reduction in compensatory LS movements in order to reduce LBP  Status at Eval: patient education in regards to AFO, patient was given info materials  Status on 19: patient is interested [facility]FO, rep will be contacted  19: AFO ordered    20: Pt compliant with wearing schedule      3. Reduction in pain to <or= 5/10 for increased ease with basic function and ADL  Status at Eval: pain ranging 3-8/10  11-27-19: 1-3/10 MET      Long Term Goals: To be accomplished in 8 weeks:   1. Improved FOTO score to >or= 48/100 as evidence of improved function  Status at Eval: FOTO 40/100  12-11-19: 39/100 SLIGHT REGRESSION  2/4/20:  44/100, 50% MET     2. Improved gait pattern and functional speed as evidenced by improved TUG score by >or= 4 seconds  Status at Eval: altered gait, slowed speed, left leg spasticity, lack of foot clearance compensated through left hip hiking, TUG 32 seconds  Current: No change due to lack of foot clearance 12-2-19 12-11-19: 15 seconds , goal MET     3. Reduction in pain to <or= 3/10 with ADL for improved overall function and tolerance to ADL  Status at Eval: pain 3-8/10  12-11-19: 2-4/10   2/4/20: 2-3/10,  MET      4. Improved functional core strength as evidenced by ability to perform prone plank for >or= 5 seconds without increase in pain to improve functional LS stability  Status at Eval: extremely weak core and PF,  unable to lie in supine or prone position due to pain  Status on 11/22/19: good tolerance to floor transfer and prone position, progressing  12-2-19: Pt asked to reduce challenge due to increased pain after last 3 visits, Goal modified and d/c     5.  Patient reports improved control of bowel and bladder for improved quality of life  Status at Eval: patient reports intermittent bowel and bladder incontinence, c/o PF spasms  2/4/20: Pt compliant with PF stretches for home, goal MET    ASSESSMENT/RECOMMENDATIONS:  []Discontinue therapy: [x]Patient has reached or is progressing toward set goals      []Patient is non-compliant or has abdicated      []Due to lack of appreciable progress towards set goals    Urban Ceja 2/4/2020 9:58 AM

## 2020-02-11 ENCOUNTER — OFFICE VISIT (OUTPATIENT)
Dept: ORTHOPEDIC SURGERY | Age: 55
End: 2020-02-11

## 2020-02-11 VITALS
RESPIRATION RATE: 18 BRPM | TEMPERATURE: 97.3 F | BODY MASS INDEX: 37.35 KG/M2 | HEART RATE: 70 BPM | OXYGEN SATURATION: 100 % | WEIGHT: 238 LBS | HEIGHT: 67 IN | DIASTOLIC BLOOD PRESSURE: 80 MMHG | SYSTOLIC BLOOD PRESSURE: 147 MMHG

## 2020-02-11 DIAGNOSIS — M62.838 MUSCLE SPASM: ICD-10-CM

## 2020-02-11 DIAGNOSIS — M21.372 LEFT FOOT DROP: ICD-10-CM

## 2020-02-11 DIAGNOSIS — Z86.73 HISTORY OF STROKE: ICD-10-CM

## 2020-02-11 DIAGNOSIS — M46.1 SACROILIITIS (HCC): ICD-10-CM

## 2020-02-11 DIAGNOSIS — M54.50 LUMBAR SPINE PAIN: ICD-10-CM

## 2020-02-11 DIAGNOSIS — M62.89 PELVIC FLOOR DYSFUNCTION IN FEMALE: Primary | ICD-10-CM

## 2020-02-11 RX ORDER — ALBUTEROL SULFATE 90 UG/1
AEROSOL, METERED RESPIRATORY (INHALATION)
COMMUNITY
Start: 2019-07-25

## 2020-02-11 RX ORDER — LACOSAMIDE 100 MG/1
100 TABLET ORAL 2 TIMES DAILY
COMMUNITY
Start: 2020-01-16 | End: 2021-09-15

## 2020-02-11 NOTE — PROGRESS NOTES
[address]  [address], [address]  [place], [address] [place] [address] Street  Phone: [phone]  Fax: [phone]        Linda Gonsalez  : 1965  PCP: Monserrat Tong MD  2020    PROGRESS NOTE      HISTORY OF PRESENT ILLNESS  Blanche Hernandez is a 49 y.o. female who was seen as a new patient 10/10/19 with c/o chronic low back pain extending into the RLE that she describes as a burning pain. She also c/o numbness and foot drop in the LLE that has improved with surgery (L5-S1 fusion) and SCS. She continues to have some residual foot drop and shakiness/tremor of her foot along with LLE cramping. She notes that her neurologist did an emergency EEG to see if it was due to her complex regional seizures, but they were not. She has found significant relief with Tizanidine, especially with the distal LLE cramping. She notes that she did not tolerate other muscle relaxants due to somnolence. She finds significant relief from the SCS and a Lidoderm 12 hr patch. Pt reports that she has had two EMGs in the past revealing a chronic left L5 radiculopathy. Pt notes that she previously had a dx of sacroiliitis, and she previously found relief from an SI joint injection. She had a right-sided lacunar stroke affecting the temporal lobe - LLE weakness, mild LUE weakness, cognitive impairment, smiling on the right side. 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 She saw Dr. Gonsalo Wood 19 in regards to a spinal cord stimulator malfunction. She had SCS placement in  by Dr. Kristian Winston, and it was subsequently revised several times. She has a mutli-lead paddle in place, which she uses a \"No Feel\" setting better placed by Dr. Harmony Chirinos. She reported that she has had multiple issues with the SCS's over the years related to battery life.  Due to her cognitive impairment, she is unable to use a rechargable batteries.  Per  Mikaylakatia Eisenberg, she has also had a DVT.  She had what sounds like sepsis with wound sepsis, staph infection, dural injury, and the like from her initial surgeries. She notes that she has since had the SCS corrected. She previously worked as a Physical Therapist, so she performs her stretches and exercises at home, but is willing to attend formal PT. I refilled her Tizanidine, referred her to pelvic floor PT, and prescribed an SI belt. She was also referred for a right SI joint injection. She had a  Right SI joint injection (10/22/19; Dr. Richard Bey) that provided significant relief. Pt notes that since she had the injection, she has already doubled her daily steps. She has been using Tizanidine less. She has not begun PT yet, but is scheduled to begin pelvic floor therapy at [facility] soon. She continues to take Tizanidine, Topamax, and Depakote prescribed by her neurologist. She maintains an HEP of walking and yoga. Annalee Beauchamp comes in to the office today for f/u. She has seen some improvement with PT (11/14/19-2/4/2020; [facility]). She has been using a left AFO as recommended by PT. She reports that her LLE now feels \"at rest\" and she no longer has her tremor. Pt notes that they were able to do external pelvic floor evaluation but they did not have the capability to do internal. We discussed the [facility]. Pt notes that during one session of PT, she was having manual, myofascial release on her external pelvis and believes it elicited a seizure at the time. Pt notes that her anti-seizure medications were recently changed as well. She has been experiencing constipation, weight gain, and somnolence with Tizanidine but she feels the benefits outweigh these side effects. She rates her pain as a 3/10 today. ASSESSMENT  Her residual back pain is likely present in part to pelvic floor dysfunction.  Her residual neurological symptoms related to her stroke are currently well-managed with Tizanidine. She appears to be doing well from a sacroiliitis standpoint. She is doing well with the AFO brace for her left foot drop and also seems to be providing some tone-reducing function. PLAN  1. Referral to PT for pelvic floor evaluation [facility]) - while not directly injured by her previous work related injury, this is a factor in limited response to some direct treatments. When all factors contributory to pain are not addressed, we can expect that recovery may be limited. Pt will f/u in 3 months or sooner as needed. Diagnoses and all orders for this visit:    1. Pelvic floor dysfunction in female  -     REFERRAL TO PHYSICAL THERAPY    2. History of stroke    3. Left foot drop    4. Sacroiliitis ([address]    5. Muscle spasm    6. Lumbar spine pain         PAST MEDICAL HISTORY   Past Medical History:   Diagnosis Date    Adverse effect of anesthesia      bronchospasm,     Asthma     well controlled    Coagulation disorder (HCC)     hypercoagulable    Depressive disorder 1/3/2013    Epilepsy ([address]     Ill-defined condition     Benign Granuloma Annularea    OAB (overactive bladder)     Obesity (BMI 30.0-34.9) 11/3/2016    Right leg DVT ([address]     DVT    Seizures ([address]     grand mal    Spastic bladder     Stroke ([facility] [address] 2006    mini-stroke    Thromboembolus [facility])     s/p vincenzo filter    Thyroid disease     Unspecified adverse effect of anesthesia     bronchospasm on awakening       Past Surgical History:   Procedure Laterality Date    HX BACK SURGERY      dural repair    HX BACK SURGERY      nerve stimulator    HX LUMBAR FUSION  2006    HX LUMBAR LAMINECTOMY  2006   . MEDICATIONS    Current Outpatient Medications   Medication Sig Dispense Refill    tiZANidine (ZANAFLEX) 4 mg tablet Take 1 Tab by mouth three (3) times daily as needed for Pain. 90 Tab 5    levothyroxine (SYNTHROID) 25 mcg tablet Take 25 mcg by mouth Daily (before breakfast).  fluticasone propion-salmeterol (ADVAIR HFA) 115-21 mcg/actuation inhaler Take 2 Puffs by inhalation two (2) times a day.  HYDROcodone-acetaminophen (NORCO) 5-325 mg per tablet Take 1 Tab by mouth as needed.  lidocaine (LIDODERM) 5 % Apply 1 Patch to affected area as needed.  LORazepam (ATIVAN) 0.5 mg tablet Take  by mouth.  divalproex sodium (DEPAKOTE PO) Take 500 mg by mouth two (2) times a day.  topiramate (TOPAMAX) 100 mg tablet Take 100 mg by mouth two (2) times a day.  montelukast (SINGULAIR) 10 mg tablet Take 10 mg by mouth daily. Indications: ASTHMA PREVENTION          ALLERGIES  Allergies   Allergen Reactions    Adhesive Other (comments)     Layers of skin peeled off    Copper Rash and Swelling    Dilantin [Phenytoin Sodium Extended] Other (comments)     Throat swelled up rash all over    Other Medication Other (comments)     Surgical skin prep caused layers of skin to peel off. Needs benedryl before anesthesia.  Phenobarbital Other (comments)     Throat swelled up ,rash    Rifampin Rash          SOCIAL HISTORY    Social History     Socioeconomic History    Marital status:      Spouse name: Not on file    Number of children: Not on file    Years of education: Not on file    Highest education level: Not on file   Tobacco Use    Smoking status: Never Smoker    Smokeless tobacco: Never Used   Substance and Sexual Activity    Alcohol use: Yes     Comment: 1 per month    Drug use: No       FAMILY HISTORY  No family history on file. REVIEW OF SYSTEMS  Review of Systems   Musculoskeletal: Positive for back pain. Left foot drop          PHYSICAL EXAMINATION  Visit Vitals  /80   Pulse 70   Temp 97.3 °F (36.3 °C)   Resp 18   Ht 5' 7\" (1.702 m)   Wt 238 lb (108 kg)   SpO2 100%   BMI 37.28 kg/m²       Pain Assessment  11/12/2019   Location of Pain Back   Location Modifiers -   Severity of Pain 3   Quality of Pain Aching; Sharp   Quality of Pain Comment -   Duration of Pain Persistent   Frequency of Pain Constant   Aggravating Factors -   Aggravating Factors Comment -   Limiting Behavior -   Relieving Factors -   Relieving Factors Comment -   Result of Injury -           Constitutional:  Well developed, well nourished, in no acute distress. Psychiatric: Affect and mood are appropriate. Integumentary: No rashes or abrasions noted on exposed areas. SPINE/MUSCULOSKELETAL EXAM    Cervical spine:  Neck is midline. Normal muscle tone. No focal atrophy is noted. ROM pain free. Shoulder ROM intact.   No tenderness to palpation. Negative Spurling's sign. Negative Tinel's sign. Negative Serrato's sign.      Sensation in the bilateral arms grossly intact to light touch.      Lumbar spine:  No rash, ecchymosis, or gross obliquity. No fasciculations. No focal atrophy is noted. No pain with hip ROM. Full range of motion. No tenderness to palpation.   No tenderness to palpation at the sciatic notch. SI joints tender bilaterally, R>L   Trochanters non tender.     Sensation in the bilateral legs grossly intact to light touch.             LEFT RIGHT   CLAY TEST - +   P4 TEST - +   COMPRESSION TEST - -   DISTRACTION TEST - +   GAENSLEN'S TEST - +         MOTOR:      Biceps  Triceps Deltoids Wrist Ext Wrist Flex Hand Intrin   Right 5/5 5/5 5/5 5/5 5/5 5/5   Left 5/5 5/5 5/5 5/5 5/5 5/5             Hip Flex  Quads Hamstrings Ankle DF EHL Ankle PF   Right 5/5 5/5 5/5 5/5 5/5 5/5   Left 5/5 5/5 5/5 5/5 5/5 5/5     DTRs are 1+ biceps, triceps, brachioradialis, patella, and Achilles.     Negative Straight Leg raise. Squat not tested. No difficulty with tandem gait.      Ambulation with single point cane.  FWB.       RADIOGRAPHS  Lumbar XR images taken on 2/16/18 personally reviewed with patient:  Right posterior upper hip generator device with dorsal intraspinal stimulator wires, and the T10-11 level with the wire tips at the T7-T8 level.  Postoperative changes of previous L5 decompression laminectomy with solid and are osseous circumferential fusion at L5-S1.  No findings of hardware failure. A right-sided IVC filter is present at the L2-L3 level. Mild multilevel degenerative spondylosis similar prior examination with no evidence of listhesis.  The vertebral bodies maintain normal height and alignment.  No acute obstructive osseous abnormality. IMPRESSION:    1. Stable post operative circumferential fusion L5-S1 with L5 decompression laminectomy. 2.  Dorsal stimulator wire tip at the T7-T8 level. 3.  Mild degenerative lumbar spine spondylosis.  No acute obstructive osseous abnormality. 30 minutes of face-to-face contact were spent with the patient during today's visit extensively discussing symptoms and treatment plan. All questions were answered. More than half of this visit today was spent on counseling.      Written by Kiesha Qiu as dictated by Damaris Douglas MD

## 2020-03-24 ENCOUNTER — TELEPHONE (OUTPATIENT)
Dept: ORTHOPEDIC SURGERY | Age: 55
End: 2020-03-24

## 2020-03-24 RX ORDER — METHYLPREDNISOLONE 4 MG/1
TABLET ORAL
Qty: 1 DOSE PACK | Refills: 0 | Status: SHIPPED | OUTPATIENT
Start: 2020-03-24 | End: 2020-04-01 | Stop reason: CLARIF

## 2020-03-24 NOTE — TELEPHONE ENCOUNTER
Patient called crying in intense pain. She had to discontinue the orthotic due to her pain and she has been trying the Lidocaine patches to no avail and has been getting severe headaches due to her pain. Her hip itself has been giving way as well. Patient [phone]  [address] [facility] and [address] [place] [address].

## 2020-03-25 NOTE — TELEPHONE ENCOUNTER
Patient son Ricardo Friday  (on hipaa )  called and said that due to the pain the patient is having that someone told them that [facility] will see the patient tomorrow 3/26/20 at [facility]. Said the patient is in a lot of pain. Mr. Sara Madison. [phone].

## 2020-03-26 NOTE — TELEPHONE ENCOUNTER
Patient states she spoke with dr Mira Avila last night through the service. States he told her to come in today. Per PAC instructions dr Mira Avila is not ready for VV until Friday? Patient describes numbness hip and feels like it's subluxing.     Please advise patient call her at [phone]

## 2020-03-27 ENCOUNTER — VIRTUAL VISIT (OUTPATIENT)
Dept: ORTHOPEDIC SURGERY | Age: 55
End: 2020-03-27

## 2020-03-27 DIAGNOSIS — M79.18 LEFT BUTTOCK PAIN: ICD-10-CM

## 2020-03-27 DIAGNOSIS — M53.3 SACROILIAC JOINT DYSFUNCTION OF LEFT SIDE: ICD-10-CM

## 2020-03-27 DIAGNOSIS — M70.62 GREATER TROCHANTERIC BURSITIS OF LEFT HIP: Primary | ICD-10-CM

## 2020-03-27 DIAGNOSIS — M62.89 PELVIC FLOOR DYSFUNCTION IN FEMALE: ICD-10-CM

## 2020-03-27 DIAGNOSIS — G57.02 PIRIFORMIS SYNDROME OF LEFT SIDE: ICD-10-CM

## 2020-03-27 NOTE — PROGRESS NOTES
03/27/20 Umer Maciel is a 49 y.o. female is here for   Chief Complaint   Patient presents with    Hip Pain     left    Follow-up   . Vitals:    03/27/20 1250   PainSc:   8   PainLoc: Hip    There is no height or weight on file to calculate BMI. Chart reviewed including medical/surgical/family history. Allergies   Allergen Reactions    Adhesive Other (comments)     Layers of skin peeled off    Copper Rash and Swelling    Dilantin [Phenytoin Sodium Extended] Other (comments)     Throat swelled up rash all over    Other Medication Other (comments)     Surgical skin prep caused layers of skin to peel off. Needs benedryl before anesthesia.  Phenobarbital Other (comments)     Throat swelled up ,rash    Rifampin Rash    has a current medication list which includes the following prescription(s): methylprednisolone, albuterol, lacosamide, tizanidine, levothyroxine, lidocaine, lorazepam, topiramate, and montelukast.   Social History     Tobacco Use    Smoking status: Never Smoker    Smokeless tobacco: Never Used   Substance Use Topics    Alcohol use: Yes     Comment: 1 per month    Drug use: No    Received         Pain Assessment:  Pain Assessment  3/27/2020   Location of Pain Hip;Leg; Ankle   Location Modifiers Left   Severity of Pain 8   Quality of Pain -   Quality of Pain Comment -   Duration of Pain Persistent   Frequency of Pain Constant   Aggravating Factors -   Aggravating Factors Comment -   Limiting Behavior -   Relieving Factors -   Relieving Factors Comment -   Result of Injury -       Depression Screening:  No flowsheet data found. Learning Assessment:  No flowsheet data found. Abuse Screening:  No flowsheet data found. Fall Risk  No flowsheet data found. OPIOID RISK TOOL  No flowsheet data found. Coordination of Care:  1. Have you been to the ER, urgent care clinic since your last visit? no  Hospitalized since your last visit? no    2.  Have you seen or consulted any other health care providers outside of the [facility] since your last visit? Yes  Include any pap smears or colon screening.  no

## 2020-03-27 NOTE — PROGRESS NOTES
[address]  [place], [address] [place] [address] Street  Phone: [phone]  Fax: [phone]        Madie Lee  : 1965  PCP: Jodie Phelps MD  3/27/2020    PROGRESS NOTE    Lorene Woodard is a 49 y.o. female who was seen by Dr. Parish Bowling, from the [address] [place] [address], via synchronous (real-time) audio-video technology on 3/27/2020. HISTORY OF PRESENT ILLNESS  Lorene Woodard is a 49 y.o. female who was seen as a new patient 10/10/19 with c/o chronic low back pain extending into the RLE that she describes as a burning pain. She also c/o numbness and foot drop in the LLE that has improved with surgery (L5-S1 fusion) and SCS. She continues to have some residual foot drop and shakiness/tremor of her foot along with LLE cramping. She notes that her neurologist did an emergency EEG to see if it was due to her complex regional seizures, but they were not. She has found significant relief with Tizanidine, especially with the distal LLE cramping. She notes that she did not tolerate other muscle relaxants due to somnolence. She finds significant relief from the SCS and a Lidoderm 12 hr patch. Pt reports that she has had two EMGs in the past revealing a chronic left L5 radiculopathy. Pt notes that she previously had a dx of sacroiliitis, and she previously found relief from an SI joint injection. She had a right-sided lacunar stroke affecting the temporal lobe - LLE weakness, mild LUE weakness, cognitive impairment, smiling on the right side. 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 She saw Dr. Neyda Moore 19 in regards to a spinal cord stimulator malfunction. She had SCS placement in  by Dr. Laura Mcgee, and it was subsequently revised several times. She has a mutli-lead paddle in place, which she uses a \"No Feel\" setting better placed by Dr. Hemal Ott.  She reported that she has had multiple issues with the SCS's over the years related to battery life. Due to her cognitive impairment, she is unable to use a rechargable batteries.  Per Dr. Usama Dobson, she has also had a DVT.  She had what sounds like sepsis with wound sepsis, staph infection, dural injury, and the like from her initial surgeries. She notes that she has since had the SCS corrected. She previously worked as a Physical Therapist, so she performs her stretches and exercises at home, but is willing to attend formal PT. She was prescribed an SI belt. She was also referred for a right SI joint injection. She had a  Right SI joint injection (10/22/19; Dr. Crystal Ambrose) that provided significant relief. Pt notes that since she had the injection, she has already doubled her daily steps. She has been using Tizanidine less. She has not begun PT yet, but is scheduled to begin pelvic floor therapy at [facility] soon. She continues to take Tizanidine, Topamax, and Depakote prescribed by her neurologist. She maintains an HEP of walking and yoga. She has seen some improvement with PT (11/14/19-2/4/2020; [facility]). She has been using a left AFO as recommended by PT. She reports that her LLE now feels \"at rest\" and she no longer has her tremor. Pt notes that they were able to do external pelvic floor evaluation but they did not have the capability to do internal. We discussed the option of a referral to [facility] for an internal pelvic floor evaluation. Pt notes that during one session of PT, she was having manual, myofascial release on her external pelvis and believes it elicited a seizure at the time. Pt notes that her anti-seizure medications were recently changed as well. She has been experiencing constipation, weight gain, and somnolence with Tizanidine but she feels the benefits outweigh these side effects. Monica Sales is seen virtually for f/u.  Pt notes that she saw her orthotist who provided an orthotic that has helped with her ambulation, but since then she has felt that her left hip was elevated. She notes that her left hip was elevated by 1/8th of an inch. She was advised to obtain a gel insert for the right side, but it did not provide any benefit. Her son has provided long access distraction has helped with the muscle spasms, but she has had burning and numbness slightly posterior to the trochanter. She has significant sharp pain with weight bearing that radiates into the ankle. She has seen some benefit from oral steroids, especially with her SI joint pain. However, she continues to have some pain near the trochanter and the left gluteus medius near the insertion. She has had to use a cane to ambulate around her house. Pt denies groin pain. Pain Scale: 8/10    Treatments patient has tried:  Physical therapy:Yes  Doing HEP: Yes  Non-opioid medications: Yes - Tizanidine, Topamax, oral steroids  Spinal injections: Right SI joint injection (10/22/19; Dr. Opal Olson)  Spinal surgery- Yes - fusion L5-S1; SCS  Last MRI - None.  reviewed. PMHx of epilepsy, hypothyroidism, CVA, had three failed surgeries in 2006 leaving her with arachnoiditis. L5-S1 fusion, SCS. ASSESSMENT  Her symptoms likely remain due to left trochanteric enthesopathy/bursitis, sacroiliac joint dysfunction, left piriformis syndrome, and pelvic floor dysfunction. The most likely precipitating factor is ankle bracing affecting her biomechanics. PLAN  1. Referral to Dr. Dago Reagan for evaluation of external rotators on left side and left greater trochanter s/p orthosis    Pt will f/u in 2-4 weeks after intervention with Dr. Dago Reagan. Diagnoses and all orders for this visit:    1. Greater trochanteric bursitis of left hip  -     REFERRAL TO SPORTS MEDICINE    2. Sacroiliac joint dysfunction of left side  -     REFERRAL TO SPORTS MEDICINE    3. Piriformis syndrome of left side  -     REFERRAL TO SPORTS MEDICINE    4.  Left buttock pain  -     REFERRAL TO SPORTS MEDICINE    5. Pelvic floor dysfunction in female  -     REFERRAL TO SPORTS MEDICINE       PAST MEDICAL HISTORY   Past Medical History:   Diagnosis Date    Adverse effect of anesthesia      bronchospasm,     Asthma     well controlled    Coagulation disorder ([facility] [place] [facility]     hypercoagulable    Depressive disorder 1/3/2013    Epilepsy ([facility] [place] [facility]     Ill-defined condition     Benign Granuloma Annularea    OAB (overactive bladder)     Obesity (BMI 30.0-34.9) 11/3/2016    Right leg DVT ([facility] [place] [facility]     DVT    Seizures (HCC)     grand mal    Spastic bladder     Stroke ([facility] [place] [facility] 2006    mini-stroke    Thromboembolus [facility])     s/p vincenzo filter    Thyroid disease     Unspecified adverse effect of anesthesia     bronchospasm on awakening       Past Surgical History:   Procedure Laterality Date    HX BACK SURGERY      dural repair    HX BACK SURGERY      nerve stimulator    HX LUMBAR FUSION  2006    HX LUMBAR LAMINECTOMY  2006   . MEDICATIONS    Current Outpatient Medications   Medication Sig Dispense Refill    methylPREDNISolone (MEDROL DOSEPACK) 4 mg tablet Per dose pack instructions 1 Dose Pack 0    albuterol (PROVENTIL HFA, VENTOLIN HFA, PROAIR HFA) 90 mcg/actuation inhaler INHALE 2 PUFFS EVERY 6 HOURS AS NEEDED FOR WHEEZING OR SHORTNESS OF BREATH      lacosamide (VIMPAT) 100 mg tab tablet Take 100 mg by mouth.  tiZANidine (ZANAFLEX) 4 mg tablet Take 1 Tab by mouth three (3) times daily as needed for Pain. 90 Tab 5    levothyroxine (SYNTHROID) 25 mcg tablet Take 25 mcg by mouth Daily (before breakfast).  lidocaine (LIDODERM) 5 % Apply 1 Patch to affected area as needed.  LORazepam (ATIVAN) 0.5 mg tablet Take  by mouth.  topiramate (TOPAMAX) 100 mg tablet Take 100 mg by mouth two (2) times a day.  montelukast (SINGULAIR) 10 mg tablet Take 10 mg by mouth daily.  Indications: ASTHMA PREVENTION          ALLERGIES  Allergies   Allergen Reactions    Adhesive Other (comments)     Layers of skin peeled off    Copper Rash and Swelling    Dilantin [Phenytoin Sodium Extended] Other (comments)     Throat swelled up rash all over    Other Medication Other (comments)     Surgical skin prep caused layers of skin to peel off. Needs benedryl before anesthesia.  Phenobarbital Other (comments)     Throat swelled up ,rash    Rifampin Rash          SOCIAL HISTORY    Social History     Socioeconomic History    Marital status:      Spouse name: Not on file    Number of children: Not on file    Years of education: Not on file    Highest education level: Not on file   Tobacco Use    Smoking status: Never Smoker    Smokeless tobacco: Never Used   Substance and Sexual Activity    Alcohol use: Yes     Comment: 1 per month    Drug use: No       FAMILY HISTORY  No family history on file. REVIEW OF SYSTEMS  Review of Systems   Musculoskeletal: Positive for back pain. Left foot drop         PHYSICAL EXAMINATION    Pain Assessment  3/27/2020   Location of Pain Hip;Leg; Ankle   Location Modifiers Left   Severity of Pain 8   Quality of Pain Aching;Burning; Other (Comment)   Quality of Pain Comment numbness   Duration of Pain Persistent   Frequency of Pain Constant   Aggravating Factors -   Aggravating Factors Comment -   Limiting Behavior -   Relieving Factors -   Relieving Factors Comment -   Result of Injury -         Constitutional:  Well developed, well nourished, in no acute distress. Psychiatric: Affect and mood are appropriate. Integumentary: No rashes or abrasions noted on exposed areas. Cardiopulmonary: Normal work of breathing. Musculoskeletal: He is able to move all limbs volitionally. At least 4/5 strength. Due to this being a TeleHealth evaluation, many elements of the physical examination are unable to be assessed.        RADIOGRAPHS  Lumbar XR images taken on 2/16/18 personally reviewed with patient:  Right posterior upper hip generator device with dorsal intraspinal stimulator wires, and the T10-11 level with the wire tips at the T7-T8 level. Postoperative changes of previous L5 decompression laminectomy with solid and are osseous circumferential fusion at L5-S1.  No findings of hardware failure. A right-sided IVC filter is present at the L2-L3 level. Mild multilevel degenerative spondylosis similar prior examination with no evidence of listhesis. The vertebral bodies maintain normal height and alignment.  No acute obstructive osseous abnormality. IMPRESSION:    1. Stable post operative circumferential fusion L5-S1 with L5 decompression laminectomy. 2.  Dorsal stimulator wire tip at the T7-T8 level. 3.  Mild degenerative lumbar spine spondylosis.  No acute obstructive osseous abnormality.  reviewed    Ms. Jethro Rodriguez has a reminder for a \"due or due soon\" health maintenance. I have asked that she contact her primary care provider for follow-up on this health maintenance. Pursuant to the emergency declaration under the [address] [place] [address] waiver authority and the [employer] and [employer] Act, this Virtual  Visit was conducted, with patient's consent, to reduce the patient's risk of exposure to COVID-19 and provide continuity of care for an established patient. Services were provided through a video synchronous discussion virtually to substitute for in-person clinic visit. CPT Codes 76530-64838 for Established Patients may apply to this Telehealth Visit  Time-based coding, delete if not needed: I spent at least 20 minutes with this established patient, and >50% of the time was spent counseling and/or coordinating care. Written by Calvin Valenzuela, as dictated by Dr. Zhang Rutledge. I, Dr. Zhang Rutledge, confirm that all documentation is accurate.

## 2020-04-01 ENCOUNTER — APPOINTMENT (OUTPATIENT)
Dept: VASCULAR SURGERY | Age: 55
End: 2020-04-01
Attending: PHYSICIAN ASSISTANT
Payer: MEDICARE

## 2020-04-01 ENCOUNTER — HOSPITAL ENCOUNTER (EMERGENCY)
Age: 55
Discharge: HOME | End: 2020-04-01
Attending: EMERGENCY MEDICINE
Payer: MEDICARE

## 2020-04-01 ENCOUNTER — APPOINTMENT (OUTPATIENT)
Dept: GENERAL RADIOLOGY | Age: 55
End: 2020-04-01
Attending: PHYSICIAN ASSISTANT
Payer: MEDICARE

## 2020-04-01 VITALS
WEIGHT: 220 LBS | RESPIRATION RATE: 22 BRPM | HEIGHT: 67 IN | SYSTOLIC BLOOD PRESSURE: 149 MMHG | HEART RATE: 68 BPM | BODY MASS INDEX: 34.53 KG/M2 | OXYGEN SATURATION: 98 % | TEMPERATURE: 97.6 F | DIASTOLIC BLOOD PRESSURE: 80 MMHG

## 2020-04-01 DIAGNOSIS — G89.29 CHRONIC LEFT-SIDED LOW BACK PAIN WITH LEFT-SIDED SCIATICA: Primary | ICD-10-CM

## 2020-04-01 DIAGNOSIS — M54.42 CHRONIC LEFT-SIDED LOW BACK PAIN WITH LEFT-SIDED SCIATICA: Primary | ICD-10-CM

## 2020-04-01 PROCEDURE — 99285 EMERGENCY DEPT VISIT HI MDM: CPT

## 2020-04-01 PROCEDURE — 73502 X-RAY EXAM HIP UNI 2-3 VIEWS: CPT

## 2020-04-01 PROCEDURE — 74011250637 HC RX REV CODE- 250/637: Performed by: PHYSICIAN ASSISTANT

## 2020-04-01 PROCEDURE — 96372 THER/PROPH/DIAG INJ SC/IM: CPT

## 2020-04-01 PROCEDURE — 72110 X-RAY EXAM L-2 SPINE 4/>VWS: CPT

## 2020-04-01 PROCEDURE — 93971 EXTREMITY STUDY: CPT

## 2020-04-01 PROCEDURE — 74011250636 HC RX REV CODE- 250/636: Performed by: PHYSICIAN ASSISTANT

## 2020-04-01 RX ORDER — PREDNISONE 10 MG/1
TABLET ORAL
Qty: 21 TAB | Refills: 0 | Status: SHIPPED | OUTPATIENT
Start: 2020-04-01 | End: 2020-06-08 | Stop reason: ALTCHOICE

## 2020-04-01 RX ORDER — ONDANSETRON 4 MG/1
4 TABLET, ORALLY DISINTEGRATING ORAL
Qty: 20 TAB | Refills: 0 | Status: SHIPPED | OUTPATIENT
Start: 2020-04-01 | End: 2021-09-15

## 2020-04-01 RX ORDER — ONDANSETRON 4 MG/1
4 TABLET, ORALLY DISINTEGRATING ORAL
Status: COMPLETED | OUTPATIENT
Start: 2020-04-01 | End: 2020-04-01

## 2020-04-01 RX ORDER — OXYCODONE AND ACETAMINOPHEN 5; 325 MG/1; MG/1
1 TABLET ORAL
Status: COMPLETED | OUTPATIENT
Start: 2020-04-01 | End: 2020-04-01

## 2020-04-01 RX ORDER — OXYCODONE AND ACETAMINOPHEN 5; 325 MG/1; MG/1
1 TABLET ORAL
Qty: 16 TAB | Refills: 0 | Status: SHIPPED | OUTPATIENT
Start: 2020-04-01 | End: 2020-04-06

## 2020-04-01 RX ORDER — KETOROLAC TROMETHAMINE 30 MG/ML
60 INJECTION, SOLUTION INTRAMUSCULAR; INTRAVENOUS
Status: COMPLETED | OUTPATIENT
Start: 2020-04-01 | End: 2020-04-01

## 2020-04-01 RX ORDER — DEXAMETHASONE 4 MG/1
20 TABLET ORAL ONCE
Status: COMPLETED | OUTPATIENT
Start: 2020-04-01 | End: 2020-04-01

## 2020-04-01 RX ADMIN — KETOROLAC TROMETHAMINE 60 MG: 30 INJECTION, SOLUTION INTRAMUSCULAR at 09:57

## 2020-04-01 RX ADMIN — ONDANSETRON 4 MG: 4 TABLET, ORALLY DISINTEGRATING ORAL at 09:57

## 2020-04-01 RX ADMIN — OXYCODONE HYDROCHLORIDE AND ACETAMINOPHEN 1 TABLET: 5; 325 TABLET ORAL at 09:57

## 2020-04-01 RX ADMIN — DEXAMETHASONE 20 MG: 4 TABLET ORAL at 09:57

## 2020-04-01 NOTE — ED TRIAGE NOTES
Pt reports having L4 and L5 nerve root damage and reports mild stroke in 2006. She reports that she has been having PT and needed and orthotic on her left side and reports that it was not adjusted to the right height. She reports that she was \"always walking unbalanced. \" Within the last 4 weeks she states having increased trouble of picking her leg up , and reports that 2 days she was standing and felt it \"pop out and buckle, \" but states she didn't fall. She states since then she is unable to put weight on her left leg. She reports a pain of 8/10 with ambulation and 6/10 with resting. She is ANO X4 and is NAD at this time.

## 2020-04-01 NOTE — ED PROVIDER NOTES
EMERGENCY DEPARTMENT HISTORY AND PHYSICAL EXAM    Date: 4/1/2020  Patient Name: Neil Daily    History of Presenting Illness     Chief Complaint   Patient presents with    Hip Pain         History Provided By: Patient    Chief Complaint: left hip pain    HPI(Context):   9:50 AM  Neil Daily is a 49 y.o. female with PMHX of chronic low back pain, hypothyroidism, CVA, epilepsy, asthma, left sided foot drop who presents to the emergency department C/O left hip pain. Pain began one week ago. Associated sxs include \"spasms\" to left calf and left buttocks. Pt is [facility] in [place] (Dr. Angeli Zimmerman, physiatrist). Pt feels her sxs started after she was fitted for AFO as she has hx of CVA with left sided residual deficits described as \"mild\" by patient. She reports the AFO was not fitted correctly and this has caused her pain over last week. Pain worse with movement and pt reports using cane for ambulation. Pt endorses constipation for which she takes dulcolax. Pt had virtual visit (due to [place] pandemic) with pain specialist on 03/27/2020 for same complaint and dx with bursitis of left hip, SI joint dysfunction of left side, piriformis syndrome, and pelvic floor dysfunction. Pt has had success with oral steroids in past. She was Rx medrol dose oma that she completed with no relief. Pt was referred to Dr. Brittni Holman. Pt denies fever, dysuria, hematuria, fecal or urinary incontinence, urinary retention, falls, and any other sxs or complaints. PCP: Hobert Runner, MD    Current Outpatient Medications   Medication Sig Dispense Refill    oxyCODONE-acetaminophen (Percocet) 5-325 mg per tablet Take 1 Tab by mouth every four (4) hours as needed for Pain for up to 5 days. Max Daily Amount: 6 Tabs. 16 Tab 0    predniSONE (STERAPRED DS) 10 mg dose pack Take with food. Start on Thursday.  21 Tab 0    ondansetron (Zofran ODT) 4 mg disintegrating tablet Take 1 Tab by mouth every eight (8) hours as needed for Nausea (or vomiting.). Allow to dissolve on tongue 20 Tab 0    albuterol (PROVENTIL HFA, VENTOLIN HFA, PROAIR HFA) 90 mcg/actuation inhaler INHALE 2 PUFFS EVERY 6 HOURS AS NEEDED FOR WHEEZING OR SHORTNESS OF BREATH      lacosamide (VIMPAT) 100 mg tab tablet Take 100 mg by mouth.  tiZANidine (ZANAFLEX) 4 mg tablet Take 1 Tab by mouth three (3) times daily as needed for Pain. 90 Tab 5    levothyroxine (SYNTHROID) 25 mcg tablet Take 25 mcg by mouth Daily (before breakfast).  lidocaine (LIDODERM) 5 % Apply 1 Patch to affected area as needed.  LORazepam (ATIVAN) 0.5 mg tablet Take  by mouth.  topiramate (TOPAMAX) 100 mg tablet Take 100 mg by mouth two (2) times a day.  montelukast (SINGULAIR) 10 mg tablet Take 10 mg by mouth daily. Indications: ASTHMA PREVENTION         Past History     Past Medical History:  Past Medical History:   Diagnosis Date    Adverse effect of anesthesia      bronchospasm,     Asthma     well controlled    Coagulation disorder ([facility]     hypercoagulable    Depressive disorder 1/3/2013    Epilepsy ([facility]     Ill-defined condition     Benign Granuloma Annularea    OAB (overactive bladder)     Obesity (BMI 30.0-34.9) 11/3/2016    Right leg DVT ([facility]     DVT    Seizures (HCC)     grand mal    Spastic bladder     Stroke ([facility] 2006    mini-stroke    Thromboembolus [facility])     s/p vincenzo filter    Thyroid disease     Unspecified adverse effect of anesthesia     bronchospasm on awakening       Past Surgical History:  Past Surgical History:   Procedure Laterality Date    HX BACK SURGERY      dural repair    HX BACK SURGERY      nerve stimulator    HX LUMBAR FUSION  2006    HX LUMBAR LAMINECTOMY  2006       Family History:  No family history on file.     Social History:  Social History     Tobacco Use    Smoking status: Never Smoker    Smokeless tobacco: Never Used   Substance Use Topics    Alcohol use: Yes     Comment: 1 per month    Drug use: No       Allergies: Allergies   Allergen Reactions    Adhesive Other (comments)     Layers of skin peeled off    Copper Rash and Swelling    Dilantin [Phenytoin Sodium Extended] Other (comments)     Throat swelled up rash all over    Other Medication Other (comments)     Surgical skin prep caused layers of skin to peel off. Needs benedryl before anesthesia.  Phenobarbital Other (comments)     Throat swelled up ,rash    Rifampin Rash         Review of Systems   Review of Systems   Cardiovascular: Negative for leg swelling. Gastrointestinal: Negative for abdominal pain. Genitourinary: Negative for difficulty urinating. Musculoskeletal: Positive for back pain and myalgias. Neurological: Positive for weakness (LLE foot drop) and numbness (chronic LLE neuropathy). All other systems reviewed and are negative. Physical Exam     Vitals:    04/01/20 1004 04/01/20 1005 04/01/20 1015 04/01/20 1145   BP: (!) 156/93 (!) 156/93 132/88 149/80   Pulse:  68     Resp:  22     Temp:       SpO2: 100%  99% 98%   Weight:       Height:         Physical Exam  Vitals signs and nursing note reviewed. Constitutional:       General: She is not in acute distress. Appearance: Normal appearance. Comments:  female that appears uncomfortable. Alert. HENT:      Head: Normocephalic and atraumatic. Right Ear: External ear normal.      Left Ear: External ear normal.      Nose: Nose normal.   Neck:      Musculoskeletal: Normal range of motion. Cardiovascular:      Rate and Rhythm: Normal rate and regular rhythm. Pulses: Normal pulses. Dorsalis pedis pulses are 2+ on the right side and 2+ on the left side. Posterior tibial pulses are 2+ on the right side and 2+ on the left side. Heart sounds: Normal heart sounds. No friction rub. Pulmonary:      Effort: Pulmonary effort is normal. No respiratory distress. Breath sounds: Normal breath sounds. Musculoskeletal:        Legs:    Skin:     General: Skin is warm. Capillary Refill: Capillary refill takes less than 2 seconds. Neurological:      Mental Status: She is alert and oriented to person, place, and time. Comments: 4/5 strength in LLE   Psychiatric:         Behavior: Behavior normal.             Diagnostic Study Results     Labs -   No results found for this or any previous visit (from the past 12 hour(s)). XR HIP LT W OR WO PELV 2-3 VWS   Final Result   IMPRESSION:      No evidence of acute fracture or dislocation. XR SPINE LUMB MIN 4 V   Final Result   IMPRESSION:      No evidence of acute fracture or listhesis. Lower lumbar spinal fusion. CT Results  (Last 48 hours)    None        CXR Results  (Last 48 hours)    None          Medications given in the ED-  Medications   dexAMETHasone (DECADRON) tablet 20 mg (20 mg Oral Given 4/1/20 0957)   ketorolac tromethamine (TORADOL) 60 mg/2 mL injection 60 mg (60 mg IntraMUSCular Given 4/1/20 0957)   ondansetron (ZOFRAN ODT) tablet 4 mg (4 mg Oral Given 4/1/20 0957)   oxyCODONE-acetaminophen (PERCOCET) 5-325 mg per tablet 1 Tab (1 Tab Oral Given 4/1/20 0957)         Medical Decision Making   I am the first provider for this patient. I reviewed the vital signs, available nursing notes, past medical history, past surgical history, family history and social history. Vital Signs-Reviewed the patient's vital signs. Pulse Oximetry Analysis - 100% on RA     Records Reviewed: Nursing Notes, Old Medical Records, Previous Radiology Studies and Previous Laboratory Studies    Provider Notes (Medical Decision Making): CC of back pain that is reproducible on exam w/ movement/ROM/palpation. Reports it is consistent with prior episodes of back pain. No abdominal complaints/abdomen non tender on exam. No pulsatile mass appreciated. Normal neurologic exam. Not immunosupressed. Doubt epidural abscess, infection, neoplastic etiology.  Not consistent with cauda equina. No trauma or midline tenderness. Doubt fracture. Most c/w musculoskeletal etiology. The patient shows no signs or symptoms of developing complication requiring inpatient treatment or management. Further laboratory or radiological investigation is not indicated. Will treat symptomatically with return immediately for new or worsening symptoms. The importance of close follow-up with PMD emphasized to patient. Procedures:  Procedures    ED Course:   9:50 AM Initial assessment performed. The patients presenting problems have been discussed, and they are in agreement with the care plan formulated and outlined with them. I have encouraged them to ask questions as they arise throughout their visit. Diagnosis and Disposition       Feels better. Hx of chronic low back pain with LLE sciatica and chronic left sided weakness. No alarm sxs. Imaging unremarkable. Will tx pain. FU with Ortho and pain management. Reasons to RTED discussed with pt. All questions answered. Pt feels comfortable going home at this time. Pt expressed understanding and she agrees with plan. 1. Chronic left-sided low back pain with left-sided sciatica        PLAN:  1. D/C Home  2. Discharge Medication List as of 4/1/2020 11:39 AM      START taking these medications    Details   oxyCODONE-acetaminophen (Percocet) 5-325 mg per tablet Take 1 Tab by mouth every four (4) hours as needed for Pain for up to 5 days. Max Daily Amount: 6 Tabs., Print, Disp-16 Tab, R-0      predniSONE (STERAPRED DS) 10 mg dose pack Take with food. Start on Thursday. , Print, Disp-21 Tab, R-0      ondansetron (Zofran ODT) 4 mg disintegrating tablet Take 1 Tab by mouth every eight (8) hours as needed for Nausea (or vomiting.).  Allow to dissolve on tongue, Normal, Disp-20 Tab, R-0         CONTINUE these medications which have NOT CHANGED    Details   albuterol (PROVENTIL HFA, VENTOLIN HFA, PROAIR HFA) 90 mcg/actuation inhaler INHALE 2 PUFFS EVERY 6 HOURS AS NEEDED FOR WHEEZING OR SHORTNESS OF BREATH, Historical Med      lacosamide (VIMPAT) 100 mg tab tablet Take 100 mg by mouth., Historical Med      tiZANidine (ZANAFLEX) 4 mg tablet Take 1 Tab by mouth three (3) times daily as needed for Pain., Normal, Disp-90 Tab, R-5      levothyroxine (SYNTHROID) 25 mcg tablet Take 25 mcg by mouth Daily (before breakfast). , Historical Med      lidocaine (LIDODERM) 5 % Apply 1 Patch to affected area as needed., Historical Med      LORazepam (ATIVAN) 0.5 mg tablet Take  by mouth., Historical Med      topiramate (TOPAMAX) 100 mg tablet Take 100 mg by mouth two (2) times a day., Historical Med      montelukast (SINGULAIR) 10 mg tablet Take 10 mg by mouth daily. Indications: ASTHMA PREVENTION, Historical Med           3. Follow-up Information     Follow up With Specialties Details Gerald Gill, [address] MD Orthopedic Surgery   [address] [place] [address] [address] [facility],[address]      Andie Jacobsen MD Internal Medicine   [id number] [place] [address] [address] [facility] [address]      [facility] EMERGENCY DEPT Emergency Medicine  As needed, If symptoms worsen [address] Alfred Ng [phone]        _______________________________    Attestations: This note is prepared by Army Wesley PA-C.  _______________________________        Please note that this dictation was completed with [employer], the [employer] voice recognition software. Quite often unanticipated grammatical, syntax, homophones, and other interpretive errors are inadvertently transcribed by the computer software. Please disregard these errors. Please excuse any errors that have escaped final proofreading.

## 2020-04-02 ENCOUNTER — TELEPHONE (OUTPATIENT)
Dept: ORTHOPEDIC SURGERY | Age: 55
End: 2020-04-02

## 2020-04-02 ENCOUNTER — PATIENT OUTREACH (OUTPATIENT)
Dept: FAMILY MEDICINE CLINIC | Facility: CLINIC | Age: 55
End: 2020-04-02

## 2020-04-02 NOTE — TELEPHONE ENCOUNTER
[employer]y [employer] called for [employer]. Ms. Kamaljit Lutz said that the patient was referred to a Sports Medicine Doctor , Dr. Kathe Gary. Ms. Kamaljit Lutz would like to know why [facility] referred the patient. That [facility] notes do not state why. Ms. Solanosameer Chanelrico would like a call back at tel. [phone].

## 2020-04-02 NOTE — PROGRESS NOTES
Transition Of Care Follow Up/Concern For Covid 19    Patient Outreached Attempted. Received patient's voicemail box. Message left requesting call back.

## 2020-04-02 NOTE — TELEPHONE ENCOUNTER
Spoke with Xuan Mehta and MD about the referral. Dr. Lucas Frausto is not able to justify the referral for the hip pain to the caused by the back injury. Patient was notified and it was suggested to use regular insurance. Patient was not happy about MD lack of convincing the Adjustor. After attempting to clarify reasoning to patient, she stated she would like a call explaining this to her. She is confused as to why and stated she was just in the ER for this.

## 2020-04-06 ENCOUNTER — PATIENT OUTREACH (OUTPATIENT)
Dept: FAMILY MEDICINE CLINIC | Facility: CLINIC | Age: 55
End: 2020-04-06

## 2020-04-06 NOTE — PROGRESS NOTES
Transition Of care Follow Up      Patient Outreached Attempted. Received patient's voicemail box. Message left requesting call back.

## 2020-04-07 DIAGNOSIS — Z86.73 HISTORY OF STROKE: ICD-10-CM

## 2020-04-07 DIAGNOSIS — I69.398 SPASTICITY DUE TO OLD STROKE: ICD-10-CM

## 2020-04-07 DIAGNOSIS — M62.838 MUSCLE SPASM: ICD-10-CM

## 2020-04-07 DIAGNOSIS — R25.2 SPASTICITY DUE TO OLD STROKE: ICD-10-CM

## 2020-04-07 RX ORDER — TIZANIDINE 4 MG/1
TABLET ORAL
Qty: 90 TAB | Refills: 5 | Status: SHIPPED | OUTPATIENT
Start: 2020-04-07 | End: 2020-09-17

## 2020-04-10 ENCOUNTER — PATIENT OUTREACH (OUTPATIENT)
Dept: FAMILY MEDICINE CLINIC | Facility: CLINIC | Age: 55
End: 2020-04-10

## 2020-04-10 NOTE — PROGRESS NOTES
Covid Care Transition    Patient Outreached Attempted. Received patient's voicemail box. Mailbox is full unable to leave message. This episode is resolved.

## 2020-06-08 ENCOUNTER — OFFICE VISIT (OUTPATIENT)
Dept: ORTHOPEDIC SURGERY | Age: 55
End: 2020-06-08

## 2020-06-08 VITALS
HEART RATE: 80 BPM | WEIGHT: 234 LBS | RESPIRATION RATE: 24 BRPM | HEIGHT: 67 IN | TEMPERATURE: 98.3 F | DIASTOLIC BLOOD PRESSURE: 76 MMHG | BODY MASS INDEX: 36.73 KG/M2 | SYSTOLIC BLOOD PRESSURE: 111 MMHG | OXYGEN SATURATION: 96 %

## 2020-06-08 DIAGNOSIS — G57.12 MERALGIA PARAESTHETICA, LEFT: ICD-10-CM

## 2020-06-08 DIAGNOSIS — G57.02 PIRIFORMIS SYNDROME OF LEFT SIDE: ICD-10-CM

## 2020-06-08 DIAGNOSIS — M79.18 LEFT BUTTOCK PAIN: ICD-10-CM

## 2020-06-08 DIAGNOSIS — M53.3 COCCYDYNIA: ICD-10-CM

## 2020-06-08 DIAGNOSIS — G57.11 MERALGIA PARAESTHETICA, RIGHT: ICD-10-CM

## 2020-06-08 DIAGNOSIS — M70.62 GREATER TROCHANTERIC BURSITIS OF LEFT HIP: Primary | ICD-10-CM

## 2020-06-08 DIAGNOSIS — M53.3 SACROILIAC JOINT DYSFUNCTION OF LEFT SIDE: ICD-10-CM

## 2020-06-08 DIAGNOSIS — M62.89 PELVIC FLOOR DYSFUNCTION IN FEMALE: ICD-10-CM

## 2020-06-08 RX ORDER — DICLOFENAC SODIUM 75 MG/1
75 TABLET, DELAYED RELEASE ORAL 2 TIMES DAILY
COMMUNITY
Start: 2020-05-15 | End: 2020-12-15 | Stop reason: ALTCHOICE

## 2020-06-08 RX ORDER — DICLOFENAC SODIUM 10 MG/G
2 GEL TOPICAL
COMMUNITY
Start: 2020-04-27 | End: 2022-06-07

## 2020-06-08 RX ORDER — FLUTICASONE PROPIONATE AND SALMETEROL XINAFOATE 115; 21 UG/1; UG/1
2 AEROSOL, METERED RESPIRATORY (INHALATION) 2 TIMES DAILY
COMMUNITY
Start: 2020-04-29

## 2020-06-08 NOTE — PROGRESS NOTES
[address]  [address], [address] [facility],[address]  [facility], [address]  Phone: [phone]  Fax: [phone]        Dionte Cage  : 1965  PCP: Umang Saravia MD  2020    PROGRESS NOTE      HISTORY OF PRESENT ILLNESS  Sarah Morrell is a 49 y.o. female who was seen as a new patient 10/10/19 with c/o chronic low back pain extending into the RLE that she described as a burning pain. She also c/o numbness and foot drop in the LLE that improved some with surgery (L5-S1 fusion) and SCS. She continued to have some residual foot drop and shakiness/tremor of her foot along with LLE cramping. She noted that her neurologist did an emergency EEG to see if it was due to her complex regional seizures, but they were not. She found significant relief with Tizanidine, especially with the distal LLE cramping. She noted that she did not tolerate other muscle relaxants due to somnolence. She found significant relief from the SCS and a Lidoderm 12 hr patch. Pt reported that she has had two EMGs in the past revealing a chronic left L5 radiculopathy. Pt noted that she previously had a dx of sacroiliitis, and she previously found relief from an SI joint injection. She had a right-sided lacunar stroke affecting the temporal lobe - LLE weakness, mild LUE weakness, cognitive impairment, smiling on the right side. 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 She saw Dr. Corbin Manley 19 in regards to a spinal cord stimulator malfunction. She had SCS placement in  by Dr. Malcolm Cotton, and it was subsequently revised several times. She reported that she has had multiple issues with the SCS's over the years related to battery life. Due to her cognitive impairment, she is unable to use a rechargable batteries. She previously worked as a Physical Therapist. She was prescribed an SI belt.  and underwent a Right SI joint injection (10/22/19; Dr. Hemalatha Tang) that provided significant relief and she doubled her daily steps. She continued to take Tizanidine, Topamax, and Depakote prescribed by her neurologist. She maintained an HEP of walking and yoga. She saw some improvement with PT (11/14/19-2/4/2020; [facility]). She used a left AFO as recommended by PT. She reported no longer having a tremor in her LLE. Pt noted that during one session of PT, she was having manual, myofascial release on her external pelvis and believed it elicited a seizure at the time. She experienced constipation, weight gain, and somnolence with Tizanidine but she felt the benefits outweighed the side effects. Pt noted that she saw her orthotist who provided an orthotic that has helped with her ambulation. She noted that her left hip was elevated by 1/8th of an inch. She was advised to obtain a gel insert for the right side, but it did not provide any benefit. Her son provided long access distraction that helped with the muscle spasms, but she had burning and numbness slightly posterior to the trochanter. She had significant sharp pain with weight bearing that radiated into the ankle. She saw some benefit from oral steroids, especially with her SI joint pain. However, she continued to have some pain near the trochanter and the left gluteus medius near the insertion. She had to use a cane to ambulate around her house. Pt denied groin pain. Virgen Granado comes in to the office today for f/u. She has not yet been seen by Dr. Frieda Bernheim office because she was not aware that her referral was approved. She reports with pelvic pains, sacral pain and tailbone numbness. She also reports some BLE itching and redness. She finds benefit with walking/movement. Pain score: 4/10.     Treatments patient has tried:  Physical therapy:Yes  Doing HEP: Yes  Non-opioid medications: Yes - Tizanidine, Topamax, oral steroids, Diclofenac  Spinal injections: Right SI joint injection (10/22/19; Dr. Lulu Rasmussen)  Spinal surgery- Yes - fusion L5-S1; SCS  Last MRI - None.      reviewed. PMHx of epilepsy, hypothyroidism, CVA, had three failed surgeries in 2006 leaving her with arachnoiditis. L5-S1 fusion, SCS. ASSESSMENT  Her symptoms likely remain due to left trochanteric enthesopathy/bursitis, sacroiliac joint dysfunction, left piriformis syndrome, and pelvic floor dysfunction and coccydynia. Her increase of work/sitting may be causing a bilateral meralgia paraesthetica. Given history of arachnoiditis, more peripheral treatment would be indicated. PLAN  1. Referral for ganglion impar block to address sacral/coccyx pain  2. Delay meeting with Dr. Collette Michael until results of ganglion impar block are apparent. Pt will f/u after ganglion impar block or sooner as needed. Diagnoses and all orders for this visit:    1. Greater trochanteric bursitis of left hip    2. Sacroiliac joint dysfunction of left side    3. Piriformis syndrome of left side    4. Left buttock pain    5. Pelvic floor dysfunction in female    6. Coccydynia  -     REFERRAL TO PAIN MANAGEMENT    7. Meralgia paraesthetica, left    8.  Meralgia paraesthetica, right           PAST MEDICAL HISTORY   Past Medical History:   Diagnosis Date    Adverse effect of anesthesia      bronchospasm,     Asthma     well controlled    Coagulation disorder (HCC)     hypercoagulable    Depressive disorder 1/3/2013    Epilepsy ([address]     Ill-defined condition     Benign Granuloma Annularea    OAB (overactive bladder)     Obesity (BMI 30.0-34.9) 11/3/2016    Right leg DVT ([address]     DVT    Seizures (HCC)     grand mal    Spastic bladder     Stroke ([address] 2006    mini-stroke    Thromboembolus [facility])     s/p vincenzo filter    Thyroid disease     Unspecified adverse effect of anesthesia     bronchospasm on awakening       Past Surgical History:   Procedure Laterality Date    HX BACK SURGERY      dural repair    HX BACK SURGERY      nerve stimulator  HX LUMBAR FUSION  2006    HX LUMBAR LAMINECTOMY  2006   . MEDICATIONS    Current Outpatient Medications   Medication Sig Dispense Refill    tiZANidine (ZANAFLEX) 4 mg tablet TAKE 1 TABLET BY MOUTH THREE TIMES DAILY AS NEEDED FOR PAIN 90 Tab 5    predniSONE (STERAPRED DS) 10 mg dose pack Take with food. Start on Thursday. 21 Tab 0    ondansetron (Zofran ODT) 4 mg disintegrating tablet Take 1 Tab by mouth every eight (8) hours as needed for Nausea (or vomiting.). Allow to dissolve on tongue 20 Tab 0    albuterol (PROVENTIL HFA, VENTOLIN HFA, PROAIR HFA) 90 mcg/actuation inhaler INHALE 2 PUFFS EVERY 6 HOURS AS NEEDED FOR WHEEZING OR SHORTNESS OF BREATH      lacosamide (VIMPAT) 100 mg tab tablet Take 100 mg by mouth.  levothyroxine (SYNTHROID) 25 mcg tablet Take 25 mcg by mouth Daily (before breakfast).  lidocaine (LIDODERM) 5 % Apply 1 Patch to affected area as needed.  LORazepam (ATIVAN) 0.5 mg tablet Take  by mouth.  topiramate (TOPAMAX) 100 mg tablet Take 100 mg by mouth two (2) times a day.  montelukast (SINGULAIR) 10 mg tablet Take 10 mg by mouth daily. Indications: ASTHMA PREVENTION          ALLERGIES  Allergies   Allergen Reactions    Adhesive Other (comments)     Layers of skin peeled off    Copper Rash and Swelling    Dilantin [Phenytoin Sodium Extended] Other (comments)     Throat swelled up rash all over    Other Medication Other (comments)     Surgical skin prep caused layers of skin to peel off. Needs benedryl before anesthesia.     Phenobarbital Other (comments)     Throat swelled up ,rash    Rifampin Rash          SOCIAL HISTORY    Social History     Socioeconomic History    Marital status:      Spouse name: Not on file    Number of children: Not on file    Years of education: Not on file    Highest education level: Not on file   Tobacco Use    Smoking status: Never Smoker    Smokeless tobacco: Never Used   Substance and Sexual Activity    Alcohol use: Yes     Comment: 1 per month    Drug use: No       FAMILY HISTORY  No family history on file. REVIEW OF SYSTEMS  Review of Systems   Musculoskeletal: Positive for back pain. Left foot drop  Left trochanter and SI pain          PHYSICAL EXAMINATION  There were no vitals taken for this visit. Pain Assessment  3/27/2020   Location of Pain Hip;Leg; Ankle   Location Modifiers Left   Severity of Pain 8   Quality of Pain Aching;Burning; Other (Comment)   Quality of Pain Comment numbness   Duration of Pain Persistent   Frequency of Pain Constant   Aggravating Factors -   Aggravating Factors Comment -   Limiting Behavior -   Relieving Factors -   Relieving Factors Comment -   Result of Injury -           Constitutional:  Well developed, well nourished, in no acute distress. Psychiatric: Affect and mood are appropriate. Integumentary: No rashes or abrasions noted on exposed areas. SPINE/MUSCULOSKELETAL EXAM    Cervical spine:  Neck is midline. Normal muscle tone. No focal atrophy is noted. ROM pain free. Shoulder ROM intact.   No tenderness to palpation. Negative Spurling's sign. Negative Tinel's sign. Negative Serrato's sign.      Sensation in the bilateral arms grossly intact to light touch.      Lumbar spine:  No rash, ecchymosis, or gross obliquity. No fasciculations. No focal atrophy is noted. No pain with hip ROM. Full range of motion. No tenderness to palpation.   No tenderness to palpation at the sciatic notch.    SI joints tender bilaterally, R>L   Trochanters non tender.     Sensation in the bilateral legs grossly intact to light touch.             LEFT RIGHT   CLAY TEST - +   P4 TEST - +   COMPRESSION TEST - -   DISTRACTION TEST - +   GAENSLEN'S TEST - +        MOTOR:      Biceps  Triceps Deltoids Wrist Ext Wrist Flex Hand Intrin   Right 5/5 5/5 5/5 5/5 5/5 5/5   Left 5/5 5/5 5/5 5/5 5/5 5/5             Hip Flex  Quads Hamstrings Ankle DF EHL Ankle PF Right 5/5 5/5 5/5 5/5 5/5 5/5   Left 5/5 5/5 5/5 5/5 5/5 5/5     DTRs are 1+ biceps, triceps, brachioradialis, patella, and Achilles.     Negative Straight Leg raise. Squat not tested. No difficulty with tandem gait.      Ambulation with single point cane. FWB.       RADIOGRAPHS  4V Lumbar XR images taken on 4/1/2020 personally reviewed with patient:  BONES: Status post L5-S1 posterior spinal fusion and interbody disc cage. L5  laminectomy. No evidence of acute fracture or listhesis. Overlying spinal  stimulator and IVC filter partially obscure underlying osseous detail. Vertebral  body heights are maintained. No osseous lytic or blastic lesion. Mild multilevel  spondylosis.     SOFT TISSUES: Unremarkable.     OTHER: None.     _______________     IMPRESSION  IMPRESSION:     No evidence of acute fracture or listhesis.     Lower lumbar spinal fusion. LT HIP XR images taken on 4/1/2020 personally reviewed with patient:  BONES: No evidence of acute fracture or dislocation. Joint spaces and alignment  are maintained. No aggressive appearing osseous lytic or blastic lesion.      SOFT TISSUES: Unremarkable.     _______________     IMPRESSION  IMPRESSION:     No evidence of acute fracture or dislocation. Lumbar XR images taken on 2/16/18 personally reviewed with patient:  Right posterior upper hip generator device with dorsal intraspinal stimulator wires, and the T10-11 level with the wire tips at the T7-T8 level. Postoperative changes of previous L5 decompression laminectomy with solid and are osseous circumferential fusion at L5-S1.  No findings of hardware failure. A right-sided IVC filter is present at the L2-L3 level. Mild multilevel degenerative spondylosis similar prior examination with no evidence of listhesis.  The vertebral bodies maintain normal height and alignment.  No acute obstructive osseous abnormality.      IMPRESSION:    1. Stable post operative circumferential fusion L5-S1 with L5 decompression laminectomy. 2.  Dorsal stimulator wire tip at the T7-T8 level. 3.  Mild degenerative lumbar spine spondylosis.  No acute obstructive osseous abnormality. 26 minutes of face-to-face contact were spent with the patient during today's visit extensively discussing symptoms and treatment plan. All questions were answered. More than half of this visit today was spent on counseling.      Written by Cindy Downey as dictated by Horacio Willams MD

## 2020-07-07 ENCOUNTER — VIRTUAL VISIT (OUTPATIENT)
Dept: ORTHOPEDIC SURGERY | Age: 55
End: 2020-07-07

## 2020-07-07 DIAGNOSIS — G57.12 MERALGIA PARAESTHETICA, LEFT: ICD-10-CM

## 2020-07-07 DIAGNOSIS — M70.62 GREATER TROCHANTERIC BURSITIS OF LEFT HIP: Primary | ICD-10-CM

## 2020-07-07 DIAGNOSIS — G57.11 MERALGIA PARAESTHETICA, RIGHT: ICD-10-CM

## 2020-07-07 DIAGNOSIS — M53.3 COCCYDYNIA: ICD-10-CM

## 2020-07-07 DIAGNOSIS — M79.18 LEFT BUTTOCK PAIN: ICD-10-CM

## 2020-07-07 DIAGNOSIS — G57.02 PIRIFORMIS SYNDROME OF LEFT SIDE: ICD-10-CM

## 2020-07-07 DIAGNOSIS — M53.3 SACROILIAC JOINT DYSFUNCTION OF LEFT SIDE: ICD-10-CM

## 2020-07-07 DIAGNOSIS — M62.89 PELVIC FLOOR DYSFUNCTION IN FEMALE: ICD-10-CM

## 2020-07-07 RX ORDER — SIMVASTATIN 10 MG/1
10 TABLET, FILM COATED ORAL
COMMUNITY
End: 2022-06-07

## 2020-07-07 NOTE — PROGRESS NOTES
[address], [address]  [facility], [address]  Phone: [phone]  Fax: [phone]        Jordy Alvarezporter  : 1965  PCP: Emmett Richards MD  2020    PROGRESS NOTE    Consent: Alex Thomas is a 48 y.o. female who was seen by synchronous (real-time) audio-video technology, and/or her healthcare decision maker, is aware that this patient-initiated, Telehealth encounter on 2020 is a billable service, with coverage as determined by his/her insurance carrier. He/She is aware that he/she may receive a bill and has provided verbal consent to proceed: Yes. The visit was conducted in the office with the patient being in home through a Doxy platform. Visit video time start: 4:01 PM end: 4:25 PM      HISTORY OF PRESENT ILLNESS  Alex Thomas is a 48 y.o. female who was seen as a new patient 10/10/19 with c/o chronic low back pain extending into the RLE that she described as a burning pain. She also c/o numbness and foot drop in the LLE that improved some with surgery (L5-S1 fusion) and SCS. She continued to have some residual foot drop and shakiness/tremor of her foot along with LLE cramping. She noted that her neurologist did an emergency EEG to see if it was due to her complex regional seizures, but they were not. She found significant relief with Tizanidine, especially with the distal LLE cramping. She noted that she did not tolerate other muscle relaxants due to somnolence. She found significant relief from the SCS and a Lidoderm 12 hr patch. Pt reported that she has had two EMGs in the past revealing a chronic left L5 radiculopathy. Pt noted that she previously had a dx of sacroiliitis, and she previously found relief from an SI joint injection. She had a right-sided lacunar stroke affecting the temporal lobe - LLE weakness, mild LUE weakness, cognitive impairment, smiling on the right side.  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 She saw Dr. Paolo Zuniga 6/18/19 in regards to a spinal cord stimulator malfunction. She had SCS placement in 2011 by Dr. Michela Cheng, and it was subsequently revised several times. She reported that she has had multiple issues with the SCS's over the years related to battery life. Due to her cognitive impairment, she is unable to use a rechargable batteries. She previously worked as a Physical Therapist. She was prescribed an SI belt. and underwent a Right SI joint injection (10/22/19; Dr. Noelle Mullins) that provided significant relief and she doubled her daily steps. She continued to take Tizanidine, Topamax, and Depakote prescribed by her neurologist. She maintained an HEP of walking and yoga. She saw some improvement with PT (11/14/19-2/4/2020; [facility]). She used a left AFO as recommended by PT. She reported no longer having a tremor in her LLE. Pt noted that during one session of PT, she was having manual, myofascial release on her external pelvis and believed it elicited a seizure at the time. She experienced constipation, weight gain, and somnolence with Tizanidine but she felt the benefits outweighed the side effects. Pt noted that she saw her orthotist who provided an orthotic that has helped with her ambulation. She noted that her left hip was elevated by 1/8th of an inch. She was advised to obtain a gel insert for the right side, but it did not provide any benefit. Her son provided long access distraction that helped with the muscle spasms, but she had burning and numbness slightly posterior to the trochanter. She had significant sharp pain with weight bearing that radiated into the ankle. She saw some benefit from oral steroids, especially with her SI joint pain. However, she continued to have some pain near the trochanter and the left gluteus medius near the insertion. She had to use a cane to ambulate around her house.  Pt denied groin pain. She returned with c/o pelvic pains, sacral pain and tailbone numbness. She also reported some BLE itching and redness. She found benefit with walking/movement. Alex Thomas is seen virtually for f/u. She saw Dr. Segre Jc and he suggested she may do well with seeing his wife at [facility] PT. She saw Dr. Eusebio Gutierrez, and he suggested the ganglion impar injection for coccyx pain if she decided to proceed with that option. However, he noted, it would not likely resolve her other symptoms. Pt notes that she walked 1.1 miles one day, and both of her legs began turning in. Pain Scale: 4/10    Treatments patient has tried:  Physical therapy:Yes  Doing HEP: Yes  Non-opioid medications: Yes - Tizanidine, Topamax, oral steroids, Diclofenac  Spinal injections: Right SI joint injection (10/22/19; Dr. Leopoldo Sites)  Spinal surgery- Yes - fusion L5-S1; SCS  Last MRI - None.      reviewed. 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 L5-S1 fusion, SCS. ASSESSMENT  Her symptoms likely remain due to left trochanteric enthesopathy/bursitis, sacroiliac joint dysfunction, left piriformis syndrome, and pelvic floor dysfunction and coccydynia. Her increase of work/sitting may be causing a bilateral meralgia paraesthetica. Given history of arachnoiditis, more peripheral treatment would be indicated. PLAN  1. We will see if Dr. Serge Jc can do the ganglion impar injection. Pt will f/u in 3 months or sooner as needed. Diagnoses and all orders for this visit:    1. Greater trochanteric bursitis of left hip    2. Sacroiliac joint dysfunction of left side    3. Piriformis syndrome of left side    4. Left buttock pain    5. Pelvic floor dysfunction in female    6. Coccydynia    7. Meralgia paraesthetica, left    8.  Meralgia paraesthetica, right               PAST MEDICAL HISTORY   Past Medical History:   Diagnosis Date    Adverse effect of anesthesia bronchospasm,     Asthma     well controlled    Coagulation disorder ([facility] [address]     hypercoagulable    Depressive disorder 1/3/2013    Epilepsy ([facility] [address]     Ill-defined condition     Benign Granuloma Annularea    OAB (overactive bladder)     Obesity (BMI 30.0-34.9) 11/3/2016    Right leg DVT ([facility] [address]     DVT    Seizures (HCC)     grand mal    Spastic bladder     Stroke ([facility] [address] 2006    mini-stroke    Thromboembolus [facility])     s/p vincenzo filter    Thyroid disease     Unspecified adverse effect of anesthesia     bronchospasm on awakening       Past Surgical History:   Procedure Laterality Date    HX BACK SURGERY      dural repair    HX BACK SURGERY      nerve stimulator    HX LUMBAR FUSION  2006    HX LUMBAR LAMINECTOMY  2006   . MEDICATIONS    Current Outpatient Medications   Medication Sig Dispense Refill    diclofenac (VOLTAREN) 1 % gel Apply 2 g to affected area daily as needed.  diclofenac EC (VOLTAREN) 75 mg EC tablet Take 75 mg by mouth two (2) times a day.  Advair -21 mcg/actuation inhaler Take 2 Puffs by inhalation two (2) times a day.  tiZANidine (ZANAFLEX) 4 mg tablet TAKE 1 TABLET BY MOUTH THREE TIMES DAILY AS NEEDED FOR PAIN 90 Tab 5    ondansetron (Zofran ODT) 4 mg disintegrating tablet Take 1 Tab by mouth every eight (8) hours as needed for Nausea (or vomiting.). Allow to dissolve on tongue 20 Tab 0    albuterol (PROVENTIL HFA, VENTOLIN HFA, PROAIR HFA) 90 mcg/actuation inhaler INHALE 2 PUFFS EVERY 6 HOURS AS NEEDED FOR WHEEZING OR SHORTNESS OF BREATH      lacosamide (VIMPAT) 100 mg tab tablet Take 100 mg by mouth two (2) times a day.  levothyroxine (SYNTHROID) 25 mcg tablet Take 25 mcg by mouth Daily (before breakfast).  lidocaine (LIDODERM) 5 % Apply 1 Patch to affected area as needed.  LORazepam (ATIVAN) 0.5 mg tablet Take  by mouth.  topiramate (TOPAMAX) 100 mg tablet Take 100 mg by mouth two (2) times a day.       montelukast (SINGULAIR) 10 mg tablet Take 10 mg by mouth daily. Indications: ASTHMA PREVENTION          ALLERGIES  Allergies   Allergen Reactions    Adhesive Other (comments)     Layers of skin peeled off    Copper Rash and Swelling    Dilantin [Phenytoin Sodium Extended] Other (comments)     Throat swelled up rash all over    Other Medication Other (comments)     Surgical skin prep caused layers of skin to peel off. Needs benedryl before anesthesia.  Phenobarbital Other (comments)     Throat swelled up ,rash    Rifampin Rash          SOCIAL HISTORY    Social History     Socioeconomic History    Marital status:      Spouse name: Not on file    Number of children: Not on file    Years of education: Not on file    Highest education level: Not on file   Tobacco Use    Smoking status: Never Smoker    Smokeless tobacco: Never Used   Substance and Sexual Activity    Alcohol use: Yes     Comment: 1 per month    Drug use: No       FAMILY HISTORY  No family history on file. REVIEW OF SYSTEMS  Review of Systems   Musculoskeletal: Positive for back pain.         Left foot drop  Left trochanter and SI pain   Coccydynia         PHYSICAL EXAMINATION    Pain Assessment  7/7/2020   Location of Pain Leg   Pain Location Comment left, right hip   Location Modifiers -   Severity of Pain 4   Quality of Pain Burning;Aching   Quality of Pain Comment tingling   Duration of Pain Persistent   Frequency of Pain Intermittent   Aggravating Factors Other (Comment)   Aggravating Factors Comment sitting, walking too far   Limiting Behavior Yes   Relieving Factors Ice   Relieving Factors Comment -   Result of Injury -           -Constitutional: Healthy appearing, well developed, alert, in no acute distress  - Eyes: Conjunctiva clear, lids unremarkable, sclera anicteric  - Ears, nose, mouth, and throat: External inspection head, face, ears and nose identifies normal appearance without obvious deformity.  -Neck: No asymmetry, no masses, trachea is midline, and normal overall appearance.  -Respiratory: Respirations are even and unlabored. -Musculoskeletal: No cyanosis, clubbing, or edema observed    -Musculoskeletal: Inspection of the following identifies no gross deformity, misalignment, or asymmetry:   -Head/neck   -Spine   -Ribs/pelvis   -Right upper extremity    -Left upper extremity      -Musculoskeletal: Assessment of range of motion of the following identifies no gross limitations:    -Head/neck   -Spine   -Ribs/pelvis   -Right upper extremity    -Left upper extremity     -Skin: Head and neck skin with no lesions or rash  -Neurologic: Cranial nerves 3-12 grossly intact. -Psychiatric: Judgement and insight intact. The limitations of a telemedicine visit including the inability to perform a detailed physical examination may miss significant findings was presented to the patient, and the patient still elected to proceed with the telemedicine visit. RADIOGRAPHS  4V Lumbar XR images taken on 4/1/2020 personally reviewed with patient:  BONES: Status post L5-S1 posterior spinal fusion and interbody disc cage. L5  laminectomy. No evidence of acute fracture or listhesis. Overlying spinal  stimulator and IVC filter partially obscure underlying osseous detail. Vertebral  body heights are maintained. No osseous lytic or blastic lesion. Mild multilevel  spondylosis.     SOFT TISSUES: Unremarkable.     OTHER: None.     _______________     IMPRESSION  IMPRESSION:     No evidence of acute fracture or listhesis.     Lower lumbar spinal fusion.     LT HIP XR images taken on 4/1/2020 personally reviewed with patient:  BONES: No evidence of acute fracture or dislocation. Joint spaces and alignment  are maintained. No aggressive appearing osseous lytic or blastic lesion.      SOFT TISSUES: Unremarkable.     _______________     IMPRESSION  IMPRESSION:     No evidence of acute fracture or dislocation.     Lumbar XR images taken on 2/16/18 personally reviewed with patient:  Right posterior upper hip generator device with dorsal intraspinal stimulator wires, and the T10-11 level with the wire tips at the T7-T8 level. Postoperative changes of previous L5 decompression laminectomy with solid and are osseous circumferential fusion at L5-S1.  No findings of hardware failure. A right-sided IVC filter is present at the L2-L3 level. Mild multilevel degenerative spondylosis similar prior examination with no evidence of listhesis.  The vertebral bodies maintain normal height and alignment.  No acute obstructive osseous abnormality. IMPRESSION:    1. Stable post operative circumferential fusion L5-S1 with L5 decompression laminectomy. 2.  Dorsal stimulator wire tip at the T7-T8 level. 3.  Mild degenerative lumbar spine spondylosis.  No acute obstructive osseous abnormality.  reviewed    Ms. Rona Mansfield has a reminder for a \"due or due soon\" health maintenance. I have asked that she contact her primary care provider for follow-up on this health maintenance. Pursuant to the emergency declaration under the [address] [place] [address], [facility] waiver authority and the [employer] Act, this Virtual  Visit was conducted, with patient's consent, to reduce the patient's risk of exposure to COVID-19 and provide continuity of care for an established patient. Services were provided through a video synchronous discussion virtually to substitute for in-person clinic visit. CPT Codes 71377-15165 for Established Patients may apply to this Telehealth Visit  Time-based coding, delete if not needed: I spent at least 25 minutes with this established patient, and >50% of the time was spent counseling and/or coordinating care. Written by Mario Nixon, as dictated by Dr. Maribell Vasquez. I, Dr. Maribell Vasquez, confirm that all documentation is accurate.

## 2020-07-07 NOTE — PROGRESS NOTES
Clare Leyva presents today for No chief complaint on file. Is someone accompanying this pt? no    Is the patient using any DME equipment during OV? no    Coordination of Care:  1. Have you been to the ER, urgent care clinic since your last visit? no  Hospitalized since your last visit? no    2. Have you seen or consulted any other health care providers outside of the [address] [place] [address] since your last visit? Yes, pcp and Dr. Plasencia Ship any pap smears or colon screening.  none

## 2020-08-24 ENCOUNTER — TELEPHONE (OUTPATIENT)
Dept: ORTHOPEDIC SURGERY | Age: 55
End: 2020-08-24

## 2020-08-24 NOTE — TELEPHONE ENCOUNTER
Patient states his right hip pain is worsening significantly at a 7/10. Patient states at last visit it was mentioned he could give another referral.  She is checking the status of this process.   Please advise patient [phone]

## 2020-08-26 NOTE — TELEPHONE ENCOUNTER
Returned call to patient, verified Name/, per patient she is wanting to have another injection with Dr. Leopoldo Sites, that is what helped the best.      Upon further review, patient noted to have Right SI injection with Dr. Leopoldo Sites on 10/22/2019. Patient began to further describe her pain, how it intensifies with certain movement. Per patient she is unable to really describe where her pain is coming from, she just hurts. Patient scheduled to see Dr. Samra Whitehead next Tuesday to determine what injection would be best.    Patient very thankful, no further action required at this time.

## 2020-08-26 NOTE — TELEPHONE ENCOUNTER
Check with patient and see if she wants a referral to ortho for her hip or have Dr Serge Jc evaluate it when she goes there.

## 2020-09-01 ENCOUNTER — OFFICE VISIT (OUTPATIENT)
Dept: ORTHOPEDIC SURGERY | Age: 55
End: 2020-09-01

## 2020-09-01 VITALS
DIASTOLIC BLOOD PRESSURE: 82 MMHG | TEMPERATURE: 97.9 F | SYSTOLIC BLOOD PRESSURE: 144 MMHG | OXYGEN SATURATION: 97 % | HEART RATE: 78 BPM

## 2020-09-01 DIAGNOSIS — M62.89 PELVIC FLOOR DYSFUNCTION IN FEMALE: ICD-10-CM

## 2020-09-01 DIAGNOSIS — G57.11 MERALGIA PARAESTHETICA, RIGHT: ICD-10-CM

## 2020-09-01 DIAGNOSIS — G57.12 MERALGIA PARAESTHETICA, LEFT: ICD-10-CM

## 2020-09-01 DIAGNOSIS — M53.3 COCCYDYNIA: ICD-10-CM

## 2020-09-01 DIAGNOSIS — G57.02 PIRIFORMIS SYNDROME OF LEFT SIDE: ICD-10-CM

## 2020-09-01 DIAGNOSIS — M79.18 LEFT BUTTOCK PAIN: ICD-10-CM

## 2020-09-01 DIAGNOSIS — M70.62 GREATER TROCHANTERIC BURSITIS OF LEFT HIP: Primary | ICD-10-CM

## 2020-09-01 DIAGNOSIS — M53.3 SACROILIAC JOINT DYSFUNCTION OF LEFT SIDE: ICD-10-CM

## 2020-09-01 DIAGNOSIS — M53.3 SACROILIAC JOINT DYSFUNCTION OF RIGHT SIDE: ICD-10-CM

## 2020-09-01 NOTE — PROGRESS NOTES
[address], [address]  [facility], [address]  Phone: [phone]  Fax: [phone]        Sarkis Rutherford  : 1965  PCP: Santiago Brown MD  2020    PROGRESS NOTE      HISTORY OF PRESENT ILLNESS  Mey Mckee is a 48 y.o. female who was seen as a new patient 10/10/19 with c/o chronic low back pain extending into the RLE that she described as a burning pain. She also c/o numbness and foot drop in the LLE that improved some with surgery (L5-S1 fusion) and SCS. She continued to have some residual foot drop and shakiness/tremor of her foot along with LLE cramping. She noted that her neurologist did an emergency EEG to see if it was due to her complex regional seizures, but they were not. She found significant relief with Tizanidine, especially with the distal LLE cramping. She noted that she did not tolerate other muscle relaxants due to somnolence. She found significant relief from the SCS and a Lidoderm 12 hr patch. Pt reported that she has had two EMGs in the past revealing a chronic left L5 radiculopathy. Pt noted that she previously had a dx of sacroiliitis, and she previously found relief from an SI joint injection. She had a right-sided lacunar stroke affecting the temporal lobe - LLE weakness, mild LUE weakness, cognitive impairment, smiling on the right side. 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 She saw Dr. Radha Moore 19 in regards to a spinal cord stimulator malfunction. She had SCS placement in  by Dr. Sravani Massey, and it was subsequently revised several times. She reported that she has had multiple issues with the SCS's over the years related to battery life. Due to her cognitive impairment, she is unable to use a rechargable batteries.  She previously worked as a Physical Therapist. She was prescribed an SI belt and underwent a Right SI joint injection (10/22/19; Dr. Jeff Heath) that provided significant relief and she doubled her daily steps. She continued to take Tizanidine, Topamax, and Depakote prescribed by her neurologist. She maintained an HEP of walking and yoga. She saw some improvement with PT (11/14/19-2/4/2020; [facility]). She used a left AFO as recommended by PT. She reported no longer having a tremor in her LLE. Pt noted that during one session of PT, she was having manual, myofascial release on her external pelvis and believed it elicited a seizure at the time. She experienced constipation, weight gain, and somnolence with Tizanidine but she felt the benefits outweighed the side effects. Pt noted that she saw her orthotist who provided an orthotic that has helped with her ambulation. She noted that her left hip was elevated by 1/8th of an inch. She was advised to obtain a gel insert for the right side, but it did not provide any benefit. Her son provided long access distraction that helped with the muscle spasms, but she had burning and numbness slightly posterior to the trochanter. She had significant sharp pain with weight bearing that radiated into the ankle. She saw some benefit from oral steroids, especially with her SI joint pain. However, she continued to have some pain near the trochanter and the left gluteus medius near the insertion. She had to use a cane to ambulate around her house. Pt denied groin pain. She returned with c/o pelvic pains, sacral pain and tailbone numbness. She also reported some BLE itching and redness. She found benefit with walking/movement. She saw Dr. Jorge Barker and he suggested she may do well with seeing his wife at [facility]. She saw Dr. Cindy Agarwal, and he suggested the ganglion impar injection for coccyx pain if she decided to proceed with that option. However, he noted, it would not likely resolve her other symptoms. Pt notes that she walked 1.1 miles one day, and both of her legs began turning in.     Joe Llanos comes in to the office today for f/u. She returns today with c/o exacerbation of her tailbone pain. She is interested in another SI joint injection. She plans to have a left rotator cuff repair surgery. Pain Score: 6/10. Treatments patient has tried:  Physical therapy:Yes  Doing HEP: Yes  Non-opioid medications: Yes - Tizanidine, Topamax, oral steroids, Diclofenac  Spinal injections: Right SI joint injection (10/22/19; Dr. Francisco Chávez)  Spinal surgery- Yes - fusion L5-S1; SCS  Last MRI - None.      reviewed. 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 L5-S1 fusion, SCS. ASSESSMENT  Her symptoms likely remain due to left trochanteric enthesopathy/bursitis, sacroiliac joint dysfunction, left piriformis syndrome, and pelvic floor dysfunction and coccydynia. Some of her symptoms may relate to meralgia paraesthetica. Given history of arachnoiditis, more peripheral treatment would be ideal.     PLAN  1. Right SI joint injection. Pt will f/u in 2-4 weeks after injection or sooner as needed. Diagnoses and all orders for this visit:    1. Greater trochanteric bursitis of left hip    2. Sacroiliac joint dysfunction of left side    3. Piriformis syndrome of left side    4. Left buttock pain    5. Pelvic floor dysfunction in female    6. Coccydynia    7. Meralgia paraesthetica, left    8. Meralgia paraesthetica, right    9.  Sacroiliac joint dysfunction of right side  -     SCHEDULE SURGERY               PAST MEDICAL HISTORY   Past Medical History:   Diagnosis Date    Adverse effect of anesthesia      bronchospasm,     Asthma     well controlled    Coagulation disorder (HCC)     hypercoagulable    Depressive disorder 1/3/2013    Epilepsy ([facility] [address]     Ill-defined condition     Benign Granuloma Annularea    OAB (overactive bladder)     Obesity (BMI 30.0-34.9) 11/3/2016    Right leg DVT ([facility])     DVT    Seizures ([facility])     grand mal    Spastic bladder     Stroke [facility]) 2006    mini-stroke    Thromboembolus [facility])     s/p vincenzo filter    Thyroid disease     Unspecified adverse effect of anesthesia     bronchospasm on awakening       Past Surgical History:   Procedure Laterality Date    HX BACK SURGERY      dural repair    HX BACK SURGERY      nerve stimulator    HX LUMBAR FUSION  2006    HX LUMBAR LAMINECTOMY  2006   . MEDICATIONS    Current Outpatient Medications   Medication Sig Dispense Refill    simvastatin (ZOCOR) 10 mg tablet Take 10 mg by mouth nightly.  diclofenac (VOLTAREN) 1 % gel Apply 2 g to affected area daily as needed.  diclofenac EC (VOLTAREN) 75 mg EC tablet Take 75 mg by mouth two (2) times a day.  Advair -21 mcg/actuation inhaler Take 2 Puffs by inhalation two (2) times a day.  tiZANidine (ZANAFLEX) 4 mg tablet TAKE 1 TABLET BY MOUTH THREE TIMES DAILY AS NEEDED FOR PAIN 90 Tab 5    ondansetron (Zofran ODT) 4 mg disintegrating tablet Take 1 Tab by mouth every eight (8) hours as needed for Nausea (or vomiting.). Allow to dissolve on tongue 20 Tab 0    albuterol (PROVENTIL HFA, VENTOLIN HFA, PROAIR HFA) 90 mcg/actuation inhaler INHALE 2 PUFFS EVERY 6 HOURS AS NEEDED FOR WHEEZING OR SHORTNESS OF BREATH      lacosamide (VIMPAT) 100 mg tab tablet Take 100 mg by mouth two (2) times a day.  levothyroxine (SYNTHROID) 25 mcg tablet Take 25 mcg by mouth Daily (before breakfast).  lidocaine (LIDODERM) 5 % Apply 1 Patch to affected area as needed.  LORazepam (ATIVAN) 0.5 mg tablet Take  by mouth.  topiramate (TOPAMAX) 100 mg tablet Take 100 mg by mouth two (2) times a day.  montelukast (SINGULAIR) 10 mg tablet Take 10 mg by mouth daily.  Indications: ASTHMA PREVENTION          ALLERGIES  Allergies   Allergen Reactions    Adhesive Other (comments)     Layers of skin peeled off    Copper Rash and Swelling    Dilantin [Phenytoin Sodium Extended] Other (comments)     Throat swelled up rash all over    Other Medication Other (comments)     Surgical skin prep caused layers of skin to peel off. Needs benedryl before anesthesia.  Phenobarbital Other (comments)     Throat swelled up ,rash    Rifampin Rash          SOCIAL HISTORY    Social History     Socioeconomic History    Marital status:      Spouse name: Not on file    Number of children: Not on file    Years of education: Not on file    Highest education level: Not on file   Tobacco Use    Smoking status: Never Smoker    Smokeless tobacco: Never Used   Substance and Sexual Activity    Alcohol use: Yes     Comment: 1 per month    Drug use: No       FAMILY HISTORY  No family history on file. REVIEW OF SYSTEMS  Review of Systems   Musculoskeletal: Positive for back pain. R SI joint pain  Coccydynia  Left foot drop  Left trochanter and SI pain   Coccydynia            PHYSICAL EXAMINATION  Visit Vitals  /82 (BP 1 Location: Right arm, BP Patient Position: Sitting)   Pulse 78   Temp 97.9 °F (36.6 °C) (Oral)   SpO2 97%       Pain Assessment  9/1/2020   Location of Pain Back   Pain Location Comment -   Location Modifiers -   Severity of Pain 7   Quality of Pain Sharp;Burning   Quality of Pain Comment numbness/ stinging   Duration of Pain -   Frequency of Pain Constant   Aggravating Factors -   Aggravating Factors Comment -   Limiting Behavior -   Relieving Factors -   Relieving Factors Comment -   Result of Injury -           Constitutional:  Well developed, well nourished, in no acute distress. Psychiatric: Affect and mood are appropriate. Integumentary: No rashes or abrasions noted on exposed areas. SPINE/MUSCULOSKELETAL EXAM    Cervical spine:  Neck is midline. Normal muscle tone. No focal atrophy is noted. ROM pain free. Shoulder ROM intact.   No tenderness to palpation. Negative Spurling's sign. Negative Tinel's sign.    Negative Serrato's sign.      Sensation in the bilateral arms grossly intact to light touch.      Lumbar spine:  No rash, ecchymosis, or gross obliquity. No fasciculations. No focal atrophy is noted. No pain with hip ROM. Full range of motion. No tenderness to palpation.   No tenderness to palpation at the sciatic notch. SI joints tender bilaterally, R>L   Trochanters non tender.     Sensation in the bilateral legs grossly intact to light touch.             LEFT RIGHT   CLAY TEST - +   P4 TEST - +   COMPRESSION TEST - -   DISTRACTION TEST - +   GAENSLEN'S TEST - +         Updates 9/1/2020:    SI joint on R tender to palpation     LEFT RIGHT   CLAY TEST - +   P4 TEST - +   COMPRESSION TEST - -   DISTRACTION TEST - -   GAENSLEN'S TEST - +       MOTOR:      Biceps  Triceps Deltoids Wrist Ext Wrist Flex Hand Intrin   Right 5/5 5/5 5/5 5/5 5/5 5/5   Left 5/5 5/5 5/5 5/5 5/5 5/5             Hip Flex  Quads Hamstrings Ankle DF EHL Ankle PF   Right 5/5 5/5 5/5 5/5 5/5 5/5   Left 5/5 5/5 5/5 5/5 5/5 5/5     DTRs are 1+ biceps, triceps, brachioradialis, patella, and Achilles.     Negative Straight Leg raise. Squat not tested. No difficulty with tandem gait.      Ambulation with single point cane. FWB. RADIOGRAPHS  4V Lumbar XR images taken on 4/1/2020 personally reviewed with patient:  BONES: Status post L5-S1 posterior spinal fusion and interbody disc cage. L5  laminectomy. No evidence of acute fracture or listhesis. Overlying spinal  stimulator and IVC filter partially obscure underlying osseous detail. Vertebral  body heights are maintained. No osseous lytic or blastic lesion. Mild multilevel  spondylosis.     SOFT TISSUES: Unremarkable.     OTHER: None.     _______________     IMPRESSION  IMPRESSION:     No evidence of acute fracture or listhesis.     Lower lumbar spinal fusion.     LT HIP XR images taken on 4/1/2020 personally reviewed with patient:  BONES: No evidence of acute fracture or dislocation. Joint spaces and alignment  are maintained.  No aggressive appearing osseous lytic or blastic lesion.      SOFT TISSUES: Unremarkable.     _______________     IMPRESSION  IMPRESSION:     No evidence of acute fracture or dislocation. Lumbar XR images taken on 2/16/18 personally reviewed with patient:  Right posterior upper hip generator device with dorsal intraspinal stimulator wires, and the T10-11 level with the wire tips at the T7-T8 level. Postoperative changes of previous L5 decompression laminectomy with solid and are osseous circumferential fusion at L5-S1.  No findings of hardware failure. A right-sided IVC filter is present at the L2-L3 level. Mild multilevel degenerative spondylosis similar prior examination with no evidence of listhesis.  The vertebral bodies maintain normal height and alignment.  No acute obstructive osseous abnormality. IMPRESSION:    1. Stable post operative circumferential fusion L5-S1 with L5 decompression laminectomy. 2.  Dorsal stimulator wire tip at the T7-T8 level. 3.  Mild degenerative lumbar spine spondylosis.  No acute obstructive osseous abnormality. 13 minutes of face-to-face contact were spent with the patient during today's visit extensively discussing symptoms and treatment plan. All questions were answered. More than half of this visit today was spent on counseling.      Written by Merle Hickey as dictated by Judit Guerra MD

## 2020-09-01 NOTE — H&P (VIEW-ONLY)
[address] 
[address], [address] Phone: [phone] Fax: [phone] Kori Olson : 1965 PCP: Brenda Chaney MD 
2020 PROGRESS NOTE HISTORY OF PRESENT ILLNESS Neo Maravilla is a 48 y.o. female who was seen as a new patient 10/10/19 with c/o chronic low back pain extending into the RLE that she described as a burning pain. She also c/o numbness and foot drop in the LLE that improved some with surgery (L5-S1 fusion) and SCS. She continued to have some residual foot drop and shakiness/tremor of her foot along with LLE cramping. She noted that her neurologist did an emergency EEG to see if it was due to her complex regional seizures, but they were not. She found significant relief with Tizanidine, especially with the distal LLE cramping. She noted that she did not tolerate other muscle relaxants due to somnolence. She found significant relief from the SCS and a Lidoderm 12 hr patch. Pt reported that she has had two EMGs in the past revealing a chronic left L5 radiculopathy. Pt noted that she previously had a dx of sacroiliitis, and she previously found relief from an SI joint injection. She had a right-sided lacunar stroke affecting the temporal lobe - LLE weakness, mild LUE weakness, cognitive impairment, smiling on the right side. 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 She saw Dr. Michele Moffett 19 in regards to a spinal cord stimulator malfunction. She had SCS placement in  by Dr. Nicole Romberg, and it was subsequently revised several times. She reported that she has had multiple issues with the SCS's over the years related to battery life. Due to her cognitive impairment, she is unable to use a rechargable batteries.  She previously worked as a Physical Therapist. She was prescribed an [address] and underwent a Right SI joint injection (10/22/19; Dr. Narinder Damon) that provided significant relief and she doubled her daily steps. She continued to take Tizanidine, Topamax, and Depakote prescribed by her neurologist. She maintained an HEP of walking and yoga. She saw some improvement with PT (11[facility]14[facility] She used a left AFO as recommended by PT. She reported no longer having a tremor in her LLE. Pt noted that during one session of PT, she was having manual, myofascial release on her external pelvis and believed it elicited a seizure at the time. She experienced constipation, weight gain, and somnolence with Tizanidine but she felt the benefits outweighed the side effects. Pt noted that she saw her orthotist who provided an orthotic that has helped with her ambulation. She noted that her left hip was elevated by 1/8th of an inch. She was advised to obtain a gel insert for the right side, but it did not provide any benefit. Her son provided long access distraction that helped with the muscle spasms, but she had burning and numbness slightly posterior to the trochanter. She had significant sharp pain with weight bearing that radiated into the ankle. She saw some benefit from oral steroids, especially with her SI joint pain. However, she continued to have some pain near the trochanter and the left gluteus medius near the insertion. She had to use a cane to ambulate around her house. Pt denied groin pain. She returned with c/o pelvic pains, sacral pain and tailbone numbness. She also reported some BLE itching and redness. She found benefit with walking/movement. She saw Dr. Kindra Quezada and he suggested she may do well with seeing his wife at [facility]. She saw Dr. Velia Kanner, and he suggested the ganglion impar injection for coccyx pain if she decided to proceed with that option. However, he noted, it would not likely resolve her other symptoms.  Pt notes that she walked 1.1 miles one day, and both of her legs began turning in. Vinod Ashley comes in to the office today for f/u. She returns today with c/o exacerbation of her tailbone pain. She is interested in another SI joint injection. She plans to have a left rotator cuff repair surgery. Pain Score: 6/10. Treatments patient has tried: 
Physical therapy:Yes Doing HEP: Yes Non-opioid medications: Yes - Tizanidine, Topamax, oral steroids, Diclofenac Spinal injections: Right SI joint injection (10/22/19; Dr. Charisma Morillo) Spinal surgery- Yes - fusion L5-S1; SCS Last MRI - None. 
  
 reviewed. 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 L5-S1 fusion, SCS. ASSESSMENT Her symptoms likely remain due to left trochanteric enthesopathy/bursitis, sacroiliac joint dysfunction, left piriformis syndrome, and pelvic floor dysfunction and coccydynia. Some of her symptoms may relate to meralgia paraesthetica. Given history of arachnoiditis, more peripheral treatment would be ideal.  PLAN 1. Right SI joint injection. Pt will f/u in 2-4 weeks after injection or sooner as needed. Diagnoses and all orders for this visit: 1. Greater trochanteric bursitis of left hip 2. Sacroiliac joint dysfunction of left side 3. Piriformis syndrome of left side 4. Left buttock pain 5. Pelvic floor dysfunction in female 6. Coccydynia 7. Meralgia paraesthetica, left 8. Meralgia paraesthetica, right 9. Sacroiliac joint dysfunction of right side 
-     SCHEDULE SURGERY 
 
  
 
 
 
 
PAST MEDICAL HISTORY Past Medical History:  
Diagnosis Date  Adverse effect of anesthesia   
  bronchospasm,  Asthma   
 well controlled  Coagulation disorder (HCC)   
 hypercoagulable  Depressive disorder 1/3/2013  Epilepsy ([facility] [address]  Ill-defined condition Benign Granuloma Annularea  OAB (overactive bladder)  Obesity (BMI 30.0-34.9) 11/3/2016  Right leg DVT ([facility] [address] DVT  Seizures ([facility] [address] grand mal  
 Spastic bladder  Stroke [facility]) 2006  
 mini-stroke  Thromboembolus ([facility] [address]   
 s/p vincenzo filter  Thyroid disease  Unspecified adverse effect of anesthesia   
 bronchospasm on awakening Past Surgical History:  
Procedure Laterality Date  HX BACK SURGERY    
 dural repair  HX BACK SURGERY    
 nerve stimulator  HX LUMBAR FUSION  2006  HX LUMBAR LAMINECTOMY  2006 [place] MEDICATIONS Current Outpatient Medications Medication Sig Dispense Refill  simvastatin (ZOCOR) 10 mg tablet Take 10 mg by mouth nightly.  diclofenac (VOLTAREN) 1 % gel Apply 2 g to affected area daily as needed.  diclofenac EC (VOLTAREN) 75 mg EC tablet Take 75 mg by mouth two (2) times a day.  Advair -21 mcg/actuation inhaler Take 2 Puffs by inhalation two (2) times a day.  tiZANidine (ZANAFLEX) 4 mg tablet TAKE 1 TABLET BY MOUTH THREE TIMES DAILY AS NEEDED FOR PAIN 90 Tab 5  
 ondansetron (Zofran ODT) 4 mg disintegrating tablet Take 1 Tab by mouth every eight (8) hours as needed for Nausea (or vomiting.). Allow to dissolve on tongue 20 Tab 0  
 albuterol (PROVENTIL HFA, VENTOLIN HFA, PROAIR HFA) 90 mcg/actuation inhaler INHALE 2 PUFFS EVERY 6 HOURS AS NEEDED FOR WHEEZING OR SHORTNESS OF BREATH    
 lacosamide (VIMPAT) 100 mg tab tablet Take 100 mg by mouth two (2) times a day.  levothyroxine (SYNTHROID) 25 mcg tablet Take 25 mcg by mouth Daily (before breakfast).  lidocaine (LIDODERM) 5 % Apply 1 Patch to affected area as needed.  LORazepam (ATIVAN) 0.5 mg tablet Take  by mouth.  topiramate (TOPAMAX) 100 mg tablet Take 100 mg by mouth two (2) times a day.  montelukast (SINGULAIR) 10 mg tablet Take 10 mg by mouth daily. Indications: ASTHMA PREVENTION    
  
 
ALLERGIES Allergies Allergen Reactions  Adhesive Other (comments) Layers of skin peeled off  Copper Rash and Swelling  Dilantin [Phenytoin Sodium Extended] Other (comments) Throat swelled up rash all over  Other Medication Other (comments) Surgical skin prep caused layers of skin to peel off. Needs benedryl before anesthesia.  Phenobarbital Other (comments) Throat swelled up ,rash  Rifampin Rash SOCIAL HISTORY Social History Socioeconomic History  Marital status:  Spouse name: Not on file  Number of children: Not on file  Years of education: Not on file  Highest education level: Not on file Tobacco Use  Smoking status: Never Smoker  Smokeless tobacco: Never Used Substance and Sexual Activity  Alcohol use: Yes Comment: 1 per month  Drug use: No  
 
 
FAMILY HISTORY No family history on file. REVIEW OF SYSTEMS Review of Systems Musculoskeletal: Positive for back pain. R SI joint pain Coccydynia Left foot drop Left trochanter and SI pain Coccydynia PHYSICAL EXAMINATION Visit Vitals /82 (BP 1 Location: Right arm, BP Patient Position: Sitting) Pulse 78 Temp 97.9 °F (36.6 °C) (Oral) SpO2 97% Pain Assessment  9/1/2020 Location of Pain Back Pain Location Comment - Location Modifiers - Severity of Pain 7 Quality of Pain Sharp;Burning Quality of Pain Comment numbness/ stinging Duration of Pain - Frequency of Pain Constant Aggravating Factors - Aggravating Factors Comment - Limiting Behavior -  
Relieving Factors - Relieving Factors Comment - Result of Injury - Constitutional:  Well developed, well nourished, in no acute distress. Psychiatric: Affect and mood are appropriate. Integumentary: No rashes or abrasions noted on exposed areas. SPINE/MUSCULOSKELETAL EXAM 
 
Cervical spine: 
Neck is midline. Normal muscle tone. No focal atrophy is noted. ROM pain free. Shoulder ROM intact.  
No tenderness to palpation. Negative Spurling's sign. Negative Tinel's sign. Negative Serrato's sign.  
  
Sensation in the bilateral arms grossly intact to light touch.  
  
Lumbar spine: No rash, ecchymosis, or gross obliquity. No fasciculations. No focal atrophy is noted. No pain with hip ROM. Full range of motion. No tenderness to palpation.  
No tenderness to palpation at the sciatic notch. SI joints tender bilaterally, R>L  
Trochanters non tender. 
  
Sensation in the bilateral legs grossly intact to light touch. 
  
  
  
  LEFT RIGHT CLAY TEST - +  
P4 TEST - + COMPRESSION TEST - -  
DISTRACTION TEST - + GAENSLEN'S TEST - + Updates 9/1/2020: 
 
SI joint on R tender to palpation LEFT RIGHT CLAY TEST - +  
P4 TEST - + COMPRESSION TEST - -  
DISTRACTION TEST - -  
GAENSLEN'S TEST - + MOTOR:   
  Biceps  Triceps Deltoids Wrist Ext Wrist Flex Hand Intrin Right 5/5 5/5 5/5 5/5 5/5 5/5 Left 5/5 5/5 5/5 5/5 5/5 5/5 Hip Flex  Quads Hamstrings Ankle DF EHL Ankle PF Right 5/5 5/5 5/5 5/5 5/5 5/5 Left 5/5 5/5 5/5 5/5 5/5 5/5 DTRs are 1+ biceps, triceps, brachioradialis, patella, and Achilles. 
  
Negative Straight Leg raise. Squat not tested. No difficulty with tandem gait.  
  
Ambulation with single point cane. FWB. RADIOGRAPHS 
4V Lumbar XR images taken on 4/1/2020 personally reviewed with patient: 
BONES: Status post L5-S1 posterior spinal fusion and interbody disc cage. L5 
laminectomy. No evidence of acute fracture or listhesis. Overlying spinal 
stimulator and IVC filter partially obscure underlying osseous detail. Vertebral 
body heights are maintained. No osseous lytic or blastic lesion. Mild multilevel 
spondylosis. 
  
SOFT TISSUES: Unremarkable. 
  
OTHER: None. 
  
_______________ 
  
IMPRESSION IMPRESSION: 
  
No evidence of acute fracture or listhesis. 
  
Lower lumbar spinal fusion. 
  
LT HIP XR images taken on 4/1/2020 personally reviewed with patient: BONES: No evidence of acute fracture or dislocation. Joint spaces and alignment 
are maintained. No aggressive appearing osseous lytic or blastic lesion.  
  
SOFT TISSUES: Unremarkable. 
  
_______________ 
  
IMPRESSION IMPRESSION: 
  
No evidence of acute fracture or dislocation. Lumbar XR images taken on 2/16/18 personally reviewed with patient: 
Right posterior upper hip generator device with dorsal intraspinal stimulator wires, and the T10-11 level with the wire tips at the T7-T8 level. Postoperative changes of previous L5 decompression laminectomy with solid and are osseous circumferential fusion at L5-S1.  No findings of hardware failure. A right-sided IVC filter is present at the L2-L3 level. Mild multilevel degenerative spondylosis similar prior examination with no evidence of listhesis.  The vertebral bodies maintain normal height and alignment.  No acute obstructive osseous abnormality. IMPRESSION: 
 
1. Stable post operative circumferential fusion L5-S1 with L5 decompression laminectomy. 2.  Dorsal stimulator wire tip at the T7-T8 level. 3.  Mild degenerative lumbar spine spondylosis.  No acute obstructive osseous abnormality. 13 minutes of face-to-face contact were spent with the patient during today's visit extensively discussing symptoms and treatment plan. All questions were answered. More than half of this visit today was spent on counseling.   
 
Written by Quan Cohen as dictated by Kamala Persaud MD

## 2020-09-17 DIAGNOSIS — Z86.73 HISTORY OF STROKE: ICD-10-CM

## 2020-09-17 DIAGNOSIS — R25.2 SPASTICITY DUE TO OLD STROKE: ICD-10-CM

## 2020-09-17 DIAGNOSIS — I69.398 SPASTICITY DUE TO OLD STROKE: ICD-10-CM

## 2020-09-17 DIAGNOSIS — M62.838 MUSCLE SPASM: ICD-10-CM

## 2020-09-17 RX ORDER — TIZANIDINE 4 MG/1
TABLET ORAL
Qty: 90 TAB | Refills: 5 | Status: SHIPPED | OUTPATIENT
Start: 2020-09-17 | End: 2021-03-12

## 2020-09-22 ENCOUNTER — APPOINTMENT (OUTPATIENT)
Dept: GENERAL RADIOLOGY | Age: 55
End: 2020-09-22
Attending: PHYSICAL MEDICINE & REHABILITATION
Payer: OTHER MISCELLANEOUS

## 2020-09-22 ENCOUNTER — HOSPITAL ENCOUNTER (OUTPATIENT)
Age: 55
Setting detail: OUTPATIENT SURGERY
Discharge: HOME | End: 2020-09-22
Attending: PHYSICAL MEDICINE & REHABILITATION | Admitting: PHYSICAL MEDICINE & REHABILITATION
Payer: OTHER MISCELLANEOUS

## 2020-09-22 VITALS
OXYGEN SATURATION: 100 % | SYSTOLIC BLOOD PRESSURE: 145 MMHG | RESPIRATION RATE: 20 BRPM | DIASTOLIC BLOOD PRESSURE: 95 MMHG | HEART RATE: 63 BPM | TEMPERATURE: 97.7 F

## 2020-09-22 DIAGNOSIS — M96.1 LUMBAR POST-LAMINECTOMY SYNDROME: Primary | ICD-10-CM

## 2020-09-22 LAB — HCG UR QL: NEGATIVE

## 2020-09-22 PROCEDURE — 77030039433 HC TY MYLEOGRAM BD -B: Performed by: PHYSICAL MEDICINE & REHABILITATION

## 2020-09-22 PROCEDURE — 74011000636 HC RX REV CODE- 636: Performed by: PHYSICAL MEDICINE & REHABILITATION

## 2020-09-22 PROCEDURE — 76010000009 HC PAIN MGT 0 TO 30 MIN PROC: Performed by: PHYSICAL MEDICINE & REHABILITATION

## 2020-09-22 PROCEDURE — 74011250636 HC RX REV CODE- 250/636: Performed by: PHYSICAL MEDICINE & REHABILITATION

## 2020-09-22 PROCEDURE — 74011000250 HC RX REV CODE- 250: Performed by: PHYSICAL MEDICINE & REHABILITATION

## 2020-09-22 PROCEDURE — 81025 URINE PREGNANCY TEST: CPT

## 2020-09-22 PROCEDURE — 74011250637 HC RX REV CODE- 250/637: Performed by: PHYSICAL MEDICINE & REHABILITATION

## 2020-09-22 PROCEDURE — 2709999900 HC NON-CHARGEABLE SUPPLY: Performed by: PHYSICAL MEDICINE & REHABILITATION

## 2020-09-22 RX ORDER — DEXAMETHASONE SODIUM PHOSPHATE 100 MG/10ML
INJECTION INTRAMUSCULAR; INTRAVENOUS AS NEEDED
Status: DISCONTINUED | OUTPATIENT
Start: 2020-09-22 | End: 2020-09-22 | Stop reason: HOSPADM

## 2020-09-22 RX ORDER — LIDOCAINE HYDROCHLORIDE 10 MG/ML
INJECTION, SOLUTION EPIDURAL; INFILTRATION; INTRACAUDAL; PERINEURAL AS NEEDED
Status: DISCONTINUED | OUTPATIENT
Start: 2020-09-22 | End: 2020-09-22 | Stop reason: HOSPADM

## 2020-09-22 RX ORDER — DIAZEPAM 5 MG/1
5-20 TABLET ORAL ONCE
Status: COMPLETED | OUTPATIENT
Start: 2020-09-22 | End: 2020-09-22

## 2020-09-22 RX ADMIN — DIAZEPAM 10 MG: 5 TABLET ORAL at 09:07

## 2020-09-22 NOTE — PROCEDURES
Procedure Note    Patient Name: Juan Luis Balderas    Date of Procedure: September 22, 2020    Preoperative Diagnosis:Sacroiliac Dysfunction    Post Operative Diagnosis: same    Procedure: SI Joint Injection, Intra-articular and extra-articular - Unilateral  right    Consent: Informed consent was obtained prior to the procedure. The patient was given the opportunity to ask questions regarding the procedure and its associated risks. In addition to the potential risks associated with the procedure itself, the patient was informed both verbally and in writing of potential side effects of the use of corticosteroids. The patient appeared to comprehend the informed consent and desired to have the procedure performed. The patient was counseled at length about the risks of [place] Covid-19 during their perioperative period and any recovery window from their procedure. The patient was made aware that [place] Covid-19  may worsen their prognosis for recovering from their procedure and lend to a higher morbidity and/or mortality risk. All material risks, benefits, and reasonable alternatives including postponing the procedure were discussed. The patient does  wish to proceed with the procedure at this time. Procedure: The patient was placed in the prone position on the flouroscopy table and the back was prepped and draped in the usual sterile manner. After local Lidocaine 1% infiltration, a #22 gauge spinal needle was then advanced to lie within the Sacroiliac Joint. Yes a small amount of Isovue was used to confirm placement, no vascular uptake was identified. A total of 10 mg of Dexamethasone  and 5 ml of Lidocaine was introduced in and around the joint. The injection area was cleaned and bandaids applied. No excessive bleeding was noted. Patient dressed and was discharged to home with instructions. Discussion:  The patient tolerated the procedure well.         Libby Yin MD  September 22, 2020

## 2020-09-22 NOTE — INTERVAL H&P NOTE
Update History & Physical 
 
The Patient's History and Physical of September 1, 2020 was reviewed. There was no change. The surgical site was confirmed by the patient and me. Plan:  The risk, benefits, expected outcome, and alternative to the recommended procedure have been discussed with the patient. Patient understands and wants to proceed with the procedure.  
 
Electronically signed by Della Bose MD on 9/22/2020 at 10:01 AM

## 2020-09-22 NOTE — DISCHARGE INSTRUCTIONS
[facility]   ([facility])  Dr. Oksana Card, Dr. Nando Machuca, Dr. Matias Greek Spinal Procedure (Block) Instructions    * Do not drive a car, operate heavy machinery or dangerous equipment, or make important decisions for 12-24 hours. * Light activity as tolerated; may rest for the remainder of the day. * Resume pre-block medications including those from your other doctors. * Do not drink alcoholic beverages for 24 hours. Alcohol and the medications you have received may interact and cause an adverse reaction. * You may feel better this evening and worse tomorrow, as the numbing medications wears off and the steroid has yet to begin to work. After 48-72 hrs the steroid should begin to release bringing you relief. If you had a medial branch block, no steroids were used. The medial branch block is a test to see if you are a candidate for radiofrequency ablation (RFA). The anesthetic (numbing medicine)  will wear off by the next day. * You may shower this evening and remove any bandages. * Avoid hot tubs/pools/tub soaks and heating pads for 24 hours. You may use cold packs on the procedure site as tolerated for the first 24 hours. * If a headache develops, drink plenty of fluids and rest.  Take over the counter medications for headache if needed. If the headache continues longer than 24 hours, call MD at the [address] [place] [address] Avenue. [phone]    * Continue taking pain medications as needed. * You may resume your regular diet if tolerated. Otherwise, start with sips of water and advance slowly. * If Diabetic: check your blood sugar three times a day for the next 3 days. If your sugar is greater than 300 call your family doctor. If your sugar is greater than 400, have someone transport you to the nearest Emergency Room. * If you experience any of the following problems, Please Call the [address] [place] [address] Avenue at [phone].         * Excessive pain, swelling, redness or odor at or around the surgical area    * Fever of 101 or higher    * Nausea / Vomiting lasting longer than 4 hours or if unable to take medications. * Severe Headache    * Weakness or numbness in arms or legs that is not      resolving   * Any NEW signs of decreased circulation or nerve impairment in leg: change in color, swelling, persistent numbness, tingling                    * Prolonged increase in pain greater than 4 days      PATIENT INSTRUCTIONS:    After oral sedation, for 12-24 hours or while taking prescription Narcotics:  · Limit your activities  · Do not drive and operate hazardous machinery  · Do not make important personal or business decisions  · Do  not drink alcoholic beverages  · If you have not urinated within 8 hours after discharge, please contact your surgeon on call. *  Please give a list of your current medications to your Primary Care Provider. *  Please update this list whenever your medications are discontinued, doses are      changed, or new medications (including over-the-counter products) are added. *  Please carry medication information at all times in case of emergency situations. These are general instructions for a healthy lifestyle:    No smoking/ No tobacco products/ Avoid exposure to second hand smoke    Surgeon General's Warning:  Quitting smoking now greatly reduces serious risk to your health. Obesity, smoking, and sedentary lifestyle greatly increases your risk for illness    A healthy diet, regular physical exercise & weight monitoring are important for maintaining a healthy lifestyle    You may be retaining fluid if you have a history of heart failure or if you experience any of the following symptoms:  Weight gain of 3 pounds or more overnight or 5 pounds in a week, increased swelling in our hands or feet or shortness of breath while lying flat in bed.   Please call your doctor as soon as you notice any of these symptoms; do not wait until your next office visit. Recognize signs and symptoms of STROKE:    F-face looks uneven    A-arms unable to move or move unevenly    S-speech slurred or non-existent    T-time-call 911 as soon as signs and symptoms begin-DO NOT go       Back to bed or wait to see if you get better-TIME IS BRAIN.

## 2020-10-05 ENCOUNTER — TELEPHONE (OUTPATIENT)
Dept: ORTHOPEDIC SURGERY | Age: 55
End: 2020-10-05

## 2020-10-05 NOTE — TELEPHONE ENCOUNTER
Patient called stating that she was referred to aqua therapy but the facility that the referral was sent to stated to the patient that her worker's comp [employer] needs a letter of medical necessity to approve it. She asked if this can be done.  Please advise patient at [phone]

## 2020-10-06 NOTE — TELEPHONE ENCOUNTER
Dr. Gaetano Diaz,    It looks like you ordered this therapy on 9/22/20. She had a block by you on this date. Can you help me figure out what is going on and why she needs this aqua therpy?

## 2020-10-07 NOTE — TELEPHONE ENCOUNTER
After a block, I usually tell patient to restart their HEP when they are felling better. If they say that they don't have one or have not been to PT or that land PT \"increased pain,\" then I may order PT. I always remind patients that they should not rely on injections alone as their treatment plan. For this patient, I was not aware that this was part of a WC claim. It looks like she did have PT earlier this year(2/2020) as per Dr. Dago Scott note from 9/1/2020. Since Dr. Sukumar Barajas has been her treating physician, I will defer to his treatment plan. If he does not feel aquaPT is necessary, then ok to cx order.

## 2020-11-01 ENCOUNTER — HOSPITAL ENCOUNTER (EMERGENCY)
Age: 55
Discharge: HOME | End: 2020-11-01
Attending: EMERGENCY MEDICINE
Payer: MEDICARE

## 2020-11-01 ENCOUNTER — APPOINTMENT (OUTPATIENT)
Dept: CT IMAGING | Age: 55
End: 2020-11-01
Attending: EMERGENCY MEDICINE
Payer: MEDICARE

## 2020-11-01 ENCOUNTER — APPOINTMENT (OUTPATIENT)
Dept: VASCULAR SURGERY | Age: 55
End: 2020-11-01
Attending: EMERGENCY MEDICINE
Payer: MEDICARE

## 2020-11-01 ENCOUNTER — APPOINTMENT (OUTPATIENT)
Dept: GENERAL RADIOLOGY | Age: 55
End: 2020-11-01
Attending: EMERGENCY MEDICINE
Payer: MEDICARE

## 2020-11-01 VITALS
HEIGHT: 68 IN | BODY MASS INDEX: 34.56 KG/M2 | SYSTOLIC BLOOD PRESSURE: 152 MMHG | HEART RATE: 72 BPM | WEIGHT: 228 LBS | TEMPERATURE: 97.8 F | DIASTOLIC BLOOD PRESSURE: 81 MMHG | OXYGEN SATURATION: 100 % | RESPIRATION RATE: 20 BRPM

## 2020-11-01 DIAGNOSIS — R19.5 INCREASED STOOL VOLUME: ICD-10-CM

## 2020-11-01 DIAGNOSIS — R10.32 ABDOMINAL PAIN, LLQ (LEFT LOWER QUADRANT): Primary | ICD-10-CM

## 2020-11-01 DIAGNOSIS — N83.202 LEFT OVARIAN CYST: ICD-10-CM

## 2020-11-01 LAB
ALBUMIN SERPL-MCNC: 4.8 G/DL (ref 3.4–5)
ALBUMIN/GLOB SERPL: 1.3 {RATIO} (ref 0.8–1.7)
ALP SERPL-CCNC: 113 U/L (ref 45–117)
ALT SERPL-CCNC: 29 U/L (ref 13–56)
ANION GAP SERPL CALC-SCNC: 7 MMOL/L (ref 3–18)
APPEARANCE UR: CLEAR
AST SERPL-CCNC: 18 U/L (ref 10–38)
BASOPHILS # BLD: 0 K/UL (ref 0–0.1)
BASOPHILS NFR BLD: 0 % (ref 0–2)
BILIRUB SERPL-MCNC: 0.5 MG/DL (ref 0.2–1)
BILIRUB UR QL: NEGATIVE
BUN SERPL-MCNC: 19 MG/DL (ref 7–18)
BUN/CREAT SERPL: 18 (ref 12–20)
CALCIUM SERPL-MCNC: 9.7 MG/DL (ref 8.5–10.1)
CHLORIDE SERPL-SCNC: 109 MMOL/L (ref 100–111)
CK MB CFR SERPL CALC: NORMAL % (ref 0–4)
CK MB SERPL-MCNC: <1 NG/ML (ref 5–25)
CK SERPL-CCNC: 70 U/L (ref 26–192)
CO2 SERPL-SCNC: 25 MMOL/L (ref 21–32)
COLOR UR: YELLOW
CREAT SERPL-MCNC: 1.06 MG/DL (ref 0.6–1.3)
DIFFERENTIAL METHOD BLD: ABNORMAL
EOSINOPHIL # BLD: 0.1 K/UL (ref 0–0.4)
EOSINOPHIL NFR BLD: 2 % (ref 0–5)
ERYTHROCYTE [DISTWIDTH] IN BLOOD BY AUTOMATED COUNT: 12.7 % (ref 11.6–14.5)
GLOBULIN SER CALC-MCNC: 3.8 G/DL (ref 2–4)
GLUCOSE SERPL-MCNC: 86 MG/DL (ref 74–99)
GLUCOSE UR STRIP.AUTO-MCNC: NEGATIVE MG/DL
HCT VFR BLD AUTO: 48.6 % (ref 35–45)
HGB BLD-MCNC: 15.9 G/DL (ref 12–16)
HGB UR QL STRIP: NEGATIVE
KETONES UR QL STRIP.AUTO: NEGATIVE MG/DL
LEUKOCYTE ESTERASE UR QL STRIP.AUTO: NEGATIVE
LIPASE SERPL-CCNC: 208 U/L (ref 73–393)
LYMPHOCYTES # BLD: 1.6 K/UL (ref 0.9–3.6)
LYMPHOCYTES NFR BLD: 22 % (ref 21–52)
MCH RBC QN AUTO: 32.7 PG (ref 24–34)
MCHC RBC AUTO-ENTMCNC: 32.7 G/DL (ref 31–37)
MCV RBC AUTO: 100 FL (ref 74–97)
MONOCYTES # BLD: 0.6 K/UL (ref 0.05–1.2)
MONOCYTES NFR BLD: 8 % (ref 3–10)
NEUTS SEG # BLD: 4.8 K/UL (ref 1.8–8)
NEUTS SEG NFR BLD: 68 % (ref 40–73)
NITRITE UR QL STRIP.AUTO: NEGATIVE
PH UR STRIP: 6.5 [PH] (ref 5–8)
PLATELET # BLD AUTO: 243 K/UL (ref 135–420)
PMV BLD AUTO: 10.2 FL (ref 9.2–11.8)
POTASSIUM SERPL-SCNC: 3.9 MMOL/L (ref 3.5–5.5)
PROT SERPL-MCNC: 8.6 G/DL (ref 6.4–8.2)
PROT UR STRIP-MCNC: NEGATIVE MG/DL
RBC # BLD AUTO: 4.86 M/UL (ref 4.2–5.3)
SODIUM SERPL-SCNC: 141 MMOL/L (ref 136–145)
SP GR UR REFRACTOMETRY: 1.02 (ref 1–1.03)
TROPONIN I SERPL-MCNC: <0.02 NG/ML (ref 0–0.04)
UROBILINOGEN UR QL STRIP.AUTO: 1 EU/DL (ref 0.2–1)
WBC # BLD AUTO: 7.1 K/UL (ref 4.6–13.2)

## 2020-11-01 PROCEDURE — 96375 TX/PRO/DX INJ NEW DRUG ADDON: CPT

## 2020-11-01 PROCEDURE — 71045 X-RAY EXAM CHEST 1 VIEW: CPT

## 2020-11-01 PROCEDURE — 96361 HYDRATE IV INFUSION ADD-ON: CPT

## 2020-11-01 PROCEDURE — 93005 ELECTROCARDIOGRAM TRACING: CPT

## 2020-11-01 PROCEDURE — 93971 EXTREMITY STUDY: CPT

## 2020-11-01 PROCEDURE — 74011000636 HC RX REV CODE- 636: Performed by: EMERGENCY MEDICINE

## 2020-11-01 PROCEDURE — 99284 EMERGENCY DEPT VISIT MOD MDM: CPT

## 2020-11-01 PROCEDURE — 85025 COMPLETE CBC W/AUTO DIFF WBC: CPT

## 2020-11-01 PROCEDURE — 74177 CT ABD & PELVIS W/CONTRAST: CPT

## 2020-11-01 PROCEDURE — 74011250636 HC RX REV CODE- 250/636: Performed by: EMERGENCY MEDICINE

## 2020-11-01 PROCEDURE — 83690 ASSAY OF LIPASE: CPT

## 2020-11-01 PROCEDURE — 80053 COMPREHEN METABOLIC PANEL: CPT

## 2020-11-01 PROCEDURE — 82550 ASSAY OF CK (CPK): CPT

## 2020-11-01 PROCEDURE — 96374 THER/PROPH/DIAG INJ IV PUSH: CPT

## 2020-11-01 PROCEDURE — 81003 URINALYSIS AUTO W/O SCOPE: CPT

## 2020-11-01 RX ORDER — LORAZEPAM 2 MG/ML
1 INJECTION INTRAMUSCULAR ONCE
Status: COMPLETED | OUTPATIENT
Start: 2020-11-01 | End: 2020-11-01

## 2020-11-01 RX ORDER — DICYCLOMINE HYDROCHLORIDE 10 MG/1
10 CAPSULE ORAL
Qty: 20 CAP | Refills: 0 | Status: SHIPPED | OUTPATIENT
Start: 2020-11-01 | End: 2020-11-06

## 2020-11-01 RX ORDER — ONDANSETRON 2 MG/ML
4 INJECTION INTRAMUSCULAR; INTRAVENOUS
Status: COMPLETED | OUTPATIENT
Start: 2020-11-01 | End: 2020-11-01

## 2020-11-01 RX ORDER — KETOROLAC TROMETHAMINE 10 MG/1
10 TABLET, FILM COATED ORAL
Qty: 20 TAB | Refills: 0 | Status: SHIPPED | OUTPATIENT
Start: 2020-11-01 | End: 2020-11-06

## 2020-11-01 RX ORDER — POLYETHYLENE GLYCOL 3350 17 G/17G
17 POWDER, FOR SOLUTION ORAL DAILY
Qty: 238 G | Refills: 0 | Status: SHIPPED | OUTPATIENT
Start: 2020-11-01 | End: 2020-11-15

## 2020-11-01 RX ORDER — KETOROLAC TROMETHAMINE 30 MG/ML
30 INJECTION, SOLUTION INTRAMUSCULAR; INTRAVENOUS ONCE
Status: COMPLETED | OUTPATIENT
Start: 2020-11-01 | End: 2020-11-01

## 2020-11-01 RX ADMIN — IOPAMIDOL 100 ML: 612 INJECTION, SOLUTION INTRAVENOUS at 17:01

## 2020-11-01 RX ADMIN — KETOROLAC TROMETHAMINE 30 MG: 30 INJECTION, SOLUTION INTRAMUSCULAR at 15:30

## 2020-11-01 RX ADMIN — ONDANSETRON 4 MG: 2 INJECTION INTRAMUSCULAR; INTRAVENOUS at 15:30

## 2020-11-01 RX ADMIN — SODIUM CHLORIDE 1000 ML: 900 INJECTION, SOLUTION INTRAVENOUS at 15:30

## 2020-11-01 RX ADMIN — LORAZEPAM 1 MG: 2 INJECTION INTRAMUSCULAR; INTRAVENOUS at 16:21

## 2020-11-01 NOTE — ED PROVIDER NOTES
EMERGENCY DEPARTMENT HISTORY AND PHYSICAL EXAM    Date: 11/1/2020  Patient Name: Man Benton    History of Presenting Illness     Chief Complaint   Patient presents with    Other         History Provided By: Patient    Man Benton is a 48 y.o. female who presents to the emergency department C/O left-sided abdominal pain. Patient states she is been having these symptoms intermittently for over a week. Localizes the pain to the left lower quadrant and suprapubic region. She states she was having some increased frequency of urination and took some over-the-counter Azo with minimal relief. She denies any vomiting but does admit to mild nausea. Denies any diarrhea or constipation. She also notes she has been having some pain and swelling to the left lower leg. States she did follow-up with her primary care doctor and had an ultrasound of her leg last week on Tuesday and was noted to be normal but is still having the same symptoms. She admits to intermittent subjective fevers. She states she went to an urgent care at [facility] who recommended she come to the emergency department for CT evaluation. PCP: Dee, [facility]    Current Outpatient Medications   Medication Sig Dispense Refill    ketorolac (TORADOL) 10 mg tablet Take 1 Tab by mouth every six (6) hours as needed for Pain for up to 5 days. 20 Tab 0    dicyclomine (BentyL) 10 mg capsule Take 1 Cap by mouth four (4) times daily as needed for Abdominal Cramps (abd cramps) for up to 5 days. 20 Cap 0    polyethylene glycol (Miralax) 17 gram/dose powder Take 17 g by mouth daily for 14 days. 1 tablespoon with 8 oz of water daily 238 g 0    tiZANidine (ZANAFLEX) 4 mg tablet TAKE 1 TABLET BY MOUTH THREE TIMES DAILY AS NEEDED FOR PAIN 90 Tab 5    simvastatin (ZOCOR) 10 mg tablet Take 10 mg by mouth nightly.  diclofenac (VOLTAREN) 1 % gel Apply 2 g to affected area daily as needed.       diclofenac EC (VOLTAREN) 75 mg EC tablet Take 75 mg by mouth two (2) times a day.  Advair -21 mcg/actuation inhaler Take 2 Puffs by inhalation two (2) times a day.  ondansetron (Zofran ODT) 4 mg disintegrating tablet Take 1 Tab by mouth every eight (8) hours as needed for Nausea (or vomiting.). Allow to dissolve on tongue 20 Tab 0    albuterol (PROVENTIL HFA, VENTOLIN HFA, PROAIR HFA) 90 mcg/actuation inhaler INHALE 2 PUFFS EVERY 6 HOURS AS NEEDED FOR WHEEZING OR SHORTNESS OF BREATH      lacosamide (VIMPAT) 100 mg tab tablet Take 100 mg by mouth two (2) times a day.  levothyroxine (SYNTHROID) 25 mcg tablet Take 25 mcg by mouth Daily (before breakfast).  lidocaine (LIDODERM) 5 % Apply 1 Patch to affected area as needed.  LORazepam (ATIVAN) 0.5 mg tablet Take  by mouth.  topiramate (TOPAMAX) 100 mg tablet Take 100 mg by mouth two (2) times a day.  montelukast (SINGULAIR) 10 mg tablet Take 10 mg by mouth daily. Indications: ASTHMA PREVENTION         Past History     Past Medical History:  Past Medical History:   Diagnosis Date    Adverse effect of anesthesia      bronchospasm,     Asthma     well controlled    Coagulation disorder ([address]     hypercoagulable    Depressive disorder 1/3/2013    Epilepsy ([address]     Ill-defined condition     Benign Granuloma Annularea    OAB (overactive bladder)     Obesity (BMI 30.0-34.9) 11/3/2016    Right leg DVT ([address]     DVT    Seizures ([address]     grand mal    Spastic bladder     Stroke ([facility] 2006    mini-stroke    Thromboembolus [facility])     s/p vincenzo filter    Thyroid disease     Unspecified adverse effect of anesthesia     bronchospasm on awakening       Past Surgical History:  Past Surgical History:   Procedure Laterality Date    HX BACK SURGERY      dural repair    HX BACK SURGERY      nerve stimulator    HX LUMBAR FUSION  2006    HX LUMBAR LAMINECTOMY  2006       Family History:  History reviewed. No pertinent family history.     Social History:  Social History     Tobacco Use    Smoking status: Never Smoker    Smokeless tobacco: Never Used   Substance Use Topics    Alcohol use: Yes     Comment: 1 per month    Drug use: No       Allergies: Allergies   Allergen Reactions    Adhesive Other (comments)     Layers of skin peeled off    Betadine [Povidone-Iodine] Rash     Patient states skin peels off.  Copper Rash and Swelling    Dilantin [Phenytoin Sodium Extended] Other (comments)     Throat swelled up rash all over    Other Medication Other (comments)     Surgical skin prep caused layers of skin to peel off. Needs benedryl before anesthesia.  Phenobarbital Other (comments)     Throat swelled up ,rash    Rifampin Rash         Review of Systems   Review of Systems   Constitutional: Negative for fever. Respiratory: Negative for shortness of breath. Cardiovascular: Negative for chest pain. Gastrointestinal: Positive for abdominal pain and nausea. Negative for diarrhea and vomiting. Genitourinary: Positive for frequency and urgency. Negative for dysuria. All other systems reviewed and are negative. All other systems reviewed and are negative.     Physical Exam     Vitals:    11/01/20 1413 11/01/20 1500 11/01/20 1636 11/01/20 1724   BP: (!) 147/65 133/78 (!) 157/92 (!) 152/81   Pulse: 72      Resp: 20      Temp: 97.8 °F (36.6 °C)      SpO2: 100% 97% 100% 100%   Weight: 103.4 kg (228 lb)      Height: 5' 8\" (1.727 m)        Physical Exam    Nursing notes and vital signs reviewed    Airway: intact, speaking normally  Breathing: No apparent distress, no cyanosis  Circulation: Peripheral pulses equal    Constitutional: Non toxic appearing, no acute distress, appearing stated age  [de-identified]:  Head: Normocephalic, Atraumatic  Eyes: PERRL, EOMI, No conjunctival injection  Ears: external ears normal  Nose: No rhinorrhea, external nose normal  Throat: mucous membranes moist  Neck: symmetric, trachea midline, no obvious swelling, no JVD  Cardiovascular: Regular rate and rhythm, no murmurs  Lungs: Clear to ausculation bilaterally, No stridor, Normal work of breathing and chest excursion bilaterally  Abdomen: Soft, mild suprapubic and left lower quadrant tenderness to palpation, non distended, normoactive bowel sounds, No rigidity, no peritoneal signs  Musculoskeletal:  No evidence of obvious deformity to the back, neck or extremities, no LE edema  Skin: Warm, dry, No obvious rashes  Neuro: Alert and oriented x 3, CN 2-12 intact, normal speech, strength and sensation full and symmetric bilaterally  Psychiatric: Patient is extremely anxious appearing, does not provide eye contact. Diagnostic Study Results     Labs -     Recent Results (from the past 72 hour(s))   URINALYSIS W/ RFLX MICROSCOPIC    Collection Time: 11/01/20  2:30 PM   Result Value Ref Range    Color YELLOW      Appearance CLEAR      Specific gravity 1.018 1.005 - 1.030      pH (UA) 6.5 5.0 - 8.0      Protein Negative NEG mg/dL    Glucose Negative NEG mg/dL    Ketone Negative NEG mg/dL    Bilirubin Negative NEG      Blood Negative NEG      Urobilinogen 1.0 0.2 - 1.0 EU/dL    Nitrites Negative NEG      Leukocyte Esterase Negative NEG     CBC WITH AUTOMATED DIFF    Collection Time: 11/01/20  2:40 PM   Result Value Ref Range    WBC 7.1 4.6 - 13.2 K/uL    RBC 4.86 4.20 - 5.30 M/uL    HGB 15.9 12.0 - 16.0 g/dL    HCT 48.6 (H) 35.0 - 45.0 %    .0 (H) 74.0 - 97.0 FL    MCH 32.7 24.0 - 34.0 PG    MCHC 32.7 31.0 - 37.0 g/dL    RDW 12.7 11.6 - 14.5 %    PLATELET 196 648 - 738 K/uL    MPV 10.2 9.2 - 11.8 FL    NEUTROPHILS 68 40 - 73 %    LYMPHOCYTES 22 21 - 52 %    MONOCYTES 8 3 - 10 %    EOSINOPHILS 2 0 - 5 %    BASOPHILS 0 0 - 2 %    ABS. NEUTROPHILS 4.8 1.8 - 8.0 K/UL    ABS. LYMPHOCYTES 1.6 0.9 - 3.6 K/UL    ABS. MONOCYTES 0.6 0.05 - 1.2 K/UL    ABS. EOSINOPHILS 0.1 0.0 - 0.4 K/UL    ABS.  BASOPHILS 0.0 0.0 - 0.1 K/UL    DF AUTOMATED     METABOLIC PANEL, COMPREHENSIVE    Collection Time: 11/01/20  2:40 PM   Result Value Ref Range    Sodium 141 136 - 145 mmol/L    Potassium 3.9 3.5 - 5.5 mmol/L    Chloride 109 100 - 111 mmol/L    CO2 25 21 - 32 mmol/L    Anion gap 7 3.0 - 18 mmol/L    Glucose 86 74 - 99 mg/dL    BUN 19 (H) 7.0 - 18 MG/DL    Creatinine 1.06 0.6 - 1.3 MG/DL    BUN/Creatinine ratio 18 12 - 20      GFR est AA >60 >60 ml/min/1.73m2    GFR est non-AA 54 (L) >60 ml/min/1.73m2    Calcium 9.7 8.5 - 10.1 MG/DL    Bilirubin, total 0.5 0.2 - 1.0 MG/DL    ALT (SGPT) 29 13 - 56 U/L    AST (SGOT) 18 10 - 38 U/L    Alk. phosphatase 113 45 - 117 U/L    Protein, total 8.6 (H) 6.4 - 8.2 g/dL    Albumin 4.8 3.4 - 5.0 g/dL    Globulin 3.8 2.0 - 4.0 g/dL    A-G Ratio 1.3 0.8 - 1.7     LIPASE    Collection Time: 11/01/20  2:40 PM   Result Value Ref Range    Lipase 208 73 - 393 U/L   EKG, 12 LEAD, INITIAL    Collection Time: 11/01/20  4:28 PM   Result Value Ref Range    Ventricular Rate 103 BPM    Atrial Rate 103 BPM    P-R Interval 136 ms    QRS Duration 86 ms    Q-T Interval 332 ms    QTC Calculation (Bezet) 434 ms    Calculated P Axis 59 degrees    Calculated R Axis 38 degrees    Calculated T Axis 6 degrees    Diagnosis       Sinus tachycardia  Nonspecific ST abnormality  Abnormal ECG  When compared with ECG of 20-JUN-2019 09:28,  Vent. rate has increased BY  39 BPM  Nonspecific T wave abnormality now evident in Inferior leads         Radiologic Studies -   CT ABD PELV W CONT   Final Result   IMPRESSION:      No acute process      Large amount of retained stool in the colon. Small hiatal hernia. 3.2 x 2.5 cm left adnexal cystic structure. Advise pelvic ultrasound on a   nonemergent basis. 4 mm nodule right middle lobe follow-up as per Fleischner Society protocol.      ========      Fleischner Society Pulmonary Nodule Guidelines (revised 2017):       Solid nodule <6 mm:   -Low risk for lung cancer: No routine followup.   -High risk for lung cancer: Optional CT in 12 months (suspicious morphology,   upper lobe location, or both may warrant 12 month followup depending on   comorbidities and patient preference). XR CHEST PORT   Final Result   IMPRESSION:  No acute process        CT Results  (Last 48 hours)               11/01/20 1714  CT ABD PELV W CONT Final result    Impression:  IMPRESSION:       No acute process       Large amount of retained stool in the colon. Small hiatal hernia. 3.2 x 2.5 cm left adnexal cystic structure. Advise pelvic ultrasound on a   nonemergent basis. 4 mm nodule right middle lobe follow-up as per Fleischner Society protocol.       ========       Fleischner Society Pulmonary Nodule Guidelines (revised 2017): Solid nodule <6 mm:   -Low risk for lung cancer: No routine followup.   -High risk for lung cancer: Optional CT in 12 months (suspicious morphology,   upper lobe location, or both may warrant 12 month followup depending on   comorbidities and patient preference). Narrative:  EXAM: CT of the abdomen and pelvis       INDICATION: Left lower quadrant pain       COMPARISON: None. TECHNIQUE: Axial CT imaging of the abdomen and pelvis was performed with   intravenous contrast. Multiplanar reformats were generated. One or more dose   reduction techniques were used on this CT: automated exposure control,   adjustment of the mAs and/or kVp according to patient size, and iterative   reconstruction techniques. The specific techniques used on this CT exam have   been documented in the patient's electronic medical record. Digital Imaging and   Communications in Medicine (DICOM) format image data are available to   nonaffiliated external healthcare facilities or entities on a secure, media   free, reciprocally searchable basis with patient authorization for at least a   12-month period after this study. _______________       FINDINGS:       LOWER CHEST: There is a 4 mm nodule in the right middle lobe lateral segment. Small hiatal hernia present.        LIVER, BILIARY: Liver is normal. No biliary dilation. Gallbladder is   unremarkable. PANCREAS: Normal.       SPLEEN: There is a 16 mm rounded low-density in the midpole the spleen   posteriorly suggesting a splenic cyst.       ADRENALS: Normal.       KIDNEYS: Unremarkable       VASCULATURE: There is an IVC filter in place below the level of the renal veins. Mild calcific atherosclerotic present present. LYMPH NODES: No enlarged lymph nodes. GASTROINTESTINAL TRACT: No bowel dilation or wall thickening. Large amount of   retained stool is seen in the colon. Appendix is normal. Small hiatal hernia   seen. PELVIC ORGANS: Uterus is unremarkable. There is a 2.5 x 3.2 cm left adnexal   cystic structure nabothian cysts in the cervix. Pamela Sleet Urinary bladder is   unremarkable. BONES: No acute or aggressive osseous abnormalities identified. There is an   epidural stimulator in place. Posterior fusion hardware present at L5-S1 with   laminectomy changes at L5.       OTHER: None.       _______________               CXR Results  (Last 48 hours)               11/01/20 1702  XR CHEST PORT Final result    Impression:  IMPRESSION:  No acute process       Narrative:  EXAM:  AP Portable Chest X-ray 1 view        INDICATION: Hyperventilation       COMPARISON: September 29, 2014       _______________       FINDINGS:  Heart and mediastinal contours are within normal limits for portable   radiograph. Lungs are clear of active disease. There are no pleural effusions. No acute osseous findings.  Epidural stimulator in place.       ________________                     Medications given in the ED-  Medications   ketorolac (TORADOL) injection 30 mg (30 mg IntraVENous Given 11/1/20 1530)   ondansetron (ZOFRAN) injection 4 mg (4 mg IntraVENous Given 11/1/20 1530)   sodium chloride 0.9 % bolus infusion 1,000 mL (1,000 mL IntraVENous New Bag 11/1/20 1530)   LORazepam (ATIVAN) injection 1 mg (1 mg IntraVENous Given 11/1/20 1621) iopamidoL (ISOVUE 300) 61 % contrast injection  mL (100 mL IntraVENous Given 11/1/20 1701)         Medical Decision Making     I reviewed the vital signs, available nursing notes, past medical history, past surgical history, family history and social history. Vital Signs interpretation- I have reviewed the patient's vital signs. Pulse Oximetry interpretation - 100% on Room air     Cardiac Monitor interpretation:  Rate: 72 bpm  Rhythm: sinus    EKG interpretation by ME  Rate 103 sinus tachycardia, nonspecific ST changes, normal axis    Records Reviewed: Nursing Notes and Old Medical Records    Procedures:  Procedures    ED Course & MDM:   Risk stratification: We will obtain blood work and CT imaging as well as a repeat ultrasound of her left lower extremity. Will give Toradol and Zofran for symptoms and IV fluids    Data review: Patient continues to hyperventilate stating she is in pain. I believe there is a strong anxiety component to her pain. Will obtain chest x-ray and EKG for evaluation and give Ativan    Patient appears more comfortable with Ativan    Problem follow up:  Laboratory findings unremarkable, CT scan shows evidence of a left-sided ovarian cyst and increased stool volume    Discussed with the patient and caregiver at bedside the results of her laboratory findings and imaging studies. I recommended she continue with prescribed medications for pain as directed and MiraLAX for her constipation and follow-up with her OB/GYN for reevaluation long-term management. She states she does see an OB/GYN in [place]. I recommended she come back to the emergency department if symptoms worsen. She understands and agrees to plan    Diagnosis and Disposition       DISCHARGE NOTE:    Lorri Billy  results have been reviewed with her. She has been counseled regarding her diagnosis, treatment, and plan.   She verbally conveys understanding and agreement of the signs, symptoms, diagnosis, treatment and prognosis and additionally agrees to follow up as discussed. She also agrees with the care-plan and conveys that all of her questions have been answered. I have also provided discharge instructions for her that include: educational information regarding their diagnosis and treatment, and list of reasons why they would want to return to the ED prior to their follow-up appointment, should her condition change. She has been provided with education for proper emergency department utilization. CLINICAL IMPRESSION:    1. Abdominal pain, LLQ (left lower quadrant)    2. Left ovarian cyst    3. Increased stool volume        PLAN:  1. D/C Home  2. Current Discharge Medication List      START taking these medications    Details   ketorolac (TORADOL) 10 mg tablet Take 1 Tab by mouth every six (6) hours as needed for Pain for up to 5 days. Qty: 20 Tab, Refills: 0      dicyclomine (BentyL) 10 mg capsule Take 1 Cap by mouth four (4) times daily as needed for Abdominal Cramps (abd cramps) for up to 5 days. Qty: 20 Cap, Refills: 0      polyethylene glycol (Miralax) 17 gram/dose powder Take 17 g by mouth daily for 14 days. 1 tablespoon with 8 oz of water daily  Qty: 238 g, Refills: 0           3. Follow-up Information     Follow up With Specialties Details Why Contact Info    Your OBGYN  Schedule an appointment as soon as possible for a visit  As needed         _______________________________      Please note that this dictation was completed with [employer], the computer voice recognition software. Quite often unanticipated grammatical, syntax, homophones, and other interpretive errors are inadvertently transcribed by the computer software. Please disregard these errors. Please excuse any errors that have escaped final proofreading.

## 2020-11-01 NOTE — ED TRIAGE NOTES
Patient with complaints of abdominal swelling and left calf pain.   Patient also endorses urinary frequency

## 2020-11-02 LAB
ATRIAL RATE: 103 BPM
CALCULATED P AXIS, ECG09: 59 DEGREES
CALCULATED R AXIS, ECG10: 38 DEGREES
CALCULATED T AXIS, ECG11: 6 DEGREES
DIAGNOSIS, 93000: NORMAL
P-R INTERVAL, ECG05: 136 MS
Q-T INTERVAL, ECG07: 332 MS
QRS DURATION, ECG06: 86 MS
QTC CALCULATION (BEZET), ECG08: 434 MS
VENTRICULAR RATE, ECG03: 103 BPM

## 2020-11-24 ENCOUNTER — OFFICE VISIT (OUTPATIENT)
Dept: ORTHOPEDIC SURGERY | Age: 55
End: 2020-11-24
Payer: MEDICARE

## 2020-11-24 VITALS
RESPIRATION RATE: 24 BRPM | DIASTOLIC BLOOD PRESSURE: 78 MMHG | HEIGHT: 67 IN | HEART RATE: 78 BPM | TEMPERATURE: 97.8 F | BODY MASS INDEX: 35.71 KG/M2 | SYSTOLIC BLOOD PRESSURE: 148 MMHG

## 2020-11-24 DIAGNOSIS — M53.3 COCCYDYNIA: Primary | ICD-10-CM

## 2020-11-24 DIAGNOSIS — M62.89 PELVIC FLOOR DYSFUNCTION IN FEMALE: ICD-10-CM

## 2020-11-24 DIAGNOSIS — G57.11 MERALGIA PARAESTHETICA, RIGHT: ICD-10-CM

## 2020-11-24 DIAGNOSIS — G57.12 MERALGIA PARAESTHETICA, LEFT: ICD-10-CM

## 2020-11-24 DIAGNOSIS — M70.62 GREATER TROCHANTERIC BURSITIS OF LEFT HIP: ICD-10-CM

## 2020-11-24 DIAGNOSIS — G57.02 PIRIFORMIS SYNDROME OF LEFT SIDE: ICD-10-CM

## 2020-11-24 DIAGNOSIS — M53.3 SACROILIAC JOINT DYSFUNCTION OF BOTH SIDES: ICD-10-CM

## 2020-11-24 PROCEDURE — 99214 OFFICE O/P EST MOD 30 MIN: CPT | Performed by: PHYSICAL MEDICINE & REHABILITATION

## 2020-11-24 PROCEDURE — G8417 CALC BMI ABV UP PARAM F/U: HCPCS | Performed by: PHYSICAL MEDICINE & REHABILITATION

## 2020-11-24 PROCEDURE — 3017F COLORECTAL CA SCREEN DOC REV: CPT | Performed by: PHYSICAL MEDICINE & REHABILITATION

## 2020-11-24 PROCEDURE — G9717 DOC PT DX DEP/BP F/U NT REQ: HCPCS | Performed by: PHYSICAL MEDICINE & REHABILITATION

## 2020-11-24 PROCEDURE — G8427 DOCREV CUR MEDS BY ELIG CLIN: HCPCS | Performed by: PHYSICAL MEDICINE & REHABILITATION

## 2020-12-04 ENCOUNTER — TELEPHONE (OUTPATIENT)
Dept: ORTHOPEDIC SURGERY | Age: 55
End: 2020-12-04

## 2020-12-04 NOTE — TELEPHONE ENCOUNTER
Patient called stating she is having a new pain: \"central back. Feels like a marble is in my spine and pressing on it. I am having numbness going down my left leg. ED said it was my chronic pain, but this is something new. The pain is excruciating and I need to get seen. \"    Patient went to ED and had a CAT scan done at another facility, however she is unable to pick it up due to not being able to drive because of the pain. She is scheduled for 12/22 with provider (is a current patient and requested provider) and is wondering if she needs to be seen sooner by provider. Patient is requesting a call back in regards to this:[phone].

## 2020-12-04 NOTE — TELEPHONE ENCOUNTER
Copied from Chuy Vargas, dated after here last visit with MD.     Interface, [facility] - 11/28/2020  7:10 PM EST  EXAM: Lumbar spine AP and lateral    INDICATION: Chronic back pain    COMPARISON: February 15, 2018    _______________    FINDINGS:    There is an epidural stimulator in place. There are posterior pedicular screws and rods at level of L5-S1. L5 laminectomy changes present. SI joints are intact. Bone mineralization is normal. Paraspinal soft tissues are normal. There is a IVC filter in place. There is no loosening of the spinal hardware. Intervertebral disc spacer present at L5-S1. Multilevel facet arthropathy present. There are no acute fractures or listhesis. _______________    IMPRESSION    Posterior fusion hardware present at L5-S1 without loosening. No acute fractures or listhesis. [facility] Res - 11/28/2020  7:08 PM EST  EXAM: Thoracic spine AP and lateral    INDICATION: Chronic back pain    COMPARISON: None.    _______________    FINDINGS:    There is an epidural stimulator in place with tip at the level of T8. Paraspinal soft tissues are normal. There are no acute fractures or listhesis. Visualized lungs are clear. Heart and mediastinal contours within normal limits. Multilevel degenerative disc disease present of the visualized spine.    _______________    IMPRESSION    No acute fractures or listhesis. Epidural stimulator in place.

## 2020-12-15 ENCOUNTER — OFFICE VISIT (OUTPATIENT)
Dept: ORTHOPEDIC SURGERY | Age: 55
End: 2020-12-15
Payer: OTHER MISCELLANEOUS

## 2020-12-15 VITALS
RESPIRATION RATE: 22 BRPM | HEART RATE: 71 BPM | DIASTOLIC BLOOD PRESSURE: 75 MMHG | SYSTOLIC BLOOD PRESSURE: 134 MMHG | BODY MASS INDEX: 35.71 KG/M2 | TEMPERATURE: 97.8 F | HEIGHT: 67 IN

## 2020-12-15 DIAGNOSIS — M53.3 SACROILIAC JOINT DYSFUNCTION OF BOTH SIDES: ICD-10-CM

## 2020-12-15 DIAGNOSIS — M53.3 COCCYDYNIA: ICD-10-CM

## 2020-12-15 DIAGNOSIS — M62.89 PELVIC FLOOR DYSFUNCTION IN FEMALE: ICD-10-CM

## 2020-12-15 DIAGNOSIS — R20.0 SADDLE ANESTHESIA: Primary | ICD-10-CM

## 2020-12-15 DIAGNOSIS — G57.11 MERALGIA PARAESTHETICA, RIGHT: ICD-10-CM

## 2020-12-15 DIAGNOSIS — M70.62 GREATER TROCHANTERIC BURSITIS OF LEFT HIP: ICD-10-CM

## 2020-12-15 DIAGNOSIS — G57.02 PIRIFORMIS SYNDROME OF LEFT SIDE: ICD-10-CM

## 2020-12-15 DIAGNOSIS — G57.12 MERALGIA PARAESTHETICA, LEFT: ICD-10-CM

## 2020-12-15 PROCEDURE — G9717 DOC PT DX DEP/BP F/U NT REQ: HCPCS | Performed by: PHYSICAL MEDICINE & REHABILITATION

## 2020-12-15 PROCEDURE — G8417 CALC BMI ABV UP PARAM F/U: HCPCS | Performed by: PHYSICAL MEDICINE & REHABILITATION

## 2020-12-15 PROCEDURE — 99213 OFFICE O/P EST LOW 20 MIN: CPT | Performed by: PHYSICAL MEDICINE & REHABILITATION

## 2020-12-15 PROCEDURE — 3017F COLORECTAL CA SCREEN DOC REV: CPT | Performed by: PHYSICAL MEDICINE & REHABILITATION

## 2020-12-15 PROCEDURE — G8427 DOCREV CUR MEDS BY ELIG CLIN: HCPCS | Performed by: PHYSICAL MEDICINE & REHABILITATION

## 2020-12-15 RX ORDER — NAPROXEN 500 MG/1
500 TABLET ORAL 2 TIMES DAILY WITH MEALS
Qty: 60 TAB | Refills: 2 | Status: SHIPPED | OUTPATIENT
Start: 2020-12-15 | End: 2021-06-24 | Stop reason: SDUPTHER

## 2020-12-15 NOTE — PROGRESS NOTES
[address], [address]  [facility], [address]  Phone: [phone]  Fax: [phone]        Robert Doug  : 1965  PCP: Jacque Campbell MD  12/15/2020    PROGRESS NOTE      HISTORY OF PRESENT ILLNESS  Peewee Douglass is a 48 y.o. female who was seen as a new patient 10/10/19 with c/o chronic low back pain extending into the RLE that she described as a burning pain. She also c/o numbness and foot drop in the LLE that improved some with surgery (L5-S1 fusion) and SCS. She continued to have some residual foot drop and shakiness/tremor of her foot along with LLE cramping. She noted that her neurologist did an emergency EEG to see if it was due to her complex regional seizures, but they were not. She found significant relief with Tizanidine, especially with the distal LLE cramping. She noted that she did not tolerate other muscle relaxants due to somnolence. She found significant relief from the SCS and a Lidoderm 12 hr patch. Pt reported that she has had two EMGs in the past revealing a chronic left L5 radiculopathy. Pt noted that she previously had a dx of sacroiliitis, and she previously found relief from an SI joint injection. She had a right-sided lacunar stroke affecting the temporal lobe - LLE weakness, mild LUE weakness, cognitive impairment, smiling on the right side. 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 She saw Dr. Shiraz Stephens 19 in regards to a spinal cord stimulator malfunction. She had SCS placement in  by Dr. Peter Tyson, and it was subsequently revised several times. She reported that she has had multiple issues with the SCS's over the years related to battery life. Due to her cognitive impairment, she is unable to use a rechargable batteries.  She previously worked as a Physical Therapist. She was prescribed an [address] underwent a Right SI joint injection (10/22/19; Dr. Graciella Kehr) that provided significant relief and she doubled her daily steps. She continued to take Tizanidine, Topamax, and Depakote prescribed by her neurologist. She maintained an HEP of walking and yoga. She saw some improvement with PT (11/14/19-2/4/2020; [facility]). She used a left AFO as recommended by PT. She reported no longer having a tremor in her LLE. Pt noted that during one session of PT, she was having manual, myofascial release on her external pelvis and believed it elicited a seizure at the time. She experienced constipation, weight gain, and somnolence with Tizanidine but she felt the benefits outweighed the side effects. Pt noted that she saw her orthotist who provided an orthotic that has helped with her ambulation. She noted that her left hip was elevated by 1/8th of an inch. She was advised to obtain a gel insert for the right side, but it did not provide any benefit. Her son provided long access distraction that helped with the muscle spasms, but she had burning and numbness slightly posterior to the trochanter. She had significant sharp pain with weight bearing that radiated into the ankle. She saw some benefit from oral steroids, especially with her SI joint pain. However, she continued to have some pain near the trochanter and the left gluteus medius near the insertion. She had to use a cane to ambulate around her house. Pt denied groin pain. She returned with c/o pelvic pains, sacral pain and tailbone numbness. She also reported some BLE itching and redness. She found benefit with walking/movement. She saw Dr. Sujatha Fernandes and he suggested she may do well with seeing his wife at [facility]. She saw Dr. Macy Kirkland, and he suggested the ganglion impar injection for coccyx pain if she decided to proceed with that option. However, he noted, it would not likely resolve her other symptoms. Pt notes that she walked 1.1 miles one day, and both of her legs began turning in.  She returned with c/o exacerbation of tailbone and sacral pain. She was interested in another SI joint injection. She planned to have a left rotator cuff repair surgery. Pt notes that she has severe pain, numbness, and tingling posterolaterally to the knee that is painful with walking and sitting. She notes that she even experienced limping while walking in the house and she felt that her legs would collapse. She presents wearing a left AFO. Since her last OV, she had a left rotator cuff repair surgery with [facility] so she rested in a recliner often and she has had home health PT. She underwent a right SI joint injection (9/22/2020; Dr. Martinez Tripathi) without benefit. She feels her left AFO brace has been aggravating her SIJ pain. She has had recurrent UTIs. She previously did well with pelvic floor PT. She notes that the SCS seems to be making her BLE pains worse. She has been experiencing generalized chest tightness - her GP has done a chest XR to r/o pneumonia and she is scheduled to have an echo soon. Kevin Dubois comes in to the office today for f/u. She had her SCS reprogrammed today with benefit. She has not yet heard from [facility] about approval/scheduling for the ganglion impar block. She notes that she has paraesthesia throughout the groin, anus, vaginal. She has been using the vaginal wand in pelvic floor PT. Pain Score: 10/10. Treatments patient has tried:  Physical therapy:Yes  Doing HEP: Yes  Non-opioid medications: Yes - Tizanidine, Topamax, oral steroids, Diclofenac  Spinal injections: Right SI joint injection (10/22/19); right SIJ (9/22/20)  Spinal surgery- Yes - fusion L5-S1; SCS  Last MRI - None.      reviewed. 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  L5-S1 fusion, SCS; left rotator cuff repair    ASSESSMENT  Her symptoms likely remain due to pelvic floor dysfunction, coccydynia, left trochanteric enthesopathy/bursitis, sacroiliac joint dysfunction bilaterally, and left piriformis syndrome. Some of her symptoms may relate to meralgia paraesthetica. Given history of arachnoiditis, more peripheral treatment would be ideal. It has been several years since her last spine imaging. PLAN  1. Lumbar, thoracic, cervical CT Myelogram - progressive diffuse paresthesias, new saddle anaesthesia - rule out structural cause before assuming PF dysfunction  2. Proceed with ganglion impar block once approved by [facility] and scheduled (Dr. Marcelino Mathis)      Pt will f/u after CT Myelogram or sooner as needed. Diagnoses and all orders for this visit:    1. Saddle anesthesia  -     XR MYELO LUMBAR; Future  -     CT SPINE LUMB W CONT; Future  -     CT SPINE CERV W CONT; Future  -     CT SPINE THORAC W CONT; Future  -     XR MYELO THORACIC; Future  -     XR MYELO CERVICAL; Future    2. Coccydynia  -     XR MYELO LUMBAR; Future  -     CT SPINE LUMB W CONT; Future  -     CT SPINE CERV W CONT; Future  -     CT SPINE THORAC W CONT; Future  -     XR MYELO THORACIC; Future  -     XR MYELO CERVICAL; Future    3. Pelvic floor dysfunction in female  -     XR MYELO LUMBAR; Future  -     CT SPINE LUMB W CONT; Future  -     CT SPINE CERV W CONT; Future  -     CT SPINE THORAC W CONT; Future  -     XR MYELO THORACIC; Future  -     XR MYELO CERVICAL; Future    4. Sacroiliac joint dysfunction of both sides  -     XR MYELO LUMBAR; Future  -     CT SPINE LUMB W CONT; Future  -     CT SPINE CERV W CONT; Future  -     CT SPINE THORAC W CONT; Future  -     XR MYELO THORACIC; Future  -     XR MYELO CERVICAL; Future    5. Greater trochanteric bursitis of left hip  -     XR MYELO LUMBAR; Future  -     CT SPINE LUMB W CONT; Future  -     CT SPINE CERV W CONT; Future  -     CT SPINE THORAC W CONT; Future  -     XR MYELO THORACIC; Future  -     XR MYELO CERVICAL; Future    6.  Piriformis syndrome of left side  -     XR MYELO LUMBAR; Future  -     CT SPINE LUMB W CONT; Future  -     CT SPINE CERV W CONT; Future  - CT SPINE [facility] W CONT; Future  -     XR MYELO THORACIC; Future  -     XR MYELO CERVICAL; Future    7. Meralgia paraesthetica, left  -     XR MYELO LUMBAR; Future  -     CT SPINE LUMB W CONT; Future  -     CT SPINE CERV W CONT; Future  -     CT SPINE THORAC W CONT; Future  -     XR MYELO THORACIC; Future  -     XR MYELO CERVICAL; Future    8. Meralgia paraesthetica, right  -     XR MYELO LUMBAR; Future  -     CT SPINE LUMB W CONT; Future  -     CT SPINE CERV W CONT; Future  -     CT SPINE THORAC W CONT; Future  -     XR MYELO THORACIC; Future  -     XR MYELO CERVICAL; Future         PAST MEDICAL HISTORY   Past Medical History:   Diagnosis Date    Adverse effect of anesthesia      bronchospasm,     Asthma     well controlled    Coagulation disorder (HCC)     hypercoagulable    Depressive disorder 1/3/2013    Epilepsy ([facility] [address]     Ill-defined condition     Benign Granuloma Annularea    OAB (overactive bladder)     Obesity (BMI 30.0-34.9) 11/3/2016    Right leg DVT ([address]     DVT    Seizures ([facility])     grand mal    Spastic bladder     Stroke ([facility] [address] 2006    mini-stroke    Thromboembolus [facility])     s/p vincenzo filter    Thyroid disease     Unspecified adverse effect of anesthesia     bronchospasm on awakening       Past Surgical History:   Procedure Laterality Date    HX BACK SURGERY      dural repair    HX BACK SURGERY      nerve stimulator    HX LUMBAR FUSION  2006    HX LUMBAR LAMINECTOMY  2006    HX ROTATOR CUFF REPAIR Left 09/23/2020   . MEDICATIONS    Current Outpatient Medications   Medication Sig Dispense Refill    tiZANidine (ZANAFLEX) 4 mg tablet TAKE 1 TABLET BY MOUTH THREE TIMES DAILY AS NEEDED FOR PAIN 90 Tab 5    simvastatin (ZOCOR) 10 mg tablet Take 10 mg by mouth nightly.  diclofenac (VOLTAREN) 1 % gel Apply 2 g to affected area daily as needed.  diclofenac EC (VOLTAREN) 75 mg EC tablet Take 75 mg by mouth two (2) times a day.       Advair -21 mcg/actuation inhaler Take 2 Puffs by inhalation two (2) times a day.  ondansetron (Zofran ODT) 4 mg disintegrating tablet Take 1 Tab by mouth every eight (8) hours as needed for Nausea (or vomiting.). Allow to dissolve on tongue 20 Tab 0    albuterol (PROVENTIL HFA, VENTOLIN HFA, PROAIR HFA) 90 mcg/actuation inhaler INHALE 2 PUFFS EVERY 6 HOURS AS NEEDED FOR WHEEZING OR SHORTNESS OF BREATH      lacosamide (VIMPAT) 100 mg tab tablet Take 100 mg by mouth two (2) times a day.  levothyroxine (SYNTHROID) 25 mcg tablet Take 25 mcg by mouth Daily (before breakfast).  lidocaine (LIDODERM) 5 % Apply 1 Patch to affected area as needed.  LORazepam (ATIVAN) 0.5 mg tablet Take  by mouth.  topiramate (TOPAMAX) 100 mg tablet Take 100 mg by mouth two (2) times a day.  montelukast (SINGULAIR) 10 mg tablet Take 10 mg by mouth daily. Indications: ASTHMA PREVENTION          ALLERGIES  Allergies   Allergen Reactions    Adhesive Other (comments)     Layers of skin peeled off    Betadine [Povidone-Iodine] Rash     Patient states skin peels off.  Copper Rash and Swelling    Dilantin [Phenytoin Sodium Extended] Other (comments)     Throat swelled up rash all over    Other Medication Other (comments)     Surgical skin prep caused layers of skin to peel off. Needs benedryl before anesthesia.  Phenobarbital Other (comments)     Throat swelled up ,rash    Rifampin Rash          SOCIAL HISTORY    Social History     Socioeconomic History    Marital status:      Spouse name: Not on file    Number of children: Not on file    Years of education: Not on file    Highest education level: Not on file   Tobacco Use    Smoking status: Never Smoker    Smokeless tobacco: Never Used   Substance and Sexual Activity    Alcohol use: Yes     Comment: 1 per month    Drug use: No       FAMILY HISTORY  No family history on file. REVIEW OF SYSTEMS  Review of Systems   Musculoskeletal: Positive for back pain. R SI joint pain  Coccydynia  Left foot drop  Left trochanter and SI pain   Coccydynia     L shoulder pain s/p rotator cuff surgery    Neurological: Positive for tingling ( BLE) and headaches. Saddle anesthesia          PHYSICAL EXAMINATION  There were no vitals taken for this visit. Pain Assessment  11/24/2020   Location of Pain Back   Pain Location Comment -   Location Modifiers -   Severity of Pain 10   Quality of Pain -   Quality of Pain Comment -   Duration of Pain Persistent   Frequency of Pain Constant   Aggravating Factors -   Aggravating Factors Comment -   Limiting Behavior -   Relieving Factors -   Relieving Factors Comment -   Result of Injury -           Constitutional:  Well developed, well nourished, in no acute distress. Psychiatric: Affect and mood are appropriate. Integumentary: No rashes or abrasions noted on exposed areas. SPINE/MUSCULOSKELETAL EXAM    Cervical spine:  Neck is midline. Normal muscle tone. No focal atrophy is noted. ROM pain free. Shoulder ROM intact.   No tenderness to palpation. Negative Spurling's sign. Negative Tinel's sign. Negative Serrato's sign.      Sensation in the bilateral arms grossly intact to light touch.      Lumbar spine:  No rash, ecchymosis, or gross obliquity. No fasciculations. No focal atrophy is noted. No pain with hip ROM. Full range of motion. No tenderness to palpation.   No tenderness to palpation at the sciatic notch.    SI joints tender bilaterally, R>L   Trochanters non tender.     Sensation in the bilateral legs grossly intact to light touch.             LEFT RIGHT   CLAY TEST - +   P4 TEST - +   COMPRESSION TEST - -   DISTRACTION TEST - +   GAENSLEN'S TEST - +            Updates 9/1/2020:     SI joint on R tender to palpation       LEFT RIGHT   CLAY TEST - +   P4 TEST - +   COMPRESSION TEST - -   DISTRACTION TEST - -   GAENSLEN'S TEST - +      Updates 11/24/2020:  Tenderness to palpation of coccydynia    MOTOR:       Biceps  Triceps Deltoids Wrist Ext Wrist Flex Hand Intrin   Right 5/5 5/5 5/5 5/5 5/5 5/5   Left 5/5 5/5 5/5 5/5 5/5 5/5                     Hip Flex  Quads Hamstrings Ankle DF EHL Ankle PF   Right 5/5 5/5 5/5 5/5 5/5 5/5   Left 5/5 5/5 5/5 5/5 5/5 5/5      DTRs are 1+ biceps, triceps, brachioradialis, patella, and Achilles.     Negative Straight Leg raise. Squat not tested. No difficulty with tandem gait.      Ambulation with single point cane. FWB.     RADIOGRAPHS  4V Lumbar XR images taken on 4/1/2020 personally reviewed with patient:  BONES: Status post L5-S1 posterior spinal fusion and interbody disc cage. L5  laminectomy. No evidence of acute fracture or listhesis. Overlying spinal  stimulator and IVC filter partially obscure underlying osseous detail. Vertebral  body heights are maintained. No osseous lytic or blastic lesion. Mild multilevel  spondylosis.     SOFT TISSUES: Unremarkable.     OTHER: None.     _______________     IMPRESSION  IMPRESSION:     No evidence of acute fracture or listhesis.     Lower lumbar spinal fusion.     LT HIP XR images taken on 4/1/2020 personally reviewed with patient:  BONES: No evidence of acute fracture or dislocation. Joint spaces and alignment  are maintained. No aggressive appearing osseous lytic or blastic lesion.      SOFT TISSUES: Unremarkable.     _______________     IMPRESSION  IMPRESSION:     No evidence of acute fracture or dislocation. Lumbar XR images taken on 2/16/18 personally reviewed with patient:  Right posterior upper hip generator device with dorsal intraspinal stimulator wires, and the T10-11 level with the wire tips at the T7-T8 level. Postoperative changes of previous L5 decompression laminectomy with solid and are osseous circumferential fusion at L5-S1.  No findings of hardware failure. A right-sided IVC filter is present at the L2-L3 level.     Mild multilevel degenerative spondylosis similar prior examination with no evidence of listhesis.  The vertebral bodies maintain normal height and alignment.  No acute obstructive osseous abnormality. IMPRESSION:    1. Stable post operative circumferential fusion L5-S1 with L5 decompression laminectomy. 2.  Dorsal stimulator wire tip at the T7-T8 level. 3.  Mild degenerative lumbar spine spondylosis.  No acute obstructive osseous abnormality. 15 minutes of face-to-face contact were spent with the patient during today's visit extensively discussing symptoms and treatment plan. All questions were answered. More than half of this visit today was spent on counseling.      Written by Luis Fernando Yip as dictated by Mahin Meehan MD

## 2020-12-16 ENCOUNTER — TELEPHONE (OUTPATIENT)
Dept: ORTHOPEDIC SURGERY | Age: 55
End: 2020-12-16

## 2020-12-16 NOTE — TELEPHONE ENCOUNTER
Fax sent to Ashley Tobin, [address] [facility], Phone: [phone], Fax: [phone], Claim# [id number] requesting Myelogram/CT of the Lumbar Spine, Myelogram/CT of the Thoracic Spine and Myelogram/CT of the Cervical Spine. I am including the orders and last office note to help with your approval. Ms. Errol Cade is requesting these tests to be scheduled at [facility].

## 2020-12-16 NOTE — TELEPHONE ENCOUNTER
Yes we are aware. The location was not typed in order, message sent to referral coordinator to send to [place].

## 2020-12-16 NOTE — TELEPHONE ENCOUNTER
Attempted to contact Ms. Sarah Post regarding her tests. Her voice mail is full and I am unable to leave a message. I would like to verify if this is through her [employer] or worker's compensation.

## 2020-12-22 DIAGNOSIS — M62.89 PELVIC FLOOR DYSFUNCTION IN FEMALE: ICD-10-CM

## 2020-12-22 DIAGNOSIS — G57.02 PIRIFORMIS SYNDROME OF LEFT SIDE: ICD-10-CM

## 2020-12-22 DIAGNOSIS — M53.3 COCCYDYNIA: ICD-10-CM

## 2020-12-22 DIAGNOSIS — G57.12 MERALGIA PARAESTHETICA, LEFT: ICD-10-CM

## 2020-12-22 DIAGNOSIS — G57.11 MERALGIA PARAESTHETICA, RIGHT: ICD-10-CM

## 2020-12-22 DIAGNOSIS — R20.0 SADDLE ANESTHESIA: ICD-10-CM

## 2020-12-22 DIAGNOSIS — M70.62 GREATER TROCHANTERIC BURSITIS OF LEFT HIP: ICD-10-CM

## 2020-12-22 DIAGNOSIS — M53.3 SACROILIAC JOINT DYSFUNCTION OF BOTH SIDES: ICD-10-CM

## 2020-12-29 NOTE — TELEPHONE ENCOUNTER
Pt called again, explained to patient that the dx  was out until Monday. She is asking if there was anyone in the office that can check on this for her and that he never takes more than 7 days to have anything authorized in the past with her workman's comp. She is in a lot of pain and it is affecting her physical therapy she is doing for her shoulder. Explained to patient that there may not be anyone that can assist her until Monday. She asked if someone could give her a call anyways just to let her know something.

## 2020-12-29 NOTE — TELEPHONE ENCOUNTER
Patient called for a status update of her myelogram referral. I told the patient her referral was submitted to her W/[employer] on 12/16/2020. She questioned me as to when was the last follow up to her W/C was. I told her again on 12/16/2020 she commented \"so you lied to me and you haven't followed up\". I expressed to her at that time \"no I did not lie, I'm simply following what's documented at 416 and 430 on 12/16/2020\". She was very sarcastic and elevated her tone of voice. I expressed to the patient that we try to allow workers comp at least 21 days and she continued yelling at me stating that's not true, she was going to be reporting me. She asked to speak with Dr Maria Teresa David staff or the diagnostics  again, and I told  Her his staff and the  is on holiday vacation and I would send a message for us to follow up with her. She continued yelling at me and over me. I disconnected the call. Reported the call to my supervisor Meli.

## 2020-12-29 NOTE — TELEPHONE ENCOUNTER
Called [employer] [facility] [phone] and listened to Hazelhimanshu Carter voicemail stating she will be out of the office from Friday, 12/18 until Wednesday, 12/30. I then spoke to Mitra at [facility] and I gave her the patients claim number [id number] she stated they did receive a fax on 12/16 but the information got cut off and it does not have any identifiers on it. She stated that we should fax it again and it generally gets reviewed within 24-48 hours. She stated Elva Sauceda is currently out of the office and she would reach out to her supervisor to see who is covering for Elva Sauceda until she returns. I verified the fax number [phone]. Elva Sauceda stated that was the correct fax number. Called patient [phone] and left voice mail asking patient to call our office back and informing her of the above message in regards to her CT's that need to be ordered. No further action is needed at this time.

## 2021-01-04 NOTE — TELEPHONE ENCOUNTER
Information has been re-faxed to Leretha Pro at [facility], [phone], fax [phone]

## 2021-01-05 ENCOUNTER — TELEPHONE (OUTPATIENT)
Dept: ORTHOPEDIC SURGERY | Age: 56
End: 2021-01-05

## 2021-01-05 NOTE — TELEPHONE ENCOUNTER
I have placed a call to Madelin Montague requesting a return call regarding status update for the myelogram/CT of lumbar, thoracic and cervical spine as well as pain mgmt referral. I would like to speak with Madelin Montague before I contact Ms. Ivis Cordoba. There was a previous telephone message stating our fax was only partially received and another copy was requested which I faxed on 01/04/21.

## 2021-01-05 NOTE — TELEPHONE ENCOUNTER
Patient called states she has not received any treatment since 11/30/2020. She commented there is an outstanding referral for Dr Eldon Jorgensen whom she has tried calling. The patient is very agitated,demanding and speaking to me with a very sarcastic tone and raising her voice with me. The Patient very dismayed that a message is being entered, and cannot speak with anyone directly at this time of her call. Patient is expecting  to be called before 10:00 am today.

## 2021-01-05 NOTE — TELEPHONE ENCOUNTER
Please give this patient a return phone call. I was on the phone for over 16 minutes for a run down on what needs to be documented and relayed. She has a plethora of information that I cannot simply relay in this message. She does not want to continue to leave messages and states a NP or provider need to return her call directly. She has called [employer], [employer], Cam Singh [phone], and others today. She is not pleased at all that she has not received a call from clinical or the provider. She is repeatedly telling me this review will be denied by Eliane Benavides and we need to call her and tell her it's necessary. She wanted to inform Curtis Hari is [employer] supervisor at [phone]. She states she has a nonfunctional nerve stimulator. She has been waiting for over two months to have this resolved. She was not pleasant. I barely spoke other than relaying I would put a message in. She then demanded to speak with the practice manager. Therefore, I transferred to voice mail.

## 2021-01-06 ENCOUNTER — TELEPHONE (OUTPATIENT)
Dept: ORTHOPEDIC SURGERY | Age: 56
End: 2021-01-06

## 2021-01-06 NOTE — TELEPHONE ENCOUNTER
I spoke to [facility] with [facility] this morning regarding our referrals. Authorizations have been received this morning via e-mail to schedule Myelogram/CT's and pain mgmt referral. All information is being faxed for scheduling to [facility] and [address] [place] [address], respectively. I contacted Ms. Nicole Reagan regarding this information. She was informed the pain mgmt visit will probably not be scheduled until Myelogram/CT's are completed and [facility] will not schedule over the phone for testing. [facility] will contact her directly to schedule testing. She expressed her understanding. Ms. Nicole Reagan stated no one has ever returned her call until today and she wanted to know why her care was delayed. I tried to explain once referrals are sent to [facility], I am usually cut our of the picture as they will schedule with approved providers. She will not accept this explanation. She is still upset and wants to speak with a manager. Thank you.

## 2021-01-06 NOTE — TELEPHONE ENCOUNTER
Patient called again demanding to speak with . I attemped to reach and was told she would return call to patient as soon as possible. Patient asked how long manager's meeting would be and asked for a time. I explained I did not have than information. She then demanded I take more notes in addition to the phone notes I took yesterday afternoon. Her message. Spoke with Zahra Fam at [employer] phone [phone] she informed her the ganglion \"something\" was approved as well as the ct myelogram and that these should have been scheduled. She knows we are working on ganglion \"something\" not sure what is going on with ct. She got a phone call that it could be done at [place]. She has never been called back. She states everything has been waiting in the hands of our office since then, before juan jose, as well as whatever happen with [facility] and [employer]. She knows dr Sherman Roles sent to those office. Zahra Fam has told her there is auth for the ganglion \"something\". End of message.

## 2021-01-13 NOTE — TELEPHONE ENCOUNTER
Myelogram/CT's of the Lumbar, Thoracic and Cervical Spine are scheduled at [address] [place] [address] on 01/18/21, arrive 11:00AM. Patient instructed to have a , NPO after midnight and obtain images on a disk. Ms. Liza Saavedra wants us to be aware her walking has progressively worsened but the burning sensation into her lower limbs has become intermittent instead of constant. There is some confusion regarding her follow-up appointment. She states she spoke with Clista Klinefelter and Nay Avilez and is scheduled for a virtual appt on 01/28/21 which I do not see. Dr. Antonina Pressley next appt is in February and this patient is in tremendous pain. We should have CT results no later than Friday, 01/22/21 so please find the patient an appointment as soon as 01/22/21 or shortly after. She would prefer a virtual appointment in the afternoon. Please call the patient to advise. Thank you.

## 2021-01-18 ENCOUNTER — TELEPHONE (OUTPATIENT)
Dept: ORTHOPEDIC SURGERY | Age: 56
End: 2021-01-18

## 2021-01-18 NOTE — TELEPHONE ENCOUNTER
I spoke with Dwight Choi regarding Ms. Angelica Strickland. Dwight Choi has rescheduled her to 01/21/21 for the Myelogram/CT's with a 9:30AM arrival. Dwight Choi will be very clear with her instructions. No food or drink after midnight, no medications to be taken the day of the procedure and no aspirin within 5 days prior. Ms. Angelica Strickland reported this morning to [facility] stating she had breakfast and hadn't taken her aspirin in 2 days. She has facilitated on the breakfast but [facility] is not willing to take a chance, hence, the rescheduling. I thanked Dwight Choi for her efforts this morning and clarifying the requirements with Ms. Angelica Strickland.

## 2021-01-18 NOTE — TELEPHONE ENCOUNTER
Alean Goodpasture called stating the patient has been very angry and difficult about rescheduling for this test and so they are going to attempt the test today anyway. She said not to call patient tomorrow to reschedule as they are making this attempt today. For information only.

## 2021-01-18 NOTE — TELEPHONE ENCOUNTER
I spoke with Sheryl Hodge at [facility] regarding Ms. Claribel Mackay. Sheryl Hodge has rescheduled her Myelogram/CT's to 01/21/21  with a 9:30AM arrival. Sheryl Hodge will be very clear with her instructions. No food or drink after midnight, no medications to be taken the day of the procedure and no aspirin within 5 days prior. Ms. Claribel Mackay reported this morning to [facility] stating she had breakfast and hadn't taken her aspirin in 2 days. She has facilitated on the breakfast but [facility] is not willing to take a chance, hence, the rescheduling. I thanked Sheryl Hodge for her efforts this morning and clarifying the requirements with Ms. Claribel Mackay.

## 2021-01-18 NOTE — TELEPHONE ENCOUNTER
Attempted to contact pt and was not able to reach her. Message could not be left because mailbox full. There are appts with Dr. Krishna Sheikh for tomorrow that she can do virtually.

## 2021-01-18 NOTE — TELEPHONE ENCOUNTER
On Sunday, 01/17/21 at 6:54AM, I received a voice mail from Ms. Cheryl Avila. She states when she wakes the pain in her right side is excruciating and burning. She would like an appointment as soon as possible. The message started in a tearful voice then turned sobbing and I could not understand much more. Please schedule an appointment and call the patient with the information. She will be unavailable most of this afternoon due to her testing. I see where Dr. Radu Ferrer has agreed to a virtual visit for the patient if necessary.

## 2021-01-18 NOTE — TELEPHONE ENCOUNTER
Sophia Simmons from [facility] called stating this patient did not follow any instructions prior to her myelogram today. Sophia Simmons is requesting a call back before 2:30 this afternoon to go over the patient's instructions so she can reschedule her. Sophia Simmons can be reached at [phone].

## 2021-01-20 NOTE — TELEPHONE ENCOUNTER
Attempted to contact the pt about getting scheduled to see Dr. Krishna Sheikh. She was not able to be reached. A brief message was left asking for a return call to the office at their earliest convenience. The number to the office was provided. This is the only number listed for pt contact.

## 2021-01-20 NOTE — TELEPHONE ENCOUNTER
The pt called the office back and spoke with me. The pt was identified using 2 pt identifiers. She was asked if she got her studies done on Monday like scheduled. The pt verbalized that she did get the test done. She does not have the CD yet from the test because she was there so late. She reports being there until after 7. The pt plans to go by there again when she has another physical therapy appt. Ms. Nicole Reagan was made aware that Dr. Eric Ibanez will need to be able to see the CD of her images for her appt. I let the pt know that we will try to get the report faxed over to the office later this week. The pt voiced that she is not sure how she will be able to get the CD over to the office because she is not currently driving. The pt mentioned that she can put it in the mail. I let the pt know that I did not think this was a safe idea and could take up to 10 days to get. Not to mention possibly be damaged. I asked her if she knew anyone that could just come by the office and drop the CD off for her. She does not need to be present to do this. Then the provider can move forward with her virtual visit. Ms. Nicole Reagan asked if she can just sign a release with [facility] and have them send everything electronically. Pt was advised that we are not connected to [facility]'s electronic medical record system. I offered to make the pt a physical office visit with Dr. Eric Ibanez if she needed to bring the disc to the office. Ms. Nicole Reagan did not make any appt at this time. She asked about making an appt for the first week of February. With her pain level being so severe, I let her know that we were trying to get her an appt with Dr. Eric Ibanez earlier than this. She verbalized understanding and will see what she can do to get the CD over to our office or decide whether to have an in person visit. She was asked to contact the office once she makes a decision to let us know what she has decided.  No further questions or concerns voiced from the pt at this time.

## 2021-01-21 ENCOUNTER — DOCUMENTATION ONLY (OUTPATIENT)
Dept: ORTHOPEDIC SURGERY | Age: 56
End: 2021-01-21

## 2021-01-21 NOTE — PROGRESS NOTES
PATIENT DROPPED HER MRI/CT DISK AT [facility] AS REQUESTED BY NURSE MENDIOLA. LEFT THE DISK  ON THE COUNTER WITH NURSE Alicia Bonds,. MAURY INFORMED AND AWARE.

## 2021-01-22 ENCOUNTER — TELEPHONE (OUTPATIENT)
Dept: ORTHOPEDIC SURGERY | Age: 56
End: 2021-01-22

## 2021-01-22 NOTE — TELEPHONE ENCOUNTER
Pt brought her imaging disc to the office as requested. The report for imaging study was in the disc cover. Results given to the provider for review. Office will contact pt to set up a virtual visit.

## 2021-01-22 NOTE — TELEPHONE ENCOUNTER
Pt called the [facility] center and made an in office follow up for 02/11/2021.

## 2021-02-11 ENCOUNTER — OFFICE VISIT (OUTPATIENT)
Dept: ORTHOPEDIC SURGERY | Age: 56
End: 2021-02-11
Payer: OTHER MISCELLANEOUS

## 2021-02-11 VITALS
HEIGHT: 67 IN | OXYGEN SATURATION: 94 % | BODY MASS INDEX: 36.57 KG/M2 | DIASTOLIC BLOOD PRESSURE: 78 MMHG | HEART RATE: 71 BPM | RESPIRATION RATE: 15 BRPM | SYSTOLIC BLOOD PRESSURE: 111 MMHG | WEIGHT: 233 LBS | TEMPERATURE: 97.4 F

## 2021-02-11 DIAGNOSIS — M70.62 GREATER TROCHANTERIC BURSITIS OF LEFT HIP: ICD-10-CM

## 2021-02-11 DIAGNOSIS — M54.59 MECHANICAL LOW BACK PAIN: ICD-10-CM

## 2021-02-11 DIAGNOSIS — M53.3 SACROILIAC JOINT DYSFUNCTION OF BOTH SIDES: ICD-10-CM

## 2021-02-11 DIAGNOSIS — M54.6 THORACIC SPINE PAIN: ICD-10-CM

## 2021-02-11 DIAGNOSIS — M79.18 CERVICAL MYOFASCIAL PAIN SYNDROME: ICD-10-CM

## 2021-02-11 DIAGNOSIS — M79.18 CHRONIC PRIMARY MUSCULOSKELETAL PAIN: ICD-10-CM

## 2021-02-11 DIAGNOSIS — M53.3 COCCYDYNIA: ICD-10-CM

## 2021-02-11 DIAGNOSIS — G89.29 CHRONIC PRIMARY MUSCULOSKELETAL PAIN: ICD-10-CM

## 2021-02-11 DIAGNOSIS — M62.89 PELVIC FLOOR DYSFUNCTION IN FEMALE: Primary | ICD-10-CM

## 2021-02-11 DIAGNOSIS — R20.0 SADDLE ANESTHESIA: ICD-10-CM

## 2021-02-11 DIAGNOSIS — G57.02 PIRIFORMIS SYNDROME OF LEFT SIDE: ICD-10-CM

## 2021-02-11 DIAGNOSIS — M79.18 MYOFASCIAL PAIN: ICD-10-CM

## 2021-02-11 DIAGNOSIS — R26.9 GAIT ABNORMALITY: ICD-10-CM

## 2021-02-11 DIAGNOSIS — G57.12 MERALGIA PARAESTHETICA, LEFT: ICD-10-CM

## 2021-02-11 DIAGNOSIS — G57.11 MERALGIA PARAESTHETICA, RIGHT: ICD-10-CM

## 2021-02-11 PROCEDURE — 99215 OFFICE O/P EST HI 40 MIN: CPT | Performed by: PHYSICAL MEDICINE & REHABILITATION

## 2021-02-11 RX ORDER — OXYCODONE AND ACETAMINOPHEN 5; 325 MG/1; MG/1
1 TABLET ORAL
COMMUNITY
End: 2021-09-15

## 2021-02-11 RX ORDER — IBUPROFEN 800 MG/1
800 TABLET ORAL
COMMUNITY
End: 2021-09-15

## 2021-02-11 RX ORDER — MELOXICAM 15 MG/1
15 TABLET ORAL DAILY
Qty: 30 TAB | Refills: 5 | Status: SHIPPED | OUTPATIENT
Start: 2021-02-11 | End: 2021-06-24 | Stop reason: ALTCHOICE

## 2021-02-11 NOTE — PROGRESS NOTES
Jaclyn Oneal presents today for   Chief Complaint   Patient presents with    Back Pain    Leg Pain     right    Groin Pain     right        Is someone accompanying this pt? no    Is the patient using any DME equipment during OV? no      Coordination of Care:  1. Have you been to the ER, urgent care clinic since your last visit? no  Hospitalized since your last visit? no    2. Have you seen or consulted any other health care providers outside of the [address] [place] [address] since your last visit? Yes, pcp and neurology Include any pap smears or colon screening.  no

## 2021-02-11 NOTE — PROGRESS NOTES
[address], [address]  [place], [address] [place] [address] Street  Phone: [phone]  Fax: [phone]        Narinder Perez  : 1965  PCP: Violetta Grant MD  2021    PROGRESS NOTE      HISTORY OF PRESENT ILLNESS  Lois Reveles is a 48 y.o. female who was seen as a new patient 10/10/19 with c/o chronic low back pain extending into the RLE that she described as a burning pain. She also c/o numbness and foot drop in the LLE that improved some with surgery (L5-S1 fusion) and SCS. She continued to have some residual foot drop and shakiness/tremor of her foot along with LLE cramping. She noted that her neurologist did an emergency EEG to see if it was due to her complex regional seizures, but they were not. She found significant relief with Tizanidine, especially with the distal LLE cramping. She noted that she did not tolerate other muscle relaxants due to somnolence. She found significant relief from the SCS and a Lidoderm 12 hr patch. Pt reported that she has had two EMGs in the past revealing a chronic left L5 radiculopathy. Pt noted that she previously had a dx of sacroiliitis, and she previously found relief from an SI joint injection. She had a right-sided lacunar stroke affecting the temporal lobe - LLE weakness, mild LUE weakness, cognitive impairment, smiling on the right side. 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 She saw Dr. Emmanuel Covert 19 in regards to a spinal cord stimulator malfunction. She had SCS placement in  by Dr. Valentino Cannon, and it was subsequently revised several times. She reported that she has had multiple issues with the SCS's over the years related to battery life. Due to her cognitive impairment, she is unable to use a rechargable batteries.  She previously worked as a Physical Therapist. She was prescribed an [address] underwent a Right SI joint injection (10/22/19; Dr. Melvin Woodard) that provided significant relief and she doubled her daily steps. She continued to take Tizanidine, Topamax, and Depakote prescribed by her neurologist. She maintained an HEP of walking and yoga. She saw some improvement with PT (11/14/19-2/4/2020; [facility]). She used a left AFO as recommended by PT. She reported no longer having a tremor in her LLE. Pt noted that during one session of PT, she was having manual, myofascial release on her external pelvis and believed it elicited a seizure at the time. She experienced constipation, weight gain, and somnolence with Tizanidine but she felt the benefits outweighed the side effects. Pt noted that she saw her orthotist who provided an orthotic that has helped with her ambulation. She noted that her left hip was elevated by 1/8th of an inch. She was advised to obtain a gel insert for the right side, but it did not provide any benefit. Her son provided long access distraction that helped with the muscle spasms, but she had burning and numbness slightly posterior to the trochanter. She had significant sharp pain with weight bearing that radiated into the ankle. She saw some benefit from oral steroids, especially with her SI joint pain. However, she continued to have some pain near the trochanter and the left gluteus medius near the insertion. She had to use a cane to ambulate around her house. Pt denied groin pain. She returned with c/o pelvic pains, sacral pain and tailbone numbness. She also reported some BLE itching and redness. She found benefit with walking/movement. She saw Dr. America Sinclair and he suggested she may do well with seeing his wife at [facility]. She Werocarin Dewitt, and he suggested the ganglion impar injection for coccyx pain if she decided to proceed with that option. However, he noted, it would not likely resolve her other symptoms.  Pt notes that she walked 1.1 miles one day, and both of her legs began turning in. She returned with c/o exacerbation of tailbone and sacral pain. She was interested in another SI joint injection. She planned to have a left rotator cuff repair surgery. Pt notes that she has severe pain, numbness, and tingling posterolaterally to the knee that is painful with walking and sitting. She notes that she even experienced limping while walking in the house and she felt that her legs would collapse. She presents wearing a left AFO. Since her last OV, she had a left rotator cuff repair surgery with [facility] so she rested in a recliner often and she has had home health PT. She underwent a right SI joint injection (9/22/2020; Dr. Louisa Guo) without benefit. She feels her left AFO brace has been aggravating her SIJ pain. She has had recurrent UTIs. She previously did well with pelvic floor PT. She notes that the SCS seems to be making her BLE pains worse. She has been experiencing generalized chest tightness - her GP has done a chest XR to r/o pneumonia and she is scheduled to have an echo soon. She had her SCS reprogrammed with benefit. She noted that she had paraesthesia throughout the groin, anus, vagina. She has been using the vaginal wand in pelvic floor PT. Malvina Olszewski comes in to the office today for f/u. Pt notes that she has seen slight improvement in her pain. She continues to have some right groin and RLE pain. Pt notes that she cannot use her SCS because after 3-4 hours, she has a burning pain in her coccyx and low back up to the thoracic spine to the bra line. She has been taking Gabapentin, Percocet, and NSAIDs along with a special pillow for her coccydynia. Her pain is worse with prolonged sitting or laying. She also notes that she has a soft tissue injury to the LUE. She believes she injured her right biceps tendon when she tried driving again. Cervical, Thoracic, and Lumbar spine CT Myelogram dated 1/22/21 reviewed.  Per report, No central canal compromise identified within the cervical, thoracic or lumbar spine.  Mild posterior osteophytic ridging and bilateral foraminal encroachment C5-6. Retained spinal stimulator hardware in the thoracic spine as noted. Previous laminectomy and fusion L5-S1 with intact hardware. Broad-based disc bulge L4-5. Prominent soft tissue posterior to the disc space at L5-S1, which may reflect residual scar tissue, or disc material.  Borderline periaortic retroperitoneal lymphadenopathy. 2 cm cystic lesion adnexa. Pt notes that she has an appointment scheduled with Dr. Eldon hernandez. She notes that her pelvic floor symptoms and saddle anesthesia improved with pelvic floor PT but have slightly returned since her pain has flared up. Pain Score: 4/10. Treatments patient has tried:  Physical therapy:Yes  Doing HEP: Yes  Non-opioid medications: Yes - Tizanidine, Topamax, oral steroids, Diclofenac, Gabapentin, NSAIDs  Spinal injections: Right SI joint injection (10/22/19); right SIJ (9/22/20)  Spinal surgery- Yes - fusion L5-S1; SCS  Last Spine CT Myelogram - 2021      reviewed. 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 L5-S1 fusion, SCS; left rotator cuff repair    ASSESSMENT  Her symptoms likely remain due to chronic myofascial pain, pelvic floor dysfunction, coccydynia, left trochanteric enthesopathy/bursitis, sacroiliac joint dysfunction bilaterally, and left piriformis syndrome. Some of her symptoms may relate to meralgia paraesthetica. Given history of arachnoiditis, more peripheral treatment would be ideal.    PLAN  1. Referral to chiropractic (Dr. Keegan Mora). 2. Referral to [facility] - gait abnormality  3. Mobic 15 mg daily. Add Tylenol 1,000 mg TID PRN. Discontinue Ibuprofen and Naprosyn. Pt will f/u in 6 months or sooner as needed. Diagnoses and all orders for this visit:    1. Pelvic floor dysfunction in female    2. Coccydynia    3. Sacroiliac joint dysfunction of both sides    4.  Greater trochanteric bursitis of left hip    5. Piriformis syndrome of left side    6. Meralgia paraesthetica, left    7. Meralgia paraesthetica, right    8. Saddle anesthesia    9. Chronic primary musculoskeletal pain  -     REFERRAL TO CHIROPRACTIC  -     REFERRAL TO PHYSICAL THERAPY  -     meloxicam (MOBIC) 15 mg tablet; Take 1 Tab by mouth daily. 10. Mechanical low back pain  -     REFERRAL TO CHIROPRACTIC  -     REFERRAL TO PHYSICAL THERAPY    11. Myofascial pain  -     REFERRAL TO CHIROPRACTIC  -     REFERRAL TO PHYSICAL THERAPY    12. Thoracic spine pain  -     REFERRAL TO CHIROPRACTIC  -     REFERRAL TO PHYSICAL THERAPY    13. Cervical myofascial pain syndrome  -     REFERRAL TO CHIROPRACTIC  -     REFERRAL TO PHYSICAL THERAPY    14. Gait abnormality  -     REFERRAL TO PHYSICAL THERAPY         PAST MEDICAL HISTORY   Past Medical History:   Diagnosis Date    Adverse effect of anesthesia      bronchospasm,     Asthma     well controlled    Coagulation disorder (HCC)     hypercoagulable    Depressive disorder 1/3/2013    Epilepsy ([facility] [address]     Ill-defined condition     Benign Granuloma Annularea    OAB (overactive bladder)     Obesity (BMI 30.0-34.9) 11/3/2016    Right leg DVT (HCC)     DVT    Seizures (HCC)     grand mal    Spastic bladder     Stroke ([facility] [address] 2006    mini-stroke    Thromboembolus [facility])     s/p vincenzo filter    Thyroid disease     Unspecified adverse effect of anesthesia     bronchospasm on awakening       Past Surgical History:   Procedure Laterality Date    HX BACK SURGERY      dural repair    HX BACK SURGERY      nerve stimulator    HX LUMBAR FUSION  2006    HX LUMBAR LAMINECTOMY  2006    HX ROTATOR CUFF REPAIR Left 09/23/2020   . MEDICATIONS    Current Outpatient Medications   Medication Sig Dispense Refill    naproxen (NAPROSYN) 500 mg tablet Take 1 Tab by mouth two (2) times daily (with meals).  60 Tab 2    tiZANidine (ZANAFLEX) 4 mg tablet TAKE 1 TABLET BY MOUTH THREE TIMES DAILY AS NEEDED FOR PAIN 90 Tab 5    simvastatin (ZOCOR) 10 mg tablet Take 10 mg by mouth nightly.  diclofenac (VOLTAREN) 1 % gel Apply 2 g to affected area daily as needed.  Advair -21 mcg/actuation inhaler Take 2 Puffs by inhalation two (2) times a day.  ondansetron (Zofran ODT) 4 mg disintegrating tablet Take 1 Tab by mouth every eight (8) hours as needed for Nausea (or vomiting.). Allow to dissolve on tongue 20 Tab 0    albuterol (PROVENTIL HFA, VENTOLIN HFA, PROAIR HFA) 90 mcg/actuation inhaler INHALE 2 PUFFS EVERY 6 HOURS AS NEEDED FOR WHEEZING OR SHORTNESS OF BREATH      lacosamide (VIMPAT) 100 mg tab tablet Take 100 mg by mouth two (2) times a day.  levothyroxine (SYNTHROID) 25 mcg tablet Take 25 mcg by mouth Daily (before breakfast).  lidocaine (LIDODERM) 5 % Apply 1 Patch to affected area as needed.  LORazepam (ATIVAN) 0.5 mg tablet Take  by mouth.  topiramate (TOPAMAX) 100 mg tablet Take 100 mg by mouth two (2) times a day.  montelukast (SINGULAIR) 10 mg tablet Take 10 mg by mouth daily. Indications: ASTHMA PREVENTION          ALLERGIES  Allergies   Allergen Reactions    Adhesive Other (comments)     Layers of skin peeled off    Betadine [Povidone-Iodine] Rash     Patient states skin peels off.  Copper Rash and Swelling    Dilantin [Phenytoin Sodium Extended] Other (comments)     Throat swelled up rash all over    Other Medication Other (comments)     Surgical skin prep caused layers of skin to peel off. Needs benedryl before anesthesia.     Phenobarbital Other (comments)     Throat swelled up ,rash    Rifampin Rash          SOCIAL HISTORY    Social History     Socioeconomic History    Marital status:      Spouse name: Not on file    Number of children: Not on file    Years of education: Not on file    Highest education level: Not on file   Tobacco Use    Smoking status: Never Smoker    Smokeless tobacco: Never Used   Substance and Sexual Activity    Alcohol use: Yes     Comment: 1 per month    Drug use: No       FAMILY HISTORY  No family history on file. REVIEW OF SYSTEMS  Review of Systems   Musculoskeletal: Positive for back pain. R SI joint pain  Coccydynia  Left foot drop  Left trochanter and SI pain   Coccydynia     L shoulder pain s/p rotator cuff surgery     Neurological: Positive for tingling ( BLE) and headaches. Saddle anesthesia           PHYSICAL EXAMINATION  Visit Vitals  /78 (BP 1 Location: Right upper arm, BP Patient Position: Sitting, BP Cuff Size: Adult long)   Pulse 71   Temp 97.4 °F (36.3 °C) (Skin)   Resp 15   Ht 5' 7\" (1.702 m)   Wt 233 lb (105.7 kg)   SpO2 94% Comment: RA   BMI 36.49 kg/m²       Pain Assessment  2/11/2021   Location of Pain Back;Leg;Other (comment)   Pain Location Comment groin   Location Modifiers Right   Severity of Pain 4   Quality of Pain Other (Comment); Burning   Quality of Pain Comment n/t to right hip and leg after sitting too long, stabbing   Duration of Pain Persistent   Frequency of Pain Constant   Aggravating Factors Other (Comment)   Aggravating Factors Comment sit/ lay down too long   Limiting Behavior Yes   Relieving Factors Other (Comment)   Relieving Factors Comment standing   Result of Injury No           Constitutional:  Well developed, well nourished, in no acute distress. Psychiatric: Affect and mood are appropriate. Integumentary: No rashes or abrasions noted on exposed areas. SPINE/MUSCULOSKELETAL EXAM    Cervical spine:  Neck is midline. Normal muscle tone. No focal atrophy is noted. ROM pain free. Shoulder ROM intact.   No tenderness to palpation. Negative Spurling's sign. Negative Tinel's sign. Negative Serrato's sign.      Sensation in the bilateral arms grossly intact to light touch.      Lumbar spine:  No rash, ecchymosis, or gross obliquity. No fasciculations. No focal atrophy is noted. No pain with hip ROM.    Full range of motion. No tenderness to palpation.   No tenderness to palpation at the sciatic notch. SI joints tender bilaterally, R>L   Trochanters non tender.     Sensation in the bilateral legs grossly intact to light touch.             LEFT RIGHT   CLAY TEST - +   P4 TEST - +   COMPRESSION TEST - -   DISTRACTION TEST - +   GAENSLEN'S TEST - +            Updates 9/1/2020:     SI joint on R tender to palpation       LEFT RIGHT   CLAY TEST - +   P4 TEST - +   COMPRESSION TEST - -   DISTRACTION TEST - -   GAENSLEN'S TEST - +      Updates 11/24/2020:  Tenderness to palpation of coccydynia      MOTOR:       Biceps  Triceps Deltoids Wrist Ext Wrist Flex Hand Intrin   Right 5/5 5/5 5/5 5/5 5/5 5/5   Left 5/5 5/5 5/5 5/5 5/5 5/5                     Hip Flex  Quads Hamstrings Ankle DF EHL Ankle PF   Right 5/5 5/5 5/5 5/5 5/5 5/5   Left 5/5 5/5 5/5 5/5 5/5 5/5      DTRs are 1+ biceps, triceps, brachioradialis, patella, and Achilles.     Negative Straight Leg raise. Squat not tested. No difficulty with tandem gait.      Ambulation with single point cane. FWB.     RADIOGRAPHS  Cervical, Thoracic, and Lumbar Spine CT Myelogram images taken on 1/22/21 personally reviewed with patient:  Cervical spine CT: There is mild posterior osteophytic ridging C5-6, which  causes minimal impress on the ventral thecal sac. No disc bulge or  protrusion. No central canal compromise. There is mild bilateral  foraminal encroachment C5-6, secondary to uncovertebral spurring. Facet  alignment is normal.  No fracture or subluxation. The spinal cord is  normal in appearance. The paravertebral soft tissues are unremarkable. Thoracic spine CT: No central canal compromise. No significant disc bulge  or protrusion identified in the thoracic spine. There is a retained  spinal stimulator posteriorly T7-T9. The thoracic spinal cord is  unremarkable as imaged. The conus terminalis terminates at the T12-L1  disc space.   The paravertebral soft tissues and visualized portions of the  lungs are unremarkable. Lumbar spine CT: There is a mild broad-based disc bulge L4-5. Postsurgical changes from previous posterior laminectomy and fusion L5-S1. Fusion hardware appears intact. There is moderate soft tissue posterior  to the L5-S1 disc space, which may be postsurgical scar, or disc material.  No central canal compromise. No critical foraminal encroachment. There  is advanced bilateral facet spondylosis L4-5 through L5-S1. There is an  inferior vena cava filter incidentally noted there is a couple small of  periaortic retroperitoneal lymph nodes, nonspecific. There is a 2 cm cyst  left adnexa. IMPRESSION:  No central canal compromise identified within the cervical, thoracic or  lumbar spine. Mild posterior osteophytic ridging and bilateral foraminal  encroachment C5-6. Retained spinal stimulator hardware in the thoracic  spine as noted. Previous laminectomy and fusion L5-S1 with intact  hardware. Broad-based disc bulge L4-5. Prominent soft tissue posterior  to the disc space at L5-S1, which may reflect residual scar tissue, or  disc material.  Borderline periaortic retroperitoneal lymphadenopathy. 2  cm cystic lesion adnexa. 4V Lumbar XR images taken on 4/1/2020 personally reviewed with patient:  BONES: Status post L5-S1 posterior spinal fusion and interbody disc cage. L5  laminectomy. No evidence of acute fracture or listhesis. Overlying spinal  stimulator and IVC filter partially obscure underlying osseous detail. Vertebral  body heights are maintained. No osseous lytic or blastic lesion. Mild multilevel  spondylosis.     SOFT TISSUES: Unremarkable.     OTHER: None.     _______________     IMPRESSION  IMPRESSION:     No evidence of acute fracture or listhesis.     Lower lumbar spinal fusion.     LT HIP XR images taken on 4/1/2020 personally reviewed with patient:  BONES: No evidence of acute fracture or dislocation.  Joint spaces and alignment  are maintained. No aggressive appearing osseous lytic or blastic lesion.      SOFT TISSUES: Unremarkable.     _______________     IMPRESSION  IMPRESSION:     No evidence of acute fracture or dislocation. Lumbar XR images taken on 2/16/18 personally reviewed with patient:  Right posterior upper hip generator device with dorsal intraspinal stimulator wires, and the T10-11 level with the wire tips at the T7-T8 level. Postoperative changes of previous L5 decompression laminectomy with solid and are osseous circumferential fusion at L5-S1.  No findings of hardware failure. A right-sided IVC filter is present at the L2-L3 level. Mild multilevel degenerative spondylosis similar prior examination with no evidence of listhesis.  The vertebral bodies maintain normal height and alignment.  No acute obstructive osseous abnormality. IMPRESSION:    1. Stable post operative circumferential fusion L5-S1 with L5 decompression laminectomy. 2.  Dorsal stimulator wire tip at the T7-T8 level. 3.  Mild degenerative lumbar spine spondylosis.  No acute obstructive osseous abnormality.     60 minutes of face-to-face contact were spent with the patient during today's visit extensively discussing symptoms and treatment plan. All questions were answered. More than half of this visit today was spent on counseling.      Written by Jericho Florentino as dictated by John Art MD

## 2021-03-03 ENCOUNTER — PATIENT MESSAGE (OUTPATIENT)
Dept: ORTHOPEDIC SURGERY | Age: 56
End: 2021-03-03

## 2021-03-03 ENCOUNTER — TELEPHONE (OUTPATIENT)
Dept: ORTHOPEDIC SURGERY | Age: 56
End: 2021-03-03

## 2021-03-03 NOTE — TELEPHONE ENCOUNTER
Patient called back again regarding authorization for physical therapy. She says she spoke to Eber Mark at [address] and was told that the authorization was sent to us (no info on fax # or if it was sent to us or p/t) on 2/15. Tried to explain that there is no authorization on file, patient insists we need to contact Eber Mark at [employer] to further discuss, she provided a phone # of [phone].

## 2021-03-03 NOTE — TELEPHONE ENCOUNTER
Date User Summary Attachment  02/12/2021 10:26 AM Carolyn Olivarez - -  Note   Request faxed to Olayinka Thompson for approval and scheduling. [email or web address]    Patient notified to fu with WC

## 2021-03-03 NOTE — TELEPHONE ENCOUNTER
Patient states the orders for physical therapy and chiropractic have not been received by providers she is being referred to. She would like them to be resent.       Patient [phone]

## 2021-03-04 NOTE — TELEPHONE ENCOUNTER
Ms. German Forward,   Please allow me to extend my apologies. I did receive verbal authorization for your services, however, the written authorization was not received. Written authorization for your services was received yesterday. The chiropractic referral was sent to [address] [place] [address] office yesterday and the physical therapy referral was sent to [facility] this morning. Both offices should reach out to you for scheduling.    Respectfully submitted,  Loretta Eduardo, Referral Coordinator

## 2021-03-12 DIAGNOSIS — R25.2 SPASTICITY DUE TO OLD STROKE: ICD-10-CM

## 2021-03-12 DIAGNOSIS — M62.838 MUSCLE SPASM: ICD-10-CM

## 2021-03-12 DIAGNOSIS — I69.398 SPASTICITY DUE TO OLD STROKE: ICD-10-CM

## 2021-03-12 DIAGNOSIS — Z86.73 HISTORY OF STROKE: ICD-10-CM

## 2021-03-12 RX ORDER — TIZANIDINE 4 MG/1
TABLET ORAL
Qty: 90 TAB | Refills: 5 | Status: SHIPPED | OUTPATIENT
Start: 2021-03-12 | End: 2021-09-10 | Stop reason: SDUPTHER

## 2021-03-15 ENCOUNTER — TELEPHONE (OUTPATIENT)
Dept: ORTHOPEDIC SURGERY | Age: 56
End: 2021-03-15

## 2021-03-15 DIAGNOSIS — M79.18 MYOFASCIAL PAIN: ICD-10-CM

## 2021-03-15 DIAGNOSIS — M54.6 THORACIC SPINE PAIN: ICD-10-CM

## 2021-03-15 DIAGNOSIS — G89.29 CHRONIC PRIMARY MUSCULOSKELETAL PAIN: Primary | ICD-10-CM

## 2021-03-15 DIAGNOSIS — M79.18 CHRONIC PRIMARY MUSCULOSKELETAL PAIN: Primary | ICD-10-CM

## 2021-03-15 DIAGNOSIS — M79.18 CERVICAL MYOFASCIAL PAIN SYNDROME: ICD-10-CM

## 2021-03-15 DIAGNOSIS — R26.9 GAIT ABNORMALITY: ICD-10-CM

## 2021-03-15 DIAGNOSIS — M54.59 MECHANICAL LOW BACK PAIN: ICD-10-CM

## 2021-03-15 NOTE — TELEPHONE ENCOUNTER
Please enter a new referral for physical therapy. [facility] states the referral must be dated within 30 days of the initial evaluation. Please be sure to include duration and frequency of visits.  approved 12 visits. (We were delayed by workers compensation approval.) Once the order has been entered I will fax it. Thank you.

## 2021-04-29 ENCOUNTER — OFFICE VISIT (OUTPATIENT)
Dept: ORTHOPEDIC SURGERY | Age: 56
End: 2021-04-29
Payer: OTHER MISCELLANEOUS

## 2021-04-29 VITALS
TEMPERATURE: 97.8 F | WEIGHT: 236.2 LBS | HEART RATE: 75 BPM | HEIGHT: 67 IN | BODY MASS INDEX: 37.07 KG/M2 | RESPIRATION RATE: 15 BRPM | OXYGEN SATURATION: 97 %

## 2021-04-29 DIAGNOSIS — G57.11 MERALGIA PARAESTHETICA, RIGHT: ICD-10-CM

## 2021-04-29 DIAGNOSIS — M62.89 PELVIC FLOOR DYSFUNCTION IN FEMALE: ICD-10-CM

## 2021-04-29 DIAGNOSIS — G57.02 PIRIFORMIS SYNDROME OF LEFT SIDE: ICD-10-CM

## 2021-04-29 DIAGNOSIS — M79.18 MYOFASCIAL PAIN: ICD-10-CM

## 2021-04-29 DIAGNOSIS — M70.62 GREATER TROCHANTERIC BURSITIS OF LEFT HIP: ICD-10-CM

## 2021-04-29 DIAGNOSIS — G89.29 CHRONIC PRIMARY MUSCULOSKELETAL PAIN: Primary | ICD-10-CM

## 2021-04-29 DIAGNOSIS — M53.3 COCCYDYNIA: ICD-10-CM

## 2021-04-29 DIAGNOSIS — M79.18 CERVICAL MYOFASCIAL PAIN SYNDROME: ICD-10-CM

## 2021-04-29 DIAGNOSIS — G57.12 MERALGIA PARAESTHETICA, LEFT: ICD-10-CM

## 2021-04-29 DIAGNOSIS — M79.18 CHRONIC PRIMARY MUSCULOSKELETAL PAIN: Primary | ICD-10-CM

## 2021-04-29 DIAGNOSIS — M54.59 MECHANICAL LOW BACK PAIN: ICD-10-CM

## 2021-04-29 DIAGNOSIS — M54.6 THORACIC SPINE PAIN: ICD-10-CM

## 2021-04-29 DIAGNOSIS — R26.9 GAIT ABNORMALITY: ICD-10-CM

## 2021-04-29 DIAGNOSIS — M53.3 SACROILIAC JOINT DYSFUNCTION OF BOTH SIDES: ICD-10-CM

## 2021-04-29 PROCEDURE — 99215 OFFICE O/P EST HI 40 MIN: CPT | Performed by: PHYSICAL MEDICINE & REHABILITATION

## 2021-04-29 NOTE — PROGRESS NOTES
[address], [address]  [facility] [address]  Phone: [phone]  Fax: [phone]        Rula Rudd  : 1965  PCP: Nicole Avila MD  2021    PROGRESS NOTE      HISTORY OF PRESENT ILLNESS  Nii Quiles is a 48 y.o. female who was seen as a new patient 10/10/19 with c/o chronic low back pain extending into the RLE that she described as a burning pain. She also c/o numbness and foot drop in the LLE that improved some with surgery (L5-S1 fusion) and SCS. She continued to have some residual foot drop and shakiness/tremor of her foot along with LLE cramping. She noted that her neurologist did an emergency EEG to see if it was due to her complex regional seizures, but they were not. She found significant relief with Tizanidine, especially with the distal LLE cramping. She noted that she did not tolerate other muscle relaxants due to somnolence. She found significant relief from the SCS and a Lidoderm 12 hr patch. Pt reported that she has had two EMGs in the past revealing a chronic left L5 radiculopathy. Pt noted that she previously had a dx of sacroiliitis, and she previously found relief from an SI joint injection. She had a right-sided lacunar stroke affecting the temporal lobe - LLE weakness, mild LUE weakness, cognitive impairment, smiling on the right side. 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 She saw Dr. Albert Vasquez 19 in regards to a spinal cord stimulator malfunction. She had SCS placement in  by Dr. Merline Garcia, and it was subsequently revised several times. She reported that she has had multiple issues with the SCS's over the years related to battery life. Due to her cognitive impairment, she is unable to use a rechargable batteries.  She previously worked as a Physical Therapist. She was prescribed an [address] underwent a Right SI joint injection (10/22/19; Dr. Jerold Kanner) that provided significant relief and she doubled her daily steps. She continued to take Tizanidine, Topamax, and Depakote prescribed by her neurologist. She maintained an HEP of walking and yoga. She saw some improvement with PT (11/14/19-2/4/2020; [facility]). She used a left AFO as recommended by PT. She reported no longer having a tremor in her LLE. Pt noted that during one session of PT, she was having manual, myofascial release on her external pelvis and believed it elicited a seizure at the time. She experienced constipation, weight gain, and somnolence with Tizanidine but she felt the benefits outweighed the side effects. Pt noted that she saw her orthotist who provided an orthotic that has helped with her ambulation. She noted that her left hip was elevated by 1/8th of an inch. She was advised to obtain a gel insert for the right side, but it did not provide any benefit. Her son provided long access distraction that helped with the muscle spasms, but she had burning and numbness slightly posterior to the trochanter. She had significant sharp pain with weight bearing that radiated into the ankle. She saw some benefit from oral steroids, especially with her SI joint pain. However, she continued to have some pain near the trochanter and the left gluteus medius near the insertion. She had to use a cane to ambulate around her house. Pt denied groin pain. She returned with c/o pelvic pains, sacral pain and tailbone numbness. She also reported some BLE itching and redness. She found benefit with walking/movement. She saw Dr. Sachi Campos and he suggested she may do well with seeing his wife at [facility]. She Juan Carlos Howe, and he suggested the ganglion impar injection for coccyx pain if she decided to proceed with that option. However, he noted, it would not likely resolve her other symptoms.  Pt notes that she walked 1.1 miles one day, and both of her legs began turning in. She returned with c/o exacerbation of tailbone and sacral pain. She was interested in another SI joint injection. She planned to have a left rotator cuff repair surgery. Pt notes that she has severe pain, numbness, and tingling posterolaterally to the knee that is painful with walking and sitting. She notes that she even experienced limping while walking in the house and she felt that her legs would collapse. She presents wearing a left AFO. Since her last OV, she had a left rotator cuff repair surgery with [facility] so she rested in a recliner often and she has had home health PT. She underwent a right SI joint injection (9/22/2020; Dr. Lorri Mota) without benefit. She feels her left AFO brace has been aggravating her SIJ pain. She has had recurrent UTIs. She previously did well with pelvic floor PT. She notes that the SCS seems to be making her BLE pains worse. She has been experiencing generalized chest tightness - her GP has done a chest XR to r/o pneumonia and she is scheduled to have an echo soon. She had her SCS reprogrammed with benefit. She noted that she had paraesthesia throughout the groin, anus, vagina. She has been using the vaginal wand in pelvic floor PT. She continued to have some right groin and RLE pain. Pt noted that she could not use her SCS because after 3-4 hours, she had a burning pain in her coccyx and low back up to the thoracic spine to the bra line. She took Gabapentin, Percocet, and NSAIDs along with a special pillow for her coccydynia. Her pain is worse with prolonged sitting or laying. She also notes that she has a soft tissue injury to the LUE. She believes she injured her right biceps tendon when she tried driving again. Cervical, Thoracic, and Lumbar spine CT Myelogram dated 1/22/21 reviewed. Per report, No central canal compromise identified within the cervical, thoracic or lumbar spine. Mild posterior osteophytic ridging and bilateral foraminal encroachment C5-6.   Retained spinal stimulator hardware in the thoracic spine as noted. Previous laminectomy and fusion L5-S1 with intact hardware. Broad-based disc bulge L4-5. Prominent soft tissue posterior to the disc space at L5-S1, which may reflect residual scar tissue, or disc material.  Borderline periaortic retroperitoneal lymphadenopathy. 2 cm cystic lesion adnexa. Pt notes that she has an appointment scheduled with Dr. Abe Cuevas soon. She notes that her pelvic floor symptoms and saddle anesthesia improved with pelvic floor PT but have slightly returned since her pain has flared up.        Keira Arguello comes in to the office today for f/u. She has been wearing her AFO again on the left with benefit. She feels more comfortable in it and feels more secure than she previously did. She continues to have some cramping in the left foot. She has been taking Tizanidine with some benefit, but she experiences some somnolence as well. She is doing better since her left shoulder surgery, and she is driving some again. However, she has some LUE pain when she drives still. 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 She sees Yoana Patel PA-C for pain management. She continues to have low back pain that she describes as \"heat. \" She is finding some benefit with Mobic, though mild. She had a ganglion impar block with Dr. Abe Cuevas (2/24/21) with significant benefit. She attended PT with Jeff, but she had to stop due to her spraining her ankle. Pain Score: 5/10. Treatments patient has tried:  Physical therapy:Yes  Doing HEP: Yes  Non-opioid medications: Yes - Tizanidine, Topamax, oral steroids, Diclofenac, Gabapentin, NSAIDs  Spinal injections: Right SI joint injection (10/22/19); right SIJ (9/22/20)  Spinal surgery- Yes - fusion L5-S1; SCS  Last Spine CT Myelogram - 2021      reviewed.  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 L5-S1 fusion, SCS; left rotator cuff repair    ASSESSMENT  Her symptoms likely remain due to chronic myofascial pain, pelvic floor dysfunction, coccydynia, left trochanteric enthesopathy/bursitis, sacroiliac joint dysfunction bilaterally, and left piriformis syndrome. Some of her symptoms may relate to meralgia paraesthetica. Given history of arachnoiditis, more peripheral treatment would be ideal.    PLAN  1. Continue Mobic 15 mg daily. I advised she can add Tylenol as needed. 2. Referral to PT with aqua therapy and transition to land when appropriate [facility])    Pt will f/u in 6-8 weeks or sooner as needed. Diagnoses and all orders for this visit:    1. Chronic primary musculoskeletal pain  -     REFERRAL TO PHYSICAL THERAPY    2. Mechanical low back pain  -     REFERRAL TO PHYSICAL THERAPY    3. Myofascial pain  -     REFERRAL TO PHYSICAL THERAPY    4. Thoracic spine pain    5. Cervical myofascial pain syndrome    6. Pelvic floor dysfunction in female  -     REFERRAL TO PHYSICAL THERAPY    7. Sacroiliac joint dysfunction of both sides  -     REFERRAL TO PHYSICAL THERAPY    8. Greater trochanteric bursitis of left hip  -     REFERRAL TO PHYSICAL THERAPY    9. Piriformis syndrome of left side  -     REFERRAL TO PHYSICAL THERAPY    10. Coccydynia  -     REFERRAL TO PHYSICAL THERAPY    11. Meralgia paraesthetica, left  -     REFERRAL TO PHYSICAL THERAPY    12.  Meralgia paraesthetica, right  -     REFERRAL TO PHYSICAL THERAPY    13. Gait abnormality  -     REFERRAL TO PHYSICAL THERAPY         PAST MEDICAL HISTORY   Past Medical History:   Diagnosis Date    Adverse effect of anesthesia      bronchospasm,     Asthma     well controlled    Coagulation disorder ([address]     hypercoagulable    Depressive disorder 1/3/2013    Epilepsy ([facility] [address]     Ill-defined condition     Benign Granuloma Annularea    OAB (overactive bladder)     Obesity (BMI 30.0-34.9) 11/3/2016    Right leg DVT ([facility] [address]     DVT    Seizures ([facility] [address]     grand mal    Spastic bladder     Stroke [facility]) 2006    mini-stroke    Thromboembolus [facility])     s/p vincenzo filter    Thyroid disease     Unspecified adverse effect of anesthesia     bronchospasm on awakening       Past Surgical History:   Procedure Laterality Date    HX BACK SURGERY      dural repair    HX BACK SURGERY      nerve stimulator    HX LUMBAR FUSION  2006    HX LUMBAR LAMINECTOMY  2006    HX ROTATOR CUFF REPAIR Left 09/23/2020   . MEDICATIONS    Current Outpatient Medications   Medication Sig Dispense Refill    tiZANidine (ZANAFLEX) 4 mg tablet TAKE 1 TABLET BY MOUTH THREE TIMES DAILY AS NEEDED FOR PAIN 90 Tab 5    GABAPENTIN PO Take  by mouth. Pt is not sure of the dose and how much she is taking      meloxicam (MOBIC) 15 mg tablet Take 1 Tab by mouth daily. 30 Tab 5    naproxen (NAPROSYN) 500 mg tablet Take 1 Tab by mouth two (2) times daily (with meals). (Patient taking differently: Take 500 mg by mouth as needed.) 60 Tab 2    diclofenac (VOLTAREN) 1 % gel Apply 2 g to affected area daily as needed.  Advair -21 mcg/actuation inhaler Take 2 Puffs by inhalation two (2) times a day.  ondansetron (Zofran ODT) 4 mg disintegrating tablet Take 1 Tab by mouth every eight (8) hours as needed for Nausea (or vomiting.). Allow to dissolve on tongue 20 Tab 0    albuterol (PROVENTIL HFA, VENTOLIN HFA, PROAIR HFA) 90 mcg/actuation inhaler INHALE 2 PUFFS EVERY 6 HOURS AS NEEDED FOR WHEEZING OR SHORTNESS OF BREATH      lacosamide (VIMPAT) 100 mg tab tablet Take 100 mg by mouth two (2) times a day.  levothyroxine (SYNTHROID) 25 mcg tablet Take 25 mcg by mouth Daily (before breakfast).  lidocaine (LIDODERM) 5 % Apply 1 Patch to affected area as needed.  LORazepam (ATIVAN) 0.5 mg tablet Take 0.5 mg by mouth daily as needed.  topiramate (TOPAMAX) 100 mg tablet Take 100 mg by mouth two (2) times a day.       montelukast (SINGULAIR) 10 mg tablet Take 10 mg by mouth daily. Indications: ASTHMA PREVENTION      ibuprofen (MOTRIN) 800 mg tablet Take 800 mg by mouth every eight (8) hours as needed for Pain.  oxyCODONE-acetaminophen (Percocet) 5-325 mg per tablet Take 1 Tab by mouth nightly as needed for Pain.  simvastatin (ZOCOR) 10 mg tablet Take 10 mg by mouth nightly. ALLERGIES  Allergies   Allergen Reactions    Latex, Natural Rubber Itching    Adhesive Other (comments)     Layers of skin peeled off    Betadine [Povidone-Iodine] Rash     Patient states skin peels off.  Copper Rash and Swelling    Dilantin [Phenytoin Sodium Extended] Other (comments)     Throat swelled up rash all over    Other Medication Other (comments)     Surgical skin prep caused layers of skin to peel off. Needs benedryl before anesthesia.  Phenobarbital Other (comments)     Throat swelled up ,rash    Rifampin Rash          SOCIAL HISTORY    Social History     Socioeconomic History    Marital status:      Spouse name: Not on file    Number of children: Not on file    Years of education: Not on file    Highest education level: Not on file   Tobacco Use    Smoking status: Never Smoker    Smokeless tobacco: Never Used   Substance and Sexual Activity    Alcohol use: Yes     Comment: 1 per month    Drug use: No       FAMILY HISTORY  History reviewed. No pertinent family history. REVIEW OF SYSTEMS  Review of Systems   Constitutional: Negative for chills, fever and weight loss. Respiratory: Negative for shortness of breath. Cardiovascular: Negative for chest pain. Gastrointestinal: Negative for constipation. Negative for fecal incontinence    Genitourinary: Negative for dysuria. Negative for urinary incontinence   Musculoskeletal: Positive for back pain.         R SI joint pain  Coccydynia  Left foot drop  Left trochanter and SI pain   Coccydynia     L shoulder pain s/p rotator cuff surgery Skin: Negative for rash. Neurological: Positive for tingling ( BLE, LUE) and headaches. Negative for dizziness, tremors and focal weakness. Saddle anesthesia     Endo/Heme/Allergies: Does not bruise/bleed easily. Psychiatric/Behavioral: The patient does not have insomnia. PHYSICAL EXAMINATION  Visit Vitals  Pulse 75   Temp 97.8 °F (36.6 °C) (Temporal)   Resp 15   Ht 5' 7\" (1.702 m)   Wt 236 lb 3.2 oz (107.1 kg)   SpO2 97%   BMI 36.99 kg/m²       Pain Assessment  4/29/2021   Location of Pain Back   Pain Location Comment -   Location Modifiers -   Severity of Pain 5   Quality of Pain -   Quality of Pain Comment -   Duration of Pain A few hours   Frequency of Pain Intermittent   Aggravating Factors -   Aggravating Factors Comment -   Limiting Behavior -   Relieving Factors -   Relieving Factors Comment -   Result of Injury -           Constitutional:  Well developed, well nourished, in no acute distress. Psychiatric: Affect and mood are appropriate. Integumentary: No rashes or abrasions noted on exposed areas. SPINE/MUSCULOSKELETAL EXAM    Cervical spine:  Neck is midline. Normal muscle tone. No focal atrophy is noted. ROM pain free. Shoulder ROM intact.   No tenderness to palpation. Negative Spurling's sign. Negative Tinel's sign. Negative Serrato's sign.      Sensation in the bilateral arms grossly intact to light touch.      Lumbar spine:  No rash, ecchymosis, or gross obliquity. No fasciculations. No focal atrophy is noted. No pain with hip ROM. Full range of motion. No tenderness to palpation.   No tenderness to palpation at the sciatic notch.    SI joints tender bilaterally, R>L   Trochanters non tender.     Sensation in the bilateral legs grossly intact to light touch.             LEFT RIGHT   CLAY TEST - +   P4 TEST - +   COMPRESSION TEST - -   DISTRACTION TEST - +   GAENSLEN'S TEST - +            Updates 9/1/2020:     SI joint on R tender to palpation       LEFT RIGHT   CLAY TEST - +   P4 TEST - +   COMPRESSION TEST - -   DISTRACTION TEST - -   GAENSLEN'S TEST - +      Updates 11/24/2020:  Tenderness to palpation of coccydynia     Updates 4/29/2021:  Tenderness to palpation of lumbar paraspinals      MOTOR:       Biceps  Triceps Deltoids Wrist Ext Wrist Flex Hand Intrin   Right 5/5 5/5 5/5 5/5 5/5 5/5   Left 5/5 5/5 5/5 5/5 5/5 5/5                     Hip Flex  Quads Hamstrings Ankle DF EHL Ankle PF   Right 5/5 5/5 5/5 5/5 5/5 5/5   Left 5/5 5/5 5/5 5/5 5/5 5/5      DTRs are 1+ biceps, triceps, brachioradialis, patella, and Achilles.     Negative Straight Leg raise. Squat not tested. No difficulty with tandem gait.      Ambulation with single point cane. FWB.     RADIOGRAPHS  Cervical, Thoracic, and Lumbar Spine CT Myelogram images taken on 1/22/21 personally reviewed with patient:  Cervical spine CT: There is mild posterior osteophytic ridging C5-6, which  causes minimal impress on the ventral thecal sac. No disc bulge or  protrusion. No central canal compromise. There is mild bilateral  foraminal encroachment C5-6, secondary to uncovertebral spurring. Facet  alignment is normal.  No fracture or subluxation. The spinal cord is  normal in appearance. The paravertebral soft tissues are unremarkable. Thoracic spine CT: No central canal compromise. No significant disc bulge  or protrusion identified in the thoracic spine. There is a retained  spinal stimulator posteriorly T7-T9. The thoracic spinal cord is  unremarkable as imaged. The conus terminalis terminates at the T12-L1  disc space. The paravertebral soft tissues and visualized portions of the  lungs are unremarkable. Lumbar spine CT: There is a mild broad-based disc bulge L4-5. Postsurgical changes from previous posterior laminectomy and fusion L5-S1. Fusion hardware appears intact.   There is moderate soft tissue posterior  to the L5-S1 disc space, which may be postsurgical scar, or disc material.  No central canal compromise. No critical foraminal encroachment. There  is advanced bilateral facet spondylosis L4-5 through L5-S1. There is an  inferior vena cava filter incidentally noted there is a couple small of  periaortic retroperitoneal lymph nodes, nonspecific. There is a 2 cm cyst  left adnexa. IMPRESSION:  No central canal compromise identified within the cervical, thoracic or  lumbar spine. Mild posterior osteophytic ridging and bilateral foraminal  encroachment C5-6. Retained spinal stimulator hardware in the thoracic  spine as noted. Previous laminectomy and fusion L5-S1 with intact  hardware. Broad-based disc bulge L4-5. Prominent soft tissue posterior  to the disc space at L5-S1, which may reflect residual scar tissue, or  disc material.  Borderline periaortic retroperitoneal lymphadenopathy. 2  cm cystic lesion adnexa.        4V Lumbar XR images taken on 4/1/2020 personally reviewed with patient:  BONES: Status post L5-S1 posterior spinal fusion and interbody disc cage. L5  laminectomy. No evidence of acute fracture or listhesis. Overlying spinal  stimulator and IVC filter partially obscure underlying osseous detail. Vertebral  body heights are maintained. No osseous lytic or blastic lesion. Mild multilevel  spondylosis.     SOFT TISSUES: Unremarkable.     OTHER: None.     _______________     IMPRESSION  IMPRESSION:     No evidence of acute fracture or listhesis.     Lower lumbar spinal fusion.     LT HIP XR images taken on 4/1/2020 personally reviewed with patient:  BONES: No evidence of acute fracture or dislocation. Joint spaces and alignment  are maintained. No aggressive appearing osseous lytic or blastic lesion.      SOFT TISSUES: Unremarkable.     _______________     IMPRESSION  IMPRESSION:     No evidence of acute fracture or dislocation.     Lumbar XR images taken on 2/16/18 personally reviewed with patient:  Right posterior upper hip generator device with dorsal intraspinal stimulator wires, and the T10-11 level with the wire tips at the T7-T8 level. Postoperative changes of previous L5 decompression laminectomy with solid and are osseous circumferential fusion at L5-S1.  No findings of hardware failure. A right-sided IVC filter is present at the L2-L3 level. Mild multilevel degenerative spondylosis similar prior examination with no evidence of listhesis.  The vertebral bodies maintain normal height and alignment.  No acute obstructive osseous abnormality. IMPRESSION:    1. Stable post operative circumferential fusion L5-S1 with L5 decompression laminectomy. 2.  Dorsal stimulator wire tip at the T7-T8 level. 3.  Mild degenerative lumbar spine spondylosis.  No acute obstructive osseous abnormality. 40 minutes of face-to-face contact were spent with the patient during today's visit extensively discussing symptoms and treatment plan. All questions were answered. More than half of this visit today was spent on counseling.      Written by Claudette Haagensen as dictated by Nohemy Gustafson MD

## 2021-04-30 ENCOUNTER — APPOINTMENT (OUTPATIENT)
Dept: PHYSICAL THERAPY | Age: 56
End: 2021-04-30

## 2021-06-24 ENCOUNTER — OFFICE VISIT (OUTPATIENT)
Dept: ORTHOPEDIC SURGERY | Age: 56
End: 2021-06-24
Payer: OTHER MISCELLANEOUS

## 2021-06-24 VITALS
HEART RATE: 74 BPM | BODY MASS INDEX: 37.04 KG/M2 | TEMPERATURE: 97.8 F | WEIGHT: 236 LBS | OXYGEN SATURATION: 96 % | HEIGHT: 67 IN

## 2021-06-24 DIAGNOSIS — M79.18 MYOFASCIAL PAIN: ICD-10-CM

## 2021-06-24 DIAGNOSIS — G57.11 MERALGIA PARAESTHETICA, RIGHT: ICD-10-CM

## 2021-06-24 DIAGNOSIS — R26.9 GAIT ABNORMALITY: ICD-10-CM

## 2021-06-24 DIAGNOSIS — G57.12 MERALGIA PARAESTHETICA, LEFT: ICD-10-CM

## 2021-06-24 DIAGNOSIS — M62.89 PELVIC FLOOR DYSFUNCTION IN FEMALE: ICD-10-CM

## 2021-06-24 DIAGNOSIS — M53.3 COCCYDYNIA: ICD-10-CM

## 2021-06-24 DIAGNOSIS — G89.29 CHRONIC PRIMARY MUSCULOSKELETAL PAIN: Primary | ICD-10-CM

## 2021-06-24 DIAGNOSIS — M54.59 MECHANICAL LOW BACK PAIN: ICD-10-CM

## 2021-06-24 DIAGNOSIS — M54.6 THORACIC SPINE PAIN: ICD-10-CM

## 2021-06-24 DIAGNOSIS — M53.3 SACROILIAC JOINT DYSFUNCTION OF BOTH SIDES: ICD-10-CM

## 2021-06-24 DIAGNOSIS — M79.18 CHRONIC PRIMARY MUSCULOSKELETAL PAIN: Primary | ICD-10-CM

## 2021-06-24 DIAGNOSIS — G57.02 PIRIFORMIS SYNDROME OF LEFT SIDE: ICD-10-CM

## 2021-06-24 DIAGNOSIS — M70.62 GREATER TROCHANTERIC BURSITIS OF LEFT HIP: ICD-10-CM

## 2021-06-24 DIAGNOSIS — M79.18 CERVICAL MYOFASCIAL PAIN SYNDROME: ICD-10-CM

## 2021-06-24 PROCEDURE — 99214 OFFICE O/P EST MOD 30 MIN: CPT | Performed by: PHYSICAL MEDICINE & REHABILITATION

## 2021-06-24 RX ORDER — NAPROXEN 500 MG/1
500 TABLET ORAL 2 TIMES DAILY WITH MEALS
Qty: 60 TABLET | Refills: 11 | Status: SHIPPED | OUTPATIENT
Start: 2021-06-24 | End: 2022-06-28

## 2021-06-24 RX ORDER — OXCARBAZEPINE 150 MG/1
150 TABLET, FILM COATED ORAL 2 TIMES DAILY
COMMUNITY
End: 2022-06-07

## 2021-06-24 NOTE — LETTER
NOTIFICATION RETURN TO WORK / SCHOOL    6/24/2021 12:34 PM    Ms. Terrance Garcia  [facility] [id number]      To Whom It May Concern:    Terrance Garcia is currently under the care of [facility]. It would be medically beneficial for her to have a handrail installed on her front porch and have a handicap parking spot in front of her residence. If there are questions or concerns please have the patient contact our office.         Sincerely,      Brijesh Blancas MD

## 2021-06-24 NOTE — PROGRESS NOTES
[address]  [address]  [facility], [address]  Phone: [phone]  Fax: [phone]        Von Marquez  : 1965  PCP: Glory Storey MD  2021    PROGRESS NOTE      HISTORY OF PRESENT ILLNESS  Jennifer Flores is a 55 y.o. female who was seen as a new patient 10/10/19 with c/o chronic low back pain extending into the RLE that she described as a burning pain. She also c/o numbness and foot drop in the LLE that improved some with surgery (L5-S1 fusion) and SCS. She continued to have some residual foot drop and shakiness/tremor of her foot along with LLE cramping. She noted that her neurologist did an emergency EEG to see if it was due to her complex regional seizures, but they were not. She found significant relief with Tizanidine, especially with the distal LLE cramping. She noted that she did not tolerate other muscle relaxants due to somnolence. She found significant relief from the SCS and a Lidoderm 12 hr patch. Pt reported that she has had two EMGs in the past revealing a chronic left L5 radiculopathy. Pt noted that she previously had a dx of sacroiliitis, and she previously found relief from an SI joint injection. She had a right-sided lacunar stroke affecting the temporal lobe - LLE weakness, mild LUE weakness, cognitive impairment, smiling on the right side. 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 She saw Dr. Aron Mason 19 in regards to a spinal cord stimulator malfunction. She had SCS placement in  by Dr. James Spencer, and it was subsequently revised several times. She reported that she has had multiple issues with the SCS's over the years related to battery life. Due to her cognitive impairment, she is unable to use a rechargable batteries.  She previously worked as a Physical Therapist. She was prescribed an [address] underwent a Right SI joint injection (10/22/19; Dr. Staci Dhillon) that provided significant relief and she doubled her daily steps. She continued to take Tizanidine, Topamax, and Depakote prescribed by her neurologist. She maintained an HEP of walking and yoga. She saw some improvement with PT (11/14/19-2/4/2020; [facility]). She used a left AFO as recommended by PT. She reported no longer having a tremor in her LLE. Pt noted that during one session of PT, she was having manual, myofascial release on her external pelvis and believed it elicited a seizure at the time. She experienced constipation, weight gain, and somnolence with Tizanidine but she felt the benefits outweighed the side effects. Pt noted that she saw her orthotist who provided an orthotic that has helped with her ambulation. She noted that her left hip was elevated by 1/8th of an inch. She was advised to obtain a gel insert for the right side, but it did not provide any benefit. Her son provided long access distraction that helped with the muscle spasms, but she had burning and numbness slightly posterior to the trochanter. She had significant sharp pain with weight bearing that radiated into the ankle. She saw some benefit from oral steroids, especially with her SI joint pain. However, she continued to have some pain near the trochanter and the left gluteus medius near the insertion. She had to use a cane to ambulate around her house. Pt denied groin pain. She returned with c/o pelvic pains, sacral pain and tailbone numbness. She also reported some BLE itching and redness. She found benefit with walking/movement. She saw Dr. Sona Sharp and he suggested she may do well with seeing his wife at [facility]. She Misti Watkins, and he suggested the ganglion impar injection for coccyx pain if she decided to proceed with that option. However, he noted, it would not likely resolve her other symptoms.  Pt notes that she walked 1.1 miles one day, and both of her legs began turning in. She returned with c/o exacerbation of tailbone and sacral pain. She was interested in another SI joint injection. She planned to have a left rotator cuff repair surgery. Pt notes that she has severe pain, numbness, and tingling posterolaterally to the knee that is painful with walking and sitting. She notes that she even experienced limping while walking in the house and she felt that her legs would collapse. She presents wearing a left AFO. Since her last OV, she had a left rotator cuff repair surgery with [facility] so she rested in a recliner often and she has had home health PT. She underwent a right SI joint injection (9/22/2020; Dr. Anna Neff) without benefit. She feels her left AFO brace has been aggravating her SIJ pain. She has had recurrent UTIs. She previously did well with pelvic floor PT. She notes that the SCS seems to be making her BLE pains worse. She has been experiencing generalized chest tightness - her GP has done a chest XR to r/o pneumonia and she is scheduled to have an echo soon. She had her SCS reprogrammed with benefit. She noted that she had paraesthesia throughout the groin, anus, vagina. She has been using the vaginal wand in pelvic floor PT. She continued to have some right groin and RLE pain. Pt noted that she could not use her SCS because after 3-4 hours, she had a burning pain in her coccyx and low back up to the thoracic spine to the bra line. She took Gabapentin, Percocet, and NSAIDs along with a special pillow for her coccydynia. Her pain is worse with prolonged sitting or laying. She also notes that she has a soft tissue injury to the LUE. She believes she injured her right biceps tendon when she tried driving again. Cervical, Thoracic, and Lumbar spine CT Myelogram dated 1/22/21 reviewed.  Per report, No central canal compromise identified within the cervical, thoracic or lumbar spine.  Mild posterior osteophytic ridging and bilateral foraminal encroachment C5-6.  Retained spinal stimulator hardware in the thoracic spine as noted.  Previous laminectomy and fusion L5-S1 with intact hardware.  Broad-based disc bulge L4-5.  Prominent soft tissue posterior to the disc space at L5-S1, which may reflect residual scar tissue, or disc material.  Borderline periaortic retroperitoneal lymphadenopathy.  2 cm cystic lesion adnexa. Pt notes that she has an appointment scheduled with Dr. Vishal Lovell soon. She notes that her pelvic floor symptoms and saddle anesthesia improved with pelvic floor PT but have slightly returned since her pain has flared up. She has been wearing her AFO again on the left with benefit. She feels more comfortable in it and feels more secure than she previously did. She continues to have some cramping in the left foot. She has been taking Tizanidine with some benefit, but she experiences some somnolence as well. She is doing better since her left shoulder surgery, and she is driving some again. However, she has some LUE pain when she drives still. 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 She sees Miguel Gamez PA-C for pain management. She continues to have low back pain that she describes as \"heat. \" She is finding some benefit with Mobic, though mild. She had a ganglion impar block with Dr. Vishal Lovell (2/24/21) with significant benefit. She attended PT with Jeff, but she had to stop due to her spraining her ankle. Jefry Fuentes comes in to the office today for f/u. She has had a flare up of her sacral pain, but she has seen improvement overall since her last visit. She continues to do well with Dr. Antonella Alamo. She has been walking more and increasing her activity. She still has some difficulty with the grocery store and carrying groceries. She had difficulty coming up the steps after aqua therapy Evon Colón) so she was advised it was not recommended to continue due to safety risk.  She notes that PT recommended the Urbita machine. Pain Score: 5/10. Treatments patient has tried:  Physical therapy:Yes  Doing HEP: Yes  Non-opioid medications: Yes - Tizanidine, Topamax, oral steroids, Diclofenac, Gabapentin, NSAIDs  Spinal injections: Right SI joint injection (10/22/19); right SIJ (9/22/20)  Spinal surgery- Yes - fusion L5-S1; SCS  Last Spine CT Myelogram - 2021      reviewed. 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 L5-S1 fusion, SCS; left rotator cuff repair    ASSESSMENT  Her symptoms likely remain due to chronic myofascial pain, pelvic floor dysfunction, coccydynia, left trochanteric enthesopathy/bursitis, sacroiliac joint dysfunction bilaterally, and left piriformis syndrome. Some of her symptoms may relate to meralgia paraesthetica. Given history of arachnoiditis, more peripheral treatment would be ideal.    PLAN  1. Letter for handrail installation and handicap parking spot at residence. 2. Refill Naprosyn 500 mg BID. Pt will f/u at already scheduled appt in August or sooner as needed. Diagnoses and all orders for this visit:    1. Chronic primary musculoskeletal pain    2. Coccydynia  -     naproxen (NAPROSYN) 500 mg tablet; Take 1 Tablet by mouth two (2) times daily (with meals). 3. Pelvic floor dysfunction in female  -     naproxen (NAPROSYN) 500 mg tablet; Take 1 Tablet by mouth two (2) times daily (with meals). 4. Sacroiliac joint dysfunction of both sides  -     naproxen (NAPROSYN) 500 mg tablet; Take 1 Tablet by mouth two (2) times daily (with meals). 5. Greater trochanteric bursitis of left hip  -     naproxen (NAPROSYN) 500 mg tablet; Take 1 Tablet by mouth two (2) times daily (with meals). 6. Piriformis syndrome of left side  -     naproxen (NAPROSYN) 500 mg tablet; Take 1 Tablet by mouth two (2) times daily (with meals). 7. Meralgia paraesthetica, left  -     naproxen (NAPROSYN) 500 mg tablet;  Take 1 Tablet by mouth two (2) times daily (with meals). 8. Meralgia paraesthetica, right  -     naproxen (NAPROSYN) 500 mg tablet; Take 1 Tablet by mouth two (2) times daily (with meals). 9. Mechanical low back pain    10. Myofascial pain    11. Thoracic spine pain    12. Cervical myofascial pain syndrome    13. Gait abnormality         PAST MEDICAL HISTORY   Past Medical History:   Diagnosis Date    Adverse effect of anesthesia      bronchospasm,     Asthma     well controlled    Coagulation disorder (HCC)     hypercoagulable    Depressive disorder 1/3/2013    Epilepsy ([address]     Ill-defined condition     Benign Granuloma Annularea    OAB (overactive bladder)     Obesity (BMI 30.0-34.9) 11/3/2016    Right leg DVT ([facility] [address]     DVT    Seizures ([facility] [address]     grand mal    Spastic bladder     Stroke ([facility] [address] 2006    mini-stroke    Thromboembolus [facility])     s/p vincenzo filter    Thyroid disease     Unspecified adverse effect of anesthesia     bronchospasm on awakening       Past Surgical History:   Procedure Laterality Date    HX BACK SURGERY      dural repair    HX BACK SURGERY      nerve stimulator    HX LUMBAR FUSION  2006    HX LUMBAR LAMINECTOMY  2006    HX ROTATOR CUFF REPAIR Left 09/23/2020   . MEDICATIONS    Current Outpatient Medications   Medication Sig Dispense Refill    tiZANidine (ZANAFLEX) 4 mg tablet TAKE 1 TABLET BY MOUTH THREE TIMES DAILY AS NEEDED FOR PAIN 90 Tab 5    ibuprofen (MOTRIN) 800 mg tablet Take 800 mg by mouth every eight (8) hours as needed for Pain.  GABAPENTIN PO Take  by mouth. Pt is not sure of the dose and how much she is taking      oxyCODONE-acetaminophen (Percocet) 5-325 mg per tablet Take 1 Tab by mouth nightly as needed for Pain.  meloxicam (MOBIC) 15 mg tablet Take 1 Tab by mouth daily. 30 Tab 5    naproxen (NAPROSYN) 500 mg tablet Take 1 Tab by mouth two (2) times daily (with meals).  (Patient taking differently: Take 500 mg by mouth as needed.) 60 Tab 2    simvastatin (ZOCOR) 10 mg tablet Take 10 mg by mouth nightly.  diclofenac (VOLTAREN) 1 % gel Apply 2 g to affected area daily as needed.  Advair -21 mcg/actuation inhaler Take 2 Puffs by inhalation two (2) times a day.  ondansetron (Zofran ODT) 4 mg disintegrating tablet Take 1 Tab by mouth every eight (8) hours as needed for Nausea (or vomiting.). Allow to dissolve on tongue 20 Tab 0    albuterol (PROVENTIL HFA, VENTOLIN HFA, PROAIR HFA) 90 mcg/actuation inhaler INHALE 2 PUFFS EVERY 6 HOURS AS NEEDED FOR WHEEZING OR SHORTNESS OF BREATH      lacosamide (VIMPAT) 100 mg tab tablet Take 100 mg by mouth two (2) times a day.  levothyroxine (SYNTHROID) 25 mcg tablet Take 25 mcg by mouth Daily (before breakfast).  lidocaine (LIDODERM) 5 % Apply 1 Patch to affected area as needed.  LORazepam (ATIVAN) 0.5 mg tablet Take 0.5 mg by mouth daily as needed.  topiramate (TOPAMAX) 100 mg tablet Take 100 mg by mouth two (2) times a day.  montelukast (SINGULAIR) 10 mg tablet Take 10 mg by mouth daily. Indications: ASTHMA PREVENTION          ALLERGIES  Allergies   Allergen Reactions    Latex, Natural Rubber Itching    Adhesive Other (comments)     Layers of skin peeled off    Betadine [Povidone-Iodine] Rash     Patient states skin peels off.  Copper Rash and Swelling    Dilantin [Phenytoin Sodium Extended] Other (comments)     Throat swelled up rash all over    Other Medication Other (comments)     Surgical skin prep caused layers of skin to peel off. Needs benedryl before anesthesia.     Phenobarbital Other (comments)     Throat swelled up ,rash    Rifampin Rash          SOCIAL HISTORY    Social History     Socioeconomic History    Marital status:      Spouse name: Not on file    Number of children: Not on file    Years of education: Not on file    Highest education level: Not on file   Tobacco Use    Smoking status: Never Smoker    Smokeless tobacco: Never Used Vaping Use    Vaping Use: Never used   Substance and Sexual Activity    Alcohol use: Yes     Comment: 1 per month    Drug use: No     Social Determinants of Health     Financial Resource Strain:     Difficulty of Paying Living Expenses:    Food Insecurity:     Worried About Running Out of Food in the Last Year:     [address] in the Last Year:    Transportation Needs:     Lack of Transportation (Medical):  Lack of Transportation (Non-Medical):    Physical Activity:     Days of Exercise per Week:     Minutes of Exercise per Session:    Stress:     Feeling of Stress :    Social Connections:     Frequency of Communication with Friends and Family:     Frequency of Social Gatherings with Friends and Family:     Attends Christian Services:     Active Member of Clubs or Organizations:     Attends Club or Organization Meetings:     Marital Status:        FAMILY HISTORY  No family history on file. REVIEW OF SYSTEMS  Review of Systems   Constitutional: Negative for chills, fever and weight loss. Respiratory: Negative for shortness of breath. Cardiovascular: Negative for chest pain. Gastrointestinal: Negative for constipation. Negative for fecal incontinence    Genitourinary: Negative for dysuria. Negative for urinary incontinence   Musculoskeletal: Positive for back pain. R SI joint pain  Coccydynia  Left foot drop  Left trochanter and SI pain   Coccydynia     L shoulder pain s/p rotator cuff surgery    Skin: Negative for rash. Neurological: Positive for tingling ( BLE, LUE) and headaches. Negative for dizziness, tremors and focal weakness. Saddle anesthesia     Endo/Heme/Allergies: Does not bruise/bleed easily. Psychiatric/Behavioral: The patient does not have insomnia. PHYSICAL EXAMINATION  There were no vitals taken for this visit.     Pain Assessment  4/29/2021   Location of Pain Back   Pain Location Comment -   Location Modifiers - Severity of Pain 5   Quality of Pain -   Quality of Pain Comment -   Duration of Pain A few hours   Frequency of Pain Intermittent   Aggravating Factors -   Aggravating Factors Comment -   Limiting Behavior -   Relieving Factors -   Relieving Factors Comment -   Result of Injury -           Constitutional:  Well developed, well nourished, in no acute distress. Psychiatric: Affect and mood are appropriate. Integumentary: No rashes or abrasions noted on exposed areas. SPINE/MUSCULOSKELETAL EXAM    Cervical spine:  Neck is midline. Normal muscle tone. No focal atrophy is noted. ROM pain free. Shoulder ROM intact.   No tenderness to palpation. Negative Spurling's sign. Negative Tinel's sign. Negative Serrato's sign.      Sensation in the bilateral arms grossly intact to light touch.      Lumbar spine:  No rash, ecchymosis, or gross obliquity. No fasciculations. No focal atrophy is noted. No pain with hip ROM. Full range of motion. No tenderness to palpation.   No tenderness to palpation at the sciatic notch.    SI joints tender bilaterally, R>L   Trochanters non tender.     Sensation in the bilateral legs grossly intact to light touch.             LEFT RIGHT   CLAY TEST - +   P4 TEST - +   COMPRESSION TEST - -   DISTRACTION TEST - +   GAENSLEN'S TEST - +            Updates 9/1/2020:     SI joint on R tender to palpation       LEFT RIGHT   CLAY TEST - +   P4 TEST - +   COMPRESSION TEST - -   DISTRACTION TEST - -   GAENSLEN'S TEST - +      Updates 11/24/2020:  Tenderness to palpation of coccydynia      Updates 4/29/2021:  Tenderness to palpation of lumbar paraspinals       MOTOR:      Biceps  Triceps Deltoids Wrist Ext Wrist Flex Hand Intrin   Right 5/5 5/5 5/5 5/5 5/5 5/5   Left 5/5 5/5 5/5 5/5 5/5 5/5             Hip Flex  Quads Hamstrings Ankle DF EHL Ankle PF   Right 5/5 5/5 5/5 5/5 5/5 5/5   Left 5/5 5/5 5/5 5/5 5/5 5/5     DTRs are 1+ biceps, triceps, brachioradialis, patella, and Achilles.     Negative Straight Leg raise. Squat not tested. No difficulty with tandem gait.      Ambulation with single point cane. FWB.     RADIOGRAPHS  Cervical, Thoracic, and Lumbar Spine CT Myelogram images taken on 1/22/21 personally reviewed with patient:  Cervical spine CT: There is mild posterior osteophytic ridging C5-6, which  causes minimal impress on the ventral thecal sac.  No disc bulge or  protrusion.  No central canal compromise.  There is mild bilateral  foraminal encroachment C5-6, secondary to uncovertebral spurring.  Facet  alignment is normal.  No fracture or subluxation.  The spinal cord is  normal in appearance.  The paravertebral soft tissues are unremarkable. Thoracic spine CT: No central canal compromise.  No significant disc bulge  or protrusion identified in the thoracic spine. Gleda Erin is a retained  spinal stimulator posteriorly T7-T9.  The thoracic spinal cord is  unremarkable as imaged.  The conus terminalis terminates at the T12-L1  disc space.  The paravertebral soft tissues and visualized portions of the  lungs are unremarkable. Lumbar spine CT: There is a mild broad-based disc bulge L4-5. Postsurgical changes from previous posterior laminectomy and fusion L5-S1. Fusion hardware appears intact.  There is moderate soft tissue posterior  to the L5-S1 disc space, which may be postsurgical scar, or disc material.  No central canal compromise.  No critical foraminal encroachment.  There  is advanced bilateral facet spondylosis L4-5 through L5-S1.  There is an  inferior vena cava filter incidentally noted there is a couple small of  periaortic retroperitoneal lymph nodes, nonspecific. Gleda Erin is a 2 cm cyst  left adnexa. IMPRESSION:  No central canal compromise identified within the cervical, thoracic or  lumbar spine.  Mild posterior osteophytic ridging and bilateral foraminal  encroachment C5-6.  Retained spinal stimulator hardware in the thoracic  spine as noted.  Previous laminectomy and fusion L5-S1 with intact  hardware.  Broad-based disc bulge L4-5.  Prominent soft tissue posterior  to the disc space at L5-S1, which may reflect residual scar tissue, or  disc material.  Borderline periaortic retroperitoneal lymphadenopathy.  2  cm cystic lesion adnexa.        4V Lumbar XR images taken on 4/1/2020 personally reviewed with patient:  BONES: Status post L5-S1 posterior spinal fusion and interbody disc cage. L5  laminectomy. No evidence of acute fracture or listhesis. Overlying spinal  stimulator and IVC filter partially obscure underlying osseous detail. Vertebral  body heights are maintained. No osseous lytic or blastic lesion. Mild multilevel  spondylosis.     SOFT TISSUES: Unremarkable.     OTHER: None.     _______________     IMPRESSION  IMPRESSION:     No evidence of acute fracture or listhesis.     Lower lumbar spinal fusion.     LT HIP XR images taken on 4/1/2020 personally reviewed with patient:  BONES: No evidence of acute fracture or dislocation. Joint spaces and alignment  are maintained. No aggressive appearing osseous lytic or blastic lesion.      SOFT TISSUES: Unremarkable.     _______________     IMPRESSION  IMPRESSION:     No evidence of acute fracture or dislocation. Lumbar XR images taken on 2/16/18 personally reviewed with patient:  Right posterior upper hip generator device with dorsal intraspinal stimulator wires, and the T10-11 level with the wire tips at the T7-T8 level. Postoperative changes of previous L5 decompression laminectomy with solid and are osseous circumferential fusion at L5-S1.  No findings of hardware failure. A right-sided IVC filter is present at the L2-L3 level. Mild multilevel degenerative spondylosis similar prior examination with no evidence of listhesis.  The vertebral bodies maintain normal height and alignment.  No acute obstructive osseous abnormality.      IMPRESSION:    1. Stable post operative circumferential fusion L5-S1 with L5 decompression laminectomy. 2.  Dorsal stimulator wire tip at the T7-T8 level. 3.  Mild degenerative lumbar spine spondylosis.  No acute obstructive osseous abnormality. 30 minutes of face-to-face contact were spent with the patient during today's visit extensively discussing symptoms and treatment plan. All questions were answered. More than half of this visit today was spent on counseling.      Written by Jovanna Crocker as dictated by Tony Scott MD

## 2021-07-02 ENCOUNTER — TELEPHONE (OUTPATIENT)
Dept: ORTHOPEDIC SURGERY | Age: 56
End: 2021-07-02

## 2021-07-02 NOTE — LETTER
NOTIFICATION RETURN TO WORK / SCHOOL    7/6/2021 9:33 AM    Ms. Farooq Cruz  [facility] [address]      To Whom It May Concern:    Farooq Cruz is currently under the care of [address] [facility]. It would be medically beneficial for her to have a handrail installed on her front porch and to have a handicap parking spot in front of her residence. This would require a curb cut out on the sidewalk where the handicap space will be. If there are questions or concerns please have the patient contact our office.         Sincerely,      Fannie Person MD

## 2021-07-02 NOTE — TELEPHONE ENCOUNTER
Patient is asking if the letter written for her handicap request can be amended to including she would require a \"curb cut out\" where they remove part of a sidewalk. Please advise.      Patient [phone]

## 2021-07-06 NOTE — TELEPHONE ENCOUNTER
Letter was updated per provider's ok. I called and spoke to Ms. Patrick Flaco. She was made aware that the letter had been addended and is ready for  at the office or it is accessible through her My Chart rod. She verbalized understanding and has no other requests at this time.

## 2021-07-06 NOTE — TELEPHONE ENCOUNTER
Patient called in again to follow up on the previous message encounter in regards to the \"curb cut out\" documentation.     Patient's contact [phone]

## 2021-08-12 ENCOUNTER — TELEPHONE (OUTPATIENT)
Dept: ORTHOPEDIC SURGERY | Age: 56
End: 2021-08-12

## 2021-08-12 ENCOUNTER — OFFICE VISIT (OUTPATIENT)
Dept: ORTHOPEDIC SURGERY | Age: 56
End: 2021-08-12
Payer: OTHER MISCELLANEOUS

## 2021-08-12 VITALS — OXYGEN SATURATION: 97 % | RESPIRATION RATE: 18 BRPM | HEART RATE: 73 BPM | BODY MASS INDEX: 36.96 KG/M2 | HEIGHT: 67 IN

## 2021-08-12 DIAGNOSIS — M54.59 MECHANICAL LOW BACK PAIN: ICD-10-CM

## 2021-08-12 DIAGNOSIS — M62.89 PELVIC FLOOR DYSFUNCTION IN FEMALE: ICD-10-CM

## 2021-08-12 DIAGNOSIS — G57.12 MERALGIA PARAESTHETICA, LEFT: ICD-10-CM

## 2021-08-12 DIAGNOSIS — G89.29 CHRONIC PRIMARY MUSCULOSKELETAL PAIN: Primary | ICD-10-CM

## 2021-08-12 DIAGNOSIS — R26.9 GAIT ABNORMALITY: ICD-10-CM

## 2021-08-12 DIAGNOSIS — G57.11 MERALGIA PARAESTHETICA, RIGHT: ICD-10-CM

## 2021-08-12 DIAGNOSIS — G57.02 PIRIFORMIS SYNDROME OF LEFT SIDE: ICD-10-CM

## 2021-08-12 DIAGNOSIS — M53.3 COCCYDYNIA: ICD-10-CM

## 2021-08-12 DIAGNOSIS — M79.18 MYOFASCIAL PAIN: ICD-10-CM

## 2021-08-12 DIAGNOSIS — M53.3 SACROILIAC JOINT DYSFUNCTION OF BOTH SIDES: ICD-10-CM

## 2021-08-12 DIAGNOSIS — M70.62 GREATER TROCHANTERIC BURSITIS OF LEFT HIP: ICD-10-CM

## 2021-08-12 DIAGNOSIS — M79.18 CHRONIC PRIMARY MUSCULOSKELETAL PAIN: Primary | ICD-10-CM

## 2021-08-12 PROBLEM — E03.9 ACQUIRED HYPOTHYROIDISM: Status: ACTIVE | Noted: 2020-04-27

## 2021-08-12 PROBLEM — Z86.2 H/O HYPERCOAGULABLE STATE: Status: RESOLVED | Noted: 2018-07-03 | Resolved: 2021-08-12

## 2021-08-12 PROBLEM — G40.209 LOCALIZATION-RELATED FOCAL EPILEPSY WITH COMPLEX PARTIAL SEIZURES (HCC): Status: ACTIVE | Noted: 2018-05-01

## 2021-08-12 PROBLEM — Z86.73 HISTORY OF STROKE: Status: ACTIVE | Noted: 2018-05-01

## 2021-08-12 PROBLEM — F41.1 ANXIETY STATE: Status: ACTIVE | Noted: 2020-01-16

## 2021-08-12 PROBLEM — R06.02 SHORTNESS OF BREATH: Status: ACTIVE | Noted: 2020-11-12

## 2021-08-12 PROBLEM — M75.21 BICEPS TENDONITIS ON RIGHT: Status: ACTIVE | Noted: 2021-03-31

## 2021-08-12 PROBLEM — M25.511 CHRONIC RIGHT SHOULDER PAIN: Status: ACTIVE | Noted: 2021-01-30

## 2021-08-12 PROBLEM — H25.813 COMBINED FORM OF AGE-RELATED CATARACT, BOTH EYES: Status: ACTIVE | Noted: 2020-07-20

## 2021-08-12 PROCEDURE — 99215 OFFICE O/P EST HI 40 MIN: CPT | Performed by: PHYSICAL MEDICINE & REHABILITATION

## 2021-08-12 NOTE — TELEPHONE ENCOUNTER
Patient called in requesting to speak with Marianela Javier only. When asked in reference for the call, patient stated \"they did not tell me my blood pressure today during the office visit and and I need to know it, \"this is unfair and not acceptable\".    The patient stated \"this is the only issue I need to speak with Marianela Javier about and she is expecting a call before the close of the business day\"    Patient's contact is [phone]

## 2021-08-12 NOTE — PROGRESS NOTES
[address]  [place], [address] [place] [address] Street  Phone: [phone]  Fax: [phone]        Reena Holliday  : 1965  PCP: Zahra Nj MD  2021    PROGRESS NOTE      HISTORY OF PRESENT ILLNESS  Elli Miranda is a 55 y.o. female who was seen as a new patient 10/10/19 with c/o chronic low back pain extending into the RLE that she described as a burning pain. She also c/o numbness and foot drop in the LLE that improved some with surgery (L5-S1 fusion) and SCS. She continued to have some residual foot drop and shakiness/tremor of her foot along with LLE cramping. She noted that her neurologist did an emergency EEG to see if it was due to her complex regional seizures, but they were not. She found significant relief with Tizanidine, especially with the distal LLE cramping. She noted that she did not tolerate other muscle relaxants due to somnolence. She found significant relief from the SCS and a Lidoderm 12 hr patch. Pt reported that she has had two EMGs in the past revealing a chronic left L5 radiculopathy. Pt noted that she previously had a dx of sacroiliitis, and she previously found relief from an SI joint injection. She had a right-sided lacunar stroke affecting the temporal lobe - LLE weakness, mild LUE weakness, cognitive impairment, smiling on the right side. 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 She saw Dr. Mario Holt 19 in regards to a spinal cord stimulator malfunction. She had SCS placement in  by Dr. Jessica Kearns, and it was subsequently revised several times. She reported that she has had multiple issues with the SCS's over the years related to battery life. Due to her cognitive impairment, she is unable to use a rechargable batteries.  She previously worked as a Physical Therapist. She was prescribed an [address] underwent a Right SI joint injection (10/22/19; Dr. Sherron Apodaca) that provided significant relief and she doubled her daily steps. She continued to take Tizanidine, Topamax, and Depakote prescribed by her neurologist. She maintained an HEP of walking and yoga. She saw some improvement with PT (11/14/19-2/4/2020; [facility]). She used a left AFO as recommended by PT. She reported no longer having a tremor in her LLE. Pt noted that during one session of PT, she was having manual, myofascial release on her external pelvis and believed it elicited a seizure at the time. She experienced constipation, weight gain, and somnolence with Tizanidine but she felt the benefits outweighed the side effects. Pt noted that she saw her orthotist who provided an orthotic that has helped with her ambulation. She noted that her left hip was elevated by 1/8th of an inch. She was advised to obtain a gel insert for the right side, but it did not provide any benefit. Her son provided long access distraction that helped with the muscle spasms, but she had burning and numbness slightly posterior to the trochanter. She had significant sharp pain with weight bearing that radiated into the ankle. She saw some benefit from oral steroids, especially with her SI joint pain. However, she continued to have some pain near the trochanter and the left gluteus medius near the insertion. She had to use a cane to ambulate around her house. Pt denied groin pain. She returned with c/o pelvic pains, sacral pain and tailbone numbness. She also reported some BLE itching and redness. She found benefit with walking/movement. She saw Dr. Jaimee Salamanca and he suggested she may do well with seeing his wife at [facility]. She [employer], and he suggested the ganglion impar injection for coccyx pain if she decided to proceed with that option. However, he noted, it would not likely resolve her other symptoms.  Pt notes that she walked 1.1 miles one day, and both of her legs began turning in. She returned with c/o exacerbation of tailbone and sacral pain. She was interested in another SI joint injection. She planned to have a left rotator cuff repair surgery. Pt notes that she has severe pain, numbness, and tingling posterolaterally to the knee that is painful with walking and sitting. She notes that she even experienced limping while walking in the house and she felt that her legs would collapse. She presents wearing a left AFO. Since her last OV, she had a left rotator cuff repair surgery with [facility] so she rested in a recliner often and she has had home health PT. She underwent a right SI joint injection (9/22/2020; Dr. Christian Bautista) without benefit. She feels her left AFO brace has been aggravating her SIJ pain. She has had recurrent UTIs. She previously did well with pelvic floor PT. She notes that the SCS seems to be making her BLE pains worse. She has been experiencing generalized chest tightness - her GP has done a chest XR to r/o pneumonia and she is scheduled to have an echo soon. She had her SCS reprogrammed with benefit. She noted that she had paraesthesia throughout the groin, anus, vagina. She has been using the vaginal wand in pelvic floor PT. She continued to have some right groin and RLE pain. Pt noted that she could not use her SCS because after 3-4 hours, she had a burning pain in her coccyx and low back up to the thoracic spine to the bra line. She took Gabapentin, Percocet, and NSAIDs along with a special pillow for her coccydynia. Her pain is worse with prolonged sitting or laying. She also notes that she has a soft tissue injury to the LUE. She believes she injured her right biceps tendon when she tried driving again. Cervical, Thoracic, and Lumbar spine CT Myelogram dated 1/22/21 reviewed.  Per report, No central canal compromise identified within the cervical, thoracic or lumbar spine.  Mild posterior osteophytic ridging and bilateral foraminal encroachment C5-6.  Retained spinal stimulator hardware in the thoracic spine as noted.  Previous laminectomy and fusion L5-S1 with intact hardware.  Broad-based disc bulge L4-5.  Prominent soft tissue posterior to the disc space at L5-S1, which may reflect residual scar tissue, or disc material.  Borderline periaortic retroperitoneal lymphadenopathy.  2 cm cystic lesion adnexa. Pt notes that she has an appointment scheduled with Dr. Alivia Cardona soon. She notes that her pelvic floor symptoms and saddle anesthesia improved with pelvic floor PT but have slightly returned since her pain has flared up. She has been wearing her AFO again on the left with benefit. She feels more comfortable in it and feels more secure than she previously did. She continues to have some cramping in the left foot. She has been taking Tizanidine with some benefit, but she experiences some somnolence as well. She is doing better since her left shoulder surgery, and she is driving some again. However, she has some LUE pain when she drives still. 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 She sees Emre Avendaño PA-C for pain management. She continues to have low back pain that she describes as \"heat. \" She is finding some benefit with Mobic, though mild. She had a ganglion impar block with Dr. Alivia Cardona (2/24/21) with significant benefit. She attended PT with Jeff, but she had to stop due to her spraining her ankle. She has had a flare up of her sacral pain, but she has seen improvement overall since her last visit. She continues to do well with Dr. Kirby Early. She has been walking more and increasing her activity. She still has some difficulty with the grocery store and carrying groceries. She had difficulty coming up the steps after aqua therapy DR ANDREA TIAN [facility]) so she was advised it was not recommended to continue due to safety risk. She notes that PT recommended the litegait machine.     Smith Sacks comes in to the office today for f/u. She was preoccupied today with coincidence of reminder calls from our office for her appointment today and suspected surveillance. She also suspects that some of our treatment choices are being influenced directly by [employer], which I reassured her is not true. She has been finding relief in Pain management with CBD and Gabapentin 400. She saw Dr. Mahsa Abdi 2 weeks ago, which provided significant benefit, especially with the use of the Flexion table, Biofreeze, and percussion treatments. She reports having increased sensation in the bottom of her big toe x 2 months ago. She feels her meralgia paraesthetica is doing better. Pain Score: 5/10. Treatments patient has tried:  Physical therapy:Yes  Doing HEP: Yes  Non-opioid medications: Yes - Tizanidine, Topamax, oral steroids, Diclofenac, Gabapentin, NSAIDs  Spinal injections: Right SI joint injection (10/22/19); right SIJ (9/22/20)  Spinal surgery- Yes - fusion L5-S1; SCS  Last Spine CT Myelogram - 2021      reviewed. 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 L5-S1 fusion, SCS; left rotator cuff repair     ASSESSMENT  Her symptoms likely remain due to chronic myofascial pain, pelvic floor dysfunction, coccydynia, left trochanteric enthesopathy/bursitis, sacroiliac joint dysfunction bilaterally, and left piriformis syndrome. Some of her symptoms may relate to meralgia paraesthetica. Given history of arachnoiditis, more peripheral treatment would be ideal.    PLAN  1. Referral to PT [facility])      Pt will f/u in 3 months or sooner as needed. Diagnoses and all orders for this visit:    1. Chronic primary musculoskeletal pain  -     REFERRAL TO PHYSICAL THERAPY    2.  Coccydynia  -     REFERRAL TO PHYSICAL THERAPY    3. Pelvic floor dysfunction in female  -     REFERRAL TO PHYSICAL THERAPY    4. Sacroiliac joint dysfunction of both sides  -     REFERRAL TO PHYSICAL THERAPY    5. Myofascial pain  -     REFERRAL TO PHYSICAL THERAPY    6. Mechanical low back pain  -     REFERRAL TO PHYSICAL THERAPY    7. Greater trochanteric bursitis of left hip  -     REFERRAL TO PHYSICAL THERAPY    8. Piriformis syndrome of left side  -     REFERRAL TO PHYSICAL THERAPY    9. Meralgia paraesthetica, left  -     REFERRAL TO PHYSICAL THERAPY    10. Meralgia paraesthetica, right  -     REFERRAL TO PHYSICAL THERAPY    11. Gait abnormality  -     REFERRAL TO PHYSICAL THERAPY         PAST MEDICAL HISTORY   Past Medical History:   Diagnosis Date    Adverse effect of anesthesia      bronchospasm,     Asthma     well controlled    Coagulation disorder ([address]     hypercoagulable    Depressive disorder 1/3/2013    Epilepsy [facility]     Ill-defined condition     Benign Granuloma Annularea    OAB (overactive bladder)     Obesity (BMI 30.0-34.9) 11/3/2016    Right leg DVT ([facility] [address]     DVT    Seizures (HCC)     grand mal    Spastic bladder     Stroke ([facility] [address] 2006    mini-stroke    Thromboembolus [facility])     s/p vincenzo filter    Thyroid disease     Unspecified adverse effect of anesthesia     bronchospasm on awakening       Past Surgical History:   Procedure Laterality Date    HX BACK SURGERY      dural repair    HX BACK SURGERY      nerve stimulator    HX LUMBAR FUSION  2006    HX LUMBAR LAMINECTOMY  2006    HX ROTATOR CUFF REPAIR Left 09/23/2020   . MEDICATIONS    Current Outpatient Medications   Medication Sig Dispense Refill    OXcarbazepine (TrileptaL) 150 mg tablet Take 150 mg by mouth two (2) times a day.  naproxen (NAPROSYN) 500 mg tablet Take 1 Tablet by mouth two (2) times daily (with meals). 60 Tablet 11    tiZANidine (ZANAFLEX) 4 mg tablet TAKE 1 TABLET BY MOUTH THREE TIMES DAILY AS NEEDED FOR PAIN 90 Tab 5    ibuprofen (MOTRIN) 800 mg tablet Take 800 mg by mouth every eight (8) hours as needed for Pain.  (Patient not taking: Reported on 6/24/2021)      GABAPENTIN PO Take  by mouth. Pt is not sure of the dose and how much she is taking      oxyCODONE-acetaminophen (Percocet) 5-325 mg per tablet Take 1 Tab by mouth nightly as needed for Pain. (Patient not taking: Reported on 6/24/2021)      simvastatin (ZOCOR) 10 mg tablet Take 10 mg by mouth nightly. (Patient not taking: Reported on 6/24/2021)      diclofenac (VOLTAREN) 1 % gel Apply 2 g to affected area daily as needed.  Advair -21 mcg/actuation inhaler Take 2 Puffs by inhalation two (2) times a day.  ondansetron (Zofran ODT) 4 mg disintegrating tablet Take 1 Tab by mouth every eight (8) hours as needed for Nausea (or vomiting.). Allow to dissolve on tongue 20 Tab 0    albuterol (PROVENTIL HFA, VENTOLIN HFA, PROAIR HFA) 90 mcg/actuation inhaler INHALE 2 PUFFS EVERY 6 HOURS AS NEEDED FOR WHEEZING OR SHORTNESS OF BREATH      lacosamide (VIMPAT) 100 mg tab tablet Take 100 mg by mouth two (2) times a day. (Patient not taking: Reported on 6/24/2021)      levothyroxine (SYNTHROID) 25 mcg tablet Take 25 mcg by mouth Daily (before breakfast).  lidocaine (LIDODERM) 5 % Apply 1 Patch to affected area as needed.  LORazepam (ATIVAN) 0.5 mg tablet Take 0.5 mg by mouth daily as needed.  topiramate (TOPAMAX) 100 mg tablet Take 100 mg by mouth two (2) times a day.  montelukast (SINGULAIR) 10 mg tablet Take 10 mg by mouth daily. Indications: ASTHMA PREVENTION          ALLERGIES  Allergies   Allergen Reactions    Latex, Natural Rubber Itching    Adhesive Other (comments)     Layers of skin peeled off    Betadine [Povidone-Iodine] Rash     Patient states skin peels off.  Copper Rash and Swelling    Dilantin [Phenytoin Sodium Extended] Other (comments)     Throat swelled up rash all over    Other Medication Other (comments)     Surgical skin prep caused layers of skin to peel off. Needs benedryl before anesthesia.     Phenobarbital Other (comments)     Throat swelled up ,rash    Rifampin Rash SOCIAL HISTORY    Social History     Socioeconomic History    Marital status:      Spouse name: Not on file    Number of children: Not on file    Years of education: Not on file    Highest education level: Not on file   Tobacco Use    Smoking status: Never Smoker    Smokeless tobacco: Never Used   Vaping Use    Vaping Use: Never used   Substance and Sexual Activity    Alcohol use: Yes     Comment: 1 per month    Drug use: No     Social Determinants of Health     Financial Resource Strain:     Difficulty of Paying Living Expenses:    Food Insecurity:     Worried About Running Out of Food in the Last Year:     [address] in the Last Year:    Transportation Needs:     Lack of Transportation (Medical):  Lack of Transportation (Non-Medical):    Physical Activity:     Days of Exercise per Week:     Minutes of Exercise per Session:    Stress:     Feeling of Stress :    Social Connections:     Frequency of Communication with Friends and Family:     Frequency of Social Gatherings with Friends and Family:     Attends Nondenominational Services:     Active Member of Clubs or Organizations:     Attends Club or Organization Meetings:     Marital Status:        FAMILY HISTORY  No family history on file. REVIEW OF SYSTEMS  Review of Systems   Constitutional: Negative for chills, fever and weight loss. Respiratory: Negative for shortness of breath. Cardiovascular: Negative for chest pain. Gastrointestinal: Negative for constipation. Negative for fecal incontinence    Genitourinary: Negative for dysuria. Negative for urinary incontinence   Musculoskeletal: Positive for back pain. R SI joint pain  Coccydynia  Left foot drop  Left trochanter and SI pain   Coccydynia     L shoulder pain s/p rotator cuff surgery     Skin: Negative for rash. Neurological: Positive for tingling ( BLE, LUE) and headaches. Negative for dizziness, tremors and focal weakness. Saddle anesthesia    Endo/Heme/Allergies: Does not bruise/bleed easily. Psychiatric/Behavioral: The patient does not have insomnia. PHYSICAL EXAMINATION  Visit Vitals  Pulse 73   Resp 18   Ht 5' 7\" (1.702 m)   SpO2 97%   BMI 36.96 kg/m²       Pain Assessment  6/24/2021   Location of Pain Back   Pain Location Comment -   Location Modifiers -   Severity of Pain 5   Quality of Pain Dull; Sonia Munch; Throbbing;Burning   Quality of Pain Comment -   Duration of Pain Persistent   Frequency of Pain Constant   Aggravating Factors Walking   Aggravating Factors Comment Walking long distances   Limiting Behavior Yes   Relieving Factors Ice; Other (Comment)   Relieving Factors Comment Medications   Result of Injury -           Constitutional:  Well developed, well nourished, in no acute distress. Psychiatric: Affect and mood are appropriate. Integumentary: No rashes or abrasions noted on exposed areas. SPINE/MUSCULOSKELETAL EXAM    Cervical spine:  Neck is midline. Normal muscle tone. No focal atrophy is noted. ROM pain free. Shoulder ROM intact.   No tenderness to palpation. Negative Spurling's sign. Negative Tinel's sign. Negative Serrato's sign.      Sensation in the bilateral arms grossly intact to light touch.      Lumbar spine:  No rash, ecchymosis, or gross obliquity. No fasciculations. No focal atrophy is noted. No pain with hip ROM. Full range of motion. No tenderness to palpation.   No tenderness to palpation at the sciatic notch.    SI joints tender bilaterally, R>L   Trochanters non tender.     Sensation in the bilateral legs grossly intact to light touch.             LEFT RIGHT   CLAY TEST - +   P4 TEST - +   COMPRESSION TEST - -   DISTRACTION TEST - +   GAENSLEN'S TEST - +            Updates 9/1/2020:     SI joint on R tender to palpation       LEFT RIGHT   CLAY TEST - +   P4 TEST - +   COMPRESSION TEST - -   DISTRACTION TEST - -   GAENSLEN'S TEST - +      Updates 11/24/2020:  Tenderness to palpation of coccydynia      Updates 4/29/2021:  Tenderness to palpation of lumbar paraspinals        MOTOR:      Biceps  Triceps Deltoids Wrist Ext Wrist Flex Hand Intrin   Right 5/5 5/5 5/5 5/5 5/5 5/5   Left 5/5 5/5 5/5 5/5 5/5 5/5             Hip Flex  Quads Hamstrings Ankle DF EHL Ankle PF   Right 5/5 5/5 5/5 5/5 5/5 5/5   Left 5/5 5/5 5/5 5/5 5/5 5/5     DTRs are 1+ biceps, triceps, brachioradialis, patella, and Achilles.     Negative Straight Leg raise. Squat not tested. No difficulty with tandem gait.      Ambulation with single point cane. FWB.     RADIOGRAPHS  Cervical, Thoracic, and Lumbar Spine CT Myelogram images taken on 1/22/21 personally reviewed with patient:  Cervical spine CT: There is mild posterior osteophytic ridging C5-6, which  causes minimal impress on the ventral thecal sac.  No disc bulge or  protrusion.  No central canal compromise.  There is mild bilateral  foraminal encroachment C5-6, secondary to uncovertebral spurring.  Facet  alignment is normal.  No fracture or subluxation.  The spinal cord is  normal in appearance.  The paravertebral soft tissues are unremarkable. Thoracic spine CT: No central canal compromise.  No significant disc bulge  or protrusion identified in the thoracic spine. Amy Cheng is a retained  spinal stimulator posteriorly T7-T9.  The thoracic spinal cord is  unremarkable as imaged.  The conus terminalis terminates at the T12-L1  disc space.  The paravertebral soft tissues and visualized portions of the  lungs are unremarkable. Lumbar spine CT: There is a mild broad-based disc bulge L4-5. Postsurgical changes from previous posterior laminectomy and fusion L5-S1.   Fusion hardware appears intact.  There is moderate soft tissue posterior  to the L5-S1 disc space, which may be postsurgical scar, or disc material.  No central canal compromise.  No critical foraminal encroachment.  There  is advanced bilateral facet spondylosis L4-5 through L5-S1. Sri Iniguez is an  inferior vena cava filter incidentally noted there is a couple small of  periaortic retroperitoneal lymph nodes, nonspecific. Sri Iniguez is a 2 cm cyst  left adnexa. IMPRESSION:  No central canal compromise identified within the cervical, thoracic or  lumbar spine.  Mild posterior osteophytic ridging and bilateral foraminal  encroachment C5-6.  Retained spinal stimulator hardware in the thoracic  spine as noted.  Previous laminectomy and fusion L5-S1 with intact  hardware.  Broad-based disc bulge L4-5.  Prominent soft tissue posterior  to the disc space at L5-S1, which may reflect residual scar tissue, or  disc material.  Borderline periaortic retroperitoneal lymphadenopathy.  2  cm cystic lesion adnexa.        4V Lumbar XR images taken on 4/1/2020 personally reviewed with patient:  BONES: Status post L5-S1 posterior spinal fusion and interbody disc cage. L5  laminectomy. No evidence of acute fracture or listhesis. Overlying spinal  stimulator and IVC filter partially obscure underlying osseous detail. Vertebral  body heights are maintained. No osseous lytic or blastic lesion. Mild multilevel  spondylosis.     SOFT TISSUES: Unremarkable.     OTHER: None.     _______________     IMPRESSION  IMPRESSION:     No evidence of acute fracture or listhesis.     Lower lumbar spinal fusion.     LT HIP XR images taken on 4/1/2020 personally reviewed with patient:  BONES: No evidence of acute fracture or dislocation. Joint spaces and alignment  are maintained. No aggressive appearing osseous lytic or blastic lesion.      SOFT TISSUES: Unremarkable.     _______________     IMPRESSION  IMPRESSION:     No evidence of acute fracture or dislocation. Lumbar XR images taken on 2/16/18 personally reviewed with patient:  Right posterior upper hip generator device with dorsal intraspinal stimulator wires, and the T10-11 level with the wire tips at the T7-T8 level.   Postoperative changes of previous L5 decompression laminectomy with solid and are osseous circumferential fusion at L5-S1.  No findings of hardware failure. A right-sided IVC filter is present at the L2-L3 level. Mild multilevel degenerative spondylosis similar prior examination with no evidence of listhesis.  The vertebral bodies maintain normal height and alignment.  No acute obstructive osseous abnormality. IMPRESSION:    1. Stable post operative circumferential fusion L5-S1 with L5 decompression laminectomy. 2.  Dorsal stimulator wire tip at the T7-T8 level. 3.  Mild degenerative lumbar spine spondylosis.  No acute obstructive osseous abnormality. 40 minutes of face-to-face contact were spent with the patient during today's visit extensively discussing symptoms and treatment plan. All questions were answered. More than half of this visit today was spent on counseling.      Written by Severiano Charles as dictated by Marylin Nieto MD

## 2021-08-12 NOTE — PROGRESS NOTES
Phone calls    Appointment calls Saturday 2x Monday 2x  Came from [facility]     Pain management  CBD  Gabapentin 400      [place] 2 weeks ago - helped a lot  Flexiontable  Bio freeze  Percussion      Sensation increased 2 months ago bottom of big toe    Meralgia paresthetica doing better

## 2021-08-13 NOTE — TELEPHONE ENCOUNTER
Returned patients call. She was concerned that her BP was not documented on her AVS when she reviewed at home after her appointment. I explained that we no longer take/record the BPs unless a procedure is taking place in the office or if we are starting a new medication. I told her the nurses still take it out of habit and sometimes because they are unsure of what the plan will be once seeing the provider. She understood and said she would like it documented in her chart because she wasn't feeling too good when she left and wanted to take the information to her pcp. I told her I would make sure that the documentation was added and sent to her.

## 2021-08-20 ENCOUNTER — TELEPHONE (OUTPATIENT)
Dept: ORTHOPEDIC SURGERY | Age: 56
End: 2021-08-20

## 2021-08-20 NOTE — TELEPHONE ENCOUNTER
Marnie from [facility] called requesting an updated physical therapy referral so they can continue treating the patient. We can fax this referral to fax number [phone]. Soraya Martinez is asking if this can be done today since they saw her today for a visit. Please advise.

## 2021-08-20 NOTE — TELEPHONE ENCOUNTER
Patient is W/C, Referral will need to get authorized by W/C once Dr. Combs Morning has approved. Per Dr. Combs Morning, ok to place order for referral however, we need to send to W/C for approval.    Returned call to [address] [place] [address] at [phone]. Informed Marnie of above. Marnie asked, if we could just fax the order over to them. Informed Marnie that we need to get authorization from W/C prior to sending referral.    [address] [place] [address] verbalized agreement/understanding. Provided Marnie with the number to our Referral Coordinator [facility] in case she needed to reach out on Monday. Informed Marnie that Rin Livingston is out of the office until Monday. Made Marnie aware that Kaylynn may not get to this referral right away. Marnie verbalized agreement/understanding.

## 2021-08-23 ENCOUNTER — TELEPHONE (OUTPATIENT)
Dept: ORTHOPEDIC SURGERY | Age: 56
End: 2021-08-23

## 2021-08-23 NOTE — TELEPHONE ENCOUNTER
Returned call to Leatha Coates, reached Franco Rendon. Informed Franco Rendon, this patient is requesting to go to [facility]. Per Franco Rendon she will relay the message to Leatha Coates. No further action required at this time.

## 2021-08-23 NOTE — TELEPHONE ENCOUNTER
Worker's compensation already authorized 12 visits and patient completed her initial visit with Beatris GODINEZ. I am not sure why another referral is required, however, the order was entered by the provider so I have faxed it as requested.

## 2021-08-23 NOTE — TELEPHONE ENCOUNTER
Nilsa Mane from [facility] from the [facility] location called for [facility]. Nilsa Mane said that they need the Physical Therapy order from Dr. Sarah Ackerman for the patient. That they are unable to see it in the patient Chart. Nilsa Mane is asking that the order be faxed to them. Alivia Zamora  Tel. [phone]  Fax [phone].

## 2021-08-24 ENCOUNTER — HOSPITAL ENCOUNTER (OUTPATIENT)
Dept: PHYSICAL THERAPY | Age: 56
Discharge: HOME | End: 2021-08-24
Payer: OTHER MISCELLANEOUS

## 2021-08-24 ENCOUNTER — APPOINTMENT (OUTPATIENT)
Dept: PHYSICAL THERAPY | Age: 56
End: 2021-08-24

## 2021-08-24 PROCEDURE — 97163 PT EVAL HIGH COMPLEX 45 MIN: CPT

## 2021-08-24 NOTE — PROGRESS NOTES
[facility] at THE [facility]  [address], [address]  Ph [phone]  Fx [phone]    Plan of Care/ Statement of Necessity for Physical Therapy Services    Patient name: Alis Carlos Start of Care: 2021   Referral source: Sangeetha Berumen MD : 1965    Medical Diagnosis: Low back pain [M54.5]   Onset Date: chronic    Treatment Diagnosis: low back pain/gait instability                                              ICD-10: M54.5, R26.9   Prior Hospitalization: see medical history Provider#: [id number]   Medications: Verified on Patient summary List    Comorbidities: s/p CVA , 3 lumbar surgeries as noted in eval in , left foot drop since , wearing left foot AFO x 2 years, left foot peroneal palsy, 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 Assessment/ key information: Patient is a very pleasant 55 y.o. female presenting to skilled PT with a complicated PMH consisting of chronic LBP and gait abnormalities following a work injury and 3 subsequent lower back surgeries in . Patient is currently at a very high risk of falls per scores on [place] Balance testing, TUG test, 30 second sit<>stand test, and balance testing as noted above. Patient presents with deficits in bilateral LE strength, core stabilization, posture, gait, balance, all of which are limiting her functional abilities. Patient has been advised she is currently unsafe during ambulation without an AD, she was strongly advised to ambulate with assistance of a cane in order to improve her balance/gait and decrease her risk of falls.   Next visit: please assess patients trunk AROM (including any LOB during movements) as well as bilateral ankle strength.      Patient did well with today's evaluation and initiation of treatment. Patient was educated in [address] and all questions were answered. An HEP was prescribed and reviewed with the patient today, see chart for copy of HEP.     Patient would benefit from skilled PT services to modify and progress therapeutic interventions, address functional mobility deficits, address ROM deficits, address strength deficits, analyze and address soft tissue restrictions, analyze and cue movement patterns, analyze and modify body mechanics/ergonomics, and assess and modify postural abnormalities to attain remaining goals. Evaluation Complexity History HIGH Complexity :3+ comorbidities / personal factors will impact the outcome/ POC ; Examination HIGH Complexity : 4+ Standardized tests and measures addressing body structure, function, activity limitation and / or participation in recreation  ;Presentation HIGH Complexity : Unstable and unpredictable characteristics  ; Clinical Decision Making HIGH Complexity : FOTO score of 1- 25  : FOTO score = an established functional score where 100 = no disability  Overall Complexity Rating: HIGH     Problem List: pain affecting function, decrease ROM, decrease strength, impaired gait/ balance, decrease ADL/ functional abilitiies, decrease activity tolerance and decrease transfer abilities   Treatment Plan may include any combination of the following: Therapeutic exercise, Therapeutic activities, Neuromuscular re-education, Physical agent/modality, Gait/balance training, Manual therapy, Aquatic therapy, Patient education, Self Care training, Functional mobility training, Home safety training and Stair training  Patient / Family readiness to learn indicated by: asking questions and interest  Persons(s) to be included in education: patient (P)  Barriers to Learning/Limitations: None  Measures taken if barriers to learning: n/a  Patient Goal (s): \"to improve my walking, balance, and endurance\"  Patient Self Reported Health Status: fair  Rehabilitation Potential: fair    Short Term Goals: to be accomplished within 4 weeks  1. Patient will report compliance with initial HEP at least 1x/day in order to assist in progression towards goals and restore functional mobility. Eval Status: HEP issued at eval  Current Status: same as eval     2. Patient will report at least a 25% reduction in pain when performing ADLs/functional activities in order to improve overall tolerance to functional movements and progress towards patient's PLOF. Eval Status: pain ranges from 2-8, currently 4/10  Current Status: same as eval     Long Term Goals: to be accomplished within 8 weeks  1. Patient will report compliance with most recent HEP at least 1x/day in order to assist in returning to patient's PLOF. Eval Status: initial HEP dispensed at time of eval  Current Status: same as eval     2. Patient will demonstrate functional trunk AROM in order to return to functional activities and mobility. Eval Status: will assess trunk AROM upon second visit  Current Status: same as eval     3. Patient will demonstrate at least 4/5 to 4+/5 strength bilaterally in order to be able to safely perform functional activities. Eval Status: Grossly 4-/5 bilaterally except paul hip flexion 3+/5, paul hip abd 2+/5; will test bilateral ankle strength upon second visit  Current Status: same as eval     4. Patient will be able to safely ambulate at least 1000' with LRAD in order to improve safety during gait and improve independence when ambulating to/from doctor's appointments.               Eval Status: Without AD, wearing AFO: very unsafe including high likelihood of fall, furniture walking, antalgic, lack of appropriate weight shift through left LE, guarding of left UE along flank, very small stride lengths, very slow gait speed,               With SPC, wearing AFO: safer but still unsteady requiring CGA/supervision for safety, very slow gait speed, better weight shift through left LE, small stride lengths, left UE along flank  Current Status: same as eval     5. Patient will improve score by at least 10 points on FOTO in order to maximize function and promote patients satisfaction with overall outcome. (Shala Brighter is an established functional score where 100 = no disability)  Eval Status: FOTO not captured this visit, to be completed 2nd visit  Current Status: same as eval     6. Patient will score 18 seconds or less during TUG testing in order to show improved gait, balance, and stability during ambulation. Eval Status: 24 seconds, without AD              Current Status: same as eval     7. Patient will be able to perform at least 9 reps of sit<>stands during 30 second STS testing to show improved bilateral LE strength during this functional movement. Eval Status: 5 reps              Current Status: same as eval    Frequency / Duration: Patient to be seen 2 times per week for 8 weeks. Patient/ Caregiver education and instruction: Diagnosis, prognosis, self care, activity modification and other cane usage recommended   [x]  Plan of care has been reviewed with PTA    Certification Period: n/a    [facility], PT 8/24/2021 2:58 PM    ________________________________________________________________________    I certify that the above Therapy Services are being furnished while the patient is under my care. I agree with the treatment plan and certify that this therapy is necessary.     Physician's Signature:_____________________Date:____________TIME:________                                      Mojgan Lopez MD      ** Signature, Date and Time must be completed for valid certification **  Please sign and return to [facility] at THE [facility]  [address], [address] [facility]  Ph [phone]  Fx [phone]

## 2021-08-24 NOTE — PROGRESS NOTES
PHYSICAL THERAPY EVALUATION / DAILY TREATMENT      Patient Name: Jesenia Gather  Date: 2021  : 1965  [x] Patient  Verified  Payor: Joyce Dillard / Plan: [facility] MEDICARE CHOICE PPO/PFFS / Product Type: Managed Care Medicare /    In Time: 3:00  Out Time: 3:55  Total Treatment Time (min): 55  Visit #: 1 of 2    Medicare / [place] Only   Total Timed Codes (min):  0 1:1 Treatment Time:  55     Treatment Area: Low back pain [M54.5]    SUBJECTIVE:  Chief Complaint/Mechanism of Injury:   Patient is a 55 y.o. female with a complex PMH that presents to skilled PT with c/o LBP and gait instability that she states started in  after an injury at work. She reports having had 3 lumbar surgeries after her injury: first one being in the mid of May 2006 (\"laminectomy\", then end of May 2006 (\"fusion of L5-6\"), then again in 2006 (\"where they cleaned up what they messed up\"). Patient states she had \"complications\" during her surgeries, causing her to develop left foot drop. She has been wearing an AFO on her left foot for the past 2 years. Patient also states that shortly after her third lumbar surgery she suffered a stroke. More recently, patient states she completed aquatic therapy about a month ago, but patient states she felt as though the aquatic therapist had her do more UE exercises rather than her LE and low back, so patient isn't so sure how helpful it was. Patient said her left leg started tremoring more while she was in aquatic therapy, which she didn't find helpful for her walking and balance. Patient states currently she is having \"weakness and spastiticy\" as well as a \"fullness\" along her hips and tailbone areas. Patient states she has started to become more constipated and she is now using Colace more often because she is having difficulty with voiding. Function wise, she is having difficulty with walking, balance, standing, sit<>stands, and going up and down stairs.   She is having to go up stairs intermittently with step-to pattern secondary to her drop foot. Patient presents to clinic today wearing an AFO on her left foot as well as ambulating unsafely without an AD. \"I didn't take my medicine today, so my walking is worse than it normally is. \"   Patient is currently working with students with disabilities doing academic tutoring. At the time of her injury, patient was working as a neurological physical therapist.  Hobbies include attending [employer], drawing, and signing.      Pain Level (out of 0-10 scale): ranges from 2-8, currently 4/10  [] constant [x] intermittent [x] improving [] worsening [] no change since onset  Alleviating Factors: ice, meds, injections in her sacral region  Previous Treatment for Current Injury: injections, physical therapy both land and aquatic base, 3 prior surgeries  DiagnosticImaging for Current Injury: EMG left leg a year ago showed \"peroneal palsy\"  Hand Dominance: [] right handed [] left handed [x] ambidextrous but primarily right handed  Living Situation: lives alone in a 2 story home with 2 steps to enter, [] with handrail  Prior Level of Function: wearing AFO x 2 years, using a mini-quad cane x 2 years  [] Unrestricted with ADL's and functional activities  [] No assistive device  [] active lifestyle [] moderately active lifestyle [x] sedentary lifestyle  [] Other:     Substance Use: [] tobacco use [x] alcohol use [] other:     Comorbidities: s/p CVA 2006, 3 lumbar surgeries as noted in eval in 2006, left foot drop since 2006, wearing left foot AFO x 2 years, left foot peroneal palsy, 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, s/p left shoulder RCR September 2020, epilepsy (last grand mal seizure in 2017 with several smaller seizures since then), asthma, latex allergy    Patients Goals:  \"to improve my walking, balance, and endurance\"    Potential Barriers for PT: [] pain [] financial [] time [] transportation [] other:  Motivation: Good  Cognition: A&O x 3  Denies Red Flags: SOB, chest pain, dizziness/lightheadedness, blurred/double vision, HA, chills/fevers, night sweats, change in bowel/bladder control, abdominal pain, difficulty swallowing, slurred speech, unexplained weight gain/loss, nausea, and/or vomiting.   Any medication changes, allergies to medications, adverse drug reactions, diagnosis change, or new procedure performed?: [x] No     [] Yes (see summary sheet for update)        OBJECTIVE/EXAMINATION:    55 min [x] Eval                  [] Re-Evaluation         With   [x] TE   [] TA   [] Neuro   [] Other: Patient Education: [x] Review HEP    [] Progressed/Changed HEP based on:   [] positioning   [] body mechanics   [] transfers   [] heat/ice application    [] other:      Physical Therapy Evaluation:    Inspection: left foot AFO, no AD, furniture walking  [] Patient in no apparent distress                   [x] Patient in obvious distress during standing/walking  [] No visible signs/symptoms of infection  [] Edema present: [] mild [] moderate [] severe [] 1+ pitting [] 2+ pitting   Location:   [] Ecchymosis present: [] mild [] moderate [] severe   Location:     Posture: mildly kyphotic posture with loss of cervical lordosis and forward shoulders, left knee flexion contracture, left foot AFO    Gait:  Without AD, wearing AFO: very unsafe including high likelihood of fall, furniture walking, antalgic, lack of appropriate weight shift through left LE, guarding of left UE along flank, very small stride lengths, very slow gait speed,    With SPC, wearing AFO: safer but still unsteady requiring CGA/supervision for safety, very slow gait speed, better weight shift through left LE, small stride lengths, left UE along flank    AROM (in degrees): did not test due to lack of time today, will test trunk AROM upon second visit    MMT: Grossly 4-/5 bilaterally except paul hip flexion 3+/5, paul hip abd 2+/5; will test bilateral ankle strength upon second visit    Special Tests: TUG test, without AD: 24 seconds; 30\" STS: 5 reps; Monaco Balance Testin/56    Balance Testing: feet together/eyes open: steady 30\" with supervision; feet together/eyes closed: 30\" with CGA, mild unsteadiness secondary to increased anterior/posterior sway; Tandem with left foot in front: 15 seconds, Tandem with right foot in front: 3 seconds; right SLS: 3 seconds with CGA, left SLS: 0 secs with CGA    Other Comments: none     Pain Level (out of 0-10 scale) Post Treatment: 4        ASSESSMENT:  Patient is a very pleasant 55 y.o. female presenting to skilled PT with a complicated PMH consisting of chronic LBP and gait abnormalities following a work injury and 3 subsequent lower back surgeries in . Patient is currently at a very high risk of falls per scores on [place] Balance testing, TUG test, 30 second sit<>stand test, and balance testing as noted above. Patient presents with deficits in bilateral LE strength, core stabilization, posture, gait, balance, all of which are limiting her functional abilities. Patient has been advised she is currently unsafe during ambulation without an AD, she was strongly advised to ambulate with assistance of a cane in order to improve her balance/gait and decrease her risk of falls. Next visit: please assess patients trunk AROM (including any LOB during movements) as well as bilateral ankle strength. Patient did well with today's evaluation and initiation of treatment. Patient was educated in  [facility] and all questions were answered. An HEP was prescribed and reviewed with the patient today, see chart for copy of HEP.     Patient would benefit from skilled PT services to modify and progress therapeutic interventions, address functional mobility deficits, address ROM deficits, address strength deficits, analyze and address soft tissue restrictions, analyze and cue movement patterns, analyze and modify body mechanics/ergonomics, and assess and modify postural abnormalities to attain remaining goals. [x]  See Plan of Care  []  See Progress Note/Recertification  []  See Discharge Summary         GOALS:  Short Term Goals: to be accomplished within 4 weeks  1. Patient will report compliance with initial HEP at least 1x/day in order to assist in progression towards goals and restore functional mobility. Eval Status: HEP issued at eval  Current Status: same as eval    2. Patient will report at least a 25% reduction in pain when performing ADLs/functional activities in order to improve overall tolerance to functional movements and progress towards patient's PLOF. Eval Status: pain ranges from 2-8, currently 4/10  Current Status: same as eval    Long Term Goals: to be accomplished within 8 weeks  1. Patient will report compliance with most recent HEP at least 1x/day in order to assist in returning to patient's PLOF. Eval Status: initial HEP dispensed at time of eval  Current Status: same as eval    2. Patient will demonstrate functional trunk AROM in order to return to functional activities and mobility. Eval Status: will assess trunk AROM upon second visit  Current Status: same as eval    3. Patient will demonstrate at least 4/5 to 4+/5 strength bilaterally in order to be able to safely perform functional activities. Eval Status: Grossly 4-/5 bilaterally except paul hip flexion 3+/5, paul hip abd 2+/5; will test bilateral ankle strength upon second visit  Current Status: same as eval    4. Patient will be able to safely ambulate at least 1000' with LRAD in order to improve safety during gait and improve independence when ambulating to/from doctor's appointments.    Eval Status: Without AD, wearing AFO: very unsafe including high likelihood of fall, furniture walking, antalgic, lack of appropriate weight shift through left LE, guarding of left UE along flank, very small stride lengths, very slow gait speed,    With SPC, wearing AFO: safer but still unsteady requiring CGA/supervision for safety, very slow gait speed, better weight shift through left LE, small stride lengths, left UE along flank  Current Status: same as eval    5. Patient will improve score by at least 10 points on FOTO in order to maximize function and promote patients satisfaction with overall outcome. (Megan Shutter is an established functional score where 100 = no disability)  Eval Status: FOTO not captured this visit, to be completed 2nd visit  Current Status: same as eval    6. Patient will score 18 seconds or less during TUG testing in order to show improved gait, balance, and stability during ambulation. Eval Status: 24 seconds, without AD   Current Status: same as eval    7. Patient will be able to perform at least 9 reps of sit<>stands during 30 second STS testing to show improved bilateral LE strength during this functional movement. Eval Status: 5 reps   Current Status: same as eval        PLAN  [x]  Continue Plan of Care  []  Upgrade Activities as Tolerated       [x]  Update Interventions per Flow Sheet, as Tolerated by Patient       []  Discharge Due To:   []  Other: Thank you for the referral to [facility]. Erasmo Ash.  Danni, PT, DPT, ATR               8/24/2021     2:58 PM

## 2021-08-27 ENCOUNTER — HOSPITAL ENCOUNTER (OUTPATIENT)
Dept: PHYSICAL THERAPY | Age: 56
Discharge: HOME | End: 2021-08-27
Payer: OTHER MISCELLANEOUS

## 2021-08-27 PROCEDURE — 97112 NEUROMUSCULAR REEDUCATION: CPT

## 2021-08-27 PROCEDURE — 97116 GAIT TRAINING THERAPY: CPT

## 2021-08-27 PROCEDURE — 97110 THERAPEUTIC EXERCISES: CPT

## 2021-08-27 PROCEDURE — 97535 SELF CARE MNGMENT TRAINING: CPT

## 2021-08-27 NOTE — PROGRESS NOTES
PT DAILY TREATMENT NOTE    Patient Name: Sebas Medina  Date:2021  : 1965  [x]  Patient  Verified  Payor: [employer] / Plan: [facility] HUMAN MEDICARE CHOICE PPO/PFFS / Product Type: Managed Care Medicare /    In time:118  Out time:226  Total Treatment Time (min): 68  Total Timed Codes (min): 68  1:1 Treatment Time ( [address] only): 68  Visit #: 2 of 16    Treatment Dx: Low back pain [M54.5]    SUBJECTIVE  Pain Level (0-10 scale): 4-5/10  Any medication changes, allergies to medications, adverse drug reactions, diagnosis change, or new procedure performed?: [x] No    [] Yes (see summary sheet for update)  Subjective functional status/changes:   [] No changes reported  Pt reports that she moved the stimulator and that is improving her pain. OBJECTIVE     15 min Therapeutic Exercise:  [x] See flow sheet :   Rationale: increase ROM, increase strength, improve coordination, improve balance and increase proprioception to improve the patients ability to restore PLOF    15 min Neuro Re ed:  [x]  See flow sheet : sit to stands   Rationale: increase ROM, increase strength, improve coordination, improve balance and increase proprioception  to improve the patients ability to restore PLOF     23 min Gait Training:  Using a SPC in left UE for improved heel strike and toe off and increased pelvic control   Rationale: To improve ambulation safety and efficiency in order to improve patient's ability to safely ambulate at home for self care. 15 min Self Care: education regarding use of SPC for improvements in back pain and gait pattern. Education on importance of use of muscle imbalances. Rationale: To improve ambulation safety and efficiency in order to improve patient's ability to safely ambulate at home for self care.                With   - TE   - TA   - neuro   - other: gait and self care Patient Education: [x] Review HEP    [x] Progressed/Changed HEP based on:   [x] positioning   [x] body mechanics   [] transfers   [] heat/ice application    [] other:      Pain Level (0-10 scale) post treatment: 2-3/10    ASSESSMENT/Changes in Function: Patient tolerated treatment session well today. Patient had no complaints with addition of hip abd/add, LAQ, and sit to stand to exercise program to accomplish improve muscle activation. Pt needed left knee stabilization during sit to stand transfers to activate quad muscle in alignment. Pt needed extensive education regarding SPC and posture control in relation to back pain. After a long conversation, pt agreed to trial [facility] for long distances and agreed that her walking felt better with the cane. Patient continues to make steady progress toward goals and would benefit from continued skilled PT intervention to address remaining deficits outlined in goals below. Patient will continue to benefit from skilled PT services to modify and progress therapeutic interventions, address functional mobility deficits, address ROM deficits, address strength deficits, analyze and address soft tissue restrictions, analyze and cue movement patterns, analyze and modify body mechanics/ergonomics, assess and modify postural abnormalities and instruct in home and community integration to attain remaining goals. [x]  See Plan of Care  []  See progress note/recertification  []  See Discharge Summary         Progress towards goals / Updated goals:  Short Term Goals: to be accomplished within 4 weeks  1. Patient will report compliance with initial HEP at least 1x/day in order to assist in progression towards goals and restore functional mobility. Eval Status: HEP issued at [facility]  Current Status: same as eval     2. Patient will report at least a 25% reduction in pain when performing ADLs/functional activities in order to improve overall tolerance to functional movements and progress towards patient's PLOF.   Eval Status: pain ranges from 2-8, currently 4/10  Current Status: same as eval     Long Term Goals: to be accomplished within 8 weeks  1. Patient will report compliance with most recent HEP at least 1x/day in order to assist in returning to patient's PLOF. Eval Status: initial HEP dispensed at time of eval  Current Status: same as eval     2. Patient will demonstrate functional trunk AROM in order to return to functional activities and mobility. Eval Status: will assess trunk AROM upon second visit  Current Status: Limited trunk flexion, limited Rotation, limited side bending - all tested in sitting due to risk of falling      3. Patient will demonstrate at least 4/5 to 4+/5 strength bilaterally in order to be able to safely perform functional activities. Eval Status: Grossly 4-/5 bilaterally except paul hip flexion 3+/5, paul hip abd 2+/5; will test bilateral ankle strength upon second visit  Current Status:  Ankle PF: 1/5, DF 2/5, EV 2/5, IV 2/5     4.  Patient will be able to safely ambulate at least 1000' with LRAD in order to improve safety during gait and improve independence when ambulating to/from doctor's appointments.  Isabel Alexis Status: Without AD, wearing AFO: very unsafe including high likelihood of fall, furniture walking, antalgic, lack of appropriate weight shift through left LE, guarding of left UE along flank, very small stride lengths, very slow gait speed,               With SPC, wearing AFO: safer but still unsteady requiring CGA/supervision for safety, very slow gait speed, better weight shift through left LE, small stride lengths, left UE along flank  Current Status: same as eval     5.  Patient will improve score by at least 10 points on FOTO in order to maximize function and promote patients satisfaction with overall outcome.  (FOTO is an established functional score where 100 = no disability)  Eval Status: FOTO not captured this visit, to be completed 2nd visit  Current Status: 35      6.  Patient will score 18 seconds or less during TUG testing in order to show improved gait, balance, and stability during ambulation.              Eval Status: 24 seconds, without AD              Current Status: same as eval     7.  Patient will be able to perform at least 9 reps of sit<>stands during 30 second STS testing to show improved bilateral LE strength during this functional movement.              Eval Status: 5 reps              RQYTZVO Status: same as eval    PLAN  [x]  Upgrade activities as tolerated     [x]  Continue plan of care  [x]  Update interventions per flow sheet       []  Discharge due to:_  []  Other:_      Amita Velez PT 8/27/2021  1:03 PM    Future Appointments   Date Time Provider Catherine Sanchez   8/27/2021  1:15 PM Dominic Dey, [address] [facility]   9/8/2021 11:00 AM Malachi Onemo [facility]   9/10/2021 11:00 AM Dominic Dey [facility]   9/15/2021 12:30 PM Nicole Vargas [facility]   9/17/2021 11:00 AM Kenyatta Sewell [facility]   9/22/2021 11:00 AM Fredrick Purcell [facility]   9/24/2021 11:00 AM Dominic Dey [facility]   9/29/2021 11:00 AM Dominic Dey [facility]   10/1/2021 11:00 AM Dominic Dey [facility]   11/18/2021 11:30 AM Amena Chua MD [facility]

## 2021-09-08 ENCOUNTER — HOSPITAL ENCOUNTER (OUTPATIENT)
Dept: PHYSICAL THERAPY | Age: 56
End: 2021-09-08
Payer: OTHER MISCELLANEOUS

## 2021-09-09 ENCOUNTER — TELEPHONE (OUTPATIENT)
Dept: ORTHOPEDIC SURGERY | Age: 56
End: 2021-09-09

## 2021-09-09 NOTE — TELEPHONE ENCOUNTER
Per message from Wen on 8/23/21 the pt requested to go to the [facility]. Please pend up new PT order for Dr Gautam Christian to sign for the PT she wants to go to.

## 2021-09-09 NOTE — TELEPHONE ENCOUNTER
Patient called in to inquire where referral was sent for physical therapy because she requested [facility] and she was advised referral was sent to [facility]. Requesting call back for clarification because she was under the impression physical therapy was to begin on tomorrow (09/10/21).     Phn #: [phone]

## 2021-09-09 NOTE — TELEPHONE ENCOUNTER
Attempted to contact pt about her message. She was not able to be reached. A message was left for the pt letting her know that she is scheduled for tomorrow with [facility] and has made appts through October already. If she would like to go to [place] we can send the referral any where she would like to go. She will just need to call us to give us a location. If not, she can attend her scheduled appts as planned. The number to the office was provided. No pt names were used in the message.

## 2021-09-10 ENCOUNTER — HOSPITAL ENCOUNTER (OUTPATIENT)
Dept: PHYSICAL THERAPY | Age: 56
Discharge: HOME | End: 2021-09-10
Payer: OTHER MISCELLANEOUS

## 2021-09-10 DIAGNOSIS — I69.398 SPASTICITY DUE TO OLD STROKE: ICD-10-CM

## 2021-09-10 DIAGNOSIS — Z86.73 HISTORY OF STROKE: ICD-10-CM

## 2021-09-10 DIAGNOSIS — R25.2 SPASTICITY DUE TO OLD STROKE: ICD-10-CM

## 2021-09-10 DIAGNOSIS — M62.838 MUSCLE SPASM: ICD-10-CM

## 2021-09-10 PROCEDURE — 97110 THERAPEUTIC EXERCISES: CPT

## 2021-09-10 PROCEDURE — 97116 GAIT TRAINING THERAPY: CPT

## 2021-09-10 NOTE — PROGRESS NOTES
PT DAILY TREATMENT NOTE    Patient Name: Elli Miranda  Date:9/10/2021  : 1965  [x]  Patient  Verified  Payor: [employer] / Plan: [id number] / Product Type: Workers Comp /    In [place] time:1200  Total Treatment Time (min): 38  Total Timed Codes (min): 38  1:1 Treatment Time ([facility] only): 38   Visit #: 3 of 18    Treatment Dx: Low back pain [M54.5]    SUBJECTIVE  Pain Level (0-10 scale): 3-410  Any medication changes, allergies to medications, adverse drug reactions, diagnosis change, or new procedure performed?: [x] No    [] Yes (see summary sheet for update)  Subjective functional status/changes:   [] No changes reported  Pt reported that she is starting to use her [facility] when she walks long distances. She also reported that she has been able to go up and down stairs with greater ease. OBJECTIVE    10 min Therapeutic Exercise:  [x] See flow sheet :   Rationale: increase ROM, increase strength, improve coordination, improve balance and increase proprioception to improve the patients ability to restore PLOF    28 min Gait Training: stepping, WB training, heel strike and stance phase training   Rationale: To improve ambulation safety and efficiency in order to improve patient's ability to safely ambulate at home for self care. With   [x] TE   [] TA   [x] neuro   [] other: Patient Education: [x] Review HEP    [x] Progressed/Changed HEP based on:   [x] positioning   [x] body mechanics   [x] transfers   [] heat/ice application    [] other:      Pain Level (0-10 scale) post treatment: 2-3/10    ASSESSMENT/Changes in Function: Patient tolerated treatment session well today. PT worked with pt on forward stepping today using a 10lb weight between feet to facilitate increased CATHY during stepping. Education provided on WB for spasm and tone relief. PT facilitated SLS on Left LE progressing to stepping and then walking.   Pt educated again on using SPC to decrease back pain during gait training in order to provide increased support to allow for glut training and appropriate WB training. Patient continues to make steady progress toward goals and would benefit from continued skilled PT intervention to address remaining deficits outlined in goals below. Patient will continue to benefit from skilled PT services to modify and progress therapeutic interventions, address functional mobility deficits, address ROM deficits, address strength deficits, analyze and address soft tissue restrictions, analyze and cue movement patterns, analyze and modify body mechanics/ergonomics, assess and modify postural abnormalities, address imbalance/dizziness and instruct in home and community integration to attain remaining goals. [x]  See Plan of Care  []  See progress note/recertification  []  See Discharge Summary         Progress towards goals / Updated goals:  Short Term Goals: to be accomplished within 4 weeks  1. Patient will report compliance with initial HEP at least 1x/day in order to assist in progression towards goals and restore functional mobility. Eval Status: HEP issued at eval  Current Status: same as eval     2. Patient will report at least a 25% reduction in pain when performing ADLs/functional activities in order to improve overall tolerance to functional movements and progress towards patient's PLOF. Eval Status: pain ranges from 2-8, currently 4/10  Current Status: same as eval     Long Term Goals: to be accomplished within 8 weeks  1. Patient will report compliance with most recent HEP at least 1x/day in order to assist in returning to patient's PLOF. Eval Status: initial HEP dispensed at time of eval  Current Status: same as eval     2. Patient will demonstrate functional trunk AROM in order to return to functional activities and mobility.   Eval Status: will assess trunk AROM upon second visit  Current Status: Limited trunk flexion, limited Rotation, limited side bending - all tested in sitting due to risk of falling      3. Patient will demonstrate at least 4/5 to 4+/5 strength bilaterally in order to be able to safely perform functional activities. Eval Status: Grossly 4-/5 bilaterally except paul hip flexion 3+/5, paul hip abd 2+/5; will test bilateral ankle strength upon second visit  Current Status: Ankle PF: 1/5, DF 2/5, EV 2/5, IV 2/5     4.  Patient will be able to safely ambulate at least 1000' with LRAD in order to improve safety during gait and improve independence when ambulating to/from doctor's appointments.  Leotis Alter Status: Without AD, wearing AFO: very unsafe including high likelihood of fall, furniture walking, antalgic, lack of appropriate weight shift through left LE, guarding of left UE along flank, very small stride lengths, very slow gait speed,               With SPC, wearing AFO: safer but still unsteady requiring CGA/supervision for safety, very slow gait speed, better weight shift through left LE, small stride lengths, left UE along flank  Current Status: 9/10/21 With verbal and visual cues is able to hold a wider CATHY with good Left heel strike and toe off as well as foot flat stance phase with no SPC apx 80% of 330 feet walk.   Cont to recommend [facility] for stability and improved muscle control.       5.  Patient will improve score by at least 10 points on FOTO in order to maximize function and promote patients satisfaction with overall outcome.  (FOTO is an established functional score where 100 = no disability)  Eval Status: FOTO not captured this visit, to be completed 2nd visit  Current Status: 35 8/27/21     6.  Patient will score 18 seconds or less during TUG testing in order to show improved gait, balance, and stability during ambulation.              Eval Status: 24 seconds, without AD              Current Status: same as eval     7.  Patient will be able to perform at least 9 reps of sit<>stands during 30 second STS testing to show improved bilateral LE strength during this functional movement.              Eval Status: 5 reps              RVNXYLR Status: same as eval     PLAN  [x]  Upgrade activities as tolerated     [x]  Continue plan of care  [x]  Update interventions per flow sheet       []  Discharge due to:_  []  Other:_      Dinorah Frazier PT 9/10/2021  9:57 AM    Future Appointments   Date Time Provider Catherine Sanchez   9/10/2021 11:00 AM Farhan Trujillo [facility]   9/15/2021 12:30 PM [facility]   9/17/2021 11:00 AM Ruthie Hook [facility]   9/22/2021 11:00 AM [facility]   9/24/2021 11:00 AM Farhan Trujillo [facility]   9/29/2021 11:00 AM Farhan Trujillo [facility]   10/1/2021 11:00 AM Farhan Trujillo [facility]   11/18/2021 11:30 AM Rolf Tucker MD [facility] AMB

## 2021-09-10 NOTE — TELEPHONE ENCOUNTER
Patient left a message requesting a refill on Mobic also. I show Mobic was discontinued on 6/24/21 and patient was started on Naproxen. Please advise    Last Visit: 8/12/21 with MD Griselda Canela  Next Appointment: 11/18/21 with MD Griselda Canela  Previous Refill Encounter(s): 3/12/21 #90 with 5 refills    Requested Prescriptions     Pending Prescriptions Disp Refills    tiZANidine (ZANAFLEX) 4 mg tablet 90 Tablet 5     Sig: Take 1 Tablet by mouth three (3) times daily as needed for Pain.

## 2021-09-12 RX ORDER — TIZANIDINE 4 MG/1
4 TABLET ORAL
Qty: 90 TABLET | Refills: 5 | Status: SHIPPED | OUTPATIENT
Start: 2021-09-12 | End: 2022-03-21

## 2021-09-15 ENCOUNTER — HOSPITAL ENCOUNTER (OUTPATIENT)
Dept: PHYSICAL THERAPY | Age: 56
Discharge: HOME | End: 2021-09-15
Payer: OTHER MISCELLANEOUS

## 2021-09-15 PROCEDURE — 97112 NEUROMUSCULAR REEDUCATION: CPT

## 2021-09-15 PROCEDURE — 97110 THERAPEUTIC EXERCISES: CPT

## 2021-09-15 PROCEDURE — 97530 THERAPEUTIC ACTIVITIES: CPT

## 2021-09-15 NOTE — PROGRESS NOTES
PT DAILY TREATMENT NOTE    Patient Name: Gudelia Becerril  Date:9/15/2021  : 1965  [x]  Patient  Verified  Payor: Shellie Gama / Plan: [id number] [facility] / Product Type: Workers Comp /    In [employer] time:1:25  Total Treatment Time (min): 53  Total Timed Codes (min): 53  1:1 Treatment Time ( [address] only): NA   Visit #: 4 of 16    Treatment Dx: Low back pain [M54.5]    SUBJECTIVE  Pain Level (0-10 scale): /10  Any medication changes, allergies to medications, adverse drug reactions, diagnosis change, or new procedure performed?: [x] No    [] Yes (see summary sheet for update)  Subjective functional status/changes:   [] No changes reported  \"I have some pain in my low back but not too bad. \"    OBJECTIVE      26 min Therapeutic Exercise:  [x] See flow sheet :   Rationale: increase ROM, increase strength and improve coordination to improve the patients ability to return to prior level of physical activity. 15 min Therapeutic Activity:  [x]  See flow sheet :   Rationale: increase ROM, increase strength and improve coordination  to improve the patients ability to return to prior level of physical activity. 12 min Neuromuscular Re-education:  [x]  See flow sheet :   Rationale: increase ROM, increase strength and improve coordination  to improve the patients ability to return to prior level of physical activity. With   [] TE   [] TA   [] neuro   [] other: Patient Education: [x] Review HEP    [] Progressed/Changed HEP based on:   [] positioning   [] body mechanics   [] transfers   [] heat/ice application    [] other:      Other Objective/Functional Measures:      Pain Level (0-10 scale) post treatment: 3/10    ASSESSMENT/Changes in Function: Pt arrived ambulating without AD and demonstrating great lack of heel strike with weight shifted onto forefoot bilaterally. Pt was wearing AFO but with slip on shoes which was causing pt's heel to come out of shoe.  Pt reports unable to tie shoes therefore pt does not wear sneakers. Pt was greatly challenged by therex in general, requiring multiple rest breaks throughout tx. Patient will continue to benefit from skilled PT services to modify and progress therapeutic interventions, address functional mobility deficits, address ROM deficits, address strength deficits, analyze and address soft tissue restrictions, analyze and cue movement patterns, analyze and modify body mechanics/ergonomics and assess and modify postural abnormalities to attain remaining goals. [x]  See Plan of Care  []  See progress note/recertification  []  See Discharge Summary         Progress towards goals / Updated goals:  Short Term Goals: to be accomplished within 4 weeks  1. Patient will report compliance with initial HEP at least 1x/day in order to assist in progression towards goals and restore functional mobility. Eval Status: HEP issued at eval  Current Status: Pt reports daily partial compliance with HEP 9/15/21     2. Patient will report at least a 25% reduction in pain when performing ADLs/functional activities in order to improve overall tolerance to functional movements and progress towards patient's PLOF. Eval Status: pain ranges from 2-8, currently 4/10  Current Status: same as eval     Long Term Goals: to be accomplished within 8 weeks  1. Patient will report compliance with most recent HEP at least 1x/day in order to assist in returning to patient's PLOF. Eval Status: initial HEP dispensed at time of eval  Current Status: same as eval     2. Patient will demonstrate functional trunk AROM in order to return to functional activities and mobility. Eval Status: will assess trunk AROM upon second visit  Current Status: Limited trunk flexion, limited Rotation, limited side bending - all tested in sitting due to risk of falling      3.  Patient will demonstrate at least 4/5 to 4+/5 strength bilaterally in order to be able to safely perform functional activities. Eval Status: Grossly 4-/5 bilaterally except paul hip flexion 3+/5, paul hip abd 2+/5; will test bilateral ankle strength upon second visit  Current Status: Ankle PF: 1/5, DF 2/5, EV 2/5, IV 2/5     4.  Patient will be able to safely ambulate at least 1000' with LRAD in order to improve safety during gait and improve independence when ambulating to/from doctor's appointments.  Chioma Wells Status: Without AD, wearing AFO: very unsafe including high likelihood of fall, furniture walking, antalgic, lack of appropriate weight shift through left LE, guarding of left UE along flank, very small stride lengths, very slow gait speed,               With SPC, wearing AFO: safer but still unsteady requiring CGA/supervision for safety, very slow gait speed, better weight shift through left LE, small stride lengths, left UE along flank  Current Status: 9/10/21 With verbal and visual cues is able to hold a wider CATHY with good Left heel strike and toe off as well as foot flat stance phase with no SPC apx 80% of 330 feet walk.   Cont to recommend [facility] for stability and improved muscle control.       5.  Patient will improve score by at least 10 points on FOTO in order to maximize function and promote patients satisfaction with overall outcome.  (FOTO is an established functional score where 100 = no disability)  Eval Status: FOTO not captured this visit, to be completed 2nd visit  Current Status: 35 8/27/21     6.  Patient will score 18 seconds or less during TUG testing in order to show improved gait, balance, and stability during ambulation.              Eval Status: 24 seconds, without AD              Current Status: same as eval     7.  Patient will be able to perform at least 9 reps of sit<>stands during 30 second STS testing to show improved bilateral LE strength during this functional movement.              Eval Status: 5 reps              CWHCBWQ Status: same as eval    PLAN  [x]  Upgrade activities as tolerated     [x]  Continue plan of care  []  Update interventions per flow sheet       []  Discharge due to:_  []  Other:_      Farzad Acevedo, PTA 9/15/2021  1:02 PM    Future Appointments   Date Time Provider Catherine Sanchez   9/17/2021 11:00 AM Candida Mckenzie, [facility]   9/22/2021 11:00 AM Glorine Tamia [facility]   9/24/2021 11:00 AM Thedora Fast, [facility]   9/29/2021 11:00 AM Thedora Fast, [facility]   10/1/2021 11:00 AM Thedo Fast, [facility]   11/18/2021 11:30 AM Griselda Amaro MD [facility] AM[facility]

## 2021-09-17 ENCOUNTER — HOSPITAL ENCOUNTER (OUTPATIENT)
Dept: PHYSICAL THERAPY | Age: 56
Discharge: HOME | End: 2021-09-17
Payer: OTHER MISCELLANEOUS

## 2021-09-17 PROCEDURE — 97110 THERAPEUTIC EXERCISES: CPT

## 2021-09-17 NOTE — PROGRESS NOTES
PT DAILY TREATMENT NOTE    Patient Name: Jose Flores  Date:2021  : 1965  [x]  Patient  Verified  Payor: Verna Guzman / Plan: [address] / Product Type: Workers Comp /    In [employer] time:11:48  Total Treatment Time (min): 43  Total Timed Codes (min): 43  1:1 Treatment Time ( only): 43   Visit #: 5 of 16    Treatment Area: Low back pain [M54.5]    SUBJECTIVE  Pain Level (0-10 scale): 4/10 in left arm. Any medication changes, allergies to medications, adverse drug reactions, diagnosis change, or new procedure performed?: [x] No    [] Yes (see summary sheet for update)  Subjective functional status/changes:   [] No changes reported  Pt reports she started a new medication this week and she started to have dizziness when she is standing. Pt reports it is for SVtac. Pt reports she thinks she is doing better. Pt reports she hasn't told her MD yet. OBJECTIVE    43 min Therapeutic Exercise:  [x] See flow sheet :   Rationale: increase ROM, increase strength, improve coordination and increase proprioception to improve the patients ability to increase patient's ease with performing functional activities and decrease overall pain and symptoms. With   [x] TE   [] TA   [] neuro   [] other: Patient Education: [x] Review HEP    [] Progressed/Changed HEP based on:   [] positioning   [] body mechanics   [] transfers   [] heat/ice application    [] other:      Other Objective/Functional Measures: blood pressure: 123/81 mmHg. Pt entered gym with steppage gait pattern with left spring loaded AFO on without cane. Pain Level (0-10 scale) post treatment: 3/10 in her back. ASSESSMENT/Changes in Function: Patient presented to therapy today with reports of feeling dizzy upon standing. Patient's vitals were assessed and were WNL.  Pt was educated on importance of calling MD after session to let them be aware of her recent symptoms of dizziness and LE swelling due to the new medication. Pt did not tolerate standing exercises well due to reports of dizziness, therefore focused on strengthening exercises in supine and sitting. Pt was able to complete. Pt occasionally needed to pull her mask down due to reports of asthma. Pt did require assistance during supine DF to improve ROM and then pt was able to perform ROM with resistance band. Possibly hold on use of AFO due to spring loaded AFO causing increased extensor tone. Although showing progress patient would benefit from continuation of skilled physical therapy to address the remaining limitations. Patient will continue to benefit from skilled PT services to modify and progress therapeutic interventions, address functional mobility deficits, address ROM deficits, address strength deficits, analyze and address soft tissue restrictions, analyze and cue movement patterns, analyze and modify body mechanics/ergonomics and assess and modify postural abnormalities to attain remaining goals. []  See Plan of Care  []  See progress note/recertification  []  See Discharge Summary         Progress towards goals / Updated goals:  Short Term Goals: to be accomplished within 4 weeks  1. Patient will report compliance with initial HEP at least 1x/day in order to assist in progression towards goals and restore functional mobility. Eval Status: HEP issued at eval  Current Status: Pt reports daily partial compliance with HEP 9/17/21     2. Patient will report at least a 25% reduction in pain when performing ADLs/functional activities in order to improve overall tolerance to functional movements and progress towards patient's PLOF. Eval Status: pain ranges from 2-8, currently 4/10  Current Status: same as eval     Long Term Goals: to be accomplished within 8 weeks  1. Patient will report compliance with most recent HEP at least 1x/day in order to assist in returning to patient's PLOF.   Eval Status: initial HEP dispensed at time of eval  Current Status: same as eval     2. Patient will demonstrate functional trunk AROM in order to return to functional activities and mobility. Eval Status: will assess trunk AROM upon second visit  Current Status: Limited trunk flexion, limited Rotation, limited side bending - all tested in sitting due to risk of falling      3. Patient will demonstrate at least 4/5 to 4+/5 strength bilaterally in order to be able to safely perform functional activities.   Eval Status: Grossly 4-/5 bilaterally except paul hip flexion 3+/5, paul hip abd 2+/5; will test bilateral ankle strength upon second visit  Current Status: Ankle PF: 1/5, DF 2/5, EV 2/5, IV 2/5     4.  Patient will be able to safely ambulate at least 1000' with LRAD in order to improve safety during gait and improve independence when ambulating to/from doctor's appointments.  Sapp Prima Status: Without AD, wearing AFO: very unsafe including high likelihood of fall, furniture walking, antalgic, lack of appropriate weight shift through left LE, guarding of left UE along flank, very small stride lengths, very slow gait speed,               With SPC, wearing AFO: safer but still unsteady requiring CGA/supervision for safety, very slow gait speed, better weight shift through left LE, small stride lengths, left UE along flank  Current Status: 9/10/21 With verbal and visual cues is able to hold a wider CATHY with good Left heel strike and toe off as well as foot flat stance phase with no SPC apx 80% of 330 feet walk.  Cont to recommend [address] for stability and improved muscle control.       5.  Patient will improve score by at least 10 points on FOTO in order to maximize function and promote patients satisfaction with overall outcome.  (FOTO is an established functional score where 100 = no disability)  Eval Status: FOTO not captured this visit, to be completed 2nd visit  Current Status: 35 8/27/21     6.  Patient will score 18 seconds or less during TUG testing in order to show improved gait, balance, and stability during ambulation.              Eval Status: 24 seconds, without AD              Current Status: same as eval     7.  Patient will be able to perform at least 9 reps of sit<>stands during 30 second STS testing to show improved bilateral LE strength during this functional movement.              Eval Status: 5 reps              GTSORZU Status: same as eval    PLAN  [x]  Upgrade activities as tolerated     [x]  Continue plan of care  [x]  Update interventions per flow sheet       []  Discharge due to:_  []  Other:_      Mony Chan PT 9/17/2021  11:07 AM    Future Appointments   Date Time Provider Catherine Sanchez   9/22/2021 11:00 AM [facility]   9/24/2021 11:00 AM Bonifacio Zheng [facility]   9/29/2021 11:00 AM Bonifacio Zheng [facility]   10/1/2021 11:00 AM Bonifacio Zheng [facility]   11/18/2021 11:30 AM Tonja Keen MD [facility]

## 2021-09-22 ENCOUNTER — HOSPITAL ENCOUNTER (OUTPATIENT)
Dept: PHYSICAL THERAPY | Age: 56
Discharge: HOME | End: 2021-09-22
Payer: OTHER MISCELLANEOUS

## 2021-09-22 PROCEDURE — 97116 GAIT TRAINING THERAPY: CPT

## 2021-09-22 PROCEDURE — 97110 THERAPEUTIC EXERCISES: CPT

## 2021-09-22 PROCEDURE — 97530 THERAPEUTIC ACTIVITIES: CPT

## 2021-09-22 NOTE — PROGRESS NOTES
[facility] at [address] [place], [address]  Ph [phone]  Fx [phone]    Physical Therapy Progress Note  Patient name: Andrés Borges Start of Care: 2021   Referral source: Radha Vo MD : 1965                Medical Diagnosis: Low back pain [M54.5]    Onset Date: chronic                Treatment Diagnosis: low back pain/gait instability                                              ICD-10: M54.5, R26.9   Prior Hospitalization: see medical history Provider#: [id number]   Medications: Verified on Patient summary List    Comorbidities: s/p CVA , 3 lumbar surgeries as noted in eval in , left foot drop since , wearing left foot AFO x 2 years, left foot peroneal palsy, 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 Patient will report compliance with initial HEP at least 1x/day in order to assist in progression towards goals and restore functional mobility. Eval Status: HEP issued at eval  Current Status: Pt reports daily compliance 21 PROGRESSING     2. Patient will report at least a 25% reduction in pain when performing ADLs/functional activities in order to improve overall tolerance to functional movements and progress towards patient's PLOF. Eval Status: pain ranges from 2-8, currently 4/10  Current Status: 3-5 pain range over the past week 21 Progressing      Long Term Goals: to be accomplished within 8 weeks  1. Patient will report compliance with most recent HEP at least 1x/day in order to assist in returning to patient's PLOF.   Eval Status: initial HEP dispensed at time of eval  Current Status:Pt reports daily compliance 9/22/21 PROGRESSING     2. Patient will demonstrate functional trunk AROM in order to return to functional activities and mobility. Eval Status: will assess trunk AROM upon second visit  Current Status: Limited trunk flexion, limited extension 9/22/21 PROGRESSING     3. Patient will demonstrate at least 4/5 to 4+/5 strength bilaterally in order to be able to safely perform functional activities.   Eval Status: Grossly 4-/5 bilaterally except geoff hip flexion 3+/5, geoff hip abd 2+/5; will test bilateral ankle strength upon second visit  Current Status: Hip flexion 4/5 Geoff, 5/5 Left knee ext, 3/5 Right knee ext, Hip abd Left 4/5, Right 3/5 Ankle PF: 1/5, DF 2/5, EV 2/5, IV 2/5 9/22/21 PROGRESSING     4.  Patient will be able to safely ambulate at least 1000' with LRAD in order to improve safety during gait and improve independence when ambulating to/from doctor's appointments.  Donnie Barcenas Status: Without AD, wearing AFO: very unsafe including high likelihood of fall, furniture walking, antalgic, lack of appropriate weight shift through left LE, guarding of left UE along flank, very small stride lengths, very slow gait speed,               With SPC, wearing AFO: safer but still unsteady requiring CGA/supervision for safety, very slow gait speed, better weight shift through left LE, small stride lengths, left UE along flank  Current Status: 9/10/21 With verbal and visual cues is able to hold a wider CATHY with good Left heel strike and toe off as well as foot flat stance phase with no SPC apx 80% of 330 feet walk.  Cont to recommend [facility] for stability and improved muscle control.       5.  Patient will improve score by at least 10 points on FOTO in order to maximize function and promote patients satisfaction with overall outcome.  (FOTO is an established functional score where 100 = no disability)  Eval Status: FOTO not captured this visit, to be completed 2nd visit  Current Status: 38 9/22/21 PROGRESSING      6.  Patient will score 18 seconds or less during TUG testing in order to show improved gait, balance, and stability during ambulation.              Eval Status: 24 seconds, without AD              Current Status: 25 seconds with out AD; pt having increased tone today limiting progressing 9/22/21 PROGRESSING      7.  Patient will be able to perform at least 9 reps of sit<>stands during 30 second STS testing to show improved bilateral LE strength during this functional movement.              Eval Status: 5 reps              XPRWZLJ Status: 4 reps (pt having increased extension tone in Left leg and flexion tone in left arm today)  9/22/21 PROGRESSING       Summary of Care/ Key Functional Changes: Patient tolerated treatment session faitly today. Performed reassessment for Progress note today. Pt had increased flexion tone in Left UE and extension tone in Left LE. Patient again needed increased verbal instruction regarding SPC usage and decreasing back pain and improving stability. Patient continues to make slow steady progress toward goals and would benefit from continued skilled PT intervention to address remaining deficits outlined in goals below.     Patient will continue to benefit from skilled PT services to modify and progress therapeutic interventions, address functional mobility deficits, address ROM deficits, address strength deficits, analyze and address soft tissue restrictions, analyze and cue movement patterns, analyze and modify body mechanics/ergonomics, assess and modify postural abnormalities, address imbalance/dizziness and instruct in home and community integration to attain remaining goals.       ASSESSMENT/RECOMMENDATIONS:  [x]Continue therapy per initial plan/protocol at a frequency of  2 x per week for 5 more weeks  []Continue therapy with the following recommended changes:_____________________ _____________________________ ________________________________________  []Discontinue therapy progressing towards or have reached established goals  []Discontinue therapy due to lack of appreciable progress towards goals  []Discontinue therapy due to lack of attendance or compliance  []Await Physician's recommendations/decisions regarding therapy  []Other:________________________________________________________________    Thank you for this referral.   Michelle Patel, PT 9/22/2021 3:58 PM

## 2021-09-22 NOTE — PROGRESS NOTES
PT DAILY TREATMENT NOTE    Patient Name: Elli Miranda  Date:2021  : 1965  [x]  Patient  Verified  Payor: [id number] / Plan: [address] / Product Type: Workers Comp /    In [employer] time:1205  Total Treatment Time (min): 55   Total Timed Codes (min): 55  1:1 Treatment Time ( [address] only): 55   Visit #: 6 of 16    Treatment Dx: Low back pain [M54.5]    SUBJECTIVE  Pain Level (0-10 scale): 4/10  Any medication changes, allergies to medications, adverse drug reactions, diagnosis change, or new procedure performed?: [x] No    [] Yes (see summary sheet for update)  Subjective functional status/changes:   [] No changes reported  Pt reported that she feels like her spinal stimulator needs a new battery. OBJECTIVE    35 min Therapeutic Activity:  []  See flow sheet :   Rationale: increase ROM, increase strength, improve coordination, improve balance and increase proprioception  to improve the patients ability to complete transfers and ADLs without external assist     20 min Gait Training:  TUG x 2 gait training to car using sPC   Rationale: To improve ambulation safety and efficiency in order to improve patient's ability to safely ambulate at home for self care. With   [] TE   [x] TA   [] neuro   [] other: Patient Education: [x] Review HEP    [x] Progressed/Changed HEP based on:   [x] positioning   [x] body mechanics   [x] transfers   [] heat/ice application    [] other:      Other Objective/Functional Measures: see goals assessment below     Pain Level (0-10 scale) post treatment: 4/10    ASSESSMENT/Changes in Function: Patient tolerated treatment session faitly today. Performed reassessment for Progress note today. Pt had increased flexion tone in Left UE and extension tone in Left LE. Patient again needed increased verbal instruction regarding SPC usage and decreasing back pain and improving stability.   Patient continues to make slow steady progress toward goals and would benefit from continued skilled PT intervention to address remaining deficits outlined in goals below. Patient will continue to benefit from skilled PT services to modify and progress therapeutic interventions, address functional mobility deficits, address ROM deficits, address strength deficits, analyze and address soft tissue restrictions, analyze and cue movement patterns, analyze and modify body mechanics/ergonomics, assess and modify postural abnormalities, address imbalance/dizziness and instruct in home and community integration to attain remaining goals. [x]  See Plan of Care  []  See progress note/recertification  []  See Discharge Summary         Progress towards goals / Updated goals:  Short Term Goals: to be accomplished within 4 weeks  1. Patient will report compliance with initial HEP at least 1x/day in order to assist in progression towards goals and restore functional mobility. Eval Status: HEP issued at eval  Current Status: Pt reports daily compliance 9/22/21 PROGRESSING     2. Patient will report at least a 25% reduction in pain when performing ADLs/functional activities in order to improve overall tolerance to functional movements and progress towards patient's PLOF. Eval Status: pain ranges from 2-8, currently 4/10  Current Status: 3-5 pain range over the past week 9/22/21 Progressing      Long Term Goals: to be accomplished within 8 weeks  1. Patient will report compliance with most recent HEP at least 1x/day in order to assist in returning to patient's PLOF. Eval Status: initial HEP dispensed at time of eval  Current Status:Pt reports daily compliance 9/22/21 PROGRESSING     2. Patient will demonstrate functional trunk AROM in order to return to functional activities and mobility.   Eval Status: will assess trunk AROM upon second visit  Current Status: Limited trunk flexion, limited extension 9/22/21 PROGRESSING     3. Patient will demonstrate at least 4/5 to 4+/5 strength bilaterally in order to be able to safely perform functional activities.   Eval Status: Grossly 4-/5 bilaterally except geoff hip flexion 3+/5, geoff hip abd 2+/5; will test bilateral ankle strength upon second visit  Current Status: Hip flexion 4/5 Geoff, 5/5 Left knee ext, 3/5 Right knee ext, Hip abd Left 4/5, Right 3/5 Ankle PF: 1/5, DF 2/5, EV 2/5, IV 2/5 9/22/21 PROGRESSING     4.  Patient will be able to safely ambulate at least 1000' with LRAD in order to improve safety during gait and improve independence when ambulating to/from doctor's appointments.  Marta Cousin Status: Without AD, wearing AFO: very unsafe including high likelihood of fall, furniture walking, antalgic, lack of appropriate weight shift through left LE, guarding of left UE along flank, very small stride lengths, very slow gait speed,               With SPC, wearing AFO: safer but still unsteady requiring CGA/supervision for safety, very slow gait speed, better weight shift through left LE, small stride lengths, left UE along flank  Current Status: 9/10/21 With verbal and visual cues is able to hold a wider CATHY with good Left heel strike and toe off as well as foot flat stance phase with no SPC apx 80% of 330 feet walk.  Cont to recommend [facility] for stability and improved muscle control.       5.  Patient will improve score by at least 10 points on FOTO in order to maximize function and promote patients satisfaction with overall outcome.  (FOTO is an established functional score where 100 = no disability)  Eval Status: FOTO not captured this visit, to be completed 2nd visit  Current Status: 38 9/22/21 PROGRESSING      6.  Patient will score 18 seconds or less during TUG testing in order to show improved gait, balance, and stability during ambulation.              Eval Status: 24 seconds, without AD              Current Status: 25 seconds with out AD; pt having increased tone today limiting progressing 9/22/21 PROGRESSING    7.  Patient will be able to perform at least 9 reps of sit<>stands during 30 second STS testing to show improved bilateral LE strength during this functional movement.              Eval Status: 5 reps              VAZGHZU Status: 4 reps (pt having increased extension tone in Left leg and flexion tone in left arm today)  9/22/21 PROGRESSING    PLAN  [x]  Upgrade activities as tolerated     [x]  Continue plan of care  [x]  Update interventions per flow sheet       []  Discharge due to:_  []  Other:_      Bernadette Cohen PT 9/22/2021  11:14 AM    Future Appointments   Date Time Provider Catherine Sanchez   9/24/2021 11:00 AM Yesenia Fowler [facility]   9/29/2021 11:00 AM Yesenia Fowler [facility]   10/1/2021 11:00 AM Yesenia Fowler [facility]   10/6/2021 10:15 AM Yesenia Fowler [facility]   10/8/2021  1:15 PM Yesenia Fowler [facility]   10/13/2021 11:00 AM Yesenia Fowler [facility]   10/15/2021  1:15 PM Yesenia Fowler [facility]   10/20/2021 11:00 AM Yesenia Fowler [facility]   10/22/2021 12:30 PM Yesenia Fowler, [facility] [employer] [facility]   10/27/2021 11:00 AM Gloria Camacho [facility]   10/29/2021 12:30 PM Yesenia Fowler [facility]   11/18/2021 11:30 AM Cas Fernando MD [facility]

## 2021-09-23 ENCOUNTER — TELEPHONE (OUTPATIENT)
Dept: ORTHOPEDIC SURGERY | Age: 56
End: 2021-09-23

## 2021-09-23 NOTE — TELEPHONE ENCOUNTER
Shayy Aponte, patient's , called stating she is having a hard time finding a neurologist that will accept the patient. She was wanting to know if there is one that we could refer her to. She stated the patient only has 4-6 weeks battery left on her spinal cord stimulator. Deysi Simmons can be reached at [phone].

## 2021-09-24 ENCOUNTER — APPOINTMENT (OUTPATIENT)
Dept: PHYSICAL THERAPY | Age: 56
End: 2021-09-24
Payer: OTHER MISCELLANEOUS

## 2021-09-27 NOTE — TELEPHONE ENCOUNTER
Apryl Acuña MD  You 3 days ago   DA  Oh a spinal cord stimulator is not managed by a neurologist anyway. That would be a spine surgeon or neurosurgeon who replaces her device. I believe she has a non-rechargeable battery. I think her device was supposed to last around a decade.      Message text

## 2021-09-27 NOTE — TELEPHONE ENCOUNTER
I tried to reach [place] regarding her message. She was not able to be reached. A message was left for her letting her know that the pt does not need to see a neurologist but just a spine surgeon or neurosurgeon. They are the only ones that replace these devices. The number to the office was provided in case she has any questions or concerns.

## 2021-09-29 ENCOUNTER — HOSPITAL ENCOUNTER (OUTPATIENT)
Dept: PHYSICAL THERAPY | Age: 56
Discharge: HOME | End: 2021-09-29
Payer: OTHER MISCELLANEOUS

## 2021-09-29 PROCEDURE — 97535 SELF CARE MNGMENT TRAINING: CPT

## 2021-09-29 PROCEDURE — 97116 GAIT TRAINING THERAPY: CPT

## 2021-09-29 NOTE — PROGRESS NOTES
PT DAILY TREATMENT NOTE    Patient Name: Blake Sandifer  Date:2021  : 1965  [x]  Patient  Verified  Payor: Lj Billy / Plan: [id number] [facility] / Product Type: Workers Comp /    In [employer] time:1149  Total Treatment Time (min): 42  Total Timed Codes (min): 42  1:1 Treatment Time (MC/BCBS only): 42   Visit #: 7 of 16    Treatment Dx: Low back pain [M54.5]    SUBJECTIVE  Pain Level (0-10 scale): 5  Any medication changes, allergies to medications, adverse drug reactions, diagnosis change, or new procedure performed?: [x] No    [] Yes (see summary sheet for update)  Subjective functional status/changes:   [] No changes reported  Pt reports that she spent the night in the ER last night due to the cardiologist sending her because she was falling in the office. OBJECTIVE    30 min Gait Training:  Gait training with SPC in Left hand with increased weight tolerance through Left leg and improved tone management during stance phase. Rationale: To improve ambulation safety and efficiency in order to improve patient's ability to safely ambulate at home for self care. 12 min Self Care: Pt educated on use of SPC in Left hand and possible use of walker to improve strengthening in paul LE and improved fall risk   Rationale:    increase ROM, increase strength, improve coordination, improve balance and increase proprioception to improve the patients ability to restore PLOF          With   [] TE   [] TA   [] neuro   [x] other: gait and self care Patient Education: [x] Review HEP    [] Progressed/Changed HEP based on:   [x] positioning   [x] body mechanics   [] transfers   [] heat/ice application    [] other:      Pain Level (0-10 scale) post treatment: 4/10    ASSESSMENT/Changes in Function: Patient tolerated treatment session well today. Patient reports that she is cont to have problems with her back pain and is using her SPC more due to her unsteadiness.   Pt reported that using her SPC in the Right hand is irritating her Right shoulder and causing increased pain in her shoulder. PT began to discuss use of RW however pt was very resistant to use of walker. PT trialed SPC in Left hand and pt actually demod improved stance phase on Left leg and demod improved tone management on Left leg. Recommended to pt to use Left hand SPC at this time due to improvements made. Patient continues to make steady progress toward goals and would benefit from continued skilled PT intervention to address remaining deficits outlined in goals below. Patient will continue to benefit from skilled PT services to modify and progress therapeutic interventions, address functional mobility deficits, address ROM deficits, address strength deficits, analyze and address soft tissue restrictions, analyze and cue movement patterns, analyze and modify body mechanics/ergonomics, assess and modify postural abnormalities, address imbalance/dizziness and instruct in home and community integration to attain remaining goals. [x]  See Plan of Care  []  See progress note/recertification  []  See Discharge Summary         Progress towards goals / Updated goals:  Short Term Goals: to be accomplished within 4 weeks  1. Patient will report compliance with initial HEP at least 1x/day in order to assist in progression towards goals and restore functional mobility. Eval Status: HEP issued at eval  Current Status: Pt reports daily compliance 9/22/21 PROGRESSING     2. Patient will report at least a 25% reduction in pain when performing ADLs/functional activities in order to improve overall tolerance to functional movements and progress towards patient's PLOF. Eval Status: pain ranges from 2-8, currently 4/10  Current Status: 3-5 pain range over the past week 9/22/21 Progressing      Long Term Goals: to be accomplished within 8 weeks  1.  Patient will report compliance with most recent HEP at least 1x/day in order to assist in returning to patient's PLOF. Eval Status: initial HEP dispensed at time of eval  Current Status:Pt reports daily compliance 9/22/21 PROGRESSING     2. Patient will demonstrate functional trunk AROM in order to return to functional activities and mobility. Eval Status: will assess trunk AROM upon second visit  Current Status: Limited trunk flexion, limited extension 9/22/21 PROGRESSING     3. Patient will demonstrate at least 4/5 to 4+/5 strength bilaterally in order to be able to safely perform functional activities. Eval Status: Grossly 4-/5 bilaterally except geoff hip flexion 3+/5, geoff hip abd 2+/5; will test bilateral ankle strength upon second visit  Current Status: Hip flexion 4/5 Geoff, 5/5 Left knee ext, 3/5 Right knee ext, Hip abd Left 4/5, Right 3/5 Ankle PF: 1/5, DF 2/5, EV 2/5, IV 2/5 9/22/21 PROGRESSING     4.  Patient will be able to safely ambulate at least 1000' with LRAD in order to improve safety during gait and improve independence when ambulating to/from doctor's appointments.  Jose Alberto Lloyd Status: Without AD, wearing AFO: very unsafe including high likelihood of fall, furniture walking, antalgic, lack of appropriate weight shift through left LE, guarding of left UE along flank, very small stride lengths, very slow gait speed,               With SPC, wearing AFO: safer but still unsteady requiring CGA/supervision for safety, very slow gait speed, better weight shift through left LE, small stride lengths, left UE along flank  Current Status: 9/29/21 use of SPC in Left hand.   Improved stance phase with improved dorsiflexion and toe off with cane in left hand.      5.  Patient will improve score by at least 10 points on FOTO in order to maximize function and promote patients satisfaction with overall outcome.  (FOTO is an established functional score where 100 = no disability)  Eval Status: FOTO not captured this visit, to be completed 2nd visit  Current Status: 38 9/22/21 PROGRESSING      6.  Patient will score 18 seconds or less during TUG testing in order to show improved gait, balance, and stability during ambulation.              Eval Status: 24 seconds, without AD              Current Status: 25 seconds with out AD; pt having increased tone today limiting progressing 9/22/21 PROGRESSING      7.  Patient will be able to perform at least 9 reps of sit<>stands during 30 second STS testing to show improved bilateral LE strength during this functional movement.              Eval Status: 5 reps              NXPIZVH Status: 4 reps (pt having increased extension tone in Left leg and flexion tone in left arm today)  9/22/21 PROGRESSING     PLAN  []  Upgrade activities as tolerated     [x]  Continue plan of care  []  Update interventions per flow sheet       []  Discharge due to:_  []  Other:_      Elli Estrella PT 9/29/2021  8:12 AM    Future Appointments   Date Time Provider Catherine Sanchez   9/29/2021 11:00 AM Leticiafederica Conner, [facility]   10/1/2021 11:00 AM Leticia Kaska, [facility]   10/6/2021 10:15 AM Leticia Kaska, [facility]   10/8/2021  1:15 PM Leticia Kaska, [address] [facility]   10/13/2021 11:00 AM Leticia Kaska, [facility]   10/15/2021  1:15 PM Leticia Kaska, [address] [facility]   10/20/2021 11:00 AM Leticia Kaska, PT [facility]   10/22/2021 12:30 PM Leticia Kaska, [address] [facility]   10/27/2021 11:00 AM Zain Cox, [facility]   10/29/2021 12:30 PM Leticiafederica Conner, [facility]   11/18/2021 11:30 AM Zenobia Zaragoza MD [facility]

## 2021-10-01 ENCOUNTER — HOSPITAL ENCOUNTER (OUTPATIENT)
Dept: PHYSICAL THERAPY | Age: 56
End: 2021-10-01
Payer: OTHER MISCELLANEOUS

## 2021-10-06 ENCOUNTER — HOSPITAL ENCOUNTER (OUTPATIENT)
Dept: PHYSICAL THERAPY | Age: 56
Discharge: HOME | End: 2021-10-06
Payer: OTHER MISCELLANEOUS

## 2021-10-06 PROCEDURE — 97110 THERAPEUTIC EXERCISES: CPT

## 2021-10-06 PROCEDURE — 97116 GAIT TRAINING THERAPY: CPT

## 2021-10-06 NOTE — PROGRESS NOTES
PT DAILY TREATMENT NOTE    Patient Name: Maryam Rushing  Date:10/6/2021  : 1965  [x]  Patient  Verified  [place] / Plan: [id number] [facility] / Product Type: Workers Comp /    In [employer] time:1115  Total Treatment Time (min): 50  Total Timed Codes (min): 50  1:1 Treatment Time (MC/BCBS only): 50   Visit #: 8 of 16    Treatment Dx: Low back pain [M54.5]    SUBJECTIVE  Pain Level (0-10 scale): 2  Any medication changes, allergies to medications, adverse drug reactions, diagnosis change, or new procedure performed?: [x] No    [] Yes (see summary sheet for update)  Subjective functional status/changes:   [] No changes reported  Pt reports that she has had improvements in her ability to perform stairs and has now moved back to second floor bedroom. Pt reports that she is noticing improvements in pain as well. Pt reports that she is using her cane more frequently and has noticed improvements in pain and mobility. OBJECTIVE    30 min Therapeutic Exercise:  [x] See flow sheet :   Rationale: increase ROM, increase strength, improve coordination, improve balance and increase proprioception to improve the patients ability to restore PLOF      20 min Gait Training:  Stair training, with and with out AFO, step taps in parallel bars on 4 in step working on initial DF needed to ascend steps   Rationale: To improve ambulation safety and efficiency in order to improve patient's ability to safely ambulate at home for self care. With   [x] TE   [] TA   [] neuro   [x] other: gait Patient Education: [x] Review HEP    [x] Progressed/Changed HEP based on:   [x] positioning   [x] body mechanics   [x] transfers   [] heat/ice application    [] other:      Pain Level (0-10 scale) post treatment: 2/10    ASSESSMENT/Changes in Function: Patient tolerated treatment session well today.  Patient had no complaints with addition of step taps to exercise program to accomplish improved stair negotiation. Pt performed stairs with and without the AFO. Pts tone increased during stairs with AFO and pt requested trial with out AFO. Pt was able to perform stairs however she was unable to lift toes enough and toes caught on edge of step. PT facilitated stair training in parallel bars working on that initial DF needed for stairs. Pt reported compliance with tone reduction techniques that were taught and has noticed a difference in how she is performing her functional mobility and less tonal issues. Patient continues to make steady progress toward goals and would benefit from continued skilled PT intervention to address remaining deficits outlined in goals below. Patient will continue to benefit from skilled PT services to modify and progress therapeutic interventions, address functional mobility deficits, address ROM deficits, address strength deficits, analyze and address soft tissue restrictions, analyze and cue movement patterns, analyze and modify body mechanics/ergonomics, assess and modify postural abnormalities, address imbalance/dizziness and instruct in home and community integration to attain remaining goals. [x]  See Plan of Care  []  See progress note/recertification  []  See Discharge Summary         Progress towards goals / Updated goals:  Short Term Goals: to be accomplished within 4 weeks  1. Patient will report compliance with initial HEP at least 1x/day in order to assist in progression towards goals and restore functional mobility. Eval Status: HEP issued at [facility]  Current Status: Pt reports daily compliance 9/22/21 PROGRESSING     2. Patient will report at least a 25% reduction in pain when performing ADLs/functional activities in order to improve overall tolerance to functional movements and progress towards patient's PLOF.   Eval Status: pain ranges from 2-8, currently 4/10  Current Status: 3-5 pain range over the past week 9/22/21 Progressing      Long Term Goals: to be accomplished within 8 weeks  1. Patient will report compliance with most recent HEP at least 1x/day in order to assist in returning to patient's PLOF. Eval Status: initial HEP dispensed at time of eval  Current Status:Pt reports daily compliance 9/22/21 PROGRESSING     2. Patient will demonstrate functional trunk AROM in order to return to functional activities and mobility. Eval Status: will assess trunk AROM upon second visit  Current Status: Limited trunk flexion, limited extension 9/22/21 PROGRESSING     3. Patient will demonstrate at least 4/5 to 4+/5 strength bilaterally in order to be able to safely perform functional activities. Eval Status: Grossly 4-/5 bilaterally except geoff hip flexion 3+/5, geoff hip abd 2+/5; will test bilateral ankle strength upon second visit  Current Status: Hip flexion 4/5 Geoff, 5/5 Left knee ext, 3/5 Right knee ext, Hip abd Left 4/5, Right 3/5 Ankle PF: 1/5, DF 2/5, EV 2/5, IV 2/5 9/22/21 PROGRESSING     4.  Patient will be able to safely ambulate at least 1000' with LRAD in order to improve safety during gait and improve independence when ambulating to/from doctor's appointments.  Cass Nuñez Status: Without AD, wearing AFO: very unsafe including high likelihood of fall, furniture walking, antalgic, lack of appropriate weight shift through left LE, guarding of left UE along flank, very small stride lengths, very slow gait speed,               With SPC, wearing AFO: safer but still unsteady requiring CGA/supervision for safety, very slow gait speed, better weight shift through left LE, small stride lengths, left UE along flank  Current Status: 9/29/21 use of SPC in Left hand.   Improved stance phase with improved dorsiflexion and toe off with cane in left hand.      5.  Patient will improve score by at least 10 points on FOTO in order to maximize function and promote patients satisfaction with overall outcome.  (FOTO is an established functional score where 100 = no disability)  Eval Status: FOTO not captured this visit, to be completed 2nd visit  Current Status: 38 9/22/21 PROGRESSING      6.  Patient will score 18 seconds or less during TUG testing in order to show improved gait, balance, and stability during ambulation.              Eval Status: 24 seconds, without AD              Current Status: 25 seconds with out AD; pt having increased tone today limiting progressing 9/22/21 PROGRESSING      7.  Patient will be able to perform at least 9 reps of sit<>stands during 30 second STS testing to show improved bilateral LE strength during this functional movement.              Eval Status: 5 reps              NEYJPAX Status: 4 reps (pt having increased extension tone in Left leg and flexion tone in left arm today)  9/22/21 PROGRESSING     PLAN  []  Upgrade activities as tolerated     [x]  Continue plan of care  []  Update interventions per flow sheet       []  Discharge due to:_  []  Other:_      Jemima Reyes, PT 10/6/2021  10:20 AM    Future Appointments   Date Time Provider Catherine Sanchez   10/8/2021  1:15 PM Pinkie Midget, [address] [facility]   10/13/2021 11:00 AM Pinkie Midget, [facility]   10/15/2021  1:15 PM Pinkie Midget, [address] [facility]   10/20/2021 11:00 AM Pinkie Midget, [facility]   10/22/2021 12:30 PM Pinkie Midget, [address] [facility]   10/27/2021 11:00 AM Darral Span, [facility]   10/29/2021 12:30 PM Pinkie Midget, [facility]   11/18/2021 11:30 AM Ghazala Troncoso MD [facility]

## 2021-10-08 ENCOUNTER — HOSPITAL ENCOUNTER (OUTPATIENT)
Dept: PHYSICAL THERAPY | Age: 56
Discharge: HOME | End: 2021-10-08
Payer: OTHER MISCELLANEOUS

## 2021-10-08 PROCEDURE — 97110 THERAPEUTIC EXERCISES: CPT

## 2021-10-08 PROCEDURE — 97535 SELF CARE MNGMENT TRAINING: CPT

## 2021-10-08 NOTE — PROGRESS NOTES
PT DAILY TREATMENT NOTE    Patient Name: Antionette Dubois  Date:10/8/2021  : 1965  [x]  Patient  Verified  Payor: Maria A Cai / Plan: [id number] / Product Type: Workers Comp /    In [employer] time:200  Total Treatment Time (min): 37  Total Timed Codes (min): 37  1:1 Treatment Time (MC/BCBS only): 37   Visit #: 9 of 16    Treatment Dx: Low back pain [M54.5]    SUBJECTIVE  Pain Level (0-10 scale): 5/10  Any medication changes, allergies to medications, adverse drug reactions, diagnosis change, or new procedure performed?: [x] No    [] Yes (see summary sheet for update)  Subjective functional status/changes:   [] No changes reported  Pt reported increased pain in Lower back. OBJECTIVE    25 min Gait Training:  Weight shifting, marching, stepping and gait training with SPC. Education on Left LE WB   Rationale: To improve ambulation safety and efficiency in order to improve patient's ability to safely ambulate at home for self care. 12 min Self Care: Education on changes to daily activities to decrease stress and increase compliance of HEP. Rationale:    increase ROM, increase strength, improve coordination, improve balance and increase proprioception to improve the patients ability to complete transfers and ADLs without external assist          With   [] TE   [] TA   [] neuro   [x] other: gait and self care Patient Education: [x] Review HEP    [] Progressed/Changed HEP based on:   [x] positioning   [x] body mechanics   [x] transfers   [] heat/ice application    [] other:      Pain Level (0-10 scale) post treatment:     ASSESSMENT/Changes in Function: Patient tolerated treatment session well today. Pt came in today with increased tone in Left LE and increased tone in LEFT leg. Pt reported rushing to get to clinic today. Pt reported that she thinks she needs to not sleep in upstairs bedroom any more.   Educated pt to not give up on trying and cont to try but taking rests. Pt cont to be highly effected by emotional stress. Patient educated on tone reduction techniques today and education on home management. Pt did reports decreased back pain once tone was relieved. Patient continues to make steady progress toward goals and would benefit from continued skilled PT intervention to address remaining deficits outlined in goals below. Patient will continue to benefit from skilled PT services to modify and progress therapeutic interventions, address functional mobility deficits, address ROM deficits, address strength deficits, analyze and address soft tissue restrictions, analyze and cue movement patterns, analyze and modify body mechanics/ergonomics, assess and modify postural abnormalities, address imbalance/dizziness and instruct in home and community integration to attain remaining goals. [x]  See Plan of Care  []  See progress note/recertification  []  See Discharge Summary         Progress towards goals / Updated goals:  Short Term Goals: to be accomplished within 4 weeks  1. Patient will report compliance with initial HEP at least 1x/day in order to assist in progression towards goals and restore functional mobility. Eval Status: HEP issued at eval  Current Status: Pt reports daily compliance 9/22/21 PROGRESSING     2. Patient will report at least a 25% reduction in pain when performing ADLs/functional activities in order to improve overall tolerance to functional movements and progress towards patient's PLOF. Eval Status: pain ranges from 2-8, currently 4/10  Current Status: 3-5 pain range over the past week 9/22/21 Progressing      Long Term Goals: to be accomplished within 8 weeks  1. Patient will report compliance with most recent HEP at least 1x/day in order to assist in returning to patient's PLOF.   Eval Status: initial HEP dispensed at time of eval  Current Status:Pt reports daily compliance 9/22/21 PROGRESSING     2. Patient will demonstrate functional trunk AROM in order to return to functional activities and mobility. Eval Status: will assess trunk AROM upon second visit  Current Status: Limited trunk flexion, limited extension 9/22/21 PROGRESSING     3. Patient will demonstrate at least 4/5 to 4+/5 strength bilaterally in order to be able to safely perform functional activities.   Eval Status: Grossly 4-/5 bilaterally except geoff hip flexion 3+/5, geoff hip abd 2+/5; will test bilateral ankle strength upon second visit  Current Status: Hip flexion 4/5 Geoff, 5/5 Left knee ext, 3/5 Right knee ext, Hip abd Left 4/5, Right 3/5 Ankle PF: 1/5, DF 2/5, EV 2/5, IV 2/5 9/22/21 PROGRESSING     4.  Patient will be able to safely ambulate at least 1000' with LRAD in order to improve safety during gait and improve independence when ambulating to/from doctor's appointments.  Jitendra Harveyius Status: Without AD, wearing AFO: very unsafe including high likelihood of fall, furniture walking, antalgic, lack of appropriate weight shift through left LE, guarding of left UE along flank, very small stride lengths, very slow gait speed,               With SPC, wearing AFO: safer but still unsteady requiring CGA/supervision for safety, very slow gait speed, better weight shift through left LE, small stride lengths, left UE along flank  Current Status: 9/29/21 use of SPC in Left hand.  Improved stance phase with improved dorsiflexion and toe off with cane in left hand.      5.  Patient will improve score by at least 10 points on FOTO in order to maximize function and promote patients satisfaction with overall outcome.  (FOTO is an established functional score where 100 = no disability)  Eval Status: FOTO not captured this visit, to be completed 2nd visit  Current Status: 38 9/22/21 PROGRESSING      6.  Patient will score 18 seconds or less during TUG testing in order to show improved gait, balance, and stability during ambulation.              Eval Status: 24 seconds, without AD              RMZIIFC Status: 25 seconds with out AD; pt having increased tone today limiting progressing 9/22/21 PROGRESSING      7.  Patient will be able to perform at least 9 reps of sit<>stands during 30 second STS testing to show improved bilateral LE strength during this functional movement.              Eval Status: 5 reps              LXXJWFS Status: 4 reps (pt having increased extension tone in Left leg and flexion tone in left arm today)  9/22/21 PROGRESSING    PLAN  []  Upgrade activities as tolerated     [x]  Continue plan of care  []  Update interventions per flow sheet       []  Discharge due to:_  []  Other:_      Sonia Templeton, PT 10/8/2021  8:05 AM    Future Appointments   Date Time Provider Catherine Sanchez   10/8/2021  1:15 PM Avril Hurtado, [address] [facility]   10/13/2021 11:00 AM Avril Hurtado [facility]   10/15/2021  1:15 PM Avril Hurtado, [address] [facility]   10/20/2021 11:00 AM Avril Hurtado [facility]   10/22/2021 12:30 PM Avril Hurtado, [address] [facility]   10/27/2021 11:00 AM Michael Flanagan [facility]   10/29/2021 12:30 PM Avril Hurtado [facility]   11/18/2021 11:30 AM Lorenza Churchill MD [facility]

## 2021-10-13 ENCOUNTER — HOSPITAL ENCOUNTER (OUTPATIENT)
Dept: PHYSICAL THERAPY | Age: 56
Discharge: HOME | End: 2021-10-13
Payer: OTHER MISCELLANEOUS

## 2021-10-13 PROCEDURE — 97112 NEUROMUSCULAR REEDUCATION: CPT

## 2021-10-13 PROCEDURE — 97116 GAIT TRAINING THERAPY: CPT

## 2021-10-13 PROCEDURE — 97530 THERAPEUTIC ACTIVITIES: CPT

## 2021-10-13 NOTE — PROGRESS NOTES
PT DAILY TREATMENT NOTE    Patient Name: Tu Trevino  Date:10/13/2021  : 1965  [x]  Patient  Verified  Payor: [place] Borges / Plan: [id number] [facility] / Product Type: Workers Comp /    In [employer] time:1145  Total Treatment Time (min): 38  Total Timed Codes (min): 38  1:1 Treatment Time (MC/BCBS only): 38   Visit #: 10 of 16    Treatment Dx: Low back pain [M54.5]    SUBJECTIVE  Pain Level (0-10 scale): 5/10  Any medication changes, allergies to medications, adverse drug reactions, diagnosis change, or new procedure performed?: [x] No    [] Yes (see summary sheet for update)  Subjective functional status/changes:   [] No changes reported  Pt reports that she has kept doing the stairs at home and has not stopped and it is going well. Pt is using SPC all the time now and has seen improvements in back pain. OBJECTIVE    15 min Therapeutic Activity:  [x]  See flow sheet :   Rationale: increase ROM, increase strength, improve coordination, improve balance and increase proprioception  to improve the patients ability to complete transfers and ADLs without external assist    8 min Neuromuscular Re-education:  [x]  See flow sheet :  Activate gluts    Rationale: increase ROM, increase strength, improve coordination, improve balance and increase proprioception  to improve the patients ability to activate ankle and knee extensors without compensation    15 min Gait Training:  Work on weight shifting using SPC; TUG tests   Rationale: To improve ambulation safety and efficiency in order to improve patient's ability to safely ambulate at home for self care.      With   [] TE   [x] TA   [x] neuro   [x] other: gait Patient Education: [x] Review HEP    [x] Progressed/Changed HEP based on:   [x] positioning   [x] body mechanics   [x] transfers   [] heat/ice application    [] other:      Pain Level (0-10 scale) post treatment: 5/10    ASSESSMENT/Changes in Function: Patient tolerated treatment session well today. Patient had no complaints with addition of hip hikes to exercise program to accomplish improved glut contraction. Pt cont to need frequent redirection. Patient is demonstrating improved tone management using tone reduction techniques taught. Patient continues to make slow progress toward goals and would benefit from continued skilled PT intervention to address remaining deficits outlined in goals below. Patient will continue to benefit from skilled PT services to modify and progress therapeutic interventions, address functional mobility deficits, address ROM deficits, address strength deficits, analyze and address soft tissue restrictions, analyze and cue movement patterns, analyze and modify body mechanics/ergonomics, assess and modify postural abnormalities, address imbalance/dizziness and instruct in home and community integration to attain remaining goals. [x]  See Plan of Care  []  See progress note/recertification  []  See Discharge Summary         Progress towards goals / Updated goals:  Short Term Goals: to be accomplished within 4 weeks  1. Patient will report compliance with initial HEP at least 1x/day in order to assist in progression towards goals and restore functional mobility. Eval Status: HEP issued at eval  Current Status: Pt reports daily compliance 9/22/21 PROGRESSING     2. Patient will report at least a 25% reduction in pain when performing ADLs/functional activities in order to improve overall tolerance to functional movements and progress towards patient's PLOF. Eval Status: pain ranges from 2-8, currently 4/10  Current Status: 3-5 pain range over the past week 9/22/21 Progressing      Long Term Goals: to be accomplished within 8 weeks  1. Patient will report compliance with most recent HEP at least 1x/day in order to assist in returning to patient's PLOF.   Eval Status: initial HEP dispensed at time of eval  Current Status:Pt reports daily compliance 9/22/21 PROGRESSING     2. Patient will demonstrate functional trunk AROM in order to return to functional activities and mobility. Eval Status: will assess trunk AROM upon second visit  Current Status: Limited trunk flexion, limited extension 9/22/21 PROGRESSING     3. Patient will demonstrate at least 4/5 to 4+/5 strength bilaterally in order to be able to safely perform functional activities.   Eval Status: Grossly 4-/5 bilaterally except geoff hip flexion 3+/5, geoff hip abd 2+/5; will test bilateral ankle strength upon second visit  Current Status: Hip flexion 4/5 Geoff, 5/5 Left knee ext, 3/5 Right knee ext, Hip abd Left 4/5, Right 3/5 Ankle PF: 1/5, DF 2/5, EV 2/5, IV 2/5 9/22/21 PROGRESSING     4.  Patient will be able to safely ambulate at least 1000' with LRAD in order to improve safety during gait and improve independence when ambulating to/from doctor's appointments.  Leland Allen Status: Without AD, wearing AFO: very unsafe including high likelihood of fall, furniture walking, antalgic, lack of appropriate weight shift through left LE, guarding of left UE along flank, very small stride lengths, very slow gait speed,               With SPC, wearing AFO: safer but still unsteady requiring CGA/supervision for safety, very slow gait speed, better weight shift through left LE, small stride lengths, left UE along flank  Current Status: 9/29/21 use of SPC in Left hand.  Improved stance phase with improved dorsiflexion and toe off with cane in left hand.      5.  Patient will improve score by at least 10 points on FOTO in order to maximize function and promote patients satisfaction with overall outcome.  (FOTO is an established functional score where 100 = no disability)  Eval Status: FOTO not captured this visit, to be completed 2nd visit  Current Status: 38 9/22/21 PROGRESSING      6.  Patient will score 18 seconds or less during TUG testing in order to show improved gait, balance, and stability during ambulation.              Eval Status: 24 seconds, without AD              Current Status: PROGRESSING 36 sec with [facility] 10/13/21     7.  Patient will be able to perform at least 9 reps of sit<>stands during 30 second STS testing to show improved bilateral LE strength during this functional movement.              Eval Status: 5 reps              QNDXNWR Status: 4 reps (pt having increased extension tone in Left leg and flexion tone in left arm today)  9/22/21 PROGRESSING     PLAN  []  Upgrade activities as tolerated     [x]  Continue plan of care  []  Update interventions per flow sheet       []  Discharge due to:_  []  Other:_      To Hurd PT 10/13/2021  10:58 AM    Future Appointments   Date Time Provider Catherine Sanchez   10/13/2021 11:00 AM Nitesh Barajas PT [facility]   10/15/2021  1:15 PM Nitesh Barajas, [address] [facility]   10/20/2021 11:00 AM Nitesh Barajas PT [facility]   10/22/2021 12:30 PM Nitesh Barajas, [address] [facility]   10/27/2021 11:00 AM May Stabrandon PT [facility]   10/29/2021 12:30 PM Nitesh Barajas PT [facility]   11/18/2021 11:30 AM Meredith Richter MD [facility]

## 2021-10-15 ENCOUNTER — HOSPITAL ENCOUNTER (OUTPATIENT)
Dept: PHYSICAL THERAPY | Age: 56
Discharge: HOME | End: 2021-10-15
Payer: OTHER MISCELLANEOUS

## 2021-10-15 PROCEDURE — 97110 THERAPEUTIC EXERCISES: CPT

## 2021-10-15 PROCEDURE — 97112 NEUROMUSCULAR REEDUCATION: CPT

## 2021-10-15 PROCEDURE — 97116 GAIT TRAINING THERAPY: CPT

## 2021-10-15 NOTE — PROGRESS NOTES
PT DAILY TREATMENT NOTE    Patient Name: Jaclyn Oneal  Date:10/15/2021  : 1965  [x]  Patient  Verified  Payor: Ayo Dears / Plan: [id number] / Product Type: Workers Comp /    In [place] [employer] time:214  Total Treatment Time (min): 56  Total Timed Codes (min): 56  1:1 Treatment Time (MC/BCBS only): 56   Visit #: 11 of 16    Treatment Dx: Low back pain [M54.5]    SUBJECTIVE  Pain Level (0-10 scale): 2/10  Any medication changes, allergies to medications, adverse drug reactions, diagnosis change, or new procedure performed?: [x] No    [] Yes (see summary sheet for update)  Subjective functional status/changes:   [] No changes reported  Pt reports that she is doing well and pain is less today. OBJECTIVE    15 min Therapeutic Exercise:  [x] See flow sheet :   Rationale: increase ROM, increase strength, improve coordination, improve balance and increase proprioception to improve the patients ability to restore PLOF    15 min Neuromuscular Re-education:  [x]  See flow sheet :   Rationale: increase ROM, increase strength, improve coordination and improve balance  to improve the patients ability to activate gluts without compensation    26 min Gait Training:  Stepping; WB for tonal management; Gait training with no AFO 2x 60 feet. Stair training no AFO working on anterior tib activation   Rationale: To improve ambulation safety and efficiency in order to improve patient's ability to safely ambulate at home for self care. With   [x] TE   [] TA   [x] neuro   [x] other: gait Patient Education: [x] Review HEP    [x] Progressed/Changed HEP based on:   [x] positioning   [x] body mechanics   [] transfers   [] heat/ice application    [] other:      Pain Level (0-10 scale) post treatment: 2/10    ASSESSMENT/Changes in Function: Patient tolerated treatment session well today.  Patient had no complaints with addition of weight shifts to Geoff leg to exercise program to accomplish improved pelvic control during gait pattern. Educated pt on cont gait training at home with slower carson in order to cont strengthening. Pt reported that she wants to be faster but loses control when she goes faster. Educated that form comes before speed. Patient cont to report improvements in pain despite pts back stimulator being low battery per pt. Pt cont to use SPC during gait improving back pain. Patient continues to make steady progress toward goals and would benefit from continued skilled PT intervention to address remaining deficits outlined in goals below. Patient will continue to benefit from skilled PT services to modify and progress therapeutic interventions, address functional mobility deficits, address ROM deficits, address strength deficits, analyze and address soft tissue restrictions, analyze and cue movement patterns, analyze and modify body mechanics/ergonomics, assess and modify postural abnormalities, address imbalance/dizziness and instruct in home and community integration to attain remaining goals. [x]  See Plan of Care  []  See progress note/recertification  []  See Discharge Summary         Progress towards goals / Updated goals:  Short Term Goals: to be accomplished within 4 weeks  1. Patient will report compliance with initial HEP at least 1x/day in order to assist in progression towards goals and restore functional mobility. Eval Status: HEP issued at eval  Current Status: Pt reports daily compliance 9/22/21 PROGRESSING     2. Patient will report at least a 25% reduction in pain when performing ADLs/functional activities in order to improve overall tolerance to functional movements and progress towards patient's PLOF. Eval Status: pain ranges from 2-8, currently 4/10  Current Status: 3-5 pain range over the past week 9/22/21 Progressing      Long Term Goals: to be accomplished within 8 weeks  1.  Patient will report compliance with most recent HEP at least 1x/day in order to assist in returning to patient's PLOF. Eval Status: initial HEP dispensed at time of eval  Current Status:Pt reports daily compliance 9/22/21 PROGRESSING     2. Patient will demonstrate functional trunk AROM in order to return to functional activities and mobility. Eval Status: will assess trunk AROM upon second visit  Current Status: Limited trunk flexion, limited extension 9/22/21 PROGRESSING     3. Patient will demonstrate at least 4/5 to 4+/5 strength bilaterally in order to be able to safely perform functional activities.   Eval Status: Grossly 4-/5 bilaterally except geoff hip flexion 3+/5, geoff hip abd 2+/5; will test bilateral ankle strength upon second visit  Current Status: Hip flexion 4/5 Geoff, 5/5 Left knee ext, 3/5 Right knee ext, Hip abd Left 4/5, Right 3/5 Ankle PF: 1/5, DF 2/5, EV 2/5, IV 2/5 9/22/21 PROGRESSING     4.  Patient will be able to safely ambulate at least 1000' with LRAD in order to improve safety during gait and improve independence when ambulating to/from doctor's appointments.  [employer] Status: Without AD, wearing AFO: very unsafe including high likelihood of fall, furniture walking, antalgic, lack of appropriate weight shift through left LE, guarding of left UE along flank, very small stride lengths, very slow gait speed,               With SPC, wearing AFO: safer but still unsteady requiring CGA/supervision for safety, very slow gait speed, better weight shift through left LE, small stride lengths, left UE along flank  Current Status: 9/29/21 use of SPC in Left hand.  Improved stance phase with improved dorsiflexion and toe off with cane in left hand.      5.  Patient will improve score by at least 10 points on FOTO in order to maximize function and promote patients satisfaction with overall outcome.  (FOTO is an established functional score where 100 = no disability)  Eval Status: FOTO not captured this visit, to be completed 2nd visit  Current Status: 38 9/22/21 PROGRESSING      6.  Patient will score 18 seconds or less during TUG testing in order to show improved gait, balance, and stability during ambulation.              Eval Status: 24 seconds, without AD              Current Status: PROGRESSING 36 sec with [facility] 10/13/21     7.  Patient will be able to perform at least 9 reps of sit<>stands during 30 second STS testing to show improved bilateral LE strength during this functional movement.              Eval Status: 5 reps              MQWBRHW Status: 4 reps (pt having increased extension tone in Left leg and flexion tone in left arm today)  9/22/21 PROGRESSING     PLAN  [x]  Upgrade activities as tolerated     [x]  Continue plan of care  [x]  Update interventions per flow sheet       []  Discharge due to:_  []  Other:_      Ramu Carlos PT 10/15/2021  1:09 PM    Future Appointments   Date Time Provider Catherine Sanchez   10/15/2021  1:15 PM Mendy Ruelas PT [facility]   10/20/2021 11:00 AM Melania Connor [facility]   10/22/2021 12:30 PM Mendy Ruelas PT [facility]   10/27/2021 11:00 AM Bubba Rosen PT [facility]   10/29/2021 12:30 PM Mendy Ruelas PT [facility]   11/18/2021 11:30 AM Bernabe Calvert MD [facility]

## 2021-10-20 ENCOUNTER — HOSPITAL ENCOUNTER (OUTPATIENT)
Dept: PHYSICAL THERAPY | Age: 56
Discharge: HOME | End: 2021-10-20
Payer: OTHER MISCELLANEOUS

## 2021-10-20 PROCEDURE — 97110 THERAPEUTIC EXERCISES: CPT

## 2021-10-20 PROCEDURE — 97112 NEUROMUSCULAR REEDUCATION: CPT

## 2021-10-20 PROCEDURE — 97530 THERAPEUTIC ACTIVITIES: CPT

## 2021-10-20 NOTE — PROGRESS NOTES
PT DAILY TREATMENT NOTE    Patient Name: Maryam Rushing  Date:10/20/2021  : 1965  [x]  Patient  Verified  Payor: Jules [employer]bar[employer] Visa / Plan: [id number] [facility] / Product Type: Workers Comp /    In [employer] time:11:58  Total Treatment Time (min): 53  Total Timed Codes (min): 53  1:1 Treatment Time (MC/BCBS only): NA   Visit #: 12 of 16    Treatment Dx: Low back pain [M54.5]    SUBJECTIVE  Pain Level (0-10 scale): 4/10  Any medication changes, allergies to medications, adverse drug reactions, diagnosis change, or new procedure performed?: [x] No    [] Yes (see summary sheet for update)  Subjective functional status/changes:   [] No changes reported  \"I have some pain in my back. \"    OBJECTIVE      20 min Therapeutic Exercise:  [x] See flow sheet :   Rationale: increase ROM, increase strength and improve coordination to improve the patients ability to return to prior level of physical activity. 10 min Therapeutic Activity:  [x]  See flow sheet :   Rationale: increase ROM, increase strength and improve coordination  to improve the patients ability to return to prior level of physical activity. 23 min Neuromuscular Re-education:  [x]  See flow sheet :   Rationale: increase ROM, increase strength and improve coordination  to improve the patients ability to return to prior level of physical activity. With   [] TE   [] TA   [] neuro   [] other: Patient Education: [x] Review HEP    [] Progressed/Changed HEP based on:   [] positioning   [] body mechanics   [] transfers   [] heat/ice application    [] other:      Other Objective/Functional Measures: FOTO: 50     Pain Level (0-10 scale) post treatment: 0/10    ASSESSMENT/Changes in Function: Pt tolerated session moderately well. 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  Pt had incident of sudden onset of greatly increased respirations as well as pt reporting their Right LE was feeling \" heavy and tight\" while the pt's LE presented with slight hip flexion, full knee extension and plantar flexion. Pt initially did not respond to therapist inquiries for several seconds but, eventually began talking about the \"heavy\" feeling in their lower extremity. Pt did exhibit mild incident of upper body shaking while leaning on // bars. This PTA recommended pt sit down and pt agreed and took extended time to place Left LE back on the ground. Pt then moved normally and perform standing to sit transfer with Geoff UE and only CGA from this PTA. Pt confirmed no abnormal symptoms while sitting down and had no shortness of breath. Pt reported only mild increased fatigue and was able to perform gait training with Supervision and no losses of balance or increased shortness of breath. Due to above evidence it does not appear pt had a seizure but, more likely a moment of anxiety due to increased tone in Left LE. Pt advised to f/u with doctor if pt had any abnormal symptoms. Patient will continue to benefit from skilled PT services to modify and progress therapeutic interventions, address functional mobility deficits, address ROM deficits, address strength deficits, analyze and address soft tissue restrictions, analyze and cue movement patterns, analyze and modify body mechanics/ergonomics and assess and modify postural abnormalities to attain remaining goals. [x]  See Plan of Care  []  See progress note/recertification  []  See Discharge Summary         Progress towards goals / Updated goals:  Short Term Goals: to be accomplished within 4 weeks  1. Patient will report compliance with initial HEP at least 1x/day in order to assist in progression towards goals and restore functional mobility. [facility] Status: HEP issued at [facility]  Current Status: Pt reports 4x/week compliance 10/20/21     2.  Patient will report at least a 25% reduction in pain when performing ADLs/functional activities in order to improve overall tolerance to functional movements and progress towards patient's PLOF. Eval Status: pain ranges from 2-8, currently 4/10  Current Status: 3-5 pain range over the past week 9/22/21 Progressing      Long Term Goals: to be accomplished within 8 weeks  1. Patient will report compliance with most recent HEP at least 1x/day in order to assist in returning to patient's PLOF. Eval Status: initial HEP dispensed at time of eval  Current Status:Pt reports daily compliance 9/22/21 PROGRESSING     2. Patient will demonstrate functional trunk AROM in order to return to functional activities and mobility. Eval Status: will assess trunk AROM upon second visit  Current Status: Limited trunk flexion, limited extension 9/22/21 PROGRESSING     3. Patient will demonstrate at least 4/5 to 4+/5 strength bilaterally in order to be able to safely perform functional activities. Eval Status: Grossly 4-/5 bilaterally except geoff hip flexion 3+/5, geoff hip abd 2+/5; will test bilateral ankle strength upon second visit  Current Status: Hip flexion 4/5 Geoff, 5/5 Left knee ext, 3/5 Right knee ext, Hip abd Left 4/5, Right 3/5 Ankle PF: 1/5, DF 2/5, EV 2/5, IV 2/5 9/22/21 PROGRESSING     4.  Patient will be able to safely ambulate at least 1000' with LRAD in order to improve safety during gait and improve independence when ambulating to/from doctor's appointments.  Dorthey Harms Status: Without AD, wearing AFO: very unsafe including high likelihood of fall, furniture walking, antalgic, lack of appropriate weight shift through left LE, guarding of left UE along flank, very small stride lengths, very slow gait speed,               With SPC, wearing AFO: safer but still unsteady requiring CGA/supervision for safety, very slow gait speed, better weight shift through left LE, small stride lengths, left UE along flank  Current Status: 9/29/21 use of SPC in Left hand.  Improved stance phase with improved dorsiflexion and toe off with cane in left hand.      5.  Patient will improve score by at least 10 points on FOTO in order to maximize function and promote patients satisfaction with overall outcome.  (FOTO is an established functional score where 100 = no disability)  Eval Status: FOTO not captured this visit, to be completed 2nd visit  Last PN: 38 9/22/21 PROGRESSING   Current: 50 10/20/21 Met     6.  Patient will score 18 seconds or less during TUG testing in order to show improved gait, balance, and stability during ambulation.              Eval Status: 24 seconds, without AD              Current Status: PROGRESSING 36 sec with SPC 10/13/21     7.  Patient will be able to perform at least 9 reps of sit<>stands during 30 second STS testing to show improved bilateral LE strength during this functional movement.              Eval Status: 5 reps              KLYKITW Status: 4 reps (pt having increased extension tone in Left leg and flexion tone in left arm today)  9/22/21 PROGRESSING    PLAN  [x]  Upgrade activities as tolerated     [x]  Continue plan of care  []  Update interventions per flow sheet       []  Discharge due to:_  []  Other:_      Romina Melendez PTA 10/20/2021  11:13 AM    Future Appointments   Date Time Provider Catherine Sanchez   10/22/2021 12:30 PM Dottie File, [address] [facility]   10/27/2021 11:00 AM Dottie Gaston, PT [facility]   10/28/2021 12:30 PM Gearjamaica Floyd [facility]   11/18/2021 11:30 AM Shana Santamaria MD [facility]

## 2021-10-22 ENCOUNTER — HOSPITAL ENCOUNTER (OUTPATIENT)
Dept: PHYSICAL THERAPY | Age: 56
Discharge: HOME | End: 2021-10-22
Payer: OTHER MISCELLANEOUS

## 2021-10-22 PROCEDURE — 97530 THERAPEUTIC ACTIVITIES: CPT

## 2021-10-22 PROCEDURE — 97116 GAIT TRAINING THERAPY: CPT

## 2021-10-22 PROCEDURE — 97535 SELF CARE MNGMENT TRAINING: CPT

## 2021-10-22 NOTE — PROGRESS NOTES
PT DAILY TREATMENT NOTE    Patient Name: Diane Canela  Date:10/22/2021  : 1965  [x]  Patient  Verified  Payor: Shadi Gimenez / Plan: [id number] / Product Type: Workers Comp /    In [employer] time:157  Total Treatment Time (min): 85  Total Timed Codes (min): 73  1:1 Treatment Time (MC/BCBS only): 73   Visit #: 13 of 16    Treatment Dx: Low back pain [M54.5]    SUBJECTIVE  Pain Level (0-10 scale): 5/10  Any medication changes, allergies to medications, adverse drug reactions, diagnosis change, or new procedure performed?: [x] No    [] Yes (see summary sheet for update)  Subjective functional status/changes:   [] No changes reported  Pt reports that she saw pain management yesterday who decreased her amount of NSAIDS she is taking. Pt reported that she returns for a consultation for her back stimulator. OBJECTIVE    30 min Therapeutic Activity:  [x]  See flow sheet : education on tone reduction   Rationale: increase ROM, increase strength, improve coordination, improve balance and increase proprioception  to improve the patients ability to complete transfers and ADLs without external assist     30 min Gait Training:  Stepping and weight shifting training in parallel bars, gait training in swann   Rationale: To improve ambulation safety and efficiency in order to improve patient's ability to safely ambulate at home for self care.      12 min Self Care: Education on stress reduction techniques    Rationale:    increase ROM, increase strength, improve coordination, improve balance and increase proprioception to improve the patients ability to restore PLOF            With   [x] TE   [] TA   [] neuro   [x] other: Patient Education: [x] Review HEP    [x] Progressed/Changed HEP based on:   [x] positioning   [x] body mechanics   [] transfers   [] heat/ice application    [x] other: gait     Other Objective/Functional Measures: see below goals assessment Pain Level (0-10 scale) post treatment: 5/10    ASSESSMENT/Changes in Function: Patient tolerated treatment session fairly today. Pt came into clinic today with 5/10 LBP. Pt reported that she saw pain management yesterday who decreased her amount of NSAIDS she is taking. Pt reported that she returns for a consultation for her back stimulator next Friday. Pt is now able to self manage Left LE extension tone using tone reduction techniques of WB and side lying. Pt is now able to ascend/Descend steps to her second floor bedroom. Pt is now using SPC in community which has reduced LBP even in the presence of decreased spinal stimulator activation. Pt was being assessed today for extension of services based on end of Insurance authorization,  Recommended 1x week for 4 weeks however pt reported that she is scheduled for spinal stimulator surgery and she will likely be unable to come for several weeks. PT recommends reevaluation after surgery with new MD order. Patient continues to make steady progress toward goals and would benefit from continued skilled PT intervention to address remaining deficits outlined in goals below. However at this time pt will be DC with a reassessment post surgery with new MD referral.        Patient will continue to benefit from skilled PT services to modify and progress therapeutic interventions, address functional mobility deficits, address ROM deficits, address strength deficits, analyze and address soft tissue restrictions, analyze and cue movement patterns, analyze and modify body mechanics/ergonomics, assess and modify postural abnormalities, address imbalance/dizziness and instruct in home and community integration to attain remaining goals. [x]  See Plan of Care  []  See progress note/recertification  []  See Discharge Summary         Progress towards goals / Updated goals:  Short Term Goals: to be accomplished within 4 weeks  1.  Patient will report compliance with initial HEP at least 1x/day in order to assist in progression towards goals and restore functional mobility. Eval Status: HEP issued at eval  Current Status: Pt reports daily compliance of seated exercises and daily walking 10/22/21 PROGRESSING     2. Patient will report at least a 25% reduction in pain when performing ADLs/functional activities in order to improve overall tolerance to functional movements and progress towards patient's PLOF. Eval Status: pain ranges from 2-8, currently 4/10  Current Status: 5/10 at worst 10/22/21 Progressing      Long Term Goals: to be accomplished within 8 weeks  1. Patient will report compliance with most recent HEP at least 1x/day in order to assist in returning to patient's PLOF. Eval Status: initial HEP dispensed at time of eval  Current Status:Pt reports daily compliance of seated exercises and daily walking 10/22/21 PROGRESSING     2. Patient will demonstrate functional trunk AROM in order to return to functional activities and mobility. Eval Status: will assess trunk AROM upon second visit  Current Status: Limited trunk flexion, limited extension 10/22/21 PROGRESSING     3. Patient will demonstrate at least 4/5 to 4+/5 strength bilaterally in order to be able to safely perform functional activities.   Eval Status: Grossly 4-/5 bilaterally except geoff hip flexion 3+/5, geoff hip abd 2+/5; will test bilateral ankle strength upon second visit  Current Status: Hip flexion 4+/5 Geoff, 5/5 Left knee ext, 4/5 Right knee ext, Hip abd Geoff 4+/5 Ankle PF: 2/5, DF 3/5, EV 2/5, IV 2/5 10/22/21 PROGRESSING     4.  Patient will be able to safely ambulate at least 1000' with LRAD in order to improve safety during gait and improve independence when ambulating to/from doctor's appointments.  Farrel Bollard Status: Without AD, wearing AFO: very unsafe including high likelihood of fall, furniture walking, antalgic, lack of appropriate weight shift through left LE, guarding of left UE along flank, very small stride lengths, very slow gait speed,               With SPC, wearing AFO: safer but still unsteady requiring CGA/supervision for safety, very slow gait speed, better weight shift through left LE, small stride lengths, left UE along flank  Current Status: 9/29/21 use of SPC in Left hand.  Improved stance phase with improved dorsiflexion and toe off with cane in left hand.      5.  Patient will improve score by at least 10 points on FOTO in order to maximize function and promote patients satisfaction with overall outcome.  (FOTO is an established functional score where 100 = no disability)  Eval Status: FOTO not captured this visit, to be completed 2nd visit  Last PN: 38 9/22/21 PROGRESSING   Current: 50 10/20/21 Met     6.  Patient will score 18 seconds or less during TUG testing in order to show improved gait, balance, and stability during ambulation.              Eval Status: 24 seconds, without AD              Current Status: PROGRESSING 36 sec with SPC 10/13/21 24 sec with out AD      7.  Patient will be able to perform at least 9 reps of sit<>stands during 30 second STS testing to show improved bilateral LE strength during this functional movement.              Eval Status: 5 reps              Last progress note: 4 reps (pt having increased extension tone in Left leg and flexion tone in left arm today)  9/22/21 PROGRESSING   Current: 5 reps in 30 sec 10/22/21 PROGRESSING      PLAN  []  Upgrade activities as tolerated     [x]  Continue plan of care  []  Update interventions per flow sheet       []  Discharge due to:_  []  Other:_      Josey Schaefer, PT 10/22/2021  10:49 AM    Future Appointments   Date Time Provider Catherine Sanchez   10/22/2021 12:30 PM Cache Meter, PT [facility]   10/27/2021 11:00 AM Mali Meter, PT [facility]   10/28/2021 12:30 PM Albino Castellon [facility]   11/18/2021 11:30 AM Pasquale Wisdom MD [facility]

## 2021-10-22 NOTE — PROGRESS NOTES
[facility]  [address], [address] [facility]  Ph [phone]  Fx [phone]    Physical Therapy Discharge Summary    Patient name: Karolina Ralph Start of Care: 2021   Referral Morgan Acosta MD : 1965                Medical Diagnosis: Low back pain [M54.5]    Onset Date: chronic                Treatment Diagnosis: low back pain/gait instability                                              ICD-10: M54.5, R26.9   Prior Hospitalization: see medical history Provider#: [id number]   Medications: Verified on Patient summary List    Comorbidities: s/p CVA , 3 lumbar surgeries as noted in eval in , left foot drop since , wearing left foot AFO x 2 years, left foot peroneal palsy, 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 Patient will report compliance with initial HEP at least 1x/day in order to assist in progression towards goals and restore functional mobility. Eval Status: HEP issued at eval  Current Status: Pt reports daily compliance of seated exercises and daily walking 10/22/21 PROGRESSING     2. Patient will report at least a 25% reduction in pain when performing ADLs/functional activities in order to improve overall tolerance to functional movements and progress towards patient's PLOF. Eval Status: pain ranges from 2-8, currently 4/10  Current Status: 5/10 at worst 10/22/21 Progressing      Long Term Goals: to be accomplished within 8 weeks  1.  Patient will report compliance with most recent HEP at least 1x/day in order to assist in returning to patient's PLOF. Eval Status: initial HEP dispensed at time of eval  Current Status:Pt reports daily compliance of seated exercises and daily walking 10/22/21 PROGRESSING     2. Patient will demonstrate functional trunk AROM in order to return to functional activities and mobility. Eval Status: will assess trunk AROM upon second visit  Current Status: Limited trunk flexion, limited extension 10/22/21 PROGRESSING     3. Patient will demonstrate at least 4/5 to 4+/5 strength bilaterally in order to be able to safely perform functional activities.   Eval Status: Grossly 4-/5 bilaterally except geoff hip flexion 3+/5, geoff hip abd 2+/5; will test bilateral ankle strength upon second visit  Current Status: Hip flexion 4+/5 Geoff, 5/5 Left knee ext, 4/5 Right knee ext, Hip abd Geoff 4+/5 Ankle PF: 2/5, DF 3/5, EV 2/5, IV 2/5 10/22/21 PROGRESSING     4.  Patient will be able to safely ambulate at least 1000' with LRAD in order to improve safety during gait and improve independence when ambulating to/from doctor's appointments.  Adah Ka Status: Without AD, wearing AFO: very unsafe including high likelihood of fall, furniture walking, antalgic, lack of appropriate weight shift through left LE, guarding of left UE along flank, very small stride lengths, very slow gait speed,               With SPC, wearing AFO: safer but still unsteady requiring CGA/supervision for safety, very slow gait speed, better weight shift through left LE, small stride lengths, left UE along flank  Current Status: 9/29/21 use of SPC in Left hand.  Improved stance phase with improved dorsiflexion and toe off with cane in left hand.      5.  Patient will improve score by at least 10 points on FOTO in order to maximize function and promote patients satisfaction with overall outcome.  (FOTO is an established functional score where 100 = no disability)  Eval Status: FOTO not captured this visit, to be completed 2nd visit  Last KAPLAN: 15 6/24/09 PROGRESSING   Current: 50 10/20/21 Met     6.  Patient will score 18 seconds or less during TUG testing in order to show improved gait, balance, and stability during ambulation.              Eval Status: 24 seconds, without AD              Current Status: PROGRESSING 36 sec with SPC 10/13/21 24 sec with out AD      7.  Patient will be able to perform at least 9 reps of sit<>stands during 30 second STS testing to show improved bilateral LE strength during this functional movement.              Eval Status: 5 reps              Last progress note: 4 reps (pt having increased extension tone in Left leg and flexion tone in left arm today)  9/22/21 PROGRESSING              Current: 5 reps in 30 sec 10/22/21 PROGRESSING      Assessment/ Summary of Care: Patient tolerated treatment session fairly today. Pt came into clinic today with 5/10 LBP. Pt reported that she saw pain management yesterday who decreased her amount of NSAIDS she is taking. Pt reported that she returns for a consultation for her back stimulator next Friday. Pt is now able to self manage Left LE extension tone using tone reduction techniques of WB and side lying.  Pt is now able to ascend/Descend steps to her second floor bedroom.  Pt is now using SPC in community which has reduced LBP even in the presence of decreased spinal stimulator activation. Pt was being assessed today for extension of services based on end of Insurance authorization,  Recommended 1x week for 4 weeks however pt reported that she is scheduled for spinal stimulator surgery and she will likely be unable to come for several weeks. PT recommends reevaluation after surgery with new MD order. Patient continues to make steady progress toward goals and would benefit from continued skilled PT intervention to address remaining deficits outlined in goals below.  However at this time pt will be DC with a reassessment post surgery with new MD referral.          Patient will continue to benefit from skilled PT services to modify and progress therapeutic interventions, address functional mobility deficits, address ROM deficits, address strength deficits, analyze and address soft tissue restrictions, analyze and cue movement patterns, analyze and modify body mechanics/ergonomics, assess and modify postural abnormalities, address imbalance/dizziness and instruct in home and community integration to attain remaining goals. RECOMMENDATIONS:  [x]Discontinue therapy: [x]Patient has reached or is progressing toward set goals      [x]Patient is scheduled for surgery and will need reevaluation post surgery.        []Due to lack of appreciable progress towards set goals    Armin Le PT 10/22/2021 2:07 PM

## 2021-10-22 NOTE — PROGRESS NOTES
Physical Therapy Discharge Instructions    [facility]  [address], [address] [facility]  Ph [phone]  Fx [phone]      Patient: Antionette Dubois  : 1965      Continue Home Exercise Program 1 times per day     Continue with tonal management using Weight bearing facilitation and side lying in supine. Remember weight shifting laterally, weight shifting forward and backward with left leg forward, and stepping shifting. Follow up with MD:     [] Upon completion of therapy     [x] As needed      Recommendations:     [x]   Return to activity with home program    []   Return to activity with the following modifications:       []Post Rehab Program    []Join Independent aquatic program     []Return to/join local gym        Additional Comments: Cont to use your cane!             Elli Estrella, PT 10/22/2021 1:48 PM

## 2021-10-27 ENCOUNTER — APPOINTMENT (OUTPATIENT)
Dept: PHYSICAL THERAPY | Age: 56
End: 2021-10-27
Payer: OTHER MISCELLANEOUS

## 2021-10-28 ENCOUNTER — APPOINTMENT (OUTPATIENT)
Dept: PHYSICAL THERAPY | Age: 56
End: 2021-10-28
Payer: OTHER MISCELLANEOUS

## 2021-10-29 ENCOUNTER — APPOINTMENT (OUTPATIENT)
Dept: PHYSICAL THERAPY | Age: 56
End: 2021-10-29
Payer: OTHER MISCELLANEOUS

## 2022-02-24 ENCOUNTER — VIRTUAL VISIT (OUTPATIENT)
Dept: ORTHOPEDIC SURGERY | Age: 57
End: 2022-02-24
Payer: OTHER MISCELLANEOUS

## 2022-02-24 DIAGNOSIS — M70.62 GREATER TROCHANTERIC BURSITIS OF LEFT HIP: ICD-10-CM

## 2022-02-24 DIAGNOSIS — G57.02 PIRIFORMIS SYNDROME OF LEFT SIDE: ICD-10-CM

## 2022-02-24 DIAGNOSIS — M54.59 MECHANICAL LOW BACK PAIN: ICD-10-CM

## 2022-02-24 DIAGNOSIS — M79.18 MYOFASCIAL PAIN: ICD-10-CM

## 2022-02-24 DIAGNOSIS — M53.3 COCCYDYNIA: ICD-10-CM

## 2022-02-24 DIAGNOSIS — M62.89 PELVIC FLOOR DYSFUNCTION IN FEMALE: ICD-10-CM

## 2022-02-24 DIAGNOSIS — R26.9 GAIT ABNORMALITY: ICD-10-CM

## 2022-02-24 DIAGNOSIS — G89.29 CHRONIC PRIMARY MUSCULOSKELETAL PAIN: Primary | ICD-10-CM

## 2022-02-24 DIAGNOSIS — M53.3 SACROILIAC JOINT DYSFUNCTION OF BOTH SIDES: ICD-10-CM

## 2022-02-24 DIAGNOSIS — M79.18 CHRONIC PRIMARY MUSCULOSKELETAL PAIN: Primary | ICD-10-CM

## 2022-02-24 PROCEDURE — 99214 OFFICE O/P EST MOD 30 MIN: CPT | Performed by: PHYSICAL MEDICINE & REHABILITATION

## 2022-02-24 NOTE — PROGRESS NOTES
[address], [address]  [address]  Phone: [phone]  Fax: [phone]        Ida Hairston  : 1965  PCP: Kelly Smith MD  2022    PROGRESS NOTE    Consent: Omar Meza was evaluated through  a synchronous (real-time) audio-video encounter. The patient (or guardian if applicable) is aware that this is a billable service, which includes applicable co-pays. This Virtual Visit was conducted with patient's (and/or legal guardian's) consent. This visit was conducted pursuant to the emergency declaration under the [address] [place] [address] and the [employer] Act. Patient identification was verified, and a caregiver was present when appropriate. The patient was located in a state where the provider was licensed to provide care. The visit was conducted in the office with the patient being in home through a [employer] platform. Visit video time start: 10:05 AM end: 10:31 AM      HISTORY OF PRESENT ILLNESS  Omar Meza is a 55 y.o. female who was seen as a new patient 10/10/19 with c/o chronic low back pain extending into the RLE that she described as a burning pain. She also c/o numbness and foot drop in the LLE that improved some with surgery (L5-S1 fusion) and SCS. She continued to have some residual foot drop and shakiness/tremor of her foot along with LLE cramping. She noted that her neurologist did an emergency EEG to see if it was due to her complex regional seizures, but they were not. She found significant relief with Tizanidine, especially with the distal LLE cramping. She noted that she did not tolerate other muscle relaxants due to somnolence. She found significant relief from the SCS and a Lidoderm 12 hr patch. Pt reported that she has had two EMGs in the past revealing a chronic left L5 radiculopathy.  Pt noted that she previously had a dx of sacroiliitis, and she previously found relief from an SI joint injection. She had a right-sided lacunar stroke affecting the temporal lobe - LLE weakness, mild LUE weakness, cognitive impairment, smiling on the right side. 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 She saw Dr. Brittaney Aponte 6/18/19 in regards to a spinal cord stimulator malfunction. She had SCS placement in 2011 by Dr. Anita Alcocer, and it was subsequently revised several times. She reported that she has had multiple issues with the SCS's over the years related to battery life. Due to her cognitive impairment, she is unable to use a rechargable batteries. She previously worked as a Physical Therapist. She was prescribed an [address] underwent a Right SI joint injection (10/22/19; Dr. Rasmussen Courser) that provided significant relief and she doubled her daily steps. She continued to take Tizanidine, Topamax, and Depakote prescribed by her neurologist. She maintained an HEP of walking and yoga. She saw some improvement with PT (11/14/19-2/4/2020; [facility]). She used a left AFO as recommended by PT. She reported no longer having a tremor in her LLE. Pt noted that during one session of PT, she was having manual, myofascial release on her external pelvis and believed it elicited a seizure at the time. She experienced constipation, weight gain, and somnolence with Tizanidine but she felt the benefits outweighed the side effects. Pt noted that she saw her orthotist who provided an orthotic that has helped with her ambulation. She noted that her left hip was elevated by 1/8th of an inch. She was advised to obtain a gel insert for the right side, but it did not provide any benefit. Her son provided long access distraction that helped with the muscle spasms, but she had burning and numbness slightly posterior to the trochanter.  She had significant sharp pain with weight bearing that radiated into the ankle. She saw some benefit from oral steroids, especially with her SI joint pain. However, she continued to have some pain near the trochanter and the left gluteus medius near the insertion. She had to use a cane to ambulate around her house. Pt denied groin pain. She returned with c/o pelvic pains, sacral pain and tailbone numbness. She also reported some BLE itching and redness. She found benefit with walking/movement. She saw Dr. Nohemi Garcia and he suggested she may do well with seeing his wife at [facility]. She Suzen Quiet, and he suggested the ganglion impar injection for coccyx pain if she decided to proceed with that option. However, he noted, it would not likely resolve her other symptoms. Pt notes that she walked 1.1 miles one day, and both of her legs began turning in. She returned with c/o exacerbation of tailbone and sacral pain. She was interested in another SI joint injection. She planned to have a left rotator cuff repair surgery. Pt notes that she has severe pain, numbness, and tingling posterolaterally to the knee that is painful with walking and sitting. She notes that she even experienced limping while walking in the house and she felt that her legs would collapse. She presents wearing a left AFO. Since her last OV, she had a left rotator cuff repair surgery with [facility] so she rested in a recliner often and she has had home health PT. She underwent a right SI joint injection (9/22/2020; Dr. Graeme Putnam) without benefit. She feels her left AFO brace has been aggravating her SIJ pain. She has had recurrent UTIs. She previously did well with pelvic floor PT. She notes that the SCS seems to be making her BLE pains worse. She has been experiencing generalized chest tightness - her GP has done a chest XR to r/o pneumonia and she is scheduled to have an echo soon. She had her SCS reprogrammed with benefit. She noted that she had paraesthesia throughout the groin, anus, vagina.  She has been using the vaginal wand in pelvic floor PT. She continued to have some right groin and RLE pain. Pt noted that she could not use her SCS because after 3-4 hours, she had a burning pain in her coccyx and low back up to the thoracic spine to the bra line. She took Gabapentin, Percocet, and NSAIDs along with a special pillow for her coccydynia. Her pain is worse with prolonged sitting or laying. She also notes that she has a soft tissue injury to the LUE. She believes she injured her right biceps tendon when she tried driving again. Cervical, Thoracic, and Lumbar spine CT Myelogram dated 1/22/21 reviewed. Per report, No central canal compromise identified within the cervical, thoracic or lumbar spine.  Mild posterior osteophytic ridging and bilateral foraminal encroachment C5-6.  Retained spinal stimulator hardware in the thoracic spine as noted.  Previous laminectomy and fusion L5-S1 with intact hardware.  Broad-based disc bulge L4-5.  Prominent soft tissue posterior to the disc space at L5-S1, which may reflect residual scar tissue, or disc material.  Borderline periaortic retroperitoneal lymphadenopathy.  2 cm cystic lesion adnexa. Pt notes that she has an appointment scheduled with Dr. Hanane Mata soon. She notes that her pelvic floor symptoms and saddle anesthesia improved with pelvic floor PT but have slightly returned since her pain has flared up. She has been wearing her AFO again on the left with benefit. She feels more comfortable in it and feels more secure than she previously did. She continues to have some cramping in the left foot. She has been taking Tizanidine with some benefit, but she experiences some somnolence as well. She is doing better since her left shoulder surgery, and she is driving some again. However, she has some LUE pain when she drives still. 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  She sees Emre Perez PA-C for pain management. She continues to have low back pain that she describes as \"heat. \" She is finding some benefit with Mobic, though mild. She had a ganglion impar block with Dr. Leatha Miranda (21) with significant benefit. She attended PT with Jeff, but she had to stop due to her spraining her ankle. She had a flare up of her sacral pain, but she saw improvement overall since her last visit. She continued to do well with Dr. Ana Gordon. She was walking more and increasing her activity. She still had some difficulty with the grocery store and carrying groceries. She had difficulty coming up the steps after aqua therapy DR [facility]ENDE[facility]) so she was advised it was not recommended to continue due to safety risk. She notes that PT recommended the litegait machine. Ai Sawyer is seen virtually for f/u. She attended PT (-10/22/21; [facility]ngela Bourgeois) She was doing really well until the battery in her SCS . She had it replaced in 2021, and she no longer has to use her AFO anymore. She is up to walking 0.75 miles outside now. She no longer has the tremor in her LLE. She has noticed some edema in the BLE with a noticeable ring from her socks. She has had increased sensitivity in the coccyx/sacrum. Treatments patient has tried:  Physical therapy:Yes  Doing HEP: Yes  Non-opioid medications: Yes - Tizanidine, Topamax, oral steroids, Diclofenac, Gabapentin, NSAIDs  Spinal injections: Right SI joint injection (10/22/19); right SIJ (20)  Spinal surgery- Yes - fusion L5-S1; SCS  Last Spine CT Myelogram -       reviewed. 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  L5-S1 fusion, SCS; left rotator cuff repair    ASSESSMENT  Her symptoms likely remain due to chronic myofascial pain, pelvic floor dysfunction, coccydynia, left trochanteric enthesopathy/bursitis, sacroiliac joint dysfunction bilaterally, and left piriformis syndrome. Given history of arachnoiditis, more peripheral treatment would be ideal.     Regarding her recent edema, the most likely explanation is recently decreased activity related to a flare-up of chronic pain. I do not have a spine related etiology that explains her symptoms. She does not believe that enough is being done. She should talk to her general practitioner to further investigate her concerns. She may benefit from a second opinion. PLAN  1. Continue with HEP. She was extensively counseled about appropriate progression of exercise. Pt will f/u PRN. Diagnoses and all orders for this visit:    1. Chronic primary musculoskeletal pain    2. Coccydynia    3. Pelvic floor dysfunction in female    4. Sacroiliac joint dysfunction of both sides    5. Myofascial pain    6. Mechanical low back pain    7. Gait abnormality    8. Greater trochanteric bursitis of left hip    9. Piriformis syndrome of left side           PAST MEDICAL HISTORY   Past Medical History:   Diagnosis Date    Adverse effect of anesthesia      bronchospasm,     Asthma     well controlled    Coagulation disorder (HCC)     hypercoagulable    Depressive disorder 1/3/2013    Epilepsy ([address]     Ill-defined condition     Benign Granuloma Annularea    OAB (overactive bladder)     Obesity (BMI 30.0-34.9) 11/3/2016    Right leg DVT ([address]     DVT    Seizures ([address]     grand mal    Spastic bladder     Stroke ([address] 2006    mini-stroke    Thromboembolus [facility])     s/p vincenzo filter    Thyroid disease     Unspecified adverse effect of anesthesia     bronchospasm on awakening       Past Surgical History:   Procedure Laterality Date    HX BACK SURGERY      dural repair    HX BACK SURGERY      nerve stimulator    HX LUMBAR FUSION  2006    HX LUMBAR LAMINECTOMY  2006    HX ROTATOR CUFF REPAIR Left 09/23/2020   .       MEDICATIONS    Current Outpatient Medications   Medication Sig Dispense Refill    tiZANidine (ZANAFLEX) 4 mg tablet Take 1 Tablet by mouth three (3) times daily as needed for Pain. 90 Tablet 5    OXcarbazepine (TrileptaL) 150 mg tablet Take 150 mg by mouth two (2) times a day.  naproxen (NAPROSYN) 500 mg tablet Take 1 Tablet by mouth two (2) times daily (with meals). 60 Tablet 11    simvastatin (ZOCOR) 10 mg tablet Take 10 mg by mouth nightly. (Patient not taking: Reported on 6/24/2021)      diclofenac (VOLTAREN) 1 % gel Apply 2 g to affected area daily as needed.  Advair -21 mcg/actuation inhaler Take 2 Puffs by inhalation two (2) times a day.  albuterol (PROVENTIL HFA, VENTOLIN HFA, PROAIR HFA) 90 mcg/actuation inhaler INHALE 2 PUFFS EVERY 6 HOURS AS NEEDED FOR WHEEZING OR SHORTNESS OF BREATH      levothyroxine (SYNTHROID) 25 mcg tablet Take 25 mcg by mouth Daily (before breakfast).  lidocaine (LIDODERM) 5 % Apply 1 Patch to affected area as needed.  LORazepam (ATIVAN) 0.5 mg tablet Take 0.5 mg by mouth daily as needed.  topiramate (TOPAMAX) 100 mg tablet Take 100 mg by mouth two (2) times a day.  montelukast (SINGULAIR) 10 mg tablet Take 10 mg by mouth daily. Indications: ASTHMA PREVENTION          ALLERGIES  Allergies   Allergen Reactions    Latex, Natural Rubber Itching    Phenobarbital Other (comments)     Throat swelled up ,rash  Other reaction(s): Shortness of Breath     Rifampin Rash     Other reaction(s): Shortness of Breath     Adhesive Other (comments)     Layers of skin peeled off    Betadine [Povidone-Iodine] Rash     Patient states skin peels off.  Copper Rash and Swelling    Dilantin [Phenytoin Sodium Extended] Other (comments)     Throat swelled up rash all over    Other Medication Other (comments)     Surgical skin prep caused layers of skin to peel off. Needs benedryl before anesthesia.     Phenytoin Rash          SOCIAL HISTORY    Social History     Socioeconomic History    Marital status:    Tobacco Use    Smoking status: Never Smoker    Smokeless tobacco: Never Used   Vaping Use    Vaping Use: Never used   Substance and Sexual Activity    Alcohol use: Yes     Comment: 1 per month    Drug use: No       FAMILY HISTORY  No family history on file. REVIEW OF SYSTEMS  Review of Systems   Constitutional: Negative for chills, fever and weight loss. Respiratory: Negative for shortness of breath. Cardiovascular: Negative for chest pain. Gastrointestinal: Negative for constipation. Negative for fecal incontinence    Genitourinary: Negative for dysuria. Negative for urinary incontinence   Musculoskeletal: Positive for back pain. R SI joint pain  Coccydynia  Left foot drop (improved)  Left trochanter and SI pain   Coccydynia     L shoulder pain s/p rotator cuff surgery      Skin: Negative for rash. Neurological: Positive for tingling ( BLE, LUE) and headaches. Negative for dizziness, tremors and focal weakness. Saddle anesthesia     Endo/Heme/Allergies: Does not bruise/bleed easily. Psychiatric/Behavioral: The patient does not have insomnia. PHYSICAL EXAMINATION    Pain Assessment  8/12/2021   Location of Pain Back;Leg   Pain Location Comment -   Location Modifiers Right;Left   Severity of Pain 6   Quality of Pain Burning; Other (Comment)   Quality of Pain Comment cramping, stabbing, stiff, tingling, numbness and tight, safety pins   Duration of Pain Persistent   Frequency of Pain Constant   Aggravating Factors Walking   Aggravating Factors Comment -   Limiting Behavior Some   Relieving Factors Other (Comment)   Relieving Factors Comment standing   Result of Injury -           -Constitutional: Healthy appearing, well developed, alert, in no acute distress  - Eyes: Conjunctiva clear, lids unremarkable, sclera anicteric  - Ears, nose, mouth, and throat: External inspection head, face, ears and nose identifies normal appearance without obvious deformity.  -Neck: No asymmetry, no masses, trachea is midline, and normal overall appearance.  -Respiratory: Respirations are even and unlabored. -Musculoskeletal: No cyanosis, clubbing, or edema observed  -Musculoskeletal: Able to walk without assistance with normal gait pattern  -Musculoskeletal: Inspection of the following identifies no gross deformity, misalignment, or asymmetry:   -Head/neck   -Spine   -Ribs/pelvis   -Right upper extremity    -Left upper extremity    -Right lower extremity  -Left lower extremity  -Musculoskeletal: Assessment of range of motion of the following identifies no gross limitations:    -Head/neck   -Spine   -Ribs/pelvis   -Right upper extremity    -Left upper extremity    -Right lower extremity   -Left lower extremity  -Skin: Head and neck skin with no lesions or rash  -Neurologic: Cranial nerves 3-12 grossly intact. -Psychiatric: Judgement and insight intact. The limitations of a telemedicine visit including the inability to perform a detailed physical examination may miss significant findings was presented to the patient, and the patient still elected to proceed with the telemedicine visit. RADIOGRAPHS  Cervical, Thoracic, and Lumbar Spine CT Myelogram images taken on 1/22/21 personally reviewed with patient:  Cervical spine CT: There is mild posterior osteophytic ridging C5-6, which  causes minimal impress on the ventral thecal sac.  No disc bulge or  protrusion.  No central canal compromise.  There is mild bilateral  foraminal encroachment C5-6, secondary to uncovertebral spurring.  Facet  alignment is normal.  No fracture or subluxation.  The spinal cord is  normal in appearance.  The paravertebral soft tissues are unremarkable.     Thoracic spine CT: No central canal compromise.  No significant disc bulge  or protrusion identified in the thoracic spine. Kaye Cabrera is a retained  spinal stimulator posteriorly T7-T9.  The thoracic spinal cord is  unremarkable as imaged.  The conus terminalis terminates at the T12-L1  disc space.  The paravertebral soft tissues and visualized portions of the  lungs are unremarkable. Lumbar spine CT: There is a mild broad-based disc bulge L4-5. Postsurgical changes from previous posterior laminectomy and fusion L5-S1. Fusion hardware appears intact.  There is moderate soft tissue posterior  to the L5-S1 disc space, which may be postsurgical scar, or disc material.  No central canal compromise.  No critical foraminal encroachment.  There  is advanced bilateral facet spondylosis L4-5 through L5-S1.  There is an  inferior vena cava filter incidentally noted there is a couple small of  periaortic retroperitoneal lymph nodes, nonspecific. Conner Islam is a 2 cm cyst  left adnexa. IMPRESSION:  No central canal compromise identified within the cervical, thoracic or  lumbar spine.  Mild posterior osteophytic ridging and bilateral foraminal  encroachment C5-6.  Retained spinal stimulator hardware in the thoracic  spine as noted.  Previous laminectomy and fusion L5-S1 with intact  hardware.  Broad-based disc bulge L4-5.  Prominent soft tissue posterior  to the disc space at L5-S1, which may reflect residual scar tissue, or  disc material.  Borderline periaortic retroperitoneal lymphadenopathy.  2  cm cystic lesion adnexa.        4V Lumbar XR images taken on 4/1/2020 personally reviewed with patient:  BONES: Status post L5-S1 posterior spinal fusion and interbody disc cage. L5  laminectomy. No evidence of acute fracture or listhesis. Overlying spinal  stimulator and IVC filter partially obscure underlying osseous detail. Vertebral  body heights are maintained. No osseous lytic or blastic lesion.  Mild multilevel  spondylosis.     SOFT TISSUES: Unremarkable.     OTHER: None.     _______________     IMPRESSION  IMPRESSION:     No evidence of acute fracture or listhesis.     Lower lumbar spinal fusion.     LT HIP XR images taken on 4/1/2020 personally reviewed with patient:  BONES: No evidence of acute fracture or dislocation. Joint spaces and alignment  are maintained. No aggressive appearing osseous lytic or blastic lesion.      SOFT TISSUES: Unremarkable.     _______________     IMPRESSION  IMPRESSION:     No evidence of acute fracture or dislocation. Lumbar XR images taken on 2/16/18 personally reviewed with patient:  Right posterior upper hip generator device with dorsal intraspinal stimulator wires, and the T10-11 level with the wire tips at the T7-T8 level. Postoperative changes of previous L5 decompression laminectomy with solid and are osseous circumferential fusion at L5-S1.  No findings of hardware failure. A right-sided IVC filter is present at the L2-L3 level. Mild multilevel degenerative spondylosis similar prior examination with no evidence of listhesis.  The vertebral bodies maintain normal height and alignment.  No acute obstructive osseous abnormality. IMPRESSION:    1. Stable post operative circumferential fusion L5-S1 with L5 decompression laminectomy. 2.  Dorsal stimulator wire tip at the T7-T8 level. 3.  Mild degenerative lumbar spine spondylosis.  No acute obstructive osseous abnormality.  reviewed    Ms. Won Chau has a reminder for a \"due or due soon\" health maintenance. I have asked that she contact her primary care provider for follow-up on this health maintenance. Pursuant to the emergency declaration under the [facility] waiver authority and the [employer] Act, this Virtual  Visit was conducted, with patient's consent, to reduce the patient's risk of exposure to COVID-19 and provide continuity of care for an established patient. Services were provided through a video synchronous discussion virtually to substitute for in-person clinic visit.       CPT Codes 08399-72630 for Established Patients may apply to this Telehealth Visit  Time-based coding, delete if not needed: I spent at least 25 minutes with this established patient, and >50% of the time was spent counseling and/or coordinating care. Written by Stephania Houser, as dictated by Dr. Mathew Schilder.

## 2022-03-18 PROBLEM — Z01.818 PRE-OPERATIVE EXAMINATION: Status: ACTIVE | Noted: 2019-04-05

## 2022-03-18 PROBLEM — Z01.419 ENCOUNTER FOR ROUTINE GYNECOLOGICAL EXAMINATION: Status: ACTIVE | Noted: 2019-05-09

## 2022-03-18 PROBLEM — E03.9 ACQUIRED HYPOTHYROIDISM: Status: ACTIVE | Noted: 2020-04-27

## 2022-03-18 PROBLEM — N83.202 LEFT OVARIAN CYST: Status: ACTIVE | Noted: 2020-11-01

## 2022-03-18 PROBLEM — R10.32 ABDOMINAL PAIN, LLQ (LEFT LOWER QUADRANT): Status: ACTIVE | Noted: 2020-11-01

## 2022-03-18 PROBLEM — R60.9 EDEMA, UNSPECIFIED: Status: ACTIVE | Noted: 2018-04-26

## 2022-03-18 PROBLEM — Z86.718 PERSONAL HISTORY OF VENOUS THROMBOSIS AND EMBOLISM: Status: ACTIVE | Noted: 2018-05-01

## 2022-03-19 DIAGNOSIS — Z86.73 HISTORY OF STROKE: ICD-10-CM

## 2022-03-19 DIAGNOSIS — I69.398 SPASTICITY DUE TO OLD STROKE: ICD-10-CM

## 2022-03-19 DIAGNOSIS — M62.838 MUSCLE SPASM: ICD-10-CM

## 2022-03-19 DIAGNOSIS — R25.2 SPASTICITY DUE TO OLD STROKE: ICD-10-CM

## 2022-03-19 PROBLEM — G89.29 CHRONIC RIGHT SHOULDER PAIN: Status: ACTIVE | Noted: 2021-01-30

## 2022-03-19 PROBLEM — I63.9 CEREBRAL INFARCTION (HCC): Status: ACTIVE | Noted: 2018-05-01

## 2022-03-19 PROBLEM — G31.84 MILD COGNITIVE IMPAIRMENT: Status: ACTIVE | Noted: 2018-05-01

## 2022-03-19 PROBLEM — G40.909 EPILEPSY, UNSPECIFIED, NOT INTRACTABLE, WITHOUT STATUS EPILEPTICUS (HCC): Status: ACTIVE | Noted: 2018-05-01

## 2022-03-19 PROBLEM — H61.20 IMPACTED CERUMEN: Status: ACTIVE | Noted: 2019-04-05

## 2022-03-19 PROBLEM — F41.1 ANXIETY STATE: Status: ACTIVE | Noted: 2020-01-16

## 2022-03-19 PROBLEM — E66.01 SEVERE OBESITY (HCC): Status: ACTIVE | Noted: 2019-06-18

## 2022-03-19 PROBLEM — N39.0 URINARY TRACT INFECTION: Status: ACTIVE | Noted: 2019-04-05

## 2022-03-19 PROBLEM — K58.0 IRRITABLE BOWEL SYNDROME WITH DIARRHEA: Status: ACTIVE | Noted: 2018-05-01

## 2022-03-19 PROBLEM — B35.1 DERMATOPHYTOSIS OF NAIL: Status: ACTIVE | Noted: 2019-04-05

## 2022-03-19 PROBLEM — D25.1 INTRAMURAL LEIOMYOMA OF UTERUS: Status: ACTIVE | Noted: 2019-03-06

## 2022-03-19 PROBLEM — N76.0 VAGINITIS AND VULVOVAGINITIS: Status: ACTIVE | Noted: 2019-04-05

## 2022-03-19 PROBLEM — F41.1 GENERALIZED ANXIETY DISORDER: Status: ACTIVE | Noted: 2018-05-01

## 2022-03-19 PROBLEM — D68.59 OTHER PRIMARY THROMBOPHILIA (HCC): Status: ACTIVE | Noted: 2018-05-01

## 2022-03-19 PROBLEM — N32.81 OVERACTIVE BLADDER: Status: ACTIVE | Noted: 2019-03-04

## 2022-03-19 PROBLEM — M25.511 CHRONIC RIGHT SHOULDER PAIN: Status: ACTIVE | Noted: 2021-01-30

## 2022-03-19 PROBLEM — J45.30 MILD PERSISTENT ASTHMA, UNCOMPLICATED: Status: ACTIVE | Noted: 2019-04-05

## 2022-03-19 PROBLEM — Z96.89 S/P INSERTION OF SPINAL CORD STIMULATOR: Status: ACTIVE | Noted: 2018-03-02

## 2022-03-19 PROBLEM — R30.0 DYSURIA: Status: ACTIVE | Noted: 2019-04-05

## 2022-03-19 PROBLEM — R35.0 URINARY FREQUENCY: Status: ACTIVE | Noted: 2019-04-05

## 2022-03-19 PROBLEM — L02.91 ABSCESS: Status: ACTIVE | Noted: 2017-05-08

## 2022-03-19 PROBLEM — J45.31 MILD PERSISTENT ASTHMA WITH (ACUTE) EXACERBATION: Status: ACTIVE | Noted: 2019-04-05

## 2022-03-19 PROBLEM — J30.9 ALLERGIC RHINITIS: Status: ACTIVE | Noted: 2018-05-01

## 2022-03-19 PROBLEM — R06.02 SHORTNESS OF BREATH: Status: ACTIVE | Noted: 2020-11-12

## 2022-03-19 PROBLEM — H25.813 COMBINED FORM OF AGE-RELATED CATARACT, BOTH EYES: Status: ACTIVE | Noted: 2020-07-20

## 2022-03-19 PROBLEM — J98.01 ACUTE BRONCHOSPASM: Status: ACTIVE | Noted: 2019-04-05

## 2022-03-19 PROBLEM — M75.21 BICEPS TENDONITIS ON RIGHT: Status: ACTIVE | Noted: 2021-03-31

## 2022-03-19 PROBLEM — B37.31 YEAST INFECTION OF THE VAGINA: Status: ACTIVE | Noted: 2019-04-05

## 2022-03-20 PROBLEM — Z86.73 HISTORY OF STROKE: Status: ACTIVE | Noted: 2018-05-01

## 2022-03-20 PROBLEM — L03.119 CELLULITIS AND ABSCESS OF FOOT EXCLUDING TOE: Status: ACTIVE | Noted: 2019-04-05

## 2022-03-20 PROBLEM — G40.209 LOCALIZATION-RELATED FOCAL EPILEPSY WITH COMPLEX PARTIAL SEIZURES (HCC): Status: ACTIVE | Noted: 2018-05-01

## 2022-03-20 PROBLEM — N92.0 MENORRHAGIA WITH REGULAR CYCLE: Status: ACTIVE | Noted: 2018-04-30

## 2022-03-20 PROBLEM — R05.9 COUGH: Status: ACTIVE | Noted: 2019-04-05

## 2022-03-20 PROBLEM — N93.9 ABNORMAL UTERINE BLEEDING (AUB): Status: ACTIVE | Noted: 2019-03-06

## 2022-03-20 PROBLEM — H66.90 OTITIS MEDIA: Status: ACTIVE | Noted: 2019-04-05

## 2022-03-20 PROBLEM — L02.619 CELLULITIS AND ABSCESS OF FOOT EXCLUDING TOE: Status: ACTIVE | Noted: 2019-04-05

## 2022-03-20 PROBLEM — R19.5 INCREASED STOOL VOLUME: Status: ACTIVE | Noted: 2020-11-01

## 2022-03-21 RX ORDER — TIZANIDINE 4 MG/1
TABLET ORAL
Qty: 90 TABLET | Refills: 5 | Status: SHIPPED | OUTPATIENT
Start: 2022-03-21

## 2022-05-06 ENCOUNTER — TELEPHONE (OUTPATIENT)
Dept: ORTHOPEDIC SURGERY | Age: 57
End: 2022-05-06

## 2022-05-06 NOTE — TELEPHONE ENCOUNTER
Patient returned call to office, explained that her message and the upcoming appointment were noted to be for her back. Medication can be discussed at upcoming office visit on 6/7. Patient was placed on cancellation list for potential sooner appointment. No further action needed.

## 2022-05-06 NOTE — TELEPHONE ENCOUNTER
Patient called back and scheduled for 6/7. Patient stated she did not know why she is in as a \"neck patient\" with us. Patient was advised she can discuss this with Dr. Carloz Diaz at her appointment.

## 2022-05-06 NOTE — TELEPHONE ENCOUNTER
I called the pt to get more clarification on her message. I do not see any injections or appts with Dr. Sallie Nieves. Her follow up is also scheduled for back pain and not neck pain. A message was left for the pt asking her to contact the office at her convenience regarding her message. The number to the office was provided. No pt information was used in the message for HIPAA protection.

## 2022-05-06 NOTE — TELEPHONE ENCOUNTER
Patient called requesting for a prescription for mobic, patient stated that's why she was making an follow up appointment but patient didn't want to wait until 6/7 which the next opening. Patient also stated that shes having a lot of pain in her back on the side where she received her injection from doctor carla.     Patient contact [phone]    meloxicam (MOBIC) 15 mg tablet

## 2022-05-12 NOTE — PROGRESS NOTES
[address]  [place], [address] [place] [address] Street  Phone: [phone]  Fax: [phone]        Moy Farias  : 1965  PCP: Vickie Johnson MD  2020    PROGRESS NOTE      HISTORY OF PRESENT ILLNESS  Jamil Alvarado is a 48 y.o. female who was seen as a new patient 10/10/19 with c/o chronic low back pain extending into the RLE that she described as a burning pain. She also c/o numbness and foot drop in the LLE that improved some with surgery (L5-S1 fusion) and SCS. She continued to have some residual foot drop and shakiness/tremor of her foot along with LLE cramping. She noted that her neurologist did an emergency EEG to see if it was due to her complex regional seizures, but they were not. She found significant relief with Tizanidine, especially with the distal LLE cramping. She noted that she did not tolerate other muscle relaxants due to somnolence. She found significant relief from the SCS and a Lidoderm 12 hr patch. Pt reported that she has had two EMGs in the past revealing a chronic left L5 radiculopathy. Pt noted that she previously had a dx of sacroiliitis, and she previously found relief from an SI joint injection. She had a right-sided lacunar stroke affecting the temporal lobe - LLE weakness, mild LUE weakness, cognitive impairment, smiling on the right side. 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 She saw Dr. Jeffry Gupta 19 in regards to a spinal cord stimulator malfunction. She had SCS placement in  by Dr. Taryn Gore, and it was subsequently revised several times. She reported that she has had multiple issues with the SCS's over the years related to battery life. Due to her cognitive impairment, she is unable to use a rechargable batteries.  She previously worked as a Physical Therapist. She was prescribed an SI belt and underwent a Right SI joint injection (10/22/19; Dr. Lauren Alexander) that provided significant relief and she doubled her daily steps. She continued to take Tizanidine, Topamax, and Depakote prescribed by her neurologist. She maintained an HEP of walking and yoga. She saw some improvement with PT (11/14/19-2/4/2020; [facility]). She used a left AFO as recommended by PT. She reported no longer having a tremor in her LLE. Pt noted that during one session of PT, she was having manual, myofascial release on her external pelvis and believed it elicited a seizure at the time. She experienced constipation, weight gain, and somnolence with Tizanidine but she felt the benefits outweighed the side effects. Pt noted that she saw her orthotist who provided an orthotic that has helped with her ambulation. She noted that her left hip was elevated by 1/8th of an inch. She was advised to obtain a gel insert for the right side, but it did not provide any benefit. Her son provided long access distraction that helped with the muscle spasms, but she had burning and numbness slightly posterior to the trochanter. She had significant sharp pain with weight bearing that radiated into the ankle. She saw some benefit from oral steroids, especially with her SI joint pain. However, she continued to have some pain near the trochanter and the left gluteus medius near the insertion. She had to use a cane to ambulate around her house. Pt denied groin pain. She returned with c/o pelvic pains, sacral pain and tailbone numbness. She also reported some BLE itching and redness. She found benefit with walking/movement. She saw Dr. Jhon Feliciano and he suggested she may do well with seeing his wife at [facility]. She saw Dr. Heath Burgess, and he suggested the ganglion impar injection for coccyx pain if she decided to proceed with that option. However, he noted, it would not likely resolve her other symptoms. Pt notes that she walked 1.1 miles one day, and both of her legs began turning in.  She returned with c/o exacerbation of tailbone and sacral pain. She was interested in another SI joint injection. She planned to have a left rotator cuff repair surgery. Jenn Pike comes in to the office today for f/u. Pt notes that she has severe pain, numbness, and tingling posterolaterally to the knee that is painful with walking and sitting. She notes that she even experienced limping while walking in the house and she felt that her legs would collapse. She presents wearing a left AFO. Since her last OV, she had a left rotator cuff repair surgery with [facility] so she rested in a recliner often and she has had home health PT. She underwent a right SI joint injection (9/22/2020; Dr. Cyndi Washburn) without benefit. She feels her left AFO brace has been aggravating her SIJ pain. She has had recurrent UTIs. She previously did well with pelvic floor PT. She notes that the SCS seems to be making her BLE pains worse. She has been experiencing generalized chest tightness - her GP has done a chest XR to r/o pneumonia and she is scheduled to have an echo soon. Pain Score: 10/10    Treatments patient has tried:  Physical therapy:Yes  Doing HEP: Yes  Non-opioid medications: Yes - Tizanidine, Topamax, oral steroids, Diclofenac  Spinal injections: Right SI joint injection (10/22/19); right SIJ (9/22/20)  Spinal surgery- Yes - fusion L5-S1; SCS  Last MRI - None.      reviewed. 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 L5-S1 fusion, SCS; left rotator cuff repair    ASSESSMENT  Her symptoms likely remain due to pelvic floor dysfunction, coccydynia, left trochanteric enthesopathy/bursitis, sacroiliac joint dysfunction bilaterally, and left piriformis syndrome. Some of her symptoms may relate to meralgia paraesthetica. Given history of arachnoiditis, more peripheral treatment would be ideal.     PLAN  1. Referral to for ganglion impar block.   2. We discussed potential of peroneal stimulator to replace AFO as it is helpful but also aggravating her SIJ pain. Pt will f/u after injection (ganglion impar) or sooner as needed. Diagnoses and all orders for this visit:    1. Coccydynia  -     REFERRAL TO PAIN MANAGEMENT    2. Pelvic floor dysfunction in female    3. Sacroiliac joint dysfunction of both sides    4. Greater trochanteric bursitis of left hip    5. Piriformis syndrome of left side    6. Meralgia paraesthetica, left    7. Meralgia paraesthetica, right         PAST MEDICAL HISTORY   Past Medical History:   Diagnosis Date    Adverse effect of anesthesia      bronchospasm,     Asthma     well controlled    Coagulation disorder (HCC)     hypercoagulable    Depressive disorder 1/3/2013    Epilepsy ([address]     Ill-defined condition     Benign Granuloma Annularea    OAB (overactive bladder)     Obesity (BMI 30.0-34.9) 11/3/2016    Right leg DVT ([address]     DVT    Seizures ([address]     grand mal    Spastic bladder     Stroke ([address] 2006    mini-stroke    Thromboembolus [facility])     s/p vincenzo filter    Thyroid disease     Unspecified adverse effect of anesthesia     bronchospasm on awakening       Past Surgical History:   Procedure Laterality Date    HX BACK SURGERY      dural repair    HX BACK SURGERY      nerve stimulator    HX LUMBAR FUSION  2006    HX LUMBAR LAMINECTOMY  2006   . MEDICATIONS    Current Outpatient Medications   Medication Sig Dispense Refill    tiZANidine (ZANAFLEX) 4 mg tablet TAKE 1 TABLET BY MOUTH THREE TIMES DAILY AS NEEDED FOR PAIN 90 Tab 5    simvastatin (ZOCOR) 10 mg tablet Take 10 mg by mouth nightly.  diclofenac (VOLTAREN) 1 % gel Apply 2 g to affected area daily as needed.  diclofenac EC (VOLTAREN) 75 mg EC tablet Take 75 mg by mouth two (2) times a day.  Advair -21 mcg/actuation inhaler Take 2 Puffs by inhalation two (2) times a day.       ondansetron (Zofran ODT) 4 mg disintegrating tablet Take 1 Tab by mouth every eight (8) hours as needed for Nausea (or vomiting.). Allow to dissolve on tongue 20 Tab 0    albuterol (PROVENTIL HFA, VENTOLIN HFA, PROAIR HFA) 90 mcg/actuation inhaler INHALE 2 PUFFS EVERY 6 HOURS AS NEEDED FOR WHEEZING OR SHORTNESS OF BREATH      lacosamide (VIMPAT) 100 mg tab tablet Take 100 mg by mouth two (2) times a day.  levothyroxine (SYNTHROID) 25 mcg tablet Take 25 mcg by mouth Daily (before breakfast).  lidocaine (LIDODERM) 5 % Apply 1 Patch to affected area as needed.  LORazepam (ATIVAN) 0.5 mg tablet Take  by mouth.  topiramate (TOPAMAX) 100 mg tablet Take 100 mg by mouth two (2) times a day.  montelukast (SINGULAIR) 10 mg tablet Take 10 mg by mouth daily. Indications: ASTHMA PREVENTION          ALLERGIES  Allergies   Allergen Reactions    Adhesive Other (comments)     Layers of skin peeled off    Betadine [Povidone-Iodine] Rash     Patient states skin peels off.  Copper Rash and Swelling    Dilantin [Phenytoin Sodium Extended] Other (comments)     Throat swelled up rash all over    Other Medication Other (comments)     Surgical skin prep caused layers of skin to peel off. Needs benedryl before anesthesia.  Phenobarbital Other (comments)     Throat swelled up ,rash    Rifampin Rash          SOCIAL HISTORY    Social History     Socioeconomic History    Marital status:      Spouse name: Not on file    Number of children: Not on file    Years of education: Not on file    Highest education level: Not on file   Tobacco Use    Smoking status: Never Smoker    Smokeless tobacco: Never Used   Substance and Sexual Activity    Alcohol use: Yes     Comment: 1 per month    Drug use: No       FAMILY HISTORY  No family history on file. REVIEW OF SYSTEMS  Review of Systems   Cardiovascular: Positive for chest pain. Musculoskeletal: Positive for back pain.         R SI joint pain  Coccydynia  Left foot drop  Left trochanter and SI pain   Coccydynia     L shoulder pain s/p rotator cuff surgery   Neurological: Positive for tingling ( BLE) and headaches. PHYSICAL EXAMINATION  Visit Vitals  BP (!) 148/78   Pulse 78   Temp 97.8 °F (36.6 °C) (Temporal)   Resp 24   Ht 5' 7\" (1.702 m)   BMI 35.71 kg/m²       Pain Assessment  11/24/2020   Location of Pain Back   Pain Location Comment -   Location Modifiers -   Severity of Pain 10   Quality of Pain -   Quality of Pain Comment -   Duration of Pain Persistent   Frequency of Pain Constant   Aggravating Factors -   Aggravating Factors Comment -   Limiting Behavior -   Relieving Factors -   Relieving Factors Comment -   Result of Injury -           Constitutional:  Well developed, well nourished, in no acute distress. Psychiatric: Affect and mood are appropriate. Integumentary: No rashes or abrasions noted on exposed areas. SPINE/MUSCULOSKELETAL EXAM    Cervical spine:  Neck is midline. Normal muscle tone. No focal atrophy is noted. ROM pain free. Shoulder ROM intact.   No tenderness to palpation. Negative Spurling's sign. Negative Tinel's sign. Negative Serrato's sign.      Sensation in the bilateral arms grossly intact to light touch.      Lumbar spine:  No rash, ecchymosis, or gross obliquity. No fasciculations. No focal atrophy is noted. No pain with hip ROM. Full range of motion. No tenderness to palpation.   No tenderness to palpation at the sciatic notch.    SI joints tender bilaterally, R>L   Trochanters non tender.     Sensation in the bilateral legs grossly intact to light touch.             LEFT RIGHT   CLAY TEST - +   P4 TEST - +   COMPRESSION TEST - -   DISTRACTION TEST - +   GAENSLEN'S TEST - +            Updates 9/1/2020:     SI joint on R tender to palpation       LEFT RIGHT   CLAY TEST - +   P4 TEST - +   COMPRESSION TEST - -   DISTRACTION TEST - -   GAENSLEN'S TEST - +      Updates 11/24/2020:  Tenderness to palpation of coccydynia     MOTOR:      Biceps  Triceps Deltoids Wrist Ext Wrist Flex Hand Intrin   Right 5/5 5/5 5/5 5/5 5/5 5/5   Left 5/5 5/5 5/5 5/5 5/5 5/5             Hip Flex  Quads Hamstrings Ankle DF EHL Ankle PF   Right 5/5 5/5 5/5 5/5 5/5 5/5   Left 5/5 5/5 5/5 5/5 5/5 5/5     DTRs are 1+ biceps, triceps, brachioradialis, patella, and Achilles.     Negative Straight Leg raise. Squat not tested. No difficulty with tandem gait.      Ambulation with single point cane. FWB.     RADIOGRAPHS  4V Lumbar XR images taken on 4/1/2020 personally reviewed with patient:  BONES: Status post L5-S1 posterior spinal fusion and interbody disc cage. L5  laminectomy. No evidence of acute fracture or listhesis. Overlying spinal  stimulator and IVC filter partially obscure underlying osseous detail. Vertebral  body heights are maintained. No osseous lytic or blastic lesion. Mild multilevel  spondylosis.     SOFT TISSUES: Unremarkable.     OTHER: None.     _______________     IMPRESSION  IMPRESSION:     No evidence of acute fracture or listhesis.     Lower lumbar spinal fusion.     LT HIP XR images taken on 4/1/2020 personally reviewed with patient:  BONES: No evidence of acute fracture or dislocation. Joint spaces and alignment  are maintained. No aggressive appearing osseous lytic or blastic lesion.      SOFT TISSUES: Unremarkable.     _______________     IMPRESSION  IMPRESSION:     No evidence of acute fracture or dislocation. Lumbar XR images taken on 2/16/18 personally reviewed with patient:  Right posterior upper hip generator device with dorsal intraspinal stimulator wires, and the T10-11 level with the wire tips at the T7-T8 level. Postoperative changes of previous L5 decompression laminectomy with solid and are osseous circumferential fusion at L5-S1.  No findings of hardware failure. A right-sided IVC filter is present at the L2-L3 level.     Mild multilevel degenerative spondylosis similar prior examination with no evidence of listhesis.  The vertebral bodies maintain normal height and alignment.  No acute obstructive osseous abnormality. IMPRESSION:    1. Stable post operative circumferential fusion L5-S1 with L5 decompression laminectomy. 2.  Dorsal stimulator wire tip at the T7-T8 level. 3.  Mild degenerative lumbar spine spondylosis.  No acute obstructive osseous abnormality. 30 minutes of face-to-face contact were spent with the patient during today's visit extensively discussing symptoms and treatment plan. All questions were answered. More than half of this visit today was spent on counseling.      Written by Chantel Apodaca as dictated by Peggy Hirsch MD Double Island Pedicle Flap Text: The defect edges were debeveled with a #15 scalpel blade.  Given the location of the defect, shape of the defect and the proximity to free margins a double island pedicle advancement flap was deemed most appropriate.  Using a sterile surgical marker, an appropriate advancement flap was drawn incorporating the defect, outlining the appropriate donor tissue and placing the expected incisions within the relaxed skin tension lines where possible.    The area thus outlined was incised deep to adipose tissue with a #15 scalpel blade.  The skin margins were undermined to an appropriate distance in all directions around the primary defect and laterally outward around the island pedicle utilizing iris scissors.  There was minimal undermining beneath the pedicle flap.

## 2022-06-07 ENCOUNTER — OFFICE VISIT (OUTPATIENT)
Dept: ORTHOPEDIC SURGERY | Age: 57
End: 2022-06-07
Payer: OTHER MISCELLANEOUS

## 2022-06-07 VITALS
TEMPERATURE: 97.5 F | HEART RATE: 69 BPM | OXYGEN SATURATION: 97 % | SYSTOLIC BLOOD PRESSURE: 121 MMHG | HEIGHT: 67 IN | BODY MASS INDEX: 36.96 KG/M2 | DIASTOLIC BLOOD PRESSURE: 77 MMHG

## 2022-06-07 DIAGNOSIS — M53.3 SACROILIAC JOINT DYSFUNCTION OF BOTH SIDES: ICD-10-CM

## 2022-06-07 DIAGNOSIS — G89.29 CHRONIC PRIMARY MUSCULOSKELETAL PAIN: Primary | ICD-10-CM

## 2022-06-07 DIAGNOSIS — M79.18 MYOFASCIAL PAIN: ICD-10-CM

## 2022-06-07 DIAGNOSIS — M79.18 CHRONIC PRIMARY MUSCULOSKELETAL PAIN: Primary | ICD-10-CM

## 2022-06-07 DIAGNOSIS — M62.89 PELVIC FLOOR DYSFUNCTION IN FEMALE: ICD-10-CM

## 2022-06-07 DIAGNOSIS — M53.3 COCCYDYNIA: ICD-10-CM

## 2022-06-07 DIAGNOSIS — R26.9 GAIT ABNORMALITY: ICD-10-CM

## 2022-06-07 DIAGNOSIS — G57.02 PIRIFORMIS SYNDROME OF LEFT SIDE: ICD-10-CM

## 2022-06-07 DIAGNOSIS — M54.59 MECHANICAL LOW BACK PAIN: ICD-10-CM

## 2022-06-07 DIAGNOSIS — M79.642 LEFT HAND PAIN: ICD-10-CM

## 2022-06-07 DIAGNOSIS — Z86.73 HISTORY OF STROKE: ICD-10-CM

## 2022-06-07 DIAGNOSIS — M70.62 GREATER TROCHANTERIC BURSITIS OF LEFT HIP: ICD-10-CM

## 2022-06-07 PROCEDURE — 99214 OFFICE O/P EST MOD 30 MIN: CPT | Performed by: PHYSICAL MEDICINE & REHABILITATION

## 2022-06-07 RX ORDER — GABAPENTIN 300 MG/1
300 CAPSULE ORAL 2 TIMES DAILY
Qty: 60 CAPSULE | Refills: 5 | Status: SHIPPED | OUTPATIENT
Start: 2022-06-07 | End: 2022-07-24

## 2022-06-07 RX ORDER — GABAPENTIN 400 MG/1
400 CAPSULE ORAL 3 TIMES DAILY
COMMUNITY
Start: 2022-05-07 | End: 2022-06-07 | Stop reason: DRUGHIGH

## 2022-06-07 RX ORDER — FUROSEMIDE 20 MG/1
TABLET ORAL
COMMUNITY
Start: 2022-05-05

## 2022-06-07 NOTE — PROGRESS NOTES
[address]  [facility], [address]  Phone: [phone]  Fax: [phone]        Sanaz Mondragon  : 1965  PCP: Kindra Weber MD  2022    PROGRESS NOTE      HISTORY OF PRESENT ILLNESS  Laura Sullivan is a 55 y.o. female who was seen as a new patient 10/10/19 with c/o chronic low back pain extending into the RLE that she described as a burning pain. She also c/o numbness and foot drop in the LLE that improved some with surgery (L5-S1 fusion) and SCS. She continued to have some residual foot drop and shakiness/tremor of her foot along with LLE cramping. She noted that her neurologist did an emergency EEG to see if it was due to her complex regional seizures, but they were not. She found significant relief with Tizanidine, especially with the distal LLE cramping. She noted that she did not tolerate other muscle relaxants due to somnolence. She found significant relief from the SCS and a Lidoderm 12 hr patch. Pt reported that she has had two EMGs in the past revealing a chronic left L5 radiculopathy. Pt noted that she previously had a dx of sacroiliitis, and she previously found relief from an SI joint injection. She had a right-sided lacunar stroke affecting the temporal lobe - LLE weakness, mild LUE weakness, cognitive impairment, smiling on the right side. 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 She saw Dr. Jez Jhaveri 19 in regards to a spinal cord stimulator malfunction. She had SCS placement in  by Dr. Kolton Grider, and it was subsequently revised several times. She reported that she has had multiple issues with the SCS's over the years related to battery life. Due to her cognitive impairment, she is unable to use a rechargable batteries.  She previously worked as a Physical Therapist. She was prescribed an [address] underwent a Right SI joint injection (10/22/19; Dr. Mendoza Canchola) that provided significant relief and she doubled her daily steps. She continued to take Tizanidine, Topamax, and Depakote prescribed by her neurologist. She maintained an HEP of walking and yoga. She saw some improvement with PT (11/14/19-2/4/2020; [facility]). She used a left AFO as recommended by PT. She reported no longer having a tremor in her LLE. Pt noted that during one session of PT, she was having manual, myofascial release on her external pelvis and believed it elicited a seizure at the time. She experienced constipation, weight gain, and somnolence with Tizanidine but she felt the benefits outweighed the side effects. Pt noted that she saw her orthotist who provided an orthotic that has helped with her ambulation. She noted that her left hip was elevated by 1/8th of an inch. She was advised to obtain a gel insert for the right side, but it did not provide any benefit. Her son provided long access distraction that helped with the muscle spasms, but she had burning and numbness slightly posterior to the trochanter. She had significant sharp pain with weight bearing that radiated into the ankle. She saw some benefit from oral steroids, especially with her SI joint pain. However, she continued to have some pain near the trochanter and the left gluteus medius near the insertion. She had to use a cane to ambulate around her house. Pt denied groin pain. She returned with c/o pelvic pains, sacral pain and tailbone numbness. She also reported some BLE itching and redness. She found benefit with walking/movement. She saw Dr. Krish Lara and he suggested she may do well with seeing his wife at [facility]. She Aileen Hem, and he suggested the ganglion impar injection for coccyx pain if she decided to proceed with that option. However, he noted, it would not likely resolve her other symptoms.  Pt notes that she walked 1.1 miles one day, and both of her legs began turning in. She returned with c/o exacerbation of tailbone and sacral pain. She was interested in another SI joint injection. She planned to have a left rotator cuff repair surgery. Pt notes that she has severe pain, numbness, and tingling posterolaterally to the knee that is painful with walking and sitting. She notes that she even experienced limping while walking in the house and she felt that her legs would collapse. She presents wearing a left AFO. Since her last OV, she had a left rotator cuff repair surgery with [facility] so she rested in a recliner often and she has had home health PT. She underwent a right SI joint injection (9/22/2020; Dr. Shelby Zimmerman) without benefit. She feels her left AFO brace has been aggravating her SIJ pain. She has had recurrent UTIs. She previously did well with pelvic floor PT. She notes that the SCS seems to be making her BLE pains worse. She has been experiencing generalized chest tightness - her GP has done a chest XR to r/o pneumonia and she is scheduled to have an echo soon. She had her SCS reprogrammed with benefit. She noted that she had paraesthesia throughout the groin, anus, vagina. She has been using the vaginal wand in pelvic floor PT. She continued to have some right groin and RLE pain. Pt noted that she could not use her SCS because after 3-4 hours, she had a burning pain in her coccyx and low back up to the thoracic spine to the bra line. She took Gabapentin, Percocet, and NSAIDs along with a special pillow for her coccydynia. Her pain is worse with prolonged sitting or laying. She also notes that she has a soft tissue injury to the LUE. She believes she injured her right biceps tendon when she tried driving again. Cervical, Thoracic, and Lumbar spine CT Myelogram dated 1/22/21 reviewed.  Per report, No central canal compromise identified within the cervical, thoracic or lumbar spine.  Mild posterior osteophytic ridging and bilateral foraminal encroachment C5-6.  Retained spinal stimulator hardware in the thoracic spine as noted.  Previous laminectomy and fusion L5-S1 with intact hardware.  Broad-based disc bulge L4-5.  Prominent soft tissue posterior to the disc space at L5-S1, which may reflect residual scar tissue, or disc material.  Borderline periaortic retroperitoneal lymphadenopathy.  2 cm cystic lesion adnexa. Pt notes that she has an appointment scheduled with Dr. Nate Jama soon. She notes that her pelvic floor symptoms and saddle anesthesia improved with pelvic floor PT but have slightly returned since her pain has flared up. She has been wearing her AFO again on the left with benefit. She feels more comfortable in it and feels more secure than she previously did. She continues to have some cramping in the left foot. She has been taking Tizanidine with some benefit, but she experiences some somnolence as well. She is doing better since her left shoulder surgery, and she is driving some again. However, she has some LUE pain when she drives still. 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 She sees Emre Meehan PA-C for pain management. She continues to have low back pain that she describes as \"heat. \" She is finding some benefit with Mobic, though mild. She had a ganglion impar block with Dr. Nate Jama (2/24/21) with significant benefit. She attended PT with Jeff, but she had to stop due to her spraining her ankle. She had a flare up of her sacral pain, but she saw improvement overall since her last visit. She continued to do well with Dr. Aline Sherman. She was walking more and increasing her activity. She still had some difficulty with the grocery store and carrying groceries. She had difficulty coming up the steps after aqua therapy DR ANDREA TIAN [facility]) so she was advised it was not recommended to continue due to safety risk. She notes that PT recommended the litegait machine. She attended PT (8/24-10/22/21;  [facility]) She was doing really well until the battery in her SCS . She had it replaced in 2021, and she no longer has to use her AFO anymore. She is up to walking 0.75 miles outside now. She no longer has the tremor in her LLE. She has noticed some edema in the BLE with a noticeable ring from her socks. She has had increased sensitivity in the coccyx/sacrum. Levis Schirmer comes in to the office today for f/u. She has been taking Lasix for the edema, but she does not feel like it is making a significant difference. Her edema is better when she goes to bed and first thing in the morning. She is not elevating her legs at night. Her edema is worse after walking. When she goes to her mailbox in the afternoon/evening, it feels like she has sandbags on her legs. She does okay in the morning. She has been doing well on Topamax 150 mg QAM and 100 mg QHS. She continues to try to remain active by walking around her neighborhood. She has been having some photophobia for the last year, and her light sensitivity has been causing headaches. She has been getting a numbness over her left sacrum. She reports drowsiness with Tizanidine and it affects her speech in the mornings. She does okay in the afternoons and evenings. She continues to have some left hand numbness and pain with significant use. She takes Gabapentin 400 mg BID. Pain Score: 4/10. Treatments patient has tried:  Physical therapy:Yes  Doing HEP: Yes  Non-opioid medications: Yes - Tizanidine, Topamax, oral steroids, Diclofenac, Gabapentin, NSAIDs  Spinal injections: Right SI joint injection (10/22/19); right SIJ (20)  Spinal surgery- Yes - fusion L5-S1; SCS  Last Spine CT Myelogram -       reviewed. 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 L5-S1 fusion, SCS; left rotator cuff repair    ASSESSMENT  Levis Schirmer is a 55 y.o. female with c/o chronic low back and pelvic/hip pains. Her symptoms likely remain due to chronic myofascial pain, pelvic floor dysfunction, coccydynia, left trochanteric enthesopathy/bursitis, sacroiliac joint dysfunction bilaterally, and left piriformis syndrome. Given history of arachnoiditis, more peripheral treatment would be ideal.      PLAN  1. Referral to chiropractic (Dr. Katherine Helton)  2. Discussed trial taking 0.5 pill Tizanidine (2mg) in the morning to help with drowsiness  3. Trial reducing Gabapentin to 300 mg BID. 4. Referral to Dr. Christina Covington for left hand pain. Pt will f/u PRN. Diagnoses and all orders for this visit:    1. Chronic primary musculoskeletal pain    2. Coccydynia    3. Pelvic floor dysfunction in female    4. Sacroiliac joint dysfunction of both sides    5. Myofascial pain    6. Mechanical low back pain    7. Gait abnormality    8. Greater trochanteric bursitis of left hip    9. Piriformis syndrome of left side    10. History of stroke         PAST MEDICAL HISTORY   Past Medical History:   Diagnosis Date    Adverse effect of anesthesia      bronchospasm,     Asthma     well controlled    Coagulation disorder ([facility])     hypercoagulable    Depressive disorder 1/3/2013    Epilepsy ([facility] [address]     Ill-defined condition     Benign Granuloma Annularea    OAB (overactive bladder)     Obesity (BMI 30.0-34.9) 11/3/2016    Right leg DVT ([address]     DVT    Seizures ([facility])     grand mal    Spastic bladder     Stroke ([facility] [address] 2006    mini-stroke    Thromboembolus [facility])     s/p vincenzo filter    Thyroid disease     Unspecified adverse effect of anesthesia     bronchospasm on awakening       Past Surgical History:   Procedure Laterality Date    HX BACK SURGERY      dural repair    HX BACK SURGERY      nerve stimulator    HX LUMBAR FUSION  2006    HX LUMBAR LAMINECTOMY  2006    HX ROTATOR CUFF REPAIR Left 09/23/2020   .       MEDICATIONS      Current Outpatient Medications   Medication Sig Dispense Refill    furosemide (LASIX) 20 mg tablet TK1 TABLET BY MOUTH EVERY DAY AS NEEDED FOR DYSPNEA OR EDEMA      gabapentin (NEURONTIN) 400 mg capsule Take 400 mg by mouth three (3) times daily.  tiZANidine (ZANAFLEX) 4 mg tablet TAKE 1 TABLET BY MOUTH THREE TIMES DAILY AS NEEDED FOR PAIN 90 Tablet 5    naproxen (NAPROSYN) 500 mg tablet Take 1 Tablet by mouth two (2) times daily (with meals). 60 Tablet 11    Advair -21 mcg/actuation inhaler Take 2 Puffs by inhalation two (2) times a day.  albuterol (PROVENTIL HFA, VENTOLIN HFA, PROAIR HFA) 90 mcg/actuation inhaler INHALE 2 PUFFS EVERY 6 HOURS AS NEEDED FOR WHEEZING OR SHORTNESS OF BREATH      levothyroxine (SYNTHROID) 25 mcg tablet Take 25 mcg by mouth Daily (before breakfast).  lidocaine (LIDODERM) 5 % Apply 1 Patch to affected area as needed.  LORazepam (ATIVAN) 0.5 mg tablet Take 0.5 mg by mouth daily as needed.  topiramate (TOPAMAX) 100 mg tablet Take  by mouth. Pt taking 150 mg in the morning and 100 mg at bedtime      montelukast (SINGULAIR) 10 mg tablet Take 10 mg by mouth daily. Indications: ASTHMA PREVENTION      OXcarbazepine (TrileptaL) 150 mg tablet Take 150 mg by mouth two (2) times a day.  simvastatin (ZOCOR) 10 mg tablet Take 10 mg by mouth nightly. (Patient not taking: Reported on 6/24/2021)      diclofenac (VOLTAREN) 1 % gel Apply 2 g to affected area daily as needed. ALLERGIES    Allergies   Allergen Reactions    Latex, Natural Rubber Itching    Phenobarbital Other (comments)     Throat swelled up ,rash  Other reaction(s): Shortness of Breath     Rifampin Rash     Other reaction(s): Shortness of Breath     Adhesive Other (comments)     Layers of skin peeled off    Betadine [Povidone-Iodine] Rash     Patient states skin peels off.     Copper Rash and Swelling    Dilantin [Phenytoin Sodium Extended] Other (comments)     Throat swelled up rash all over    Other Medication Other (comments)     Surgical skin prep caused layers of skin to peel off. Needs benedryl before anesthesia.  Phenytoin Rash          SOCIAL HISTORY    Social History     Socioeconomic History    Marital status:    Tobacco Use    Smoking status: Never Smoker    Smokeless tobacco: Never Used   Vaping Use    Vaping Use: Never used   Substance and Sexual Activity    Alcohol use: Yes     Comment: 1 per month    Drug use: No       FAMILY HISTORY  No family history on file. REVIEW OF SYSTEMS  Review of Systems   Constitutional: Negative for chills, fever and weight loss. Eyes: Positive for photophobia. Respiratory: Negative for shortness of breath. Cardiovascular: Negative for chest pain. Gastrointestinal: Negative for constipation. Negative for fecal incontinence    Genitourinary: Negative for dysuria. Negative for urinary incontinence   Musculoskeletal: Positive for back pain. Skin: Negative for rash. Neurological: Positive for headaches. Negative for dizziness, tingling, tremors and focal weakness. Endo/Heme/Allergies: Does not bruise/bleed easily. Psychiatric/Behavioral: The patient does not have insomnia. PHYSICAL EXAMINATION  Visit Vitals  /77 (BP 1 Location: Left upper arm, BP Patient Position: Sitting, BP Cuff Size: Large adult)   Pulse 69   Temp 97.5 °F (36.4 °C) (Temporal)   Ht 5' 7\" (1.702 m)   SpO2 97% Comment: RA   BMI 36.96 kg/m²       Pain Assessment  6/7/2022   Location of Pain Back   Pain Location Comment -   Location Modifiers -   Severity of Pain 4   Quality of Pain Other (Comment)   Quality of Pain Comment stabbing and numbness   Duration of Pain Persistent   Frequency of Pain Constant   Aggravating Factors -   Aggravating Factors Comment -   Limiting Behavior -   Relieving Factors -   Relieving Factors Comment -   Result of Injury -           Constitutional:  Well developed, well nourished, in no acute distress. Psychiatric: Affect and mood are appropriate.    Integumentary: No rashes or abrasions noted on exposed areas. SPINE/MUSCULOSKELETAL EXAM    Cervical spine:  Neck is midline. Normal muscle tone. No focal atrophy is noted. ROM pain free. Shoulder ROM intact.   No tenderness to palpation. Negative Spurling's sign. Negative Tinel's sign. Negative Serrato's sign.      Sensation in the bilateral arms grossly intact to light touch.      Lumbar spine:  No rash, ecchymosis, or gross obliquity. No fasciculations. No focal atrophy is noted. No pain with hip ROM. Full range of motion. No tenderness to palpation.   No tenderness to palpation at the sciatic notch. SI joints tender bilaterally, R>L   Trochanters non tender.     Sensation in the bilateral legs grossly intact to light touch.             LEFT RIGHT   CLAY TEST - +   P4 TEST - +   COMPRESSION TEST - -   DISTRACTION TEST - +   GAENSLEN'S TEST - +            Updates 9/1/2020:     SI joint on R tender to palpation       LEFT RIGHT   CLAY TEST - +   P4 TEST - +   COMPRESSION TEST - -   DISTRACTION TEST - -   GAENSLEN'S TEST - +      Updates 11/24/2020:  Tenderness to palpation of coccydynia      Updates 4/29/2021:  Tenderness to palpation of lumbar paraspinals         MOTOR:      Biceps  Triceps Deltoids Wrist Ext Wrist Flex Hand Intrin   Right 5/5 5/5 5/5 5/5 5/5 5/5   Left 5/5 5/5 5/5 5/5 5/5 5/5             Hip Flex  Quads Hamstrings Ankle DF EHL Ankle PF   Right 5/5 5/5 5/5 5/5 5/5 5/5   Left 5/5 5/5 5/5 5/5 5/5 5/5     DTRs are 1+ biceps, triceps, brachioradialis, patella, and Achilles.     Negative Straight Leg raise. Squat not tested. No difficulty with tandem gait.      Ambulation with single point cane. FWB.     RADIOGRAPHS  Cervical, Thoracic, and Lumbar Spine CT Myelogram images taken on 1/22/21 personally reviewed with patient:  Cervical spine CT: There is mild posterior osteophytic ridging C5-6, which  causes minimal impress on the ventral thecal sac.  No disc bulge or  protrusion.  No central canal compromise.  There is mild bilateral  foraminal encroachment C5-6, secondary to uncovertebral spurring.  Facet  alignment is normal.  No fracture or subluxation.  The spinal cord is  normal in appearance.  The paravertebral soft tissues are unremarkable. Thoracic spine CT: No central canal compromise.  No significant disc bulge  or protrusion identified in the thoracic spine. [place] Glimpse is a retained  spinal stimulator posteriorly T7-T9.  The thoracic spinal cord is  unremarkable as imaged.  The conus terminalis terminates at the T12-L1  disc space.  The paravertebral soft tissues and visualized portions of the  lungs are unremarkable. Lumbar spine CT: There is a mild broad-based disc bulge L4-5. Postsurgical changes from previous posterior laminectomy and fusion L5-S1. Fusion hardware appears intact.  There is moderate soft tissue posterior  to the L5-S1 disc space, which may be postsurgical scar, or disc material.  No central canal compromise.  No critical foraminal encroachment.  There  is advanced bilateral facet spondylosis L4-5 through L5-S1.  There is an  inferior vena cava filter incidentally noted there is a couple small of  periaortic retroperitoneal lymph nodes, nonspecific. [place] Glimpse is a 2 cm cyst  left adnexa.     IMPRESSION:  No central canal compromise identified within the cervical, thoracic or  lumbar spine.  Mild posterior osteophytic ridging and bilateral foraminal  encroachment C5-6.  Retained spinal stimulator hardware in the thoracic  spine as noted.  Previous laminectomy and fusion L5-S1 with intact  hardware.  Broad-based disc bulge L4-5.  Prominent soft tissue posterior  to the disc space at L5-S1, which may reflect residual scar tissue, or  disc material.  Borderline periaortic retroperitoneal lymphadenopathy.  2  cm cystic lesion adnexa.        4V Lumbar XR images taken on 4/1/2020 personally reviewed with patient:  BONES: Status post L5-S1 posterior spinal fusion and interbody disc cage. L5  laminectomy. No evidence of acute fracture or listhesis. Overlying spinal  stimulator and IVC filter partially obscure underlying osseous detail. Vertebral  body heights are maintained. No osseous lytic or blastic lesion. Mild multilevel  spondylosis.     SOFT TISSUES: Unremarkable.     OTHER: None.     _______________     IMPRESSION  IMPRESSION:     No evidence of acute fracture or listhesis.     Lower lumbar spinal fusion.     LT HIP XR images taken on 4/1/2020 personally reviewed with patient:  BONES: No evidence of acute fracture or dislocation. Joint spaces and alignment  are maintained. No aggressive appearing osseous lytic or blastic lesion.      SOFT TISSUES: Unremarkable.     _______________     IMPRESSION  IMPRESSION:     No evidence of acute fracture or dislocation. Lumbar XR images taken on 2/16/18 personally reviewed with patient:  Right posterior upper hip generator device with dorsal intraspinal stimulator wires, and the T10-11 level with the wire tips at the T7-T8 level. Postoperative changes of previous L5 decompression laminectomy with solid and are osseous circumferential fusion at L5-S1.  No findings of hardware failure. A right-sided IVC filter is present at the L2-L3 level. Mild multilevel degenerative spondylosis similar prior examination with no evidence of listhesis.  The vertebral bodies maintain normal height and alignment.  No acute obstructive osseous abnormality. IMPRESSION:    1. Stable post operative circumferential fusion L5-S1 with L5 decompression laminectomy. 2.  Dorsal stimulator wire tip at the T7-T8 level. 3.  Mild degenerative lumbar spine spondylosis.  No acute obstructive osseous abnormality. 30 minutes of face-to-face contact were spent with the patient during today's visit extensively discussing symptoms and treatment plan. All questions were answered. More than half of this visit today was spent on counseling.      Written by Ashly Tomlin [address] as dictated by Tracee Jha MD

## 2022-06-07 NOTE — PROGRESS NOTES
Dawson Uribe presents today for   Chief Complaint   Patient presents with    Back Pain       Is someone accompanying this pt? no    Is the patient using any DME equipment during OV? No, pt needed office wheelchair because of long walking distance to exam room    Depression Screening:  3 most recent PHQ Screens 4/29/2021   Little interest or pleasure in doing things Not at all   Feeling down, depressed, irritable, or hopeless Not at all   Total Score PHQ 2 0       Coordination of Care:  1. Have you been to the ER, urgent care clinic since your last visit? no  Hospitalized since your last visit? no    2. Have you seen or consulted any other health care providers outside of the [address] [place] [address] since your last visit? no Include any pap smears or colon screening.  no

## 2022-06-23 ENCOUNTER — TELEPHONE (OUTPATIENT)
Dept: ORTHOPEDIC SURGERY | Age: 57
End: 2022-06-23

## 2022-06-23 NOTE — TELEPHONE ENCOUNTER
Her gabapentin was reduced to 300 mg. Call her and see what is going on. Is she also taking topamax?

## 2022-06-23 NOTE — TELEPHONE ENCOUNTER
Patient states she has a lot of burning, pain and numbness on her right hip and leg. Patient states its debilitating, and that she is not able to leave the house. Patient states she has been falling to sleep all the time while on the nerve stimulator. Patient is asking to talk to the nurse.       Patient can be reached at [phone]

## 2022-06-24 NOTE — TELEPHONE ENCOUNTER
Called patient [phone] and verified her name and date of birth. I inquired if she was taking the Topamax 150 am and 100 pm and Gabapentin 400 mg am and 300 mg pm. Patient stated she is taking both. She stated she did not call about any medications. She wanted to know id she could get an injection for her pain. She is having pain in her right hip mostly on the side and front  And down her right leg also in her low back occasionally. She stated it is worse at night and then getting up because she gets a lightning and burning sensation. She stated she has a bone stimulator and they only want her to keep it 30 to 50 and she feels like she needs to go up to 60 or 70 because she can not function or do anything outside her house. Please advise.

## 2022-06-27 NOTE — TELEPHONE ENCOUNTER
Is she talking about getting a toradol injection or a SI joint injection like she has had in the past?  Either way, she would have to be seen.

## 2022-06-28 NOTE — TELEPHONE ENCOUNTER
Called patient [phone] and the phone just rung. I was calling to inquire what type of injection was she wanting a Toradol injection that we do in the office or the SI injection that gets done at the hospital. If she would like the SI injection we can put in an order and get her scheduled. If she is requesting a Toradol. She would need to be seen.

## 2022-06-29 NOTE — TELEPHONE ENCOUNTER
Patient returned call and said that she feels like someone is putting a needle on her lower back. That the pain she feels is like Intermittent needles stabbing her in the back. Patient said that she is not sure which injection she wants. That she remembers getting an SI injection 1 1/2 yrs ago by Sharyle Meyers. Does not know about the Toradol injection. Patient tel. [phone].

## 2022-07-06 ENCOUNTER — VIRTUAL VISIT (OUTPATIENT)
Dept: ORTHOPEDIC SURGERY | Age: 57
End: 2022-07-06
Payer: OTHER MISCELLANEOUS

## 2022-07-06 DIAGNOSIS — M54.59 MECHANICAL LOW BACK PAIN: Primary | ICD-10-CM

## 2022-07-06 PROCEDURE — 99443 PR PHYS/QHP TELEPHONE EVALUATION 21-30 MIN: CPT | Performed by: NURSE PRACTITIONER

## 2022-07-06 RX ORDER — METHYLPREDNISOLONE 4 MG/1
TABLET ORAL
Qty: 1 DOSE PACK | Refills: 0 | Status: SHIPPED | OUTPATIENT
Start: 2022-07-06

## 2022-07-06 NOTE — PROGRESS NOTES
History of Present Illness:    Consent: Sean Craig, who was seen by telephone call and/or her healthcare decision maker, is aware that this patient-initiated, Telehealth encounter on 7/6/2022 is a billable service, with coverage as determined by her insurance carrier. She is aware that she may receive a bill and has provided verbal consent to proceed: Yes. The provider was located at [facility] office and the patient was located at their home. No one else participated in the visit with the patient. The platform used was telephone call    The patient is a 81-year-old female who states she is having a flare of low back pain. It started about a week and a half ago without injury. She does not have any radiating leg pain. She describes it as dull to sharp and is worse from time to time. She tried taking Tylenol but it really did not help. She does have a spinal cord stimulator called [employer] who did reprogram it and that helped her bilateral calves and feet but is not really touching this pain. She is on Topamax 150 mg in the morning 100 at night. She states Dr. Laquetta Kehr wanted her to decrease her gabapentin to 300 mg twice a day and and she did do that but now she is having to take more Tylenol in place of it. She denies fever bowel bladder dysfunction. Physical Exam: The patient is a 81-year-old female who is alert and oriented. She spoke with fluency. She did not appear to be in distress. Assessment & Plan: This is a patient who is having a flare of back pain. I will give her a Medrol Dosepak. She knows to take with food not take anti-inflammatories while she is on it. She inquired about the referral to chiropractic care that Dr. Laquetta Kehr put in on June 7. I do see Worker's Comp. has approved that and she will be giving the chiropractic facility a call to schedule. We will see her back as needed. Diagnoses and all orders for this visit:    1.  Mechanical low back pain  -     methylPREDNISolone (Medrol, Norm,) 4 mg tablet; Per dose pack instructions        We discussed the expected course, resolution and complications of the diagnosis(es) in detail. Medication risks, benefits, costs, interactions, and alternatives were discussed as indicated. I advised her to contact the office if her condition worsens, changes or fails to improve as anticipated. For emergencies or worsening neurological symptoms the patient was instructed to call 911. She expressed understanding with the diagnosis(es) and plan. Follow-up and Dispositions    Return if symptoms worsen or fail to improve. [facility] Saji Saavedra is a 65 y.o. female being evaluated by a video visit encounter for concerns as above. A caregiver was present when appropriate. Due to this being a TeleHealth encounter (During [facility] emergency), evaluation of the following organ systems was limited: Vitals/Constitutional/EENT/Resp/CV/GI//MS/Neuro/Skin/Heme-Lymph-Imm. Pursuant to the emergency declaration under the [facility], 1135 waiver authority and the [employer] Act, this Virtual  Visit was conducted, with patient's (and/or legal guardian's) consent, to reduce the patient's risk of exposure to COVID-19 and provide necessary medical care. CPT Codes 36415-87863 for Established Patients may apply to this Telehealth Visit  Time-based coding, delete if not needed: I spent at least 15 minutes with this established patient, and >50% of the time was spent counseling and/or coordinating care regarding Back pain  Start time: 9 2:42 PM and Stop time: 3:04 PM.        Due to this being a TeleHealth evaluation, many elements of the physical examination are unable to be assessed.        Pursuant to the emergency declaration under the [facility] waiver authority and the Coronavirus Preparedness and Response Supplemental Appropriations Act, this Virtual  Visit was conducted, with patient's consent, to reduce the patient's risk of exposure to COVID-19 and provide continuity of care for an established patient. Services were provided through a video synchronous discussion virtually to substitute for in-person clinic visit.     Jana Whitehead NP

## 2022-07-11 ENCOUNTER — OFFICE VISIT (OUTPATIENT)
Dept: ORTHOPEDIC SURGERY | Age: 57
End: 2022-07-11
Payer: MEDICARE

## 2022-07-11 VITALS — WEIGHT: 244 LBS | HEIGHT: 68 IN | TEMPERATURE: 98.2 F | BODY MASS INDEX: 36.98 KG/M2

## 2022-07-11 DIAGNOSIS — G56.02 LEFT CARPAL TUNNEL SYNDROME: Primary | ICD-10-CM

## 2022-07-11 PROCEDURE — G8417 CALC BMI ABV UP PARAM F/U: HCPCS | Performed by: ORTHOPAEDIC SURGERY

## 2022-07-11 PROCEDURE — G9717 DOC PT DX DEP/BP F/U NT REQ: HCPCS | Performed by: ORTHOPAEDIC SURGERY

## 2022-07-11 PROCEDURE — G8427 DOCREV CUR MEDS BY ELIG CLIN: HCPCS | Performed by: ORTHOPAEDIC SURGERY

## 2022-07-11 PROCEDURE — 99214 OFFICE O/P EST MOD 30 MIN: CPT | Performed by: ORTHOPAEDIC SURGERY

## 2022-07-11 PROCEDURE — 3017F COLORECTAL CA SCREEN DOC REV: CPT | Performed by: ORTHOPAEDIC SURGERY

## 2022-07-11 NOTE — PROGRESS NOTES
Indu Diop is a 65 y.o. female right handed unspecified employment. Worker's Compensation and legal considerations: none filed. Vitals:    07/11/22 1015   Temp: 98.2 °F (36.8 °C)   TempSrc: Temporal   Weight: 244 lb (110.7 kg)   Height: 5' 8\" (1.727 m)   PainSc:   4   PainLoc: Hand           Chief Complaint   Patient presents with    Hand Pain     lt         HPI: Patient presents today with complaints of left hand numbness and tingling.     Date of onset: Indeterminate    Injury: No    Prior Treatment:  No    Numbness/ Tingling: Yes: Comment: Left hand      ROS: Review of Systems - General ROS: negative  Psychological ROS: negative  ENT ROS: negative  Allergy and Immunology ROS: negative  Hematological and Lymphatic ROS: negative  Respiratory ROS: no cough, shortness of breath, or wheezing  Cardiovascular ROS: no chest pain or dyspnea on exertion  Gastrointestinal ROS: no abdominal pain, change in bowel habits, or black or bloody stools  Musculoskeletal ROS: negative  Neurological ROS: positive for - numbness/tingling  Dermatological ROS: negative    Past Medical History:   Diagnosis Date    Adverse effect of anesthesia      bronchospasm,     Asthma     well controlled    Coagulation disorder (HCC)     hypercoagulable    Depressive disorder 1/3/2013    Epilepsy ([facility] [address]     Ill-defined condition     Benign Granuloma Annularea    OAB (overactive bladder)     Obesity (BMI 30.0-34.9) 11/3/2016    Right leg DVT (HCC)     DVT    Seizures (HCC)     grand mal    Spastic bladder     Stroke ([address] 2006    mini-stroke    Thromboembolus [facility])     s/p vincenzo filter    Thyroid disease     Unspecified adverse effect of anesthesia     bronchospasm on awakening       Past Surgical History:   Procedure Laterality Date    HX BACK SURGERY      dural repair    HX BACK SURGERY      nerve stimulator    HX LUMBAR FUSION  2006    HX LUMBAR LAMINECTOMY  2006    HX ROTATOR CUFF REPAIR Left 09/23/2020 Current Outpatient Medications   Medication Sig Dispense Refill    methylPREDNISolone (Medrol, Norm,) 4 mg tablet Per dose pack instructions 1 Dose Pack 0    naproxen (NAPROSYN) 500 mg tablet TAKE 1 TABLET BY MOUTH TWICE DAILY WITH MEALS 60 Tablet 2    furosemide (LASIX) 20 mg tablet TK1 TABLET BY MOUTH EVERY DAY AS NEEDED FOR DYSPNEA OR EDEMA      gabapentin (NEURONTIN) 300 mg capsule Take 1 Capsule by mouth two (2) times a day. Max Daily Amount: 600 mg. 60 Capsule 5    tiZANidine (ZANAFLEX) 4 mg tablet TAKE 1 TABLET BY MOUTH THREE TIMES DAILY AS NEEDED FOR PAIN 90 Tablet 5    Advair -21 mcg/actuation inhaler Take 2 Puffs by inhalation two (2) times a day.  albuterol (PROVENTIL HFA, VENTOLIN HFA, PROAIR HFA) 90 mcg/actuation inhaler INHALE 2 PUFFS EVERY 6 HOURS AS NEEDED FOR WHEEZING OR SHORTNESS OF BREATH      levothyroxine (SYNTHROID) 25 mcg tablet Take 25 mcg by mouth Daily (before breakfast).  lidocaine (LIDODERM) 5 % Apply 1 Patch to affected area as needed.  LORazepam (ATIVAN) 0.5 mg tablet Take 0.5 mg by mouth daily as needed.  topiramate (TOPAMAX) 100 mg tablet Take  by mouth. Pt taking 150 mg in the morning and 100 mg at bedtime      montelukast (SINGULAIR) 10 mg tablet Take 10 mg by mouth daily. Indications: ASTHMA PREVENTION         Allergies   Allergen Reactions    Latex, Natural Rubber Itching    Phenobarbital Other (comments)     Throat swelled up ,rash  Other reaction(s): Shortness of Breath     Rifampin Rash     Other reaction(s): Shortness of Breath     Adhesive Other (comments)     Layers of skin peeled off    Betadine [Povidone-Iodine] Rash     Patient states skin peels off.  Copper Rash and Swelling    Dilantin [Phenytoin Sodium Extended] Other (comments)     Throat swelled up rash all over    Other Medication Other (comments)     Surgical skin prep caused layers of skin to peel off. Needs benedryl before anesthesia.     Phenytoin Rash PE:     Physical Exam  Vitals and nursing note reviewed. Constitutional:       General: She is not in acute distress. Appearance: Normal appearance. She is not ill-appearing. Cardiovascular:      Pulses: Normal pulses. Pulmonary:      Effort: Pulmonary effort is normal. No respiratory distress. Musculoskeletal:         General: No swelling, tenderness, deformity or signs of injury. Normal range of motion. Cervical back: Normal range of motion. Right lower leg: No edema. Left lower leg: No edema. Skin:     General: Skin is warm and dry. Capillary Refill: Capillary refill takes less than 2 seconds. Findings: No bruising or erythema. Neurological:      General: No focal deficit present. Mental Status: She is alert and oriented to person, place, and time. Psychiatric:         Mood and Affect: Mood normal.         Behavior: Behavior normal.            NEUROVASCULAR    Examination L R Examination L R   Carpal Comp. + - Pronator Comp. - -   Carpal Tinel + - Pronator Tinel - -   Phalen's - - Pronator Stress - -   Cubital Comp. - - Guyon Comp. - -   Cubital Tinel - - Guyon Tinel - -   Elbow Hyperflexion - - Adson's - -   Spurling's - - SC Comp. - -   PCB Median abn - - SC Tinel - -   Radial Tinel - - IC Comp. - -   Digital Tinel - - IC Tinel - -   Radial 2-Pt WNL WNL Ulnar 2-Pt WNL WNL     Radial Pulse: 2+  Capillary Refill: < 2 sec  Wing: Not Performed  Digital Wing: Not Performed        Imaging:     None indicated        ICD-10-CM ICD-9-CM    1. Left carpal tunnel syndrome  G56.02 354.0 DISCONTINUED: triamcinolone acetonide (KENALOG) 10 mg/mL injection 5 mg      CANCELED: AMB SUPPLY ORDER      CANCELED: EMG ONE EXTREMITY UPPER LT      CANCELED: NCV/LAT MOTOR PER NERVE UP/LT      CANCELED: INJECT CARPAL TUNNEL         Plan: At length we discussed possible carpal tunnel injection, EMG and brace.   At first patient appeared to be interested in moving forward with all of the above. However she was concerned about insurance covering all of it so she decided not to move forward with EMG, brace, or injection. Follow-up and Dispositions    · Return if symptoms worsen or fail to improve.           Plan was reviewed with patient, who verbalized agreement and understanding of the plan

## 2022-07-21 ENCOUNTER — TELEPHONE (OUTPATIENT)
Dept: ORTHOPEDIC SURGERY | Age: 57
End: 2022-07-21

## 2022-07-21 DIAGNOSIS — M54.59 MECHANICAL LOW BACK PAIN: Primary | ICD-10-CM

## 2022-07-21 NOTE — TELEPHONE ENCOUNTER
Patient called stating that she is in a lot of pain,patient stated she's taking tylenol 650mg twice a day & using the lidocaine patches patient stated she can't use to many of those because she breaks out in seizures.  Patient is requesting a call back as soon as possible @ [phone]

## 2022-07-22 NOTE — TELEPHONE ENCOUNTER
Call patient and used the two identifiers. Take Gabapentin 400 mg twice a day. Patient communicated that she only have Gabapentin 300 mg. That's she need Gabapentin 400 mg sent to [facility] in [facility].

## 2022-07-24 RX ORDER — GABAPENTIN 400 MG/1
400 CAPSULE ORAL 2 TIMES DAILY
Qty: 60 CAPSULE | Refills: 1 | Status: SHIPPED | OUTPATIENT
Start: 2022-07-24

## 2022-10-10 NOTE — PROGRESS NOTES
[address]  [address], [address]  [place], [address] [place] [address] Street  Phone: [phone]  Fax: [phone]       Marybel Reynolds  : 1965  PCP: Parker Alicia MD  10/11/2022    PROGRESS NOTE    HISTORY OF PRESENT ILLNESS  John Lopez is a 65 y.o. F who was seen as a new patient 10/10/19 with c/o chronic low back pain extending into the RLE that she described as a burning pain. She also c/o numbness and foot drop in the LLE that improved some with surgery (L5-S1 fusion) and SCS. She continued to have some residual foot drop and shakiness/tremor of her foot along with LLE cramping. She noted that her neurologist did an emergency EEG to see if it was due to her complex regional seizures, but they were not. She found significant relief with Tizanidine, especially with the distal LLE cramping. She noted that she did not tolerate other muscle relaxants due to somnolence. She found significant relief from the SCS and a Lidoderm 12 hr patch. Pt reported that she has had two EMGs in the past revealing a chronic left L5 radiculopathy. Pt noted that she previously had a dx of sacroiliitis, and she previously found relief from an SI joint injection. She had a right-sided lacunar stroke affecting the temporal lobe - LLE weakness, mild LUE weakness, cognitive impairment, smiling on the right side. 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 She saw Dr. Kirsty De La O 19 in regards to a spinal cord stimulator malfunction. She had SCS placement in  by Dr. Ivone Villalobos, and it was subsequently revised several times. She reported that she has had multiple issues with the SCS's over the years related to battery life. Due to her cognitive impairment, she is unable to use a rechargable batteries.  She previously worked as a Physical Therapist. She was prescribed an SI belt and underwent a Right SI joint injection (10/22/19; Dr. Anai Mabry) that provided significant relief and she doubled her daily steps. She continued to take Tizanidine, Topamax, and Depakote prescribed by her neurologist. She maintained an HEP of walking and yoga. She saw some improvement with PT (11/14/19-2/4/2020; [facility]). She used a left AFO as recommended by PT. She reported no longer having a tremor in her LLE. Pt noted that during one session of PT, she was having manual, myofascial release on her external pelvis and believed it elicited a seizure at the time. She experienced constipation, weight gain, and somnolence with Tizanidine but she felt the benefits outweighed the side effects. Pt noted that she saw her orthotist who provided an orthotic that has helped with her ambulation. She noted that her left hip was elevated by 1/8th of an inch. She was advised to obtain a gel insert for the right side, but it did not provide any benefit. Her son provided long access distraction that helped with the muscle spasms, but she had burning and numbness slightly posterior to the trochanter. She had significant sharp pain with weight bearing that radiated into the ankle. She saw some benefit from oral steroids, especially with her SI joint pain. However, she continued to have some pain near the trochanter and the left gluteus medius near the insertion. She had to use a cane to ambulate around her house. Pt denied groin pain. She returned with c/o pelvic pains, sacral pain and tailbone numbness. She also reported some BLE itching and redness. She found benefit with walking/movement. She saw Dr. Bradley Omalley and he suggested she may do well with seeing his wife at [facility]. She saw Dr. Gricel Quijano, and he suggested the ganglion impar injection for coccyx pain if she decided to proceed with that option. However, he noted, it would not likely resolve her other symptoms. Pt notes that she walked 1.1 miles one day, and both of her legs began turning in.  She returned with c/o exacerbation of tailbone and sacral pain. She was interested in another SI joint injection. She planned to have a left rotator cuff repair surgery. Pt notes that she has severe pain, numbness, and tingling posterolaterally to the knee that is painful with walking and sitting. She notes that she even experienced limping while walking in the house and she felt that her legs would collapse. She presents wearing a left AFO. Since her last OV, she had a left rotator cuff repair surgery with [facility] so she rested in a recliner often and she has had home health PT. She underwent a right SI joint injection (9/22/2020; Dr. Ahmet Briggs) without benefit. She feels her left AFO brace has been aggravating her SIJ pain. She has had recurrent UTIs. She previously did well with pelvic floor PT. She notes that the SCS seems to be making her BLE pains worse. She has been experiencing generalized chest tightness - her GP has done a chest XR to r/o pneumonia and she is scheduled to have an echo soon. She had her SCS reprogrammed with benefit. She noted that she had paraesthesia throughout the groin, anus, vagina. She has been using the vaginal wand in pelvic floor PT. She continued to have some right groin and RLE pain. Pt noted that she could not use her SCS because after 3-4 hours, she had a burning pain in her coccyx and low back up to the thoracic spine to the bra line. She took Gabapentin, Percocet, and NSAIDs along with a special pillow for her coccydynia. Her pain is worse with prolonged sitting or laying. She also notes that she has a soft tissue injury to the LUE. She believes she injured her right biceps tendon when she tried driving again. Cervical, Thoracic, and Lumbar spine CT Myelogram dated 1/22/21 reviewed. Per report, No central canal compromise identified within the cervical, thoracic or lumbar spine. Mild posterior osteophytic ridging and bilateral foraminal encroachment C5-6.   Retained spinal stimulator hardware in the thoracic spine as noted. Previous laminectomy and fusion L5-S1 with intact hardware. Broad-based disc bulge L4-5. Prominent soft tissue posterior to the disc space at L5-S1, which may reflect residual scar tissue, or disc material.  Borderline periaortic retroperitoneal lymphadenopathy. 2 cm cystic lesion adnexa. Pt notes that she has an appointment scheduled with Dr. Asmita Nascimento soon. She notes that her pelvic floor symptoms and saddle anesthesia improved with pelvic floor PT but have slightly returned since her pain has flared up. She has been wearing her AFO again on the left with benefit. She feels more comfortable in it and feels more secure than she previously did. She continues to have some cramping in the left foot. She has been taking Tizanidine with some benefit, but she experiences some somnolence as well. She is doing better since her left shoulder surgery, and she is driving some again. However, she has some LUE pain when she drives still. 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 She sees David Haider PA-C for pain management. She continues to have low back pain that she describes as \"heat. \" She is finding some benefit with Mobic, though mild. She had a ganglion impar block with Dr. Asmita Nascimento (2/24/21) with significant benefit. She attended PT with Jeff, but she had to stop due to her spraining her ankle. She had a flare up of her sacral pain, but she saw improvement overall since her last visit. She continued to do well with Dr. Ceasar Avalos. She was walking more and increasing her activity. She still had some difficulty with the grocery store and carrying groceries. She had difficulty coming up the steps after aqua therapy DR ANDREA TIAN [facility]) so she was advised it was not recommended to continue due to safety risk. She notes that PT recommended the litegait machine. She attended PT (8/24-10/22/21;  [facility] [place]) She was doing really well until the battery in her SCS . She had it replaced in 2021, and she no longer has to use her AFO anymore. She is up to walking 0.75 miles outside now. She no longer has the tremor in her LLE. She has noticed some edema in the BLE with a noticeable ring from her socks. She has had increased sensitivity in the coccyx/sacrum. She has been taking Lasix for the edema, but she does not feel like it is making a significant difference. Her edema is better when she goes to bed and first thing in the morning. She is not elevating her legs at night. Her edema is worse after walking. When she goes to her mailbox in the afternoon/evening, it feels like she has sandbags on her legs. She does okay in the morning. She has been doing well on Topamax 150 mg QAM and 100 mg QHS. She continues to try to remain active by walking around her neighborhood. She has been having some photophobia for the last year, and her light sensitivity has been causing headaches. She has been getting a numbness over her left sacrum. She reports drowsiness with Tizanidine and it affects her speech in the mornings. She does okay in the afternoons and evenings. She continues to have some left hand numbness and pain with significant use. She takes Gabapentin 400 mg BID. Spring Bravo was seen today c/o of mild flare-up of pain of lateral side of right thigh. Describes it as a \"shooting\" pain. Notes of a \"heat\" that seems to radiate along lumbar region, starting from right side. Pt has previously had weakness of left side of her body due to previous stroke. Pt notes that her right hip pops frequently upon right hip rotation. During last two weeks, pt notes of increased bp in the 765F for systolic BP. Pt notes that she last met with her Neurology Dr. Argyle Najjar, where an EEG and MRI were ordered. Pt notes that she has not been able to exercise regularly due to right hip pain. Continues with Tylenol.  Notes that Lidocaine provided great relief, but was unable to take it long-term due to recent stroke. Pain Score: 7/10     Treatments patient has tried:  Physical therapy:Yes  Doing HEP: Yes  Non-opioid medications: Yes - Tizanidine, Topamax, oral steroids, Diclofenac, Gabapentin, NSAIDs  Spinal injections: Right SI joint injection (10/22/19); right SIJ (9/22/20)  Spinal surgery- Yes - fusion L5-S1; SCS  Last Spine CT Myelogram - 2021     PmHx: epilepsy, hypothyroidism, CVA, had three failed surgeries in 2006 leaving her with arachnoiditis. L5-S1 fusion, SCS; left rotator cuff repair    ASSESSMENT  Juan J Crowell comes in to the office today c/o chronic low back and pelvic/hip pains. Presents today with flare-up of right hip pain. Will further evaluate through XR images for right hip pain. Her other symptoms likely remain due to chronic myofascial pain, pelvic floor dysfunction, coccydynia, and sacroiliac joint dysfunction bilaterally. Given history of arachnoiditis, more peripheral treatment would be ideal. Pt is also under care of Neurology Dr. Raina Dsouza. PLAN  Right Hip XR  DME for Roll[employer] Walker   Diclofenac 75 mg BID    Pt will f/u after XR or sooner if needed. Diagnoses and all orders for this visit:    1. Chronic primary musculoskeletal pain    2. Mechanical low back pain    3. Spasticity due to old stroke    4. History of stroke    5. Sacroiliac joint dysfunction of both sides    6. Right hip pain    7. Pelvic floor dysfunction in female    8. Coccydynia    9.  Myofascial pain       PAST MEDICAL HISTORY   Past Medical History:   Diagnosis Date    Adverse effect of anesthesia      bronchospasm,     Asthma     well controlled    Coagulation disorder (HCC)     hypercoagulable    Depressive disorder 1/3/2013    Epilepsy ([facility] [address]     Ill-defined condition     Benign Granuloma Annularea    OAB (overactive bladder)     Obesity (BMI 30.0-34.9) 11/3/2016    Right leg DVT (HCC)     DVT    Seizures ([facility])     grand mal    Spastic bladder Stroke [facility]) 2006    mini-stroke    Thromboembolus [facility])     s/p vincenzo filter    Thyroid disease     Unspecified adverse effect of anesthesia     bronchospasm on awakening       Past Surgical History:   Procedure Laterality Date    HX BACK SURGERY      dural repair    HX BACK SURGERY      nerve stimulator    HX LUMBAR FUSION  2006    HX LUMBAR LAMINECTOMY  2006    HX ROTATOR CUFF REPAIR Left 09/23/2020       MEDICATIONS      Current Outpatient Medications   Medication Sig Dispense Refill    dilTIAZem ER (CARDIZEM CD) 120 mg capsule       fluticasone propionate (FLONASE) 50 mcg/actuation nasal spray 2 Sprays by Nasal route daily. gabapentin (NEURONTIN) 400 mg capsule Take 1 Capsule by mouth two (2) times a day. Max Daily Amount: 800 mg. Indications: neuropathic pain 60 Capsule 1    methylPREDNISolone (Medrol, Norm,) 4 mg tablet Per dose pack instructions 1 Dose Pack 0    naproxen (NAPROSYN) 500 mg tablet TAKE 1 TABLET BY MOUTH TWICE DAILY WITH MEALS 60 Tablet 2    furosemide (LASIX) 20 mg tablet TK1 TABLET BY MOUTH EVERY DAY AS NEEDED FOR DYSPNEA OR EDEMA      tiZANidine (ZANAFLEX) 4 mg tablet TAKE 1 TABLET BY MOUTH THREE TIMES DAILY AS NEEDED FOR PAIN 90 Tablet 5    Advair -21 mcg/actuation inhaler Take 2 Puffs by inhalation two (2) times a day. albuterol (PROVENTIL HFA, VENTOLIN HFA, PROAIR HFA) 90 mcg/actuation inhaler INHALE 2 PUFFS EVERY 6 HOURS AS NEEDED FOR WHEEZING OR SHORTNESS OF BREATH      levothyroxine (SYNTHROID) 25 mcg tablet Take 25 mcg by mouth Daily (before breakfast). lidocaine (LIDODERM) 5 % Apply 1 Patch to affected area as needed. LORazepam (ATIVAN) 0.5 mg tablet Take 0.5 mg by mouth daily as needed. topiramate (TOPAMAX) 100 mg tablet Take  by mouth. Pt taking 150 mg in the morning and 100 mg at bedtime      montelukast (SINGULAIR) 10 mg tablet Take 10 mg by mouth daily.  Indications: ASTHMA PREVENTION         ALLERGIES    Allergies   Allergen Reactions Latex, Natural Rubber Itching    Phenobarbital Other (comments)     Throat swelled up ,rash  Other reaction(s): Shortness of Breath     Rifampin Rash     Other reaction(s): Shortness of Breath     Adhesive Other (comments)     Layers of skin peeled off    Betadine [Povidone-Iodine] Rash     Patient states skin peels off. Copper Rash and Swelling    Dilantin [Phenytoin Sodium Extended] Other (comments)     Throat swelled up rash all over    Other Medication Other (comments)     Surgical skin prep caused layers of skin to peel off. Needs benedryl before anesthesia. Phenytoin Rash          SOCIAL HISTORY    Social History     Socioeconomic History    Marital status:    Tobacco Use    Smoking status: Never    Smokeless tobacco: Never   Vaping Use    Vaping Use: Never used   Substance and Sexual Activity    Alcohol use: Yes     Comment: 1 per month    Drug use: No       FAMILY HISTORY    No family history on file. REVIEW OF SYSTEMS  Review of Systems   Constitutional:  Negative for chills, fever and weight loss. Respiratory:  Negative for shortness of breath. Cardiovascular:  Negative for chest pain. Gastrointestinal:  Negative for constipation. Genitourinary:  Negative for dysuria. Musculoskeletal:  Positive for back pain (lower) and joint pain (R > L). Skin:  Negative for rash. Neurological:  Negative for dizziness, tingling, tremors, focal weakness and headaches. Endo/Heme/Allergies:  Does not bruise/bleed easily. Psychiatric/Behavioral:  The patient does not have insomnia. PHYSICAL EXAMINATION  Visit Vitals  Pulse (!) 101 Comment: asystematic md aware   Temp 98 °F (36.7 °C) (Temporal)   Ht 5' 8\" (1.727 m)   Wt 244 lb (110.7 kg)   SpO2 97%   BMI 37.10 kg/m²       Pain Assessment  10/11/2022   Location of Pain Back   Pain Location Comment -   Location Modifiers -   Severity of Pain 7   Quality of Pain Burning; Other (Comment)   Quality of Pain Comment numb, shooting   Duration of Pain Persistent   Frequency of Pain Intermittent   Date Pain First Started -   Aggravating Factors Other (Comment); Standing   Aggravating Factors Comment laying down sitting   Limiting Behavior Yes   Relieving Factors NSAID;Other (Comment)   Relieving Factors Comment meds   Result of Injury No       Constitutional:  Well developed, well nourished, in no acute distress. Psychiatric: Affect and mood are appropriate. HEENT: Normocephalic, atraumatic. Extraocular movements intact. Integumentary: No rashes or abrasions noted on exposed areas. Cardiovascular: Regular rate and rhythm. Pulmonary: Clear to auscultation bilaterally. SPINE/MUSCULOSKELETAL EXAM    Cervical spine:  Neck is midline. Normal muscle tone. No focal atrophy is noted. ROM pain free. Shoulder ROM intact. No tenderness to palpation. Negative Spurling's sign. Negative Tinel's sign. Negative Serrato's sign. Sensation in the bilateral arms grossly intact to light touch. Lumbar spine:  No rash, ecchymosis, or gross obliquity. No fasciculations. No focal atrophy is noted. No pain with hip ROM. Full range of motion. No tenderness to palpation. No tenderness to palpation at the sciatic notch. SI joints tender bilaterally, R>L   Trochanters non tender. Sensation in the bilateral legs grossly intact to light touch.              LEFT RIGHT   CLAY TEST - +   P4 TEST - +   COMPRESSION TEST - -   DISTRACTION TEST - +   GAENSLEN'S TEST - +            Updates 9/1/2020:     SI joint on R tender to palpation       LEFT RIGHT   CLAY TEST - +   P4 TEST - +   COMPRESSION TEST - -   DISTRACTION TEST - -   GAENSLEN'S TEST - +      Updates 11/24/2020:  Tenderness to palpation of coccydynia      Updates 4/29/2021:  Tenderness to palpation of lumbar paraspinals        MOTOR:      Biceps  Triceps Deltoids Wrist Ext Wrist Flex Hand Intrin   Right 5/5 5/5 5/5 5/5 5/5 5/5   Left 5/5 5/5 5/5 5/5 5/5 5/5             Hip Flex Quads Hamstrings Ankle DF EHL Ankle PF   Right 5/5 5/5 5/5 5/5 5/5 5/5   Left 5/5 5/5 5/5 5/5 5/5 5/5     DTRs are 1+ biceps, triceps, brachioradialis, patella, and Achilles. Negative Straight Leg raise. Squat not tested. No difficulty with tandem gait. Ambulation with single point cane. FWB. RADIOGRAPHS  Cervical, Thoracic, and Lumbar Spine CT Myelogram images taken on 1/22/21 personally reviewed with patient:  Cervical spine CT: There is mild posterior osteophytic ridging C5-6, which  causes minimal impress on the ventral thecal sac. No disc bulge or  protrusion. No central canal compromise. There is mild bilateral  foraminal encroachment C5-6, secondary to uncovertebral spurring. Facet  alignment is normal.  No fracture or subluxation. The spinal cord is  normal in appearance. The paravertebral soft tissues are unremarkable. Thoracic spine CT: No central canal compromise. No significant disc bulge  or protrusion identified in the thoracic spine. There is a retained  spinal stimulator posteriorly T7-T9. The thoracic spinal cord is  unremarkable as imaged. The conus terminalis terminates at the T12-L1  disc space. The paravertebral soft tissues and visualized portions of the  lungs are unremarkable. Lumbar spine CT: There is a mild broad-based disc bulge L4-5. Postsurgical changes from previous posterior laminectomy and fusion L5-S1. Fusion hardware appears intact. There is moderate soft tissue posterior  to the L5-S1 disc space, which may be postsurgical scar, or disc material.  No central canal compromise. No critical foraminal encroachment. There  is advanced bilateral facet spondylosis L4-5 through L5-S1. There is an  inferior vena cava filter incidentally noted there is a couple small of  periaortic retroperitoneal lymph nodes, nonspecific. There is a 2 cm cyst  left adnexa.     IMPRESSION:  No central canal compromise identified within the cervical, thoracic or  lumbar spine.  Mild posterior osteophytic ridging and bilateral foraminal  encroachment C5-6. Retained spinal stimulator hardware in the thoracic  spine as noted. Previous laminectomy and fusion L5-S1 with intact  hardware. Broad-based disc bulge L4-5. Prominent soft tissue posterior  to the disc space at L5-S1, which may reflect residual scar tissue, or  disc material.  Borderline periaortic retroperitoneal lymphadenopathy. 2  cm cystic lesion adnexa. 4V Lumbar XR images taken on 4/1/2020 personally reviewed with patient:  BONES: Status post L5-S1 posterior spinal fusion and interbody disc cage. L5  laminectomy. No evidence of acute fracture or listhesis. Overlying spinal  stimulator and IVC filter partially obscure underlying osseous detail. Vertebral  body heights are maintained. No osseous lytic or blastic lesion. Mild multilevel  spondylosis. SOFT TISSUES: Unremarkable. OTHER: None.     _______________     IMPRESSION  IMPRESSION:     No evidence of acute fracture or listhesis. Lower lumbar spinal fusion. LT HIP XR images taken on 4/1/2020 personally reviewed with patient:  BONES: No evidence of acute fracture or dislocation. Joint spaces and alignment  are maintained. No aggressive appearing osseous lytic or blastic lesion. SOFT TISSUES: Unremarkable.     _______________     IMPRESSION  IMPRESSION:     No evidence of acute fracture or dislocation. Lumbar XR images taken on 2/16/18 personally reviewed with patient:  Right posterior upper hip generator device with dorsal intraspinal stimulator wires, and the T10-11 level with the wire tips at the T7-T8 level. Postoperative changes of previous L5 decompression laminectomy with solid and are osseous circumferential fusion at L5-S1. No findings of hardware failure. A right-sided IVC filter is present at the L2-L3 level. Mild multilevel degenerative spondylosis similar prior examination with no evidence of listhesis.   The vertebral bodies maintain normal height and alignment. No acute obstructive osseous abnormality. IMPRESSION:    1. Stable post operative circumferential fusion L5-S1 with L5 decompression laminectomy. 2.  Dorsal stimulator wire tip at the T7-T8 level. 3.  Mild degenerative lumbar spine spondylosis. No acute obstructive osseous abnormality. 31 minutes of face-to-face contact were spent with the patient during today's visit extensively discussing symptoms and treatment plan. All questions were answered. More than half of this visit today was spent on counseling. Written by Rick Avila, as dictated by Dr. Kiet Lee.

## 2022-10-11 ENCOUNTER — OFFICE VISIT (OUTPATIENT)
Dept: ORTHOPEDIC SURGERY | Age: 57
End: 2022-10-11
Payer: OTHER MISCELLANEOUS

## 2022-10-11 VITALS
HEIGHT: 68 IN | OXYGEN SATURATION: 97 % | HEART RATE: 101 BPM | BODY MASS INDEX: 36.98 KG/M2 | TEMPERATURE: 98 F | WEIGHT: 244 LBS

## 2022-10-11 DIAGNOSIS — M53.3 COCCYDYNIA: ICD-10-CM

## 2022-10-11 DIAGNOSIS — M53.3 SACROILIAC JOINT DYSFUNCTION OF BOTH SIDES: ICD-10-CM

## 2022-10-11 DIAGNOSIS — M54.59 MECHANICAL LOW BACK PAIN: ICD-10-CM

## 2022-10-11 DIAGNOSIS — Z86.73 HISTORY OF STROKE: ICD-10-CM

## 2022-10-11 DIAGNOSIS — M25.551 RIGHT HIP PAIN: ICD-10-CM

## 2022-10-11 DIAGNOSIS — G89.29 CHRONIC PRIMARY MUSCULOSKELETAL PAIN: Primary | ICD-10-CM

## 2022-10-11 DIAGNOSIS — M79.18 CHRONIC PRIMARY MUSCULOSKELETAL PAIN: ICD-10-CM

## 2022-10-11 DIAGNOSIS — R25.2 SPASTICITY DUE TO OLD STROKE: ICD-10-CM

## 2022-10-11 DIAGNOSIS — I69.398 SPASTICITY DUE TO OLD STROKE: ICD-10-CM

## 2022-10-11 DIAGNOSIS — M62.89 PELVIC FLOOR DYSFUNCTION IN FEMALE: ICD-10-CM

## 2022-10-11 DIAGNOSIS — M79.18 MYOFASCIAL PAIN: ICD-10-CM

## 2022-10-11 DIAGNOSIS — M79.18 CHRONIC PRIMARY MUSCULOSKELETAL PAIN: Primary | ICD-10-CM

## 2022-10-11 DIAGNOSIS — G89.29 CHRONIC PRIMARY MUSCULOSKELETAL PAIN: ICD-10-CM

## 2022-10-11 PROCEDURE — 99214 OFFICE O/P EST MOD 30 MIN: CPT | Performed by: PHYSICAL MEDICINE & REHABILITATION

## 2022-10-11 RX ORDER — DICLOFENAC SODIUM 75 MG/1
75 TABLET, DELAYED RELEASE ORAL 2 TIMES DAILY
Qty: 60 TABLET | Refills: 0 | Status: SHIPPED | OUTPATIENT
Start: 2022-10-11 | End: 2022-11-10

## 2022-10-11 RX ORDER — FLUTICASONE PROPIONATE 50 MCG
2 SPRAY, SUSPENSION (ML) NASAL DAILY
COMMUNITY
Start: 2022-06-10 | End: 2023-06-10

## 2022-10-11 RX ORDER — DILTIAZEM HYDROCHLORIDE 120 MG/1
CAPSULE, COATED, EXTENDED RELEASE ORAL
COMMUNITY
Start: 2022-09-14

## 2022-10-11 NOTE — PROGRESS NOTES
Camila Wyman presents today for   Chief Complaint   Patient presents with    Back Pain       Is someone accompanying this pt? no    Is the patient using any DME equipment during OV? yes    Depression Screening:  3 most recent PHQ Screens 4/29/2021   Little interest or pleasure in doing things Not at all   Feeling down, depressed, irritable, or hopeless Not at all   Total Score PHQ 2 0       Learning Assessment:  No flowsheet data found. Abuse Screening:  No flowsheet data found. Fall Risk  No flowsheet data found. OPIOID RISK TOOL  No flowsheet data found. Coordination of Care:  1. Have you been to the ER, urgent care clinic since your last visit? no  Hospitalized since your last visit? no    2. Have you seen or consulted any other health care providers outside of the [address] [place] [address] since your last visit? no Include any pap smears or colon screening.  no

## 2022-11-04 NOTE — PROGRESS NOTES
[address]  [address], [address]  [place], [address] [place] [address] Street  Phone: [phone]  Fax: [phone]       Camilo Scruggson  : 1965  PCP: Lian Lopez MD  2022    PROGRESS NOTE    HISTORY OF PRESENT ILLNESS  Antoine Barraza is a 65 y.o. F who was seen as a new patient 10/10/19 with c/o chronic low back pain extending into the RLE that she described as a burning pain. She also c/o numbness and foot drop in the LLE that improved some with surgery (L5-S1 fusion) and SCS. She continued to have some residual foot drop and shakiness/tremor of her foot along with LLE cramping. She noted that her neurologist did an emergency EEG to see if it was due to her complex regional seizures, but they were not. She found significant relief with Tizanidine, especially with the distal LLE cramping. She noted that she did not tolerate other muscle relaxants due to somnolence. She found significant relief from the SCS and a Lidoderm 12 hr patch. Pt reported that she has had two EMGs in the past revealing a chronic left L5 radiculopathy. Pt noted that she previously had a dx of sacroiliitis, and she previously found relief from an SI joint injection. She had a right-sided lacunar stroke affecting the temporal lobe - LLE weakness, mild LUE weakness, cognitive impairment, smiling on the right side. 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 She saw Dr. Izabella Caicedo 19 in regards to a spinal cord stimulator malfunction. She had SCS placement in  by Dr. Dalia Desai, and it was subsequently revised several times. She reported that she has had multiple issues with the SCS's over the years related to battery life. Due to her cognitive impairment, she is unable to use a rechargable batteries.  She previously worked as a Physical Therapist. She was prescribed an SI belt and underwent a Right SI joint injection (10/22/19; Dr. Antonietta Collier) that provided significant relief and she doubled her daily steps. She continued to take Tizanidine, Topamax, and Depakote prescribed by her neurologist. She maintained an HEP of walking and yoga. She saw some improvement with PT (11/14/19-2/4/2020; [facility]). She used a left AFO as recommended by PT. She reported no longer having a tremor in her LLE. Pt noted that during one session of PT, she was having manual, myofascial release on her external pelvis and believed it elicited a seizure at the time. She experienced constipation, weight gain, and somnolence with Tizanidine but she felt the benefits outweighed the side effects. Pt noted that she saw her orthotist who provided an orthotic that has helped with her ambulation. She noted that her left hip was elevated by 1/8th of an inch. She was advised to obtain a gel insert for the right side, but it did not provide any benefit. Her son provided long access distraction that helped with the muscle spasms, but she had burning and numbness slightly posterior to the trochanter. She had significant sharp pain with weight bearing that radiated into the ankle. She saw some benefit from oral steroids, especially with her SI joint pain. However, she continued to have some pain near the trochanter and the left gluteus medius near the insertion. She had to use a cane to ambulate around her house. Pt denied groin pain. She returned with c/o pelvic pains, sacral pain and tailbone numbness. She also reported some BLE itching and redness. She found benefit with walking/movement. She saw Dr. Venkatesh Cohn and he suggested she may do well with seeing his wife at [facility]. She saw Dr. Lashae Hunt, and he suggested the ganglion impar injection for coccyx pain if she decided to proceed with that option. However, he noted, it would not likely resolve her other symptoms. Pt notes that she walked 1.1 miles one day, and both of her legs began turning in.  She returned with c/o exacerbation of tailbone and sacral pain. She was interested in another SI joint injection. She planned to have a left rotator cuff repair surgery. Pt notes that she has severe pain, numbness, and tingling posterolaterally to the knee that is painful with walking and sitting. She notes that she even experienced limping while walking in the house and she felt that her legs would collapse. She presents wearing a left AFO. Since her last OV, she had a left rotator cuff repair surgery with [facility] so she rested in a recliner often and she has had home health PT. She underwent a right SI joint injection (9/22/2020; Dr. Crystal Harvey) without benefit. She feels her left AFO brace has been aggravating her SIJ pain. She has had recurrent UTIs. She previously did well with pelvic floor PT. She notes that the SCS seems to be making her BLE pains worse. She has been experiencing generalized chest tightness - her GP has done a chest XR to r/o pneumonia and she is scheduled to have an echo soon. She had her SCS reprogrammed with benefit. She noted that she had paraesthesia throughout the groin, anus, vagina. She has been using the vaginal wand in pelvic floor PT. She continued to have some right groin and RLE pain. Pt noted that she could not use her SCS because after 3-4 hours, she had a burning pain in her coccyx and low back up to the thoracic spine to the bra line. She took Gabapentin, Percocet, and NSAIDs along with a special pillow for her coccydynia. Her pain is worse with prolonged sitting or laying. She also notes that she has a soft tissue injury to the LUE. She believes she injured her right biceps tendon when she tried driving again. Cervical, Thoracic, and Lumbar spine CT Myelogram dated 1/22/21 reviewed. Per report, No central canal compromise identified within the cervical, thoracic or lumbar spine. Mild posterior osteophytic ridging and bilateral foraminal encroachment C5-6.   Retained spinal stimulator hardware in the thoracic spine as noted. Previous laminectomy and fusion L5-S1 with intact hardware. Broad-based disc bulge L4-5. Prominent soft tissue posterior to the disc space at L5-S1, which may reflect residual scar tissue, or disc material.  Borderline periaortic retroperitoneal lymphadenopathy. 2 cm cystic lesion adnexa. Pt notes that she has an appointment scheduled with Dr. Ngoc Ramey soon. She notes that her pelvic floor symptoms and saddle anesthesia improved with pelvic floor PT but have slightly returned since her pain has flared up. She has been wearing her AFO again on the left with benefit. She feels more comfortable in it and feels more secure than she previously did. She continues to have some cramping in the left foot. She has been taking Tizanidine with some benefit, but she experiences some somnolence as well. She is doing better since her left shoulder surgery, and she is driving some again. However, she has some LUE pain when she drives still. 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 She sees Frank Jo PA-C for pain management. She continues to have low back pain that she describes as \"heat. \" She is finding some benefit with Mobic, though mild. She had a ganglion impar block with Dr. Ngoc Ramey (2/24/21) with significant benefit. She attended PT with Jeff, but she had to stop due to her spraining her ankle. She had a flare up of her sacral pain, but she saw improvement overall since her last visit. She continued to do well with Dr. Claudia Sandra. She was walking more and increasing her activity. She still had some difficulty with the grocery store and carrying groceries. She had difficulty coming up the steps after aqua therapy DR ANDREA TIAN [facility]) so she was advised it was not recommended to continue due to safety risk. She notes that PT recommended the litegait machine. She attended PT (8/24-10/22/21;  [facility]) She was doing really well until the battery in her SCS . She had it replaced in 2021, and she no longer has to use her AFO anymore. She is up to walking 0.75 miles outside now. She no longer has the tremor in her LLE. She has noticed some edema in the BLE with a noticeable ring from her socks. She has had increased sensitivity in the coccyx/sacrum. She has been taking Lasix for the edema, but she does not feel like it is making a significant difference. Her edema is better when she goes to bed and first thing in the morning. She is not elevating her legs at night. Her edema is worse after walking. When she goes to her mailbox in the afternoon/evening, it feels like she has sandbags on her legs. She does okay in the morning. She has been doing well on Topamax 150 mg QAM and 100 mg QHS. She continues to try to remain active by walking around her neighborhood. She has been having some photophobia for the last year, and her light sensitivity has been causing headaches. She has been getting a numbness over her left sacrum. She reports drowsiness with Tizanidine and it affects her speech in the mornings. She does okay in the afternoons and evenings. She continues to have some left hand numbness and pain with significant use. She takes Gabapentin 400 mg BID. She had mild flare-up of pain of lateral side of right thigh. Describes it as a shooting pain. Notes of a heat that seems to radiate along lumbar region, starting from right side. Pt has previously had weakness of left side of her body due to previous stroke. Pt notes that her right hip pops frequently upon right hip rotation. During last two weeks, pt notes of increased bp in the 897E for systolic BP. Pt notes that she last met with her Neurology Dr. Alton Cardozo, where an EEG and MRI were ordered. Pt notes that she has not been able to exercise regularly due to right hip pain. Continues with Tylenol.  Notes that Lidocaine provided great relief, but was unable to take it long-term due to recent stroke. Janelle Mariano was seen today for follow up. She came today for pain of her right thigh. However, pt was visibly upset and presented with mental instability as well. Pt was also exhibiting multiple episodes of paranoia, such as mentioning that she feels that \"her neighbors change her clocks. \" She has previously presented with similar behavior in recent OVs.  She also perceived that her spinal cord representative was trying to force her way into the office visit today; however, the pt was the one who had requested the representative to come into the room today for reprogramming. She also voiced many accusations towards me of \"colluding against her with other sources. \"    Pain Score: 5/10     Treatments patient has tried:  Physical therapy:Yes  Doing HEP: Yes  Non-opioid medications: Yes - Tizanidine, Topamax, oral steroids, Diclofenac, Gabapentin, NSAIDs  Spinal injections: Right SI joint injection (10/22/19); right SIJ (9/22/20)  Spinal surgery- Yes - fusion L5-S1; SCS  Last Spine CT Myelogram - 2021     PmHx: epilepsy, hypothyroidism, CVA, had three failed surgeries in 2006 leaving her with arachnoiditis. L5-S1 fusion, SCS; left rotator cuff repair      Noy Arriaga is a 65 y.o. female with intralateral thigh pain anterior to the IT band. She feels that it starts in lumbar region and radiates upon standing. Her symptoms may be due to a flare-up of her sacroiliitis or facet arthropathy. I personally reviewed her hip XR images, and results revealed no significant bony abnormalities. Pt is exhibiting frequent episodes of paranoia, mentioning that she feels that \"her neighbors change her clocks. \" She has previously presented with similar behavior in recent OVs.  She also perceived that her spinal cord representative was trying to force her way into the office visit today; however, the pt was the one who had requested the representative to come into the room today for reprogramming. Her untreated mental health condition is detrimental to interactions between her and our staff and my ability to interact with her productively. Further interventions that may be beneficial:  Right SI joint injection  Referral to Dr. Nazanin Stokes - right thigh discomfort with hip internal rotation. Diagnoses and all orders for this visit:    1. Chronic primary musculoskeletal pain    2. Mechanical low back pain    3. Sacroiliac joint dysfunction of both sides    4. Myofascial pain    5. History of CVA (cerebrovascular accident)         PAST MEDICAL HISTORY   Past Medical History:   Diagnosis Date    Adverse effect of anesthesia      bronchospasm,     Asthma     well controlled    Coagulation disorder ([address]     hypercoagulable    Depressive disorder 1/3/2013    Epilepsy ([address]     Ill-defined condition     Benign Granuloma Annularea    OAB (overactive bladder)     Obesity (BMI 30.0-34.9) 11/3/2016    Right leg DVT ([address]     DVT    Seizures ([address]     grand mal    Spastic bladder     Stroke ([address] 2006    mini-stroke    Thromboembolus ([address]     s/p vincenzo filter    Thyroid disease     Unspecified adverse effect of anesthesia     bronchospasm on awakening       Past Surgical History:   Procedure Laterality Date    HX BACK SURGERY      dural repair    HX BACK SURGERY      nerve stimulator    HX LUMBAR FUSION  2006    HX LUMBAR LAMINECTOMY  2006    HX ROTATOR CUFF REPAIR Left 09/23/2020       MEDICATIONS      Current Outpatient Medications   Medication Sig Dispense Refill    nitrofurantoin, macrocrystal-monohydrate, (MACROBID) 100 mg capsule       Spiriva Respimat 1.25 mcg/actuation inhaler INHALE 2 PUFFS BY MOUTH EVERY DAY      dilTIAZem ER (CARDIZEM CD) 120 mg capsule       fluticasone propionate (FLONASE) 50 mcg/actuation nasal spray 2 Sprays by Nasal route daily. diclofenac EC (VOLTAREN) 75 mg EC tablet Take 1 Tablet by mouth two (2) times a day for 30 days.  60 Tablet 0 gabapentin (NEURONTIN) 400 mg capsule Take 1 Capsule by mouth two (2) times a day. Max Daily Amount: 800 mg. Indications: neuropathic pain 60 Capsule 1    methylPREDNISolone (Medrol, Norm,) 4 mg tablet Per dose pack instructions 1 Dose Pack 0    furosemide (LASIX) 20 mg tablet TK1 TABLET BY MOUTH EVERY DAY AS NEEDED FOR DYSPNEA OR EDEMA      tiZANidine (ZANAFLEX) 4 mg tablet TAKE 1 TABLET BY MOUTH THREE TIMES DAILY AS NEEDED FOR PAIN 90 Tablet 5    Advair -21 mcg/actuation inhaler Take 2 Puffs by inhalation two (2) times a day. albuterol (PROVENTIL HFA, VENTOLIN HFA, PROAIR HFA) 90 mcg/actuation inhaler INHALE 2 PUFFS EVERY 6 HOURS AS NEEDED FOR WHEEZING OR SHORTNESS OF BREATH      levothyroxine (SYNTHROID) 25 mcg tablet Take 25 mcg by mouth Daily (before breakfast). lidocaine (LIDODERM) 5 % Apply 1 Patch to affected area as needed. LORazepam (ATIVAN) 0.5 mg tablet Take 0.5 mg by mouth daily as needed. topiramate (TOPAMAX) 100 mg tablet Take  by mouth. Pt taking 150 mg in the morning and 100 mg at bedtime      montelukast (SINGULAIR) 10 mg tablet Take 10 mg by mouth daily. Indications: ASTHMA PREVENTION         ALLERGIES    Allergies   Allergen Reactions    Latex, Natural Rubber Itching    Phenobarbital Other (comments)     Throat swelled up ,rash  Other reaction(s): Shortness of Breath     Rifampin Rash     Other reaction(s): Shortness of Breath     Adhesive Other (comments)     Layers of skin peeled off    Betadine [Povidone-Iodine] Rash     Patient states skin peels off. Copper Rash and Swelling    Dilantin [Phenytoin Sodium Extended] Other (comments)     Throat swelled up rash all over    Other Medication Other (comments)     Surgical skin prep caused layers of skin to peel off. Needs benedryl before anesthesia.     Phenytoin Rash          SOCIAL HISTORY    Social History     Socioeconomic History    Marital status:    Tobacco Use    Smoking status: Never    Smokeless tobacco: Never   Vaping Use    Vaping Use: Never used   Substance and Sexual Activity    Alcohol use: Yes     Comment: 1 per month    Drug use: No       FAMILY HISTORY    No family history on file. REVIEW OF SYSTEMS  Review of Systems   Constitutional:  Negative for chills, fever and weight loss. Respiratory:  Negative for shortness of breath. Cardiovascular:  Negative for chest pain. Gastrointestinal:  Negative for constipation. Negative for fecal incontinence   Genitourinary:  Negative for dysuria. Negative for urinary incontinence   Musculoskeletal:  Positive for back pain (lower). Skin:  Negative for rash. Neurological:  Negative for dizziness, tingling, tremors, focal weakness and headaches. Endo/Heme/Allergies:  Does not bruise/bleed easily. Psychiatric/Behavioral:  The patient does not have insomnia. PHYSICAL EXAMINATION  Visit Vitals  Pulse 69   Temp 97.5 °F (36.4 °C) (Temporal)   Ht 5' 8\" (1.727 m)   Wt 256 lb (116.1 kg)   LMP  (LMP Unknown)   SpO2 98%   BMI 38.92 kg/m²       Pain Assessment  11/8/2022   Location of Pain Back   Pain Location Comment -   Location Modifiers -   Severity of Pain 5   Quality of Pain Burning; Other (Comment)   Quality of Pain Comment shooting   Duration of Pain Persistent   Frequency of Pain Intermittent   Date Pain First Started -   Aggravating Factors Standing; Other (Comment)   Aggravating Factors Comment laying down   Limiting Behavior Yes   Relieving Factors Ice; Other (Comment)   Relieving Factors Comment meds   Result of Injury Yes   Type of Injury (No Data)   Type of Injury Comment bending over       Constitutional:  Well developed, well nourished, in no acute distress. Psychiatric: Affect and mood are appropriate. HEENT: Normocephalic, atraumatic. Extraocular movements intact. Integumentary: No rashes or abrasions noted on exposed areas. Cardiovascular: Regular rate and rhythm.    Pulmonary: Clear to auscultation bilaterally. SPINE/MUSCULOSKELETAL EXAM  Cervical spine:  Neck is midline. Normal muscle tone. No focal atrophy is noted. ROM pain free. Shoulder ROM intact. No tenderness to palpation. Negative Spurling's sign. Negative Tinel's sign. Negative Serrato's sign. Sensation in the bilateral arms grossly intact to light touch. Lumbar spine:  No rash, ecchymosis, or gross obliquity. No fasciculations. No focal atrophy is noted. No pain with hip ROM. Full range of motion. No tenderness to palpation. No tenderness to palpation at the sciatic notch. SI joints tender bilaterally, R>L   Trochanters non tender. Sensation in the bilateral legs grossly intact to light touch. LEFT RIGHT   CLAY TEST - +   P4 TEST - +   COMPRESSION TEST - -   DISTRACTION TEST - +   GAENSLEN'S TEST - +            Updates 9/1/2020:     SI joint on R tender to palpation       LEFT RIGHT   CLAY TEST - +   P4 TEST - +   COMPRESSION TEST - -   DISTRACTION TEST - -   GAENSLEN'S TEST - +      Updates 11/24/2020:  Tenderness to palpation of coccydynia      Updates 4/29/2021:  Tenderness to palpation of lumbar paraspinals       MOTOR:      Elbow Flex  Elbow Ext Arm Abd Wrist Ext Wrist Flex Hand Intrin   Right 5/5 5/5 5/5 5/5 5/5 5/5   Left 5/5 5/5 5/5 5/5 5/5 5/5             Hip flex  Knee Ext EHL Ankle DF Ankle PF      Right 5/5 5/5 5/5 5/5 5/5    Left 5/5 5/5 5/5 5/5 5/5      DTRs are 1+ biceps, triceps, brachioradialis, patella, and Achilles. Negative Straight Leg raise. Squat not tested. No difficulty with tandem gait. Ambulation without assistive device. FWB. RADIOGRAPHS  Cervical, Thoracic, and Lumbar Spine CT Myelogram images taken on 1/22/21 personally reviewed with patient:  Cervical spine CT: There is mild posterior osteophytic ridging C5-6, which  causes minimal impress on the ventral thecal sac. No disc bulge or  protrusion. No central canal compromise.   There is mild bilateral  foraminal encroachment C5-6, secondary to uncovertebral spurring. Facet  alignment is normal.  No fracture or subluxation. The spinal cord is  normal in appearance. The paravertebral soft tissues are unremarkable. Thoracic spine CT: No central canal compromise. No significant disc bulge  or protrusion identified in the thoracic spine. There is a retained  spinal stimulator posteriorly T7-T9. The thoracic spinal cord is  unremarkable as imaged. The conus terminalis terminates at the T12-L1  disc space. The paravertebral soft tissues and visualized portions of the  lungs are unremarkable. Lumbar spine CT: There is a mild broad-based disc bulge L4-5. Postsurgical changes from previous posterior laminectomy and fusion L5-S1. Fusion hardware appears intact. There is moderate soft tissue posterior  to the L5-S1 disc space, which may be postsurgical scar, or disc material.  No central canal compromise. No critical foraminal encroachment. There  is advanced bilateral facet spondylosis L4-5 through L5-S1. There is an  inferior vena cava filter incidentally noted there is a couple small of  periaortic retroperitoneal lymph nodes, nonspecific. There is a 2 cm cyst  left adnexa. IMPRESSION:  No central canal compromise identified within the cervical, thoracic or  lumbar spine. Mild posterior osteophytic ridging and bilateral foraminal  encroachment C5-6. Retained spinal stimulator hardware in the thoracic  spine as noted. Previous laminectomy and fusion L5-S1 with intact  hardware. Broad-based disc bulge L4-5. Prominent soft tissue posterior  to the disc space at L5-S1, which may reflect residual scar tissue, or  disc material.  Borderline periaortic retroperitoneal lymphadenopathy. 2  cm cystic lesion adnexa. 4V Lumbar XR images taken on 4/1/2020 personally reviewed with patient:  BONES: Status post L5-S1 posterior spinal fusion and interbody disc cage. L5  laminectomy.  No evidence of acute fracture or listhesis. Overlying spinal  stimulator and IVC filter partially obscure underlying osseous detail. Vertebral  body heights are maintained. No osseous lytic or blastic lesion. Mild multilevel  spondylosis. SOFT TISSUES: Unremarkable. OTHER: None.     _______________     IMPRESSION  IMPRESSION:     No evidence of acute fracture or listhesis. Lower lumbar spinal fusion. LT HIP XR images taken on 4/1/2020 personally reviewed with patient:  BONES: No evidence of acute fracture or dislocation. Joint spaces and alignment  are maintained. No aggressive appearing osseous lytic or blastic lesion. SOFT TISSUES: Unremarkable.     _______________     IMPRESSION  IMPRESSION:     No evidence of acute fracture or dislocation. Lumbar XR images taken on 2/16/18 personally reviewed with patient:  Right posterior upper hip generator device with dorsal intraspinal stimulator wires, and the T10-11 level with the wire tips at the T7-T8 level. Postoperative changes of previous L5 decompression laminectomy with solid and are osseous circumferential fusion at L5-S1. No findings of hardware failure. A right-sided IVC filter is present at the L2-L3 level. Mild multilevel degenerative spondylosis similar prior examination with no evidence of listhesis. The vertebral bodies maintain normal height and alignment. No acute obstructive osseous abnormality. IMPRESSION:    1. Stable post operative circumferential fusion L5-S1 with L5 decompression laminectomy. 2.  Dorsal stimulator wire tip at the T7-T8 level. 3.  Mild degenerative lumbar spine spondylosis. No acute obstructive osseous abnormality. 38 minutes of face-to-face contact were spent with the patient during today's visit extensively discussing symptoms and treatment plan. All questions were answered. More than half of this visit today was spent on counseling.       Written by Marianne Rosa, as dictated by Dr. Sharron Guy.

## 2022-11-08 ENCOUNTER — OFFICE VISIT (OUTPATIENT)
Dept: ORTHOPEDIC SURGERY | Age: 57
End: 2022-11-08
Payer: OTHER MISCELLANEOUS

## 2022-11-08 VITALS
BODY MASS INDEX: 38.8 KG/M2 | HEART RATE: 69 BPM | WEIGHT: 256 LBS | OXYGEN SATURATION: 98 % | TEMPERATURE: 97.5 F | HEIGHT: 68 IN

## 2022-11-08 DIAGNOSIS — M53.3 SACROILIAC JOINT DYSFUNCTION OF BOTH SIDES: ICD-10-CM

## 2022-11-08 DIAGNOSIS — Z86.73 HISTORY OF CVA (CEREBROVASCULAR ACCIDENT): ICD-10-CM

## 2022-11-08 DIAGNOSIS — M54.59 MECHANICAL LOW BACK PAIN: ICD-10-CM

## 2022-11-08 DIAGNOSIS — M79.18 MYOFASCIAL PAIN: ICD-10-CM

## 2022-11-08 DIAGNOSIS — G89.29 CHRONIC PRIMARY MUSCULOSKELETAL PAIN: Primary | ICD-10-CM

## 2022-11-08 DIAGNOSIS — M79.18 CHRONIC PRIMARY MUSCULOSKELETAL PAIN: Primary | ICD-10-CM

## 2022-11-08 PROCEDURE — 99214 OFFICE O/P EST MOD 30 MIN: CPT | Performed by: PHYSICAL MEDICINE & REHABILITATION

## 2022-11-08 RX ORDER — TIOTROPIUM BROMIDE INHALATION SPRAY 1.56 UG/1
SPRAY, METERED RESPIRATORY (INHALATION)
COMMUNITY
Start: 2022-11-03

## 2022-11-08 RX ORDER — NITROFURANTOIN 25; 75 MG/1; MG/1
CAPSULE ORAL
COMMUNITY
Start: 2022-10-31 | End: 2022-12-06

## 2022-12-06 ENCOUNTER — HOSPITAL ENCOUNTER (OUTPATIENT)
Age: 57
Setting detail: OUTPATIENT SURGERY
Discharge: HOME | End: 2022-12-06
Attending: PHYSICAL MEDICINE & REHABILITATION | Admitting: PHYSICAL MEDICINE & REHABILITATION
Payer: OTHER MISCELLANEOUS

## 2022-12-06 ENCOUNTER — APPOINTMENT (OUTPATIENT)
Dept: GENERAL RADIOLOGY | Age: 57
End: 2022-12-06
Attending: PHYSICAL MEDICINE & REHABILITATION
Payer: OTHER MISCELLANEOUS

## 2022-12-06 VITALS
DIASTOLIC BLOOD PRESSURE: 90 MMHG | HEART RATE: 71 BPM | RESPIRATION RATE: 16 BRPM | OXYGEN SATURATION: 98 % | SYSTOLIC BLOOD PRESSURE: 145 MMHG | TEMPERATURE: 98.1 F

## 2022-12-06 PROCEDURE — 2709999900 HC NON-CHARGEABLE SUPPLY: Performed by: PHYSICAL MEDICINE & REHABILITATION

## 2022-12-06 PROCEDURE — 76010000009 HC PAIN MGT 0 TO 30 MIN PROC: Performed by: PHYSICAL MEDICINE & REHABILITATION

## 2022-12-06 PROCEDURE — 74011250636 HC RX REV CODE- 250/636: Performed by: PHYSICAL MEDICINE & REHABILITATION

## 2022-12-06 PROCEDURE — 77030039433 HC TY MYLEOGRAM BD -B: Performed by: PHYSICAL MEDICINE & REHABILITATION

## 2022-12-06 PROCEDURE — 74011000250 HC RX REV CODE- 250: Performed by: PHYSICAL MEDICINE & REHABILITATION

## 2022-12-06 PROCEDURE — 74011250637 HC RX REV CODE- 250/637: Performed by: PHYSICAL MEDICINE & REHABILITATION

## 2022-12-06 PROCEDURE — 27096 INJECT SACROILIAC JOINT: CPT | Performed by: PHYSICAL MEDICINE & REHABILITATION

## 2022-12-06 RX ORDER — DIAZEPAM 5 MG/1
5-20 TABLET ORAL ONCE
Status: COMPLETED | OUTPATIENT
Start: 2022-12-06 | End: 2022-12-06

## 2022-12-06 RX ORDER — ASPIRIN 81 MG/1
81 TABLET ORAL DAILY
COMMUNITY

## 2022-12-06 RX ORDER — CHOLECALCIFEROL (VITAMIN D3) 50 MCG
CAPSULE ORAL
COMMUNITY

## 2022-12-06 RX ORDER — NAPROXEN 500 MG/1
500 TABLET ORAL 2 TIMES DAILY WITH MEALS
COMMUNITY

## 2022-12-06 RX ORDER — DEXAMETHASONE SODIUM PHOSPHATE 100 MG/10ML
INJECTION INTRAMUSCULAR; INTRAVENOUS AS NEEDED
Status: DISCONTINUED | OUTPATIENT
Start: 2022-12-06 | End: 2022-12-06 | Stop reason: HOSPADM

## 2022-12-06 RX ORDER — LIDOCAINE HYDROCHLORIDE 10 MG/ML
INJECTION, SOLUTION EPIDURAL; INFILTRATION; INTRACAUDAL; PERINEURAL AS NEEDED
Status: DISCONTINUED | OUTPATIENT
Start: 2022-12-06 | End: 2022-12-06 | Stop reason: HOSPADM

## 2022-12-06 RX ADMIN — DIAZEPAM 5 MG: 5 TABLET ORAL at 10:29

## 2022-12-06 NOTE — H&P
Martín Lamas      Progress Notes       Sign when Signing Visit   Encounter Date:  2022                                                                                                                                                                                                                                                                                                                                                                                                                                                                                                                                                [facility]  [address], [address]  [address]  Phone: [phone]  Fax: [phone]        Martha Nathan  : 1965  PCP: Dominik Ackerman MD  2022     PROGRESS NOTE     HISTORY OF PRESENT ILLNESS  Poornima Miranda is a 65 y.o. F who was seen as a new patient 10/10/19 with c/o chronic low back pain extending into the RLE that she described as a burning pain. She also c/o numbness and foot drop in the LLE that improved some with surgery (L5-S1 fusion) and SCS. She continued to have some residual foot drop and shakiness/tremor of her foot along with LLE cramping. She noted that her neurologist did an emergency EEG to see if it was due to her complex regional seizures, but they were not. She found significant relief with Tizanidine, especially with the distal LLE cramping. She noted that she did not tolerate other muscle relaxants due to somnolence. She found significant relief from the SCS and a Lidoderm 12 hr patch. Pt reported that she has had two EMGs in the past revealing a chronic left L5 radiculopathy. Pt noted that she previously had a dx of sacroiliitis, and she previously found relief from an SI joint injection.  She had a right-sided lacunar stroke affecting the temporal lobe - LLE weakness, mild LUE weakness, cognitive impairment, smiling on the right side. 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 She saw Dr. Gomez Speaker 6/18/19 in regards to a spinal cord stimulator malfunction. She had SCS placement in 2011 by Dr. Mey Mullen, and it was subsequently revised several times. She reported that she has had multiple issues with the SCS's over the years related to battery life. Due to her cognitive impairment, she is unable to use a rechargable batteries. She previously worked as a Physical Therapist. She was prescribed an SI belt and underwent a Right SI joint injection (10/22/19; Dr. Jasmyn Mccoy) that provided significant relief and she doubled her daily steps. She continued to take Tizanidine, Topamax, and Depakote prescribed by her neurologist. She maintained an HEP of walking and yoga. She saw some improvement with PT (11/14/19-2/4/2020; [facility]). She used a left AFO as recommended by PT. She reported no longer having a tremor in her LLE. Pt noted that during one session of PT, she was having manual, myofascial release on her external pelvis and believed it elicited a seizure at the time. She experienced constipation, weight gain, and somnolence with Tizanidine but she felt the benefits outweighed the side effects. Pt noted that she saw her orthotist who provided an orthotic that has helped with her ambulation. She noted that her left hip was elevated by 1/8th of an inch. She was advised to obtain a gel insert for the right side, but it did not provide any benefit. Her son provided long access distraction that helped with the muscle spasms, but she had burning and numbness slightly posterior to the trochanter. She had significant sharp pain with weight bearing that radiated into the ankle. She saw some benefit from oral steroids, especially with her SI joint pain. However, she continued to have some pain near the trochanter and the left gluteus medius near the insertion. She had to use a cane to ambulate around her house. Pt denied groin pain. She returned with c/o pelvic pains, sacral pain and tailbone numbness. She also reported some BLE itching and redness. She found benefit with walking/movement. She saw Dr. Bradley Omalley and he suggested she may do well with seeing his wife at [facility]. She saw Dr. Gricel Quijano, and he suggested the ganglion impar injection for coccyx pain if she decided to proceed with that option. However, he noted, it would not likely resolve her other symptoms. Pt notes that she walked 1.1 miles one day, and both of her legs began turning in. She returned with c/o exacerbation of tailbone and sacral pain. She was interested in another SI joint injection. She planned to have a left rotator cuff repair surgery. Pt notes that she has severe pain, numbness, and tingling posterolaterally to the knee that is painful with walking and sitting. She notes that she even experienced limping while walking in the house and she felt that her legs would collapse. She presents wearing a left AFO. Since her last OV, she had a left rotator cuff repair surgery with [facility] so she rested in a recliner often and she has had home health PT. She underwent a right SI joint injection (9/22/2020; Dr. Anai Mabry) without benefit. She feels her left AFO brace has been aggravating her SIJ pain. She has had recurrent UTIs. She previously did well with pelvic floor PT. She notes that the SCS seems to be making her BLE pains worse. She has been experiencing generalized chest tightness - her GP has done a chest XR to r/o pneumonia and she is scheduled to have an echo soon. She had her SCS reprogrammed with benefit. She noted that she had paraesthesia throughout the groin, anus, vagina. She has been using the vaginal wand in pelvic floor PT. She continued to have some right groin and RLE pain.  Pt noted that she could not use her SCS because after 3-4 hours, she had a burning pain in her coccyx and low back up to the thoracic spine to the bra line. She took Gabapentin, Percocet, and NSAIDs along with a special pillow for her coccydynia. Her pain is worse with prolonged sitting or laying. She also notes that she has a soft tissue injury to the LUE. She believes she injured her right biceps tendon when she tried driving again. Cervical, Thoracic, and Lumbar spine CT Myelogram dated 1/22/21 reviewed. Per report, No central canal compromise identified within the cervical, thoracic or lumbar spine. Mild posterior osteophytic ridging and bilateral foraminal encroachment C5-6. Retained spinal stimulator hardware in the thoracic spine as noted. Previous laminectomy and fusion L5-S1 with intact hardware. Broad-based disc bulge L4-5. Prominent soft tissue posterior to the disc space at L5-S1, which may reflect residual scar tissue, or disc material.  Borderline periaortic retroperitoneal lymphadenopathy. 2 cm cystic lesion adnexa. Pt notes that she has an appointment scheduled with Dr. Gualberto Restrepo soon. She notes that her pelvic floor symptoms and saddle anesthesia improved with pelvic floor PT but have slightly returned since her pain has flared up. She has been wearing her AFO again on the left with benefit. She feels more comfortable in it and feels more secure than she previously did. She continues to have some cramping in the left foot. She has been taking Tizanidine with some benefit, but she experiences some somnolence as well. She is doing better since her left shoulder surgery, and she is driving some again. However, she has some LUE pain when she drives still. 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 She sees Cynthia Pena PA-C for pain management. She continues to have low back pain that she describes as \"heat. \" She is finding some benefit with Mobic, though mild.  She had a ganglion impar block with Dr. Gualberto Restrepo (2/24/21) with significant benefit. She attended PT with Jeff, but she had to stop due to her spraining her ankle. She had a flare up of her sacral pain, but she saw improvement overall since her last visit. She continued to do well with Dr. Sena Cueto. She was walking more and increasing her activity. She still had some difficulty with the grocery store and carrying groceries. She had difficulty coming up the steps after aqua therapy DR ANDREA TIAN [facility]) so she was advised it was not recommended to continue due to safety risk. She notes that PT recommended the litegait machine. She attended PT (-10/22/21; Paulina Peralta) She was doing really well until the battery in her SCS . She had it replaced in 2021, and she no longer has to use her AFO anymore. She is up to walking 0.75 miles outside now. She no longer has the tremor in her LLE. She has noticed some edema in the BLE with a noticeable ring from her socks. She has had increased sensitivity in the coccyx/sacrum. She has been taking Lasix for the edema, but she does not feel like it is making a significant difference. Her edema is better when she goes to bed and first thing in the morning. She is not elevating her legs at night. Her edema is worse after walking. When she goes to her mailbox in the afternoon/evening, it feels like she has sandbags on her legs. She does okay in the morning. She has been doing well on Topamax 150 mg QAM and 100 mg QHS. She continues to try to remain active by walking around her neighborhood. She has been having some photophobia for the last year, and her light sensitivity has been causing headaches. She has been getting a numbness over her left sacrum. She reports drowsiness with Tizanidine and it affects her speech in the mornings. She does okay in the afternoons and evenings. She continues to have some left hand numbness and pain with significant use. She takes Gabapentin 400 mg BID.  She had mild flare-up of pain of lateral side of right thigh. Describes it as a shooting pain. Notes of a heat that seems to radiate along lumbar region, starting from right side. Pt has previously had weakness of left side of her body due to previous stroke. Pt notes that her right hip pops frequently upon right hip rotation. During last two weeks, pt notes of increased bp in the 927D for systolic BP. Pt notes that she last met with her Neurology DrSpencer Sandoval, where an EEG and MRI were ordered. Pt notes that she has not been able to exercise regularly due to right hip pain. Continues with Tylenol. Notes that Lidocaine provided great relief, but was unable to take it long-term due to recent stroke. Bandar Weber was seen today for follow up. She came today for pain of her right thigh. However, pt was visibly upset and presented with mental instability as well. Pt was also exhibiting multiple episodes of paranoia, such as mentioning that she feels that \"her neighbors change her clocks. \" She has previously presented with similar behavior in recent OVs.  She also perceived that her spinal cord representative was trying to force her way into the office visit today; however, the pt was the one who had requested the representative to come into the room today for reprogramming. She also voiced many accusations towards me of \"colluding against her with other sources. 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 L5-S1 fusion, SCS; left rotator cuff repair        Andie Farley is a 65 y.o. female with intralateral thigh pain anterior to the IT band. She feels that it starts in lumbar region and radiates upon standing. Her symptoms may be due to a flare-up of her sacroiliitis or facet arthropathy. There is no pathology of her spine for pain of . I personally reviewed her hip XR images, and results revealed no significant bony abnormalities. Pt is exhibiting frequent episodes of paranoia, mentioning that she feels that \"her neighbors change her clocks. \" She has previously presented with similar behavior in recent OVs.  She also perceived that her spinal cord representative was trying to force her way into the office visit today; however, the pt  was the one who had requested the representative to come into the room today for reprogramming. PLAN  Right SI joint injection - last SI joint injection (2020) held benefit for over a year  Referral to Dr. Babar Bustillos - right intralateral thigh discomfort       Diagnoses and all orders for this visit:     1. Chronic primary musculoskeletal pain     2. Mechanical low back pain     3. Sacroiliac joint dysfunction of both sides     4. Myofascial pain     5.  History of CVA (cerebrovascular accident)           PAST MEDICAL HISTORY        Past Medical History:   Diagnosis Date    Adverse effect of anesthesia        bronchospasm,     Asthma       well controlled    Coagulation disorder ([address]       hypercoagulable    Depressive disorder 1/3/2013    Epilepsy ([address]      Ill-defined condition       Benign Granuloma Annularea    OAB (overactive bladder)      Obesity (BMI 30.0-34.9) 11/3/2016    Right leg DVT ([address]       DVT    Seizures ([address]       grand mal    Spastic bladder      Stroke ([address] 2006     mini-stroke    Thromboembolus ([address]       s/p vincenzo filter    Thyroid disease      Unspecified adverse effect of anesthesia       bronchospasm on awakening               Past Surgical History:   Procedure Laterality Date    HX BACK SURGERY         dural repair    HX BACK SURGERY         nerve stimulator    HX LUMBAR FUSION   2006 HX LUMBAR LAMINECTOMY   2006    HX ROTATOR CUFF REPAIR Left 09/23/2020         MEDICATIONS              Current Outpatient Medications   Medication Sig Dispense Refill    nitrofurantoin, macrocrystal-monohydrate, (MACROBID) 100 mg capsule          Spiriva Respimat 1.25 mcg/actuation inhaler INHALE 2 PUFFS BY MOUTH EVERY DAY        dilTIAZem ER (CARDIZEM CD) 120 mg capsule          fluticasone propionate (FLONASE) 50 mcg/actuation nasal spray 2 Sprays by Nasal route daily. diclofenac EC (VOLTAREN) 75 mg EC tablet Take 1 Tablet by mouth two (2) times a day for 30 days. 60 Tablet 0    gabapentin (NEURONTIN) 400 mg capsule Take 1 Capsule by mouth two (2) times a day. Max Daily Amount: 800 mg. Indications: neuropathic pain 60 Capsule 1    methylPREDNISolone (Medrol, Norm,) 4 mg tablet Per dose pack instructions 1 Dose Pack 0    furosemide (LASIX) 20 mg tablet TK1 TABLET BY MOUTH EVERY DAY AS NEEDED FOR DYSPNEA OR EDEMA        tiZANidine (ZANAFLEX) 4 mg tablet TAKE 1 TABLET BY MOUTH THREE TIMES DAILY AS NEEDED FOR PAIN 90 Tablet 5    Advair -21 mcg/actuation inhaler Take 2 Puffs by inhalation two (2) times a day. albuterol (PROVENTIL HFA, VENTOLIN HFA, PROAIR HFA) 90 mcg/actuation inhaler INHALE 2 PUFFS EVERY 6 HOURS AS NEEDED FOR WHEEZING OR SHORTNESS OF BREATH        levothyroxine (SYNTHROID) 25 mcg tablet Take 25 mcg by mouth Daily (before breakfast). lidocaine (LIDODERM) 5 % Apply 1 Patch to affected area as needed. LORazepam (ATIVAN) 0.5 mg tablet Take 0.5 mg by mouth daily as needed. topiramate (TOPAMAX) 100 mg tablet Take  by mouth. Pt taking 150 mg in the morning and 100 mg at bedtime        montelukast (SINGULAIR) 10 mg tablet Take 10 mg by mouth daily.  Indications: ASTHMA PREVENTION             ALLERGIES           Allergies   Allergen Reactions    Latex, Natural Rubber Itching    Phenobarbital Other (comments)       Throat swelled up ,rash  Other reaction(s): Shortness of Breath     Rifampin Rash       Other reaction(s): Shortness of Breath     Adhesive Other (comments)       Layers of skin peeled off    Betadine [Povidone-Iodine] Rash       Patient states skin peels off. Copper Rash and Swelling    Dilantin [Phenytoin Sodium Extended] Other (comments)       Throat swelled up rash all over    Other Medication Other (comments)       Surgical skin prep caused layers of skin to peel off. Needs benedryl before anesthesia. Phenytoin Rash           SOCIAL HISTORY    Social History            Socioeconomic History    Marital status:    Tobacco Use    Smoking status: Never    Smokeless tobacco: Never   Vaping Use    Vaping Use: Never used   Substance and Sexual Activity    Alcohol use: Yes       Comment: 1 per month    Drug use: No       FAMILY HISTORY     No family history on file. REVIEW OF SYSTEMS  Review of Systems   Constitutional:  Negative for chills, fever and weight loss. Respiratory:  Negative for shortness of breath. Cardiovascular:  Negative for chest pain. Gastrointestinal:  Negative for constipation. Negative for fecal incontinence   Genitourinary:  Negative for dysuria. Negative for urinary incontinence   Musculoskeletal:  Positive for back pain (lower). Skin:  Negative for rash. Neurological:  Negative for dizziness, tingling, tremors, focal weakness and headaches. Endo/Heme/Allergies:  Does not bruise/bleed easily. Psychiatric/Behavioral:  The patient does not have insomnia. PHYSICAL EXAMINATION  Visit Vitals  Pulse 69   Temp 97.5 °F (36.4 °C) (Temporal)   Ht 5' 8\" (1.727 m)   Wt 256 lb (116.1 kg)   LMP  (LMP Unknown)   SpO2 98%   BMI 38.92 kg/m²         Pain Assessment  11/8/2022   Location of Pain Back   Pain Location Comment -   Location Modifiers -   Severity of Pain 5   Quality of Pain Burning; Other (Comment)   Quality of Pain Comment shooting   Duration of Pain Persistent   Frequency of Pain Intermittent   Date Pain First Started -   Aggravating Factors Standing; Other (Comment)   Aggravating Factors Comment laying down   Limiting Behavior Yes   Relieving Factors Ice; Other (Comment)   Relieving Factors Comment meds   Result of Injury Yes   Type of Injury (No Data)   Type of Injury Comment bending over         Constitutional:  Well developed, well nourished, in no acute distress. Psychiatric: Affect and mood are appropriate. HEENT: Normocephalic, atraumatic. Extraocular movements intact. Integumentary: No rashes or abrasions noted on exposed areas. Cardiovascular: Regular rate and rhythm. Pulmonary: Clear to auscultation bilaterally. SPINE/MUSCULOSKELETAL EXAM  Cervical spine:  Neck is midline. Normal muscle tone. No focal atrophy is noted. ROM pain free. Shoulder ROM intact. No tenderness to palpation. Negative Spurling's sign. Negative Tinel's sign. Negative Serrato's sign. Sensation in the bilateral arms grossly intact to light touch. Lumbar spine:  No rash, ecchymosis, or gross obliquity. No fasciculations. No focal atrophy is noted. No pain with hip ROM. Full range of motion. No tenderness to palpation. No tenderness to palpation at the sciatic notch. SI joints tender bilaterally, R>L   Trochanters non tender. Sensation in the bilateral legs grossly intact to light touch.              LEFT RIGHT   CLAY TEST - +   P4 TEST - +   COMPRESSION TEST - -   DISTRACTION TEST - +   GAENSLEN'S TEST - +            Updates 9/1/2020:     SI joint on R tender to palpation       LEFT RIGHT   CLAY TEST - +   P4 TEST - +   COMPRESSION TEST - -   DISTRACTION TEST - -   GAENSLEN'S TEST - +      Updates 11/24/2020:  Tenderness to palpation of coccydynia      Updates 4/29/2021:  Tenderness to palpation of lumbar paraspinals         MOTOR:       Elbow Flex  Elbow Ext Arm Abd Wrist Ext Wrist Flex Hand Intrin   Right 5/5 5/5 5/5 5/5 5/5 5/5   Left 5/5 5/5 5/5 5/5 5/5 5/5 Hip flex  Knee Ext EHL Ankle DF Ankle PF        Right 5/5 5/5 5/5 5/5 5/5     Left 5/5 5/5 5/5 5/5 5/5        DTRs are 1+ biceps, triceps, brachioradialis, patella, and Achilles. Negative Straight Leg raise. Squat not tested. No difficulty with tandem gait. Ambulation without assistive device. FWB. RADIOGRAPHS  Cervical, Thoracic, and Lumbar Spine CT Myelogram images taken on 1/22/21 personally reviewed with patient:  Cervical spine CT: There is mild posterior osteophytic ridging C5-6, which  causes minimal impress on the ventral thecal sac. No disc bulge or  protrusion. No central canal compromise. There is mild bilateral  foraminal encroachment C5-6, secondary to uncovertebral spurring. Facet  alignment is normal.  No fracture or subluxation. The spinal cord is  normal in appearance. The paravertebral soft tissues are unremarkable. Thoracic spine CT: No central canal compromise. No significant disc bulge  or protrusion identified in the thoracic spine. There is a retained  spinal stimulator posteriorly T7-T9. The thoracic spinal cord is  unremarkable as imaged. The conus terminalis terminates at the T12-L1  disc space. The paravertebral soft tissues and visualized portions of the  lungs are unremarkable. Lumbar spine CT: There is a mild broad-based disc bulge L4-5. Postsurgical changes from previous posterior laminectomy and fusion L5-S1. Fusion hardware appears intact. There is moderate soft tissue posterior  to the L5-S1 disc space, which may be postsurgical scar, or disc material.  No central canal compromise. No critical foraminal encroachment. There  is advanced bilateral facet spondylosis L4-5 through L5-S1. There is an  inferior vena cava filter incidentally noted there is a couple small of  periaortic retroperitoneal lymph nodes, nonspecific. There is a 2 cm cyst  left adnexa.     IMPRESSION:  No central canal compromise identified within the cervical, thoracic or  lumbar spine. Mild posterior osteophytic ridging and bilateral foraminal  encroachment C5-6. Retained spinal stimulator hardware in the thoracic  spine as noted. Previous laminectomy and fusion L5-S1 with intact  hardware. Broad-based disc bulge L4-5. Prominent soft tissue posterior  to the disc space at L5-S1, which may reflect residual scar tissue, or  disc material.  Borderline periaortic retroperitoneal lymphadenopathy. 2  cm cystic lesion adnexa. 4V Lumbar XR images taken on 4/1/2020 personally reviewed with patient:  BONES: Status post L5-S1 posterior spinal fusion and interbody disc cage. L5  laminectomy. No evidence of acute fracture or listhesis. Overlying spinal  stimulator and IVC filter partially obscure underlying osseous detail. Vertebral  body heights are maintained. No osseous lytic or blastic lesion. Mild multilevel  spondylosis. SOFT TISSUES: Unremarkable. OTHER: None.     _______________     IMPRESSION  IMPRESSION:     No evidence of acute fracture or listhesis. Lower lumbar spinal fusion. LT HIP XR images taken on 4/1/2020 personally reviewed with patient:  BONES: No evidence of acute fracture or dislocation. Joint spaces and alignment  are maintained. No aggressive appearing osseous lytic or blastic lesion. SOFT TISSUES: Unremarkable.     _______________     IMPRESSION  IMPRESSION:     No evidence of acute fracture or dislocation. Lumbar XR images taken on 2/16/18 personally reviewed with patient:  Right posterior upper hip generator device with dorsal intraspinal stimulator wires, and the T10-11 level with the wire tips at the T7-T8 level. Postoperative changes of previous L5 decompression laminectomy with solid and are osseous circumferential fusion at L5-S1. No findings of hardware failure. A right-sided IVC filter is present at the L2-L3 level. Mild multilevel degenerative spondylosis similar prior examination with no evidence of listhesis. The vertebral bodies maintain normal height and alignment. No acute obstructive osseous abnormality. IMPRESSION:    1. Stable post operative circumferential fusion L5-S1 with L5 decompression laminectomy. 2.  Dorsal stimulator wire tip at the T7-T8 level. 3.  Mild degenerative lumbar spine spondylosis. No acute obstructive osseous abnormality. 38 minutes of face-to-face contact were spent with the patient during today's visit extensively discussing symptoms and treatment plan. All questions were answered. More than half of this visit today was spent on counseling. Written by Geeta Kruse, as dictated by Dr. Gissel Ochoa.           Note Details    Deepti Jarrell File Time 11/08/22 6499   Author Type Pinky Méndez Status Unsigned   Last  Kaylene Marquez Service (none)   Office Visit on 11/8/2022          Office Visit on 11/8/2022              Detailed Report              Additional Documentation    Vitals:  Pulse 69    Temp 97.5 °F (36.4 °C) (Temporal)    Ht 5' 8\" (1.727 m)    Wt 256 lb (116.1 kg)    LMP  (LMP Unknown)    SpO2 98%    BMI 38.92 kg/m²    BSA 2.36 m²    Pain Sc   5 (Loc: Back)          More Vitals     Flowsheets:  Fluid Management,    Pain Assessment       Encounter Info:  Billing Info,    History,    Allergies,    Detailed Report         Orders Placed    REFERRAL TO ORTHOPEDICS Authorized  SCHEDULE SURGERY  Medication Changes      None     Medication List     Visit Diagnoses        Chronic primary musculoskeletal pain        Mechanical low back pain        Sacroiliac joint dysfunction of both sides        Myofascial pain        History of CVA (cerebrovascular accident)       Problem List

## 2022-12-06 NOTE — INTERVAL H&P NOTE
Update History & Physical    The Patient's History and Physical of November 8, 2022 was reviewed. There was no change. The surgical site was confirmed by the patient and me. Plan:  The risk, benefits, expected outcome, and alternative to the recommended procedure have been discussed with the patient. Patient understands and wants to proceed with the procedure.     Electronically signed by Rosa M Ludwig MD on 12/6/2022 at 10:43 AM

## 2022-12-14 ENCOUNTER — TELEPHONE (OUTPATIENT)
Dept: ORTHOPEDIC SURGERY | Age: 57
End: 2022-12-14

## 2022-12-14 NOTE — TELEPHONE ENCOUNTER
Patient called for Joe Henriquez. Patient said that on 12/06/22 she was given an injection by Jeovany Biggs. Patient said that she is still having shooting pain on her thigh. That she has not had any relief. Patient did not request a call back. She just wanted  to be aware. Patient tel. [phone]    Note: patient received a Right Sacroiliac Joint Injection on 12/6/22 by Jeovany Biggs. Patient has an appt with oJe Henriquez on 01/10/23.

## 2022-12-14 NOTE — TELEPHONE ENCOUNTER
It can take up to 2 weeks to get the full effect. In the meantime she try using OTC salon pas patches over her SI joint.   Keep her fu with Dr. Hans Cullen

## 2022-12-14 NOTE — TELEPHONE ENCOUNTER
Call patient and used the two identifiers. It can take up to 2 weeks to get the full effect. In the meantime she try using OTC patches over her SI joint.   Keep her follow up with Dr. Kimmy Dubon

## 2022-12-28 ENCOUNTER — HOSPITAL ENCOUNTER (OUTPATIENT)
Dept: PHYSICAL THERAPY | Age: 57
Discharge: HOME | End: 2022-12-28
Payer: MEDICARE

## 2022-12-28 PROCEDURE — 97162 PT EVAL MOD COMPLEX 30 MIN: CPT

## 2022-12-28 NOTE — PROGRESS NOTES
PT DAILY TREATMENT NOTE/CERVICAL EVAL 10-18    Patient Name: Antionette Dubois  Date:2022  : 1965  [x]  Patient  Verified  Payor: Mayra Jc / Plan: [facility] MEDICARE CHOICE PPO/PFFS / Product Type: Managed Care Medicare /    In time:330  Out time:415  Total Treatment Time (min): 45  Visit #: 1 of 16    Medicare/BCBS Only   Total Timed Codes (min):  0 1:1 Treatment Time:  45       Treatment Area: Cervicalgia [M54.2]    SUBJECTIVE  Pain Level (0-10 scale): 5/10  [x]constant []intermittent [x]improving []worsening []no change since onset    Any medication changes, allergies to medications, adverse drug reactions, diagnosis change, or new procedure performed?: [x] No    [] Yes (see summary sheet for update)  Subjective functional status/changes:     PLOF: functionally independent, rollator, sedentary lifestyle; likes to walk in park with her rollator  Limitations to PLOF: difficulty with reaching, difficulty sleeping, difficulty dressing   Mechanism of Injury: PT reports about 10 days ago she was vacuuming and she reported that a few days later she had intense pain in right neck and shoulder. Pt reported that she went to ER and received a muscle relaxer and steroid pack. And received a order for PT.     Current symptoms/Complaints: 4/10 at best with medication; 8/10 at worst with cold weather; pt reports they are unable to sleep through the night secondary to pain  Previous Treatment/Compliance: muscle relaxer's and tylenol   PMHx/Surgical Hx: 2 strokes, epilepsy, asthma, arthritis, depression, right shoulder injury 10 years ago, left shoulder injury s/p surgery  Work Hx: retired   Living Situation: 2 story [email or web address] with downstairs bedroom lives alone   Pt Goals: \"pain relief, return to function\"  Barriers: []pain []financial []time []transportation []other  Motivation: good   Substance use: []Alcohol []Tobacco []other:   Cognition: A & O x 3        OBJECTIVE    45 min [x]Eval []Re-Eval          With   [x] TE   [x] TA   [x] neuro   [] other: Patient Education: [x] Review HEP    [x] Progressed/Changed HEP based on:   [] positioning   [] body mechanics   [] transfers   [] heat/ice application    [] other:        General Evaluation    Posture: rounded shoulders  Palpation/Sensation: muscle tenstion noted throught out neck and upper shoulders. ROM:                             AROM     Shoulder Left Right   Flexion [facility]    100   ER [facility] WFL   IR [facility] WFL              Cervical Left Right   Flexion To chest     Extension To neutral P! Side bend 25% [facility]    Rotation [facility] WFL P! Strength (MMT):  Shoulder Left (1-5) Right (1-5)   Shoulder Flexion 3+ 3+ P! Shoulder ABD 3+ 3+ P! Shoulder IR 5 5   Shoulder ER 5 4              Other Tests / Comments:        Pain Level (0-10 scale) post treatment: 5/10    ASSESSMENT/Changes in Function: 66 yo female who [facility] to [facility] with c/o neck pain. Patient reports about 10 days ago she was vacuuming and she reported that a few days later she had intense pain in right neck and shoulder. Pt reported that she went to ER and received a muscle relaxer and steroid pack. They also referred her to PT. Patient reports significant PMH of 2 strokes, epilepsy, asthma, arthritis, depression, right shoulder injury 10 years ago, left shoulder injury s/p surgery. Patient demonstrates decreased ROM, decreased strength, impaired posture, pain and decreased functional mobility tolerance. Patient will continue to benefit from skilled PT services to modify and progress therapeutic interventions, address functional mobility deficits, address ROM deficits, address strength deficits, analyze and address soft tissue restrictions, analyze and cue movement patterns, analyze and modify body mechanics/ergonomics, assess and modify postural abnormalities, and instruct in home and community integration to attain remaining goals.      [x]  See Plan of Care  []  See progress note/recertification  []  See Discharge Summary         Progress towards goals / Updated goals:    Short Term Goals: To be accomplished in 8  treatments:  Patient will be independent and compliant with HEP to progress toward goals and restore functional mobility. Eval Status: issued at eval    Patient will improve FOTO score by 1/2 points to improve functional tolerance for exercise. Eval Status: FOTO to be taken at visit 2  FOTO score = an established functional score where 100 = no disability    Patient will improve pain in right shoulder and neck to 5/10 at worst to improve reaching and sleeping tolerance and restore prior level of function. Eval Status: 8/10 at worst    Pt will have painfree cervical AROM WFL to aid in functional mechanics for ambulation/ADLs. Eval Status:       Cervical Left Right   Flexion To chest     Extension To neutral P! Side bend 25% [facility]    Rotation [facility] WFL P! Long Term Goals: To be accomplished in 16 treatments:  Patient will improve FOTO score by full points to improve functional tolerance for reaching and dressing. Eval Status: FOTO to be taken at visit 2   FOTO score = an established functional score where 100 = no disability    Pt will have painfree UE AROM WFL to aid in functional mechanics for ambulation/ADLs. Eval Status:   Shoulder Left Right   Flexion [facility]    100   ER [facility] WFL   IR [facility] WFL     Pt will have 4/5 Geoff UE strength to return to goals of improved reaching and dressing . Eval Status:   Shoulder Left (1-5) Right (1-5)   Shoulder Flexion 3+ 3+ P! Shoulder ABD 3+ 3+ P! Shoulder IR 5 5   Shoulder ER 5 4       Patient will improve pain in right shoulder and neck to 1-2/10 at worst to improve reaching and sleeping tolerance and restore prior level of function.   Eval Status: 8/10 at worst      PLAN  [x]  Upgrade activities as tolerated     [x]  Continue plan of care  [x]  Update interventions per flow sheet       [] Discharge due to:_  []  Other:_      Daron Mohs, PT 12/28/2022  12:24 PM

## 2022-12-28 NOTE — PROGRESS NOTES
[facility]  [address] [place], [address] [facility] [place]  Ph [phone]  Fx [phone]    Plan of Care/ Statement of Necessity for Physical Therapy Services    Patient name: Aki Rodríguez Start of Care: 2022   Referral source: ROBIN Kelly : 1965    Medical Diagnosis: Cervicalgia [M54.2]   Onset Date:10 days ago    Treatment Diagnosis: Cervicalgia                                              ICD-10: M54.2   Prior Hospitalization: see medical history Provider#: [id number]   Medications: Verified on Patient summary List    Comorbidities: 2 strokes, epilepsy, asthma, arthritis, depression, right shoulder injury 10 years ago, left shoulder injury s/p surgery   Prior Level of Function: functionally independent, rollator, sedentary lifestyle; likes to walk in park with her rollator      The Plan of Care and following information is based on the information from the initial evaluation. Assessment/ key information:  64 yo female who presents to [facility] with c/o neck pain. Patient reports about 10 days ago she was vacuuming and she reported that a few days later she had intense pain in right neck and shoulder. Pt reported that she went to ER and received a muscle relaxer and steroid pack. They also referred her to PT. Patient reports significant PMH of 2 strokes, epilepsy, asthma, arthritis, depression, right shoulder injury 10 years ago, left shoulder injury s/p surgery. Patient demonstrates decreased ROM, decreased strength, impaired posture, pain and decreased functional mobility tolerance.      Patient will continue to benefit from skilled PT services to modify and progress therapeutic interventions, address functional mobility deficits, address ROM deficits, address strength deficits, analyze and address soft tissue restrictions, analyze and cue movement patterns, analyze and modify body mechanics/ergonomics, assess and modify postural abnormalities, and instruct in home and community integration to attain remaining goals. Evaluation Complexity History HIGH Complexity :3+ comorbidities / personal factors will impact the outcome/ POC ; Examination HIGH Complexity : 4+ Standardized tests and measures addressing body structure, function, activity limitation and / or participation in recreation  ;Presentation MEDIUM Complexity : Evolving with changing characteristics    Overall Complexity Rating: MEDIUM    Problem List: pain affecting function, decrease ROM, decrease strength, edema affecting function, impaired gait/ balance, decrease ADL/ functional abilitiies, decrease activity tolerance, decrease flexibility/ joint mobility, and decrease transfer abilities   Treatment Plan may include any combination of the following: Therapeutic exercise, Neuromuscular reeducation, Manual therapy, Therapeutic activity, Self care/home management, Electric stim unattended , Vasopneumatic device, Aquatic therapy, Gait training, Ultrasound, Mechanical traction, Electric stim attended, Needle insertion w/o injection (1 or 2 muscles), Needle insertion w/o injection (3+ muscles), and Canalith repositioning  Patient / Family readiness to learn indicated by: asking questions, trying to perform skills, and interest  Persons(s) to be included in education: patient (P)  Barriers to Learning/Limitations: yes;  cognitive and physical  Measures taken if barriers to learning: Treatment in room, increased support for trunk and core with exercises for neck,   Patient Goal (s): pain relief, return to function  Patient Self Reported Health Status: fair  Rehabilitation Potential: good    Short Term Goals: To be accomplished in 8  treatments:  Patient will be independent and compliant with HEP to progress toward goals and restore functional mobility. Eval Status: issued at eval     Patient will improve FOTO score by 1/2 points to improve functional tolerance for exercise.    Eval Status: FOTO to be taken at visit 2  FOTO score = an established functional score where 100 = no disability     Patient will improve pain in right shoulder and neck to 5/10 at worst to improve reaching and sleeping tolerance and restore prior level of function. Eval Status: 8/10 at worst     Pt will have painfree cervical AROM WFL to aid in functional mechanics for ambulation/ADLs. Eval Status:       Cervical Left Right   Flexion To chest      Extension To neutral P! Side bend 25% [facility]    Rotation [facility] WFL P! Long Term Goals: To be accomplished in 16 treatments:  Patient will improve FOTO score by full points to improve functional tolerance for reaching and dressing. Eval Status: FOTO to be taken at visit 2   FOTO score = an established functional score where 100 = no disability     Pt will have painfree UE AROM WFL to aid in functional mechanics for ambulation/ADLs. Eval Status:   Shoulder Left Right   [facility]    100   ER [facility] WFL   IR [facility] WFL      Pt will have 4/5 Geoff UE strength to return to goals of improved reaching and dressing . Eval Status:   Shoulder Left (1-5) Right (1-5)   Shoulder Flexion 3+ 3+ P! Shoulder ABD 3+ 3+ P! Shoulder IR 5 5   Shoulder ER 5 4         Patient will improve pain in right shoulder and neck to 1-2/10 at worst to improve reaching and sleeping tolerance and restore prior level of function. Eval Status: 8/10 at worst      Frequency / Duration: Patient to be seen 2 times per week for 16 treatments. Patient/ Caregiver education and instruction: Diagnosis, prognosis, self care, activity modification, and exercises   [x]  Plan of care has been reviewed with PTA    Certification Period: 12/28/22-03/28/23    Lucian Sprain, PT 12/28/2022 12:23 PM    ________________________________________________________________________    I certify that the above Therapy Services are being furnished while the patient is under my care.  I agree with the treatment plan and certify that this therapy is necessary.     [de-identified] Signature:_____________________Date:____________TIME:________                                      Peggyann Riser, FNP      ** Signature, Date and Time must be completed for valid certification **  Please sign and return to [facility]  [address], [address] [facility]  Ph [phone]  Fx [phone]

## 2022-12-30 ENCOUNTER — HOSPITAL ENCOUNTER (OUTPATIENT)
Dept: PHYSICAL THERAPY | Age: 57
End: 2022-12-30
Payer: MEDICARE

## 2022-12-30 PROCEDURE — 97535 SELF CARE MNGMENT TRAINING: CPT

## 2022-12-30 PROCEDURE — 97112 NEUROMUSCULAR REEDUCATION: CPT

## 2022-12-30 PROCEDURE — 97110 THERAPEUTIC EXERCISES: CPT

## 2022-12-30 NOTE — PROGRESS NOTES
PT DAILY TREATMENT NOTE    Patient Name: Rebecca Butts  Date:2022  : 1965  [x]  Patient  Verified  Payor: Ti Khanfer / Plan: [facility] MEDICARE CHOICE PPO/PFFS / Product Type: Managed Care Medicare /    In time:1222  Out time:124  Total Treatment Time (min): 62  Total Timed Codes (min): 62  1:1 Treatment Time (MC/BCBS only): 58   Visit #: 2 of 16    Treatment Dx: Cervicalgia [M54.2]    SUBJECTIVE  Pain Level (0-10 scale): 5-610  Any medication changes, allergies to medications, adverse drug reactions, diagnosis change, or new procedure performed?: [x] No    [] Yes (see summary sheet for update)  Subjective functional status/changes:   [] No changes reported  Pt reported that she has a 7/10 last night. OBJECTIVE    30 min Therapeutic Exercise:  [x] See flow sheet :   Rationale: increase ROM, increase strength, improve coordination, improve balance, and increase proprioception to improve the patients ability to restore PLOF     15 min Neuromuscular Re-education:  [x]  See flow sheet :   Rationale: increase ROM, increase strength, improve coordination, improve balance, and increase proprioception  to improve the patients ability to activate scap retraction and depressors without compensation    17 min Self Care: Discussion on energy conservation and use of chair to perform exercises. Discussion on how to adapt to new normal and utilize walker to her advantage. Rationale:     education  to improve the patients ability to increase her ability to perform more tasks          With   [] TE   [] TA   [] neuro   [] other: Patient Education: [x] Review HEP    [] Progressed/Changed HEP based on:   [] positioning   [] body mechanics   [] transfers   [] heat/ice application    [] other:      Pain Level (0-10 scale) post treatment: 2-3/10    ASSESSMENT/Changes in Function: Patient tolerated treatment session well today.  Patient had no complaints with addition of cervical ROM, scap retraction and depression, paul shoulder ER against resistance, and rows to exercise program to accomplish improved cervical ROM and improved strength in scap stabilizers. Pt needs a low stimulation environment and performs better with discussion during exercises. Patient continues to make steady progress toward goals and would benefit from continued skilled PT intervention to address remaining deficits outlined in goals below. Patient will continue to benefit from skilled PT services to modify and progress therapeutic interventions, address functional mobility deficits, address ROM deficits, address strength deficits, analyze and address soft tissue restrictions, analyze and cue movement patterns, analyze and modify body mechanics/ergonomics, assess and modify postural abnormalities, and instruct in home and community integration to attain remaining goals. [x]  See Plan of Care  []  See progress note/recertification  []  See Discharge Summary         Progress towards goals / Updated goals:  Short Term Goals: To be accomplished in 8  treatments:  Patient will be independent and compliant with HEP to progress toward goals and restore functional mobility. Eval Status: issued at [facility]  Current: upgraded today 12/30/22     Patient will improve FOTO score by 1/2 points to improve functional tolerance for exercise. Eval Status: FOTO to be taken at visit 2  FOTO score = an established functional score where 100 = no disability     Patient will improve pain in right shoulder and neck to 5/10 at worst to improve reaching and sleeping tolerance and restore prior level of function. Eval Status: 8/10 at worst     Pt will have painfree cervical AROM WFL to aid in functional mechanics for ambulation/ADLs. Eval Status:       Cervical Left Right   Flexion To chest      Extension To neutral P! Side bend 25% [facility]    Rotation [facility] WFL P! Long Term Goals:  To be accomplished in 16 treatments:  Patient will improve FOTO score by full points to improve functional tolerance for reaching and dressing. Eval Status: FOTO to be taken at visit 2   FOTO score = an established functional score where 100 = no disability     Pt will have painfree UE AROM WFL to aid in functional mechanics for ambulation/ADLs. Eval Status:   Shoulder Left Right   Flexion [facility]    100   ER [facility] WFL   IR [facility] WFL      Pt will have 4/5 Geoff UE strength to return to goals of improved reaching and dressing . Eval Status:   Shoulder Left (1-5) Right (1-5)   Shoulder Flexion 3+ 3+ P! Shoulder ABD 3+ 3+ P! Shoulder IR 5 5   Shoulder ER 5 4         Patient will improve pain in right shoulder and neck to 1-2/10 at worst to improve reaching and sleeping tolerance and restore prior level of function.   Eval Status: 8/10 at worst      PLAN  []  Upgrade activities as tolerated     [x]  Continue plan of care  []  Update interventions per flow sheet       []  Discharge due to:_  []  Other:_      Ritu Butler, PT 12/30/2022  12:19 PM    Future Appointments   Date Time Provider Catherine Sanchez   12/30/2022 12:30 PM Ita Hope, [address] [facility]   1/4/2023  1:00 PM Damien Sandoval PT [facility]   1/5/2023  1:30 PM Damien Sandoval PT [facility]   1/10/2023  2:15 PM Jeremy Morales MD [facility]   1/11/2023 10:00 AM Damien Sandoval PT [facility]   1/13/2023  1:00 PM Damien Sandoval PT [facility]   1/18/2023  1:00 PM Damien Sandoval PT [facility]   1/20/2023  1:00 PM Damien Sandoval PT [facility]   1/25/2023 12:30 PM Damien Sandoval, PT [facility]   1/27/2023  1:00 PM Brandee Ornelas, PT [facility]

## 2023-01-04 ENCOUNTER — APPOINTMENT (OUTPATIENT)
Dept: PHYSICAL THERAPY | Age: 58
End: 2023-01-04
Payer: MEDICARE

## 2023-01-05 ENCOUNTER — APPOINTMENT (OUTPATIENT)
Dept: PHYSICAL THERAPY | Age: 58
End: 2023-01-05
Payer: MEDICARE

## 2023-01-09 NOTE — PROGRESS NOTES
[address], [address]  [place], [address] [place] [address] Street  Phone: [phone]  Fax: [phone]       Jessy Maya  : 1965  PCP: Caitlyn Cueto MD  2023    PROGRESS NOTE    HISTORY OF PRESENT ILLNESS  Maye Murphy is a 65 y.o. female who was seen as a new patient 10/10/19 with c/o chronic low back pain extending into the RLE that she described as a burning pain. She also c/o numbness and foot drop in the LLE that improved some with surgery (L5-S1 fusion) and SCS. She continued to have some residual foot drop and shakiness/tremor of her foot along with LLE cramping. She noted that her neurologist did an emergency EEG to see if it was due to her complex regional seizures, but they were not. She found significant relief with Tizanidine, especially with the distal LLE cramping. She noted that she did not tolerate other muscle relaxants due to somnolence. She found significant relief from the SCS and a Lidoderm 12 hr patch. Pt reported that she has had two EMGs in the past revealing a chronic left L5 radiculopathy. Pt noted that she previously had a dx of sacroiliitis, and she previously found relief from an SI joint injection. She had a right-sided lacunar stroke affecting the temporal lobe - LLE weakness, mild LUE weakness, cognitive impairment, smiling on the right side. 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 She saw Dr. Ronni Ruelas 19 in regards to a spinal cord stimulator malfunction. She had SCS placement in  by Dr. Keon Tyler, and it was subsequently revised several times. She reported that she has had multiple issues with the SCS's over the years related to battery life. Due to her cognitive impairment, she is unable to use a rechargable batteries.  She previously worked as a Physical Therapist. She was prescribed an SI belt and underwent a Right SI joint injection (10/22/19; Dr. Joe Duggan) that provided significant relief and she doubled her daily steps. She continued to take Tizanidine, Topamax, and Depakote prescribed by her neurologist. She maintained an HEP of walking and yoga. She saw some improvement with PT (11/14/19-2/4/2020; [facility]). She used a left AFO as recommended by PT. She reported no longer having a tremor in her LLE. Pt noted that during one session of PT, she was having manual, myofascial release on her external pelvis and believed it elicited a seizure at the time. She experienced constipation, weight gain, and somnolence with Tizanidine but she felt the benefits outweighed the side effects. Pt noted that she saw her orthotist who provided an orthotic that has helped with her ambulation. She noted that her left hip was elevated by 1/8th of an inch. She was advised to obtain a gel insert for the right side, but it did not provide any benefit. Her son provided long access distraction that helped with the muscle spasms, but she had burning and numbness slightly posterior to the trochanter. She had significant sharp pain with weight bearing that radiated into the ankle. She saw some benefit from oral steroids, especially with her SI joint pain. However, she continued to have some pain near the trochanter and the left gluteus medius near the insertion. She had to use a cane to ambulate around her house. Pt denied groin pain. She returned with c/o pelvic pains, sacral pain and tailbone numbness. She also reported some BLE itching and redness. She found benefit with walking/movement. She saw Dr. Marly Gonzalez and he suggested she may do well with seeing his wife at [facility]. She saw Dr. Ronni Murrieta, and he suggested the ganglion impar injection for coccyx pain if she decided to proceed with that option. However, he noted, it would not likely resolve her other symptoms. Pt notes that she walked 1.1 miles one day, and both of her legs began turning in.  She returned with c/o exacerbation of tailbone and sacral pain. She was interested in another SI joint injection. She planned to have a left rotator cuff repair surgery. Pt notes that she has severe pain, numbness, and tingling posterolaterally to the knee that is painful with walking and sitting. She notes that she even experienced limping while walking in the house and she felt that her legs would collapse. She presents wearing a left AFO. Since her last OV, she had a left rotator cuff repair surgery with [facility] so she rested in a recliner often and she has had home health PT. She underwent a right SI joint injection (9/22/2020; Dr. Caryle Batch) without benefit. She feels her left AFO brace has been aggravating her SIJ pain. She has had recurrent UTIs. She previously did well with pelvic floor PT. She notes that the SCS seems to be making her BLE pains worse. She has been experiencing generalized chest tightness - her GP has done a chest XR to r/o pneumonia and she is scheduled to have an echo soon. She had her SCS reprogrammed with benefit. She noted that she had paraesthesia throughout the groin, anus, vagina. She has been using the vaginal wand in pelvic floor PT. She continued to have some right groin and RLE pain. Pt noted that she could not use her SCS because after 3-4 hours, she had a burning pain in her coccyx and low back up to the thoracic spine to the bra line. She took Gabapentin, Percocet, and NSAIDs along with a special pillow for her coccydynia. Her pain is worse with prolonged sitting or laying. She also notes that she has a soft tissue injury to the LUE. She believes she injured her right biceps tendon when she tried driving again. Cervical, Thoracic, and Lumbar spine CT Myelogram dated 1/22/21 reviewed. Per report, No central canal compromise identified within the cervical, thoracic or lumbar spine. Mild posterior osteophytic ridging and bilateral foraminal encroachment C5-6.   Retained spinal stimulator hardware in the thoracic spine as noted. Previous laminectomy and fusion L5-S1 with intact hardware. Broad-based disc bulge L4-5. Prominent soft tissue posterior to the disc space at L5-S1, which may reflect residual scar tissue, or disc material.  Borderline periaortic retroperitoneal lymphadenopathy. 2 cm cystic lesion adnexa. Pt notes that she has an appointment scheduled with Dr. Milly Pineda soon. She notes that her pelvic floor symptoms and saddle anesthesia improved with pelvic floor PT but have slightly returned since her pain has flared up. She has been wearing her AFO again on the left with benefit. She feels more comfortable in it and feels more secure than she previously did. She continues to have some cramping in the left foot. She has been taking Tizanidine with some benefit, but she experiences some somnolence as well. She is doing better since her left shoulder surgery, and she is driving some again. However, she has some LUE pain when she drives still. 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 She sees Hanny Reza PA-C for pain management. She continues to have low back pain that she describes as \"heat. \" She is finding some benefit with Mobic, though mild. She had a ganglion impar block with Dr. Milly Pineda (2/24/21) with significant benefit. She attended PT with Jeff, but she had to stop due to her spraining her ankle. She had a flare up of her sacral pain, but she saw improvement overall since her last visit. She continued to do well with Dr. Hines Kocher. She was walking more and increasing her activity. She still had some difficulty with the grocery store and carrying groceries. She had difficulty coming up the steps after aqua therapy DR ANDREA TIAN [facility]) so she was advised it was not recommended to continue due to safety risk. She notes that PT recommended the litegait machine. She attended PT (8/24-10/22/21;  [facility]) She was doing really well until the battery in her SCS . She had it replaced in 2021, and she no longer has to use her AFO anymore. She is up to walking 0.75 miles outside now. She no longer has the tremor in her LLE. She has noticed some edema in the BLE with a noticeable ring from her socks. She has had increased sensitivity in the coccyx/sacrum. She has been taking Lasix for the edema, but she does not feel like it is making a significant difference. Her edema is better when she goes to bed and first thing in the morning. She is not elevating her legs at night. Her edema is worse after walking. When she goes to her mailbox in the afternoon/evening, it feels like she has sandbags on her legs. She does okay in the morning. She has been doing well on Topamax 150 mg QAM and 100 mg QHS. She continues to try to remain active by walking around her neighborhood. She has been having some photophobia for the last year, and her light sensitivity has been causing headaches. She has been getting a numbness over her left sacrum. She reports drowsiness with Tizanidine and it affects her speech in the mornings. She does okay in the afternoons and evenings. She continues to have some left hand numbness and pain with significant use. She takes Gabapentin 400 mg BID. She had mild flare-up of pain of lateral side of right thigh. Describes it as a shooting pain. Notes of a heat that seems to radiate along lumbar region, starting from right side. Pt has previously had weakness of left side of her body due to previous stroke. Pt notes that her right hip pops frequently upon right hip rotation. During last two weeks, pt notes of increased bp in the 802D for systolic BP. Pt notes that she last met with her Neurology Dr. Paris Copeland, where an EEG and MRI were ordered. Pt notes that she has not been able to exercise regularly due to right hip pain. Continues with Tylenol.  Notes that Lidocaine provided great relief, but was unable to take it long-term due to recent stroke. During 11/8/22 OV, she c/o pain of her right thigh. However, pt was visibly upset and presented with mental instability as well. Pt was also exhibiting multiple episodes of paranoia, such as mentioning that she feels that \"her neighbors change her clocks. \" She had previously presented with similar behavior in recent OVs.  She also perceived that her spinal cord representative was trying to force her way into the office visit; however, the pt was the one who had requested the representative to come into the room today for reprogramming. She also voiced many accusations towards me of \"colluding against her with other sources. \"    Clotilde Norton was seen today for follow up. ***  Pt underwent a right SI joint injection on 12/6/22 by Dr. Janette Mcdowell with no benefit. 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 L5-S1 fusion, SCS; left rotator cuff repair      Paul Hi is a 65 y.o. female with ***      PLAN  ***    Pt will f/u in *** or sooner if needed. Diagnoses and all orders for this visit:    1. Chronic primary musculoskeletal pain    2. Mechanical low back pain    3. Sacroiliac joint dysfunction of both sides    4. Myofascial pain    5.  History of CVA (cerebrovascular accident)             PAST MEDICAL HISTORY   Past Medical History:   Diagnosis Date    Adverse effect of anesthesia      bronchospasm,     Asthma     well controlled    Coagulation disorder ([address]     hypercoagulable    Depressive disorder 1/3/2013    Epilepsy ([facility] [address]     Ill-defined condition Benign Granuloma Annularea    OAB (overactive bladder)     Obesity (BMI 30.0-34.9) 11/3/2016    Right leg DVT ([facility])     DVT    Seizures ([facility])     grand mal    Spastic bladder     Stroke ([facility] [address] 2006    mini-stroke    Thromboembolus [facility])     s/p vincenzo filter    Thyroid disease     Unspecified adverse effect of anesthesia     bronchospasm on awakening       Past Surgical History:   Procedure Laterality Date    HX BACK SURGERY      dural repair    HX BACK SURGERY      nerve stimulator    HX LUMBAR FUSION  2006    HX LUMBAR LAMINECTOMY  2006    HX ROTATOR CUFF REPAIR Left 09/23/2020       MEDICATIONS      Current Outpatient Medications   Medication Sig Dispense Refill    naproxen (NAPROSYN) 500 mg tablet Take 500 mg by mouth two (2) times daily (with meals). omega 3-dha-epa-fish oil (Fish OiL) 100-160-1,000 mg cap Take  by mouth. aspirin delayed-release 81 mg tablet Take 81 mg by mouth daily. Spiriva Respimat 1.25 mcg/actuation inhaler INHALE 2 PUFFS BY MOUTH EVERY DAY      dilTIAZem ER (CARDIZEM CD) 120 mg capsule       fluticasone propionate (FLONASE) 50 mcg/actuation nasal spray 2 Sprays by Nasal route daily. gabapentin (NEURONTIN) 400 mg capsule Take 1 Capsule by mouth two (2) times a day. Max Daily Amount: 800 mg. Indications: neuropathic pain 60 Capsule 1    furosemide (LASIX) 20 mg tablet TK1 TABLET BY MOUTH EVERY DAY AS NEEDED FOR DYSPNEA OR EDEMA      tiZANidine (ZANAFLEX) 4 mg tablet TAKE 1 TABLET BY MOUTH THREE TIMES DAILY AS NEEDED FOR PAIN 90 Tablet 5    Advair -21 mcg/actuation inhaler Take 2 Puffs by inhalation two (2) times a day. albuterol (PROVENTIL HFA, VENTOLIN HFA, PROAIR HFA) 90 mcg/actuation inhaler INHALE 2 PUFFS EVERY 6 HOURS AS NEEDED FOR WHEEZING OR SHORTNESS OF BREATH      levothyroxine (SYNTHROID) 25 mcg tablet Take 25 mcg by mouth Daily (before breakfast). lidocaine (LIDODERM) 5 % Apply 1 Patch to affected area as needed.       topiramate (TOPAMAX) 100 mg tablet Take  by mouth. Pt taking 150 mg in the morning and 100 mg at bedtime      montelukast (SINGULAIR) 10 mg tablet Take 10 mg by mouth daily. Indications: ASTHMA PREVENTION      methocarbamoL (ROBAXIN) 750 mg tablet       methylPREDNISolone (Medrol, Norm,) 4 mg tablet Per dose pack instructions (Patient not taking: Reported on 1/10/2023) 1 Dose Pack 0    LORazepam (ATIVAN) 0.5 mg tablet Take 0.5 mg by mouth daily as needed. (Patient not taking: Reported on 1/10/2023)         ALLERGIES    Allergies   Allergen Reactions    Latex, Natural Rubber Itching    Phenobarbital Other (comments)     Throat swelled up ,rash  Other reaction(s): Shortness of Breath     Rifampin Rash     Other reaction(s): Shortness of Breath     Adhesive Other (comments)     Layers of skin peeled off    Betadine [Povidone-Iodine] Rash     Patient states skin peels off. Copper Rash and Swelling    Dilantin [Phenytoin Sodium Extended] Other (comments)     Throat swelled up rash all over    Other Medication Other (comments)     Surgical skin prep caused layers of skin to peel off. Needs benedryl before anesthesia. Phenytoin Rash          SOCIAL HISTORY    Social History     Socioeconomic History    Marital status:    Tobacco Use    Smoking status: Never    Smokeless tobacco: Never   Vaping Use    Vaping Use: Never used   Substance and Sexual Activity    Alcohol use: Yes     Comment: 1 per month    Drug use: No       FAMILY HISTORY    No family history on file. REVIEW OF SYSTEMS  ROS     PHYSICAL EXAMINATION  There were no vitals taken for this visit.     Pain Assessment  1/10/2023   Location of Pain Hip   Pain Location Comment -   Location Modifiers Right   Severity of Pain 5   Quality of Pain Sharp   Quality of Pain Comment numbness   Duration of Pain A few hours   Frequency of Pain Intermittent   Date Pain First Started -   Aggravating Factors (No Data)   Aggravating Factors Comment laying down and sleeping   Limiting Behavior No   Relieving Factors Ice   Relieving Factors Comment -   Result of Injury No   Type of Injury -   Type of Injury Comment -       Constitutional:  Well developed, well nourished, in no acute distress. Psychiatric: Affect and mood are appropriate. HEENT: Normocephalic, atraumatic. Extraocular movements intact. Integumentary: No rashes or abrasions noted on exposed areas. Cardiovascular: Regular rate and rhythm. Pulmonary: Clear to auscultation bilaterally. SPINE/MUSCULOSKELETAL EXAM    Cervical spine:  Neck is midline. Normal muscle tone. No focal atrophy is noted. ROM pain free. Shoulder ROM intact. No tenderness to palpation. Negative Spurling's sign. Negative Tinel's sign. Negative Serrato's sign. Sensation in the bilateral arms grossly intact to light touch. Lumbar spine:  No rash, ecchymosis, or gross obliquity. No fasciculations. No focal atrophy is noted. No pain with hip ROM. Full range of motion. No tenderness to palpation. No tenderness to palpation at the sciatic notch. SI joints tender bilaterally, R>L   Trochanters non tender. Sensation in the bilateral legs grossly intact to light touch.              LEFT RIGHT   CLAY TEST - +   P4 TEST - +   COMPRESSION TEST - -   DISTRACTION TEST - +   GAENSLEN'S TEST - +            Updates 9/1/2020:     SI joint on R tender to palpation       LEFT RIGHT   CLAY TEST - +   P4 TEST - +   COMPRESSION TEST - -   DISTRACTION TEST - -   GAENSLEN'S TEST - +      Updates 11/24/2020:  Tenderness to palpation of coccydynia      Updates 4/29/2021:  Tenderness to palpation of lumbar paraspinals        MOTOR:      Elbow Flex  Elbow Ext Arm Abd Wrist Ext Wrist Flex Hand Intrin   Right 5/5 5/5 5/5 5/5 5/5 5/5   Left 5/5 5/5 5/5 5/5 5/5 5/5             Hip flex  Knee Ext EHL Ankle DF Ankle PF      Right 5/5 5/5 5/5 5/5 5/5    Left 5/5 5/5 5/5 5/5 5/5      DTRs are 1+ biceps, triceps, brachioradialis, patella, and Achilles. Negative Straight Leg raise. Squat not tested. No difficulty with tandem gait. Ambulation without assistive device. FWB. RADIOGRAPHS  Cervical, Thoracic, and Lumbar Spine CT Myelogram images taken on 1/22/21 personally reviewed with patient:  Cervical spine CT: There is mild posterior osteophytic ridging C5-6, which  causes minimal impress on the ventral thecal sac. No disc bulge or  protrusion. No central canal compromise. There is mild bilateral  foraminal encroachment C5-6, secondary to uncovertebral spurring. Facet  alignment is normal.  No fracture or subluxation. The spinal cord is  normal in appearance. The paravertebral soft tissues are unremarkable. Thoracic spine CT: No central canal compromise. No significant disc bulge  or protrusion identified in the thoracic spine. There is a retained  spinal stimulator posteriorly T7-T9. The thoracic spinal cord is  unremarkable as imaged. The conus terminalis terminates at the T12-L1  disc space. The paravertebral soft tissues and visualized portions of the  lungs are unremarkable. Lumbar spine CT: There is a mild broad-based disc bulge L4-5. Postsurgical changes from previous posterior laminectomy and fusion L5-S1. Fusion hardware appears intact. There is moderate soft tissue posterior  to the L5-S1 disc space, which may be postsurgical scar, or disc material.  No central canal compromise. No critical foraminal encroachment. There  is advanced bilateral facet spondylosis L4-5 through L5-S1. There is an  inferior vena cava filter incidentally noted there is a couple small of  periaortic retroperitoneal lymph nodes, nonspecific. There is a 2 cm cyst  left adnexa. IMPRESSION:  No central canal compromise identified within the cervical, thoracic or  lumbar spine. Mild posterior osteophytic ridging and bilateral foraminal  encroachment C5-6. Retained spinal stimulator hardware in the thoracic  spine as noted.   Previous laminectomy and fusion L5-S1 with intact  hardware. Broad-based disc bulge L4-5. Prominent soft tissue posterior  to the disc space at L5-S1, which may reflect residual scar tissue, or  disc material.  Borderline periaortic retroperitoneal lymphadenopathy. 2  cm cystic lesion adnexa. 4V Lumbar XR images taken on 4/1/2020 personally reviewed with patient:  BONES: Status post L5-S1 posterior spinal fusion and interbody disc cage. L5  laminectomy. No evidence of acute fracture or listhesis. Overlying spinal  stimulator and IVC filter partially obscure underlying osseous detail. Vertebral  body heights are maintained. No osseous lytic or blastic lesion. Mild multilevel  spondylosis. SOFT TISSUES: Unremarkable. OTHER: None.     _______________     IMPRESSION  IMPRESSION:     No evidence of acute fracture or listhesis. Lower lumbar spinal fusion. LT HIP XR images taken on 4/1/2020 personally reviewed with patient:  BONES: No evidence of acute fracture or dislocation. Joint spaces and alignment  are maintained. No aggressive appearing osseous lytic or blastic lesion. SOFT TISSUES: Unremarkable.     _______________     IMPRESSION  IMPRESSION:     No evidence of acute fracture or dislocation. Lumbar XR images taken on 2/16/18 personally reviewed with patient:  Right posterior upper hip generator device with dorsal intraspinal stimulator wires, and the T10-11 level with the wire tips at the T7-T8 level. Postoperative changes of previous L5 decompression laminectomy with solid and are osseous circumferential fusion at L5-S1. No findings of hardware failure. A right-sided IVC filter is present at the L2-L3 level. Mild multilevel degenerative spondylosis similar prior examination with no evidence of listhesis. The vertebral bodies maintain normal height and alignment. No acute obstructive osseous abnormality.      IMPRESSION:    1. Stable post operative circumferential fusion L5-S1 with L5 decompression laminectomy. 2.  Dorsal stimulator wire tip at the T7-T8 level. 3.  Mild degenerative lumbar spine spondylosis. No acute obstructive osseous abnormality. *** minutes of face-to-face contact were spent with the patient during today's visit extensively discussing symptoms and treatment plan. All questions were answered. More than half of this visit today was spent on counseling. Written by Bill Zhu, as dictated by Dr. Jenny Colmenares.

## 2023-01-10 ENCOUNTER — VIRTUAL VISIT (OUTPATIENT)
Dept: ORTHOPEDIC SURGERY | Age: 58
End: 2023-01-10
Payer: OTHER MISCELLANEOUS

## 2023-01-10 DIAGNOSIS — M53.3 SACROILIAC JOINT DYSFUNCTION OF BOTH SIDES: ICD-10-CM

## 2023-01-10 DIAGNOSIS — M79.18 MYOFASCIAL PAIN: ICD-10-CM

## 2023-01-10 DIAGNOSIS — M54.59 MECHANICAL LOW BACK PAIN: ICD-10-CM

## 2023-01-10 DIAGNOSIS — G89.29 CHRONIC PRIMARY MUSCULOSKELETAL PAIN: Primary | ICD-10-CM

## 2023-01-10 DIAGNOSIS — M79.18 CHRONIC PRIMARY MUSCULOSKELETAL PAIN: Primary | ICD-10-CM

## 2023-01-10 DIAGNOSIS — Z86.73 HISTORY OF CVA (CEREBROVASCULAR ACCIDENT): ICD-10-CM

## 2023-01-10 PROCEDURE — 99212 OFFICE O/P EST SF 10 MIN: CPT | Performed by: PHYSICAL MEDICINE & REHABILITATION

## 2023-01-10 RX ORDER — METHOCARBAMOL 750 MG/1
TABLET, FILM COATED ORAL
COMMUNITY
Start: 2022-12-21

## 2023-01-10 NOTE — PROGRESS NOTES
Chief Complaint   Patient presents with    Follow-up       Pt preferred language for health care discussion is english. Is someone accompanying this pt? no    Is the patient using any DME equipment during [address]? no    Depression Screening:  3 most recent PHQ Screens 4/29/2021   Little interest or pleasure in doing things Not at all   Feeling down, depressed, irritable, or hopeless Not at all   Total Score PHQ 2 0       Learning Assessment:  No flowsheet data found. Health Maintenance reviewed and discussed per provider. Yes        Advance Directive:  1. Do you have an advance directive in place? Patient Reply:no    2. If not, would you like material regarding how to put one in place? Patient Reply: no    Coordination of Care:  1. Have you been to the ER, urgent care clinic since your last visit? Hospitalized since your last visit? Yes [facility] 2 weeks ago    2. Have you seen or consulted any other health care providers outside of the [address] [place] [address] since your last visit? Include any pap smears or colon screening.  no

## 2023-01-10 NOTE — PROGRESS NOTES
[address]  [address], [address]  [address]  Phone: [phone]  Fax: [phone]        Ceci Restrepos  : 1965  PCP: Arlene Costa MD  1/10/2023     PROGRESS NOTE     Consent: Kim Crystal was evaluated through  a synchronous (real-time) audio-video encounter. The patient (or guardian if applicable) is aware that this is a billable service, which includes applicable co-pays. This Virtual Visit was conducted with patient's (and/or legal guardian's) consent. This visit was conducted pursuant to the emergency declaration under the [address] [place] [address] authority and the [employer] and [email or web address] General Act. Patient identification was verified, and a caregiver was present when appropriate. The patient was located in a state where the provider was licensed to provide care. The visit was conducted in the office with the patient being in home through a telephone platform. Visit video time start: 5:38 PM end: 5:54 PM    who was seen as a new patient 10/10/19 with c/o chronic low back pain extending into the RLE that she described as a burning pain. She also c/o numbness and foot drop in the LLE that improved some with surgery (L5-S1 fusion) and SCS. She continued to have some residual foot drop and shakiness/tremor of her foot along with LLE cramping. She noted that her neurologist did an emergency EEG to see if it was due to her complex regional seizures, but they were not. She found significant relief with Tizanidine, especially with the distal LLE cramping. She noted that she did not tolerate other muscle relaxants due to somnolence. She found significant relief from the SCS and a Lidoderm 12 hr patch. Pt reported that she has had two EMGs in the past revealing a chronic left L5 radiculopathy.  Pt noted that she previously had a dx of sacroiliitis, and she previously found relief from an SI joint injection. She had a right-sided lacunar stroke affecting the temporal lobe - LLE weakness, mild LUE weakness, cognitive impairment, smiling on the right side. 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 She saw Dr. Shanti Hamm 6/18/19 in regards to a spinal cord stimulator malfunction. She had SCS placement in 2011 by Dr. Inge Mota, and it was subsequently revised several times. She reported that she has had multiple issues with the SCS's over the years related to battery life. Due to her cognitive impairment, she is unable to use a rechargable batteries. She previously worked as a Physical Therapist. She was prescribed an SI belt and underwent a Right SI joint injection (10/22/19; Dr. Caryle Batch) that provided significant relief and she doubled her daily steps. She continued to take Tizanidine, Topamax, and Depakote prescribed by her neurologist. She maintained an HEP of walking and yoga. She saw some improvement with PT (11/14/19-2/4/2020; [facility]). She used a left AFO as recommended by PT. She reported no longer having a tremor in her LLE. Pt noted that during one session of PT, she was having manual, myofascial release on her external pelvis and believed it elicited a seizure at the time. She experienced constipation, weight gain, and somnolence with Tizanidine but she felt the benefits outweighed the side effects. Pt noted that she saw her orthotist who provided an orthotic that has helped with her ambulation. She noted that her left hip was elevated by 1/8th of an inch. She was advised to obtain a gel insert for the right side, but it did not provide any benefit. Her son provided long access distraction that helped with the muscle spasms, but she had burning and numbness slightly posterior to the trochanter. She had significant sharp pain with weight bearing that radiated into the ankle.  She saw some benefit from oral steroids, especially with her SI joint pain. However, she continued to have some pain near the trochanter and the left gluteus medius near the insertion. She had to use a cane to ambulate around her house. Pt denied groin pain. She returned with c/o pelvic pains, sacral pain and tailbone numbness. She also reported some BLE itching and redness. She found benefit with walking/movement. She saw Dr. Christopher Mireles and he suggested she may do well with seeing his wife at [facility]. She saw Dr. Vikas Frye, and he suggested the ganglion impar injection for coccyx pain if she decided to proceed with that option. However, he noted, it would not likely resolve her other symptoms. Pt notes that she walked 1.1 miles one day, and both of her legs began turning in. She returned with c/o exacerbation of tailbone and sacral pain. She was interested in another SI joint injection. She planned to have a left rotator cuff repair surgery. Pt notes that she has severe pain, numbness, and tingling posterolaterally to the knee that is painful with walking and sitting. She notes that she even experienced limping while walking in the house and she felt that her legs would collapse. She presents wearing a left AFO. Since her last OV, she had a left rotator cuff repair surgery with [facility] so she rested in a recliner often and she has had home health PT. She underwent a right SI joint injection (9/22/2020; Dr. Lorena Greenberg) without benefit. She feels her left AFO brace has been aggravating her SIJ pain. She has had recurrent UTIs. She previously did well with pelvic floor PT. She notes that the SCS seems to be making her BLE pains worse. She has been experiencing generalized chest tightness - her GP has done a chest XR to r/o pneumonia and she is scheduled to have an echo soon. She had her SCS reprogrammed with benefit. She noted that she had paraesthesia throughout the groin, anus, vagina. She has been using the vaginal wand in pelvic floor PT.  She continued to have some right groin and RLE pain. Pt noted that she could not use her SCS because after 3-4 hours, she had a burning pain in her coccyx and low back up to the thoracic spine to the bra line. She took Gabapentin, Percocet, and NSAIDs along with a special pillow for her coccydynia. Her pain is worse with prolonged sitting or laying. She also notes that she has a soft tissue injury to the LUE. She believes she injured her right biceps tendon when she tried driving again. Cervical, Thoracic, and Lumbar spine CT Myelogram dated 1/22/21 reviewed. Per report, No central canal compromise identified within the cervical, thoracic or lumbar spine. Mild posterior osteophytic ridging and bilateral foraminal encroachment C5-6. Retained spinal stimulator hardware in the thoracic spine as noted. Previous laminectomy and fusion L5-S1 with intact hardware. Broad-based disc bulge L4-5. Prominent soft tissue posterior to the disc space at L5-S1, which may reflect residual scar tissue, or disc material.  Borderline periaortic retroperitoneal lymphadenopathy. 2 cm cystic lesion adnexa. Pt notes that she has an appointment scheduled with Dr. Tung Bautista soon. She notes that her pelvic floor symptoms and saddle anesthesia improved with pelvic floor PT but have slightly returned since her pain has flared up. She has been wearing her AFO again on the left with benefit. She feels more comfortable in it and feels more secure than she previously did. She continues to have some cramping in the left foot. She has been taking Tizanidine with some benefit, but she experiences some somnolence as well. She is doing better since her left shoulder surgery, and she is driving some again. However, she has some LUE pain when she drives still. 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 She sees Jasmin Crawford PA-C for pain management.  She continues to have low back pain that she describes as \"heat. \" She is finding some benefit with Mobic, though mild. She had a ganglion impar block with Dr. Pereyra November (21) with significant benefit. She attended PT with Jeff, but she had to stop due to her spraining her ankle. She had a flare up of her sacral pain, but she saw improvement overall since her last visit. She continued to do well with Dr. Dorothy Swanson. She was walking more and increasing her activity. She still had some difficulty with the grocery store and carrying groceries. She had difficulty coming up the steps after aqua therapy DR ANDREA TIAN [facility]) so she was advised it was not recommended to continue due to safety risk. She notes that PT recommended the litegait machine. She attended PT (-10/22/21; [facility] Beltrami) She was doing really well until the battery in her SCS . She had it replaced in 2021, and she no longer has to use her AFO anymore. She is up to walking 0.75 miles outside now. She no longer has the tremor in her LLE. She has noticed some edema in the BLE with a noticeable ring from her socks. She has had increased sensitivity in the coccyx/sacrum. She has been taking Lasix for the edema, but she does not feel like it is making a significant difference. Her edema is better when she goes to bed and first thing in the morning. She is not elevating her legs at night. Her edema is worse after walking. When she goes to her mailbox in the afternoon/evening, it feels like she has sandbags on her legs. She does okay in the morning. She has been doing well on Topamax 150 mg QAM and 100 mg QHS. She continues to try to remain active by walking around her neighborhood. She has been having some photophobia for the last year, and her light sensitivity has been causing headaches. She has been getting a numbness over her left sacrum. She reports drowsiness with Tizanidine and it affects her speech in the mornings. She does okay in the afternoons and evenings.  She continues to have some left hand numbness and pain with significant use. She takes Gabapentin 400 mg BID. She had mild flare-up of pain of lateral side of right thigh. Describes it as a shooting pain. Notes of a heat that seems to radiate along lumbar region, starting from right side. Pt has previously had weakness of left side of her body due to previous stroke. Pt notes that her right hip pops frequently upon right hip rotation. During last two weeks, pt notes of increased bp in the 426B for systolic BP. Pt notes that she last met with her Neurology DrSpencer Crocker, where an EEG and MRI were ordered. Pt notes that she has not been able to exercise regularly due to right hip pain. Continues with Tylenol. Notes that Lidocaine provided great relief, but was unable to take it long-term due to recent stroke. During 11/8/22 OV, she c/o pain of her right thigh. However, pt was visibly upset and presented with mental instability as well. Pt was also exhibiting multiple episodes of paranoia, such as mentioning that she feels that \"her neighbors change her clocks. \" She had previously presented with similar behavior in recent OVs.  She also perceived that her spinal cord representative was trying to force her way into the office visit; however, the pt was the one who had requested the representative to come into the room today for reprogramming. She also voiced many accusations towards me of \"colluding against her with other sources. \"       Clotilde Norton was seen virtually today for follow up. Pt had requested video call platform for visit and we attempted to establish video call connection, but the link was no longer available. Thus, visit was conductedvisit through a telephone platform. Pt underwent a right SI joint injection on 12/6/22 by Dr. Janette Mcdowell with no benefit. Pt stated that she had no recollection of portraying erratic behavior during the last OV.  In reference to her previous behavior of \"others colluding against\" her, she asked if I was referring to cinthya. She accused me of being one-sided and questioned why she could not receive care from us. She also asked if we had an  available that she could talk to. 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 L5-S1 fusion, SCS; left rotator cuff repair        ASSESSMENT  Canelo Lawrence is a 65 y.o. female with continued lower back pain and bilateral hip pain. Her symptoms are not likely due to sacroiliac joint dysfunction, as right SI joint injection provided no benefit. Myofascial pain may also contribute to her current symptoms. Central pain sensitization is likely. We have tried various medication options, PT, SI joint injections with no relief. I have previously given her the benefit of the doubt due to her reported impairments from extensive medical history. However, with her increasingly erratic behavior, it becomes more apparent that there are non-organic factors at play. I no longer believe my efforts in performing mental gymnastics to rationalize symptoms is beneficial to her in the long run. She should speak to her PCP to seek further assistance. Her true needs appear outside the scope of my practice. PLAN  At this time she will be discharged from the practice. Diagnoses and all orders for this visit:    1. Chronic primary musculoskeletal pain    2. Mechanical low back pain    3. Sacroiliac joint dysfunction of both sides    4. Myofascial pain    5.  History of CVA (cerebrovascular accident)           PAST MEDICAL HISTORY   Past Medical History: Diagnosis Date    Adverse effect of anesthesia      bronchospasm,     Asthma     well controlled    Coagulation disorder (HCC)     hypercoagulable    Depressive disorder 1/3/2013    Epilepsy ([facility] [address]     Ill-defined condition     Benign Granuloma Annularea    OAB (overactive bladder)     Obesity (BMI 30.0-34.9) 11/3/2016    Right leg DVT ([facility] [address]     DVT    Seizures ([facility] [address]     grand mal    Spastic bladder     Stroke ([facility] [address] 2006    mini-stroke    Thromboembolus [facility])     s/p vincenzo filter    Thyroid disease     Unspecified adverse effect of anesthesia     bronchospasm on awakening       Past Surgical History:   Procedure Laterality Date    HX BACK SURGERY      dural repair    HX BACK SURGERY      nerve stimulator    HX LUMBAR FUSION  2006    HX LUMBAR LAMINECTOMY  2006    HX ROTATOR CUFF REPAIR Left 09/23/2020       MEDICATIONS      Current Outpatient Medications   Medication Sig Dispense Refill    naproxen (NAPROSYN) 500 mg tablet Take 500 mg by mouth two (2) times daily (with meals). omega 3-dha-epa-fish oil (Fish OiL) 100-160-1,000 mg cap Take  by mouth. aspirin delayed-release 81 mg tablet Take 81 mg by mouth daily. Spiriva Respimat 1.25 mcg/actuation inhaler INHALE 2 PUFFS BY MOUTH EVERY DAY      dilTIAZem ER (CARDIZEM CD) 120 mg capsule       fluticasone propionate (FLONASE) 50 mcg/actuation nasal spray 2 Sprays by Nasal route daily. gabapentin (NEURONTIN) 400 mg capsule Take 1 Capsule by mouth two (2) times a day. Max Daily Amount: 800 mg. Indications: neuropathic pain 60 Capsule 1    furosemide (LASIX) 20 mg tablet TK1 TABLET BY MOUTH EVERY DAY AS NEEDED FOR DYSPNEA OR EDEMA      tiZANidine (ZANAFLEX) 4 mg tablet TAKE 1 TABLET BY MOUTH THREE TIMES DAILY AS NEEDED FOR PAIN 90 Tablet 5    Advair -21 mcg/actuation inhaler Take 2 Puffs by inhalation two (2) times a day.       albuterol (PROVENTIL HFA, VENTOLIN HFA, PROAIR HFA) 90 mcg/actuation inhaler INHALE 2 PUFFS EVERY 6 HOURS AS NEEDED FOR WHEEZING OR SHORTNESS OF BREATH      levothyroxine (SYNTHROID) 25 mcg tablet Take 25 mcg by mouth Daily (before breakfast). lidocaine (LIDODERM) 5 % Apply 1 Patch to affected area as needed. topiramate (TOPAMAX) 100 mg tablet Take  by mouth. Pt taking 150 mg in the morning and 100 mg at bedtime      montelukast (SINGULAIR) 10 mg tablet Take 10 mg by mouth daily. Indications: ASTHMA PREVENTION      methocarbamoL (ROBAXIN) 750 mg tablet       methylPREDNISolone (Medrol, Norm,) 4 mg tablet Per dose pack instructions (Patient not taking: Reported on 1/10/2023) 1 Dose Pack 0    LORazepam (ATIVAN) 0.5 mg tablet Take 0.5 mg by mouth daily as needed. (Patient not taking: Reported on 1/10/2023)         ALLERGIES    Allergies   Allergen Reactions    Latex, Natural Rubber Itching    Phenobarbital Other (comments)     Throat swelled up ,rash  Other reaction(s): Shortness of Breath     Rifampin Rash     Other reaction(s): Shortness of Breath     Adhesive Other (comments)     Layers of skin peeled off    Betadine [Povidone-Iodine] Rash     Patient states skin peels off. Copper Rash and Swelling    Dilantin [Phenytoin Sodium Extended] Other (comments)     Throat swelled up rash all over    Other Medication Other (comments)     Surgical skin prep caused layers of skin to peel off. Needs benedryl before anesthesia. Phenytoin Rash          SOCIAL HISTORY    Social History     Socioeconomic History    Marital status:    Tobacco Use    Smoking status: Never    Smokeless tobacco: Never   Vaping Use    Vaping Use: Never used   Substance and Sexual Activity    Alcohol use: Yes     Comment: 1 per month    Drug use: No       FAMILY HISTORY    No family history on file. REVIEW OF SYSTEMS  Review of Systems   Constitutional:  Negative for chills, fever and weight loss. Respiratory:  Negative for shortness of breath. Cardiovascular:  Negative for chest pain. Gastrointestinal:  Negative for constipation. Negative for fecal incontinence   Genitourinary:  Negative for dysuria. Negative for urinary incontinence   Musculoskeletal:  Positive for back pain (lower). Skin:  Negative for rash. Neurological:  Negative for dizziness, tingling, tremors, focal weakness and headaches. Endo/Heme/Allergies:  Does not bruise/bleed easily. Psychiatric/Behavioral:  The patient does not have insomnia. PHYSICAL EXAMINATION  There were no vitals taken for this visit. Pain Assessment  1/10/2023   Location of Pain Hip   Pain Location Comment -   Location Modifiers Right   Severity of Pain 5   Quality of Pain Sharp   Quality of Pain Comment numbness   Duration of Pain A few hours   Frequency of Pain Intermittent   Date Pain First Started -   Aggravating Factors (No Data)   Aggravating Factors Comment laying down and sleeping   Limiting Behavior No   Relieving Factors Ice   Relieving Factors Comment -   Result of Injury No   Type of Injury -   Type of Injury Comment -       Constitutional:  Well developed, well nourished, in no acute distress. Psychiatric: Affect and mood are appropriate. HEENT: Normocephalic, atraumatic. Extraocular movements intact. Integumentary: No rashes or abrasions noted on exposed areas. Cardiovascular: Regular rate and rhythm. Pulmonary: Clear to auscultation bilaterally. RADIOGRAPHS  Cervical, Thoracic, and Lumbar Spine CT Myelogram images taken on 1/22/21 personally reviewed with patient:  Cervical spine CT: There is mild posterior osteophytic ridging C5-6, which  causes minimal impress on the ventral thecal sac. No disc bulge or  protrusion. No central canal compromise. There is mild bilateral  foraminal encroachment C5-6, secondary to uncovertebral spurring. Facet  alignment is normal.  No fracture or subluxation. The spinal cord is  normal in appearance. The paravertebral soft tissues are unremarkable. Thoracic spine CT: No central canal compromise.   No significant disc bulge  or protrusion identified in the thoracic spine. There is a retained  spinal stimulator posteriorly T7-T9. The thoracic spinal cord is  unremarkable as imaged. The conus terminalis terminates at the T12-L1  disc space. The paravertebral soft tissues and visualized portions of the  lungs are unremarkable. Lumbar spine CT: There is a mild broad-based disc bulge L4-5. Postsurgical changes from previous posterior laminectomy and fusion L5-S1. Fusion hardware appears intact. There is moderate soft tissue posterior  to the L5-S1 disc space, which may be postsurgical scar, or disc material.  No central canal compromise. No critical foraminal encroachment. There  is advanced bilateral facet spondylosis L4-5 through L5-S1. There is an  inferior vena cava filter incidentally noted there is a couple small of  periaortic retroperitoneal lymph nodes, nonspecific. There is a 2 cm cyst  left adnexa. IMPRESSION:  No central canal compromise identified within the cervical, thoracic or  lumbar spine. Mild posterior osteophytic ridging and bilateral foraminal  encroachment C5-6. Retained spinal stimulator hardware in the thoracic  spine as noted. Previous laminectomy and fusion L5-S1 with intact  hardware. Broad-based disc bulge L4-5. Prominent soft tissue posterior  to the disc space at L5-S1, which may reflect residual scar tissue, or  disc material.  Borderline periaortic retroperitoneal lymphadenopathy. 2  cm cystic lesion adnexa. 4V Lumbar XR images taken on 4/1/2020 personally reviewed with patient:  BONES: Status post L5-S1 posterior spinal fusion and interbody disc cage. L5  laminectomy. No evidence of acute fracture or listhesis. Overlying spinal  stimulator and IVC filter partially obscure underlying osseous detail. Vertebral  body heights are maintained. No osseous lytic or blastic lesion. Mild multilevel  spondylosis. SOFT TISSUES: Unremarkable. OTHER: None. _______________     IMPRESSION  IMPRESSION:     No evidence of acute fracture or listhesis. Lower lumbar spinal fusion. LT HIP XR images taken on 4/1/2020 personally reviewed with patient:  BONES: No evidence of acute fracture or dislocation. Joint spaces and alignment  are maintained. No aggressive appearing osseous lytic or blastic lesion. SOFT TISSUES: Unremarkable.     _______________     IMPRESSION  IMPRESSION:     No evidence of acute fracture or dislocation. Lumbar XR images taken on 2/16/18 personally reviewed with patient:  Right posterior upper hip generator device with dorsal intraspinal stimulator wires, and the T10-11 level with the wire tips at the T7-T8 level. Postoperative changes of previous L5 decompression laminectomy with solid and are osseous circumferential fusion at L5-S1. No findings of hardware failure. A right-sided IVC filter is present at the L2-L3 level. Mild multilevel degenerative spondylosis similar prior examination with no evidence of listhesis. The vertebral bodies maintain normal height and alignment. No acute obstructive osseous abnormality. IMPRESSION:    1. Stable post operative circumferential fusion L5-S1 with L5 decompression laminectomy. 2.  Dorsal stimulator wire tip at the T7-T8 level. 3.  Mild degenerative lumbar spine spondylosis. No acute obstructive osseous abnormality. The limitations of a telemedicine visit including the inability to perform a detailed physical examination may miss significant findings was presented to the patient, and the patient still elected to proceed with the telemedicine visit.  reviewed     Ms. Pao Kerr has a reminder for a \"due or due soon\" health maintenance. I have asked that she contact her primary care provider for follow-up on this health maintenance.       Pursuant to the emergency declaration under the [facility] waiver authority and the Coronavirus Preparedness and Response Supplemental Appropriations Act, this Virtual  Visit was conducted, with patient's consent, to reduce the patient's risk of exposure to COVID-19 and provide continuity of care for an established patient. Services were provided through a video synchronous discussion virtually to substitute for in-person clinic visit. CPT Codes 34746-46245 for Established Patients may apply to this Telehealth Visit  Time-based coding, delete if not needed: I spent at least 16 minutes with this established patient, and >50% of the time was spent counseling and/or coordinating care. Written by Molly Marquis, as dictated by Dr. Francisca Mcginnis.

## 2023-01-11 ENCOUNTER — HOSPITAL ENCOUNTER (OUTPATIENT)
Dept: PHYSICAL THERAPY | Age: 58
Discharge: HOME | End: 2023-01-11
Payer: MEDICARE

## 2023-01-11 PROCEDURE — 97110 THERAPEUTIC EXERCISES: CPT

## 2023-01-11 PROCEDURE — 97140 MANUAL THERAPY 1/> REGIONS: CPT

## 2023-01-11 NOTE — PROGRESS NOTES
PT DAILY TREATMENT NOTE    Patient Name: Poornima Miranda  Date:2023  : 1965  [x]  Patient  Verified  Payor: Stan Ibrahim / Plan: [facility] [employer] MEDICARE CHOICE PPO/PFFS / Product Type: Managed Care Medicare /    In time:9:59 AM  Out time:10:34 AM  Total Treatment Time (min): 35  Total Timed Codes (min): 35  1:1 Treatment Time (MC/BCBS only): 35   Visit #: 3 of 16    Treatment Dx: Cervicalgia [M54.2]    SUBJECTIVE  Pain Level (0-10 scale): 5-6/10  Any medication changes, allergies to medications, adverse drug reactions, diagnosis change, or new procedure performed?: [x] No    [] Yes (see summary sheet for update)  Subjective functional status/changes:   [] No changes reported  Patient reports that the \"resistive exercises were too much last time\" and states that her pain increased a couple hours after therapy. Patient reports that she attends chiropractor twice a week. OBJECTIVE  15 min Therapeutic Exercise:  [x] See flow sheet :   Rationale: increase ROM, increase strength, and improve coordination to improve the patients ability to increase ease with ADLs    10 min Therapeutic Activity:  [x]  See flow sheet :   Rationale: increase strength and improve coordination  to improve the patients ability to improve ADL ease and improve c/s ROM for ease with driving       10 min Manual Therapy: In supine: STM to pec major/minor and anterior deltoid   Rationale: decrease pain, increase ROM, and increase tissue extensibility to improve the patients ability to improve ROM   The manual therapy interventions were performed at a separate and distinct time from the therapeutic activities interventions.       With   [] TE   [] TA   [] neuro   [] other: Patient Education: [x] Review HEP    [] Progressed/Changed HEP based on:   [] positioning   [] body mechanics   [] transfers   [] heat/ice application    [] other:      Other Objective/Functional Measures:      Pain Level (0-10 scale) post treatment: 4    ASSESSMENT/Changes in Function:   Patient reports that resistive exercises were too intense at last visit and caused an increase in pain post session. Patient with many complaints of tightness through right axillary/lateral chest region with noted tightness along pec minor. Patient reports relief post manual. Patient has ortho appt on Friday for pain at right [facility] joint. Patient will continue to benefit from skilled PT services to modify and progress therapeutic interventions, address functional mobility deficits, address ROM deficits, address strength deficits, analyze and address soft tissue restrictions, analyze and cue movement patterns, analyze and modify body mechanics/ergonomics, assess and modify postural abnormalities, and instruct in home and community integration to attain remaining goals. [x]  See Plan of Care  []  See progress note/recertification  []  See Discharge Summary         Progress towards goals / Updated goals:  Short Term Goals: To be accomplished in 8  treatments:  Patient will be independent and compliant with HEP to progress toward goals and restore functional mobility. Eval Status: issued at [facility]  Current: upgraded today 12/30/22 progressing 1/11/23 pt reports HEP semi-compliance (pt states that she has  SA spasms after performing HEP)     Patient will improve FOTO score by 1/2 points to improve functional tolerance for exercise. Eval Status: FOTO to be taken at visit 2  FOTO score = an established functional score where 100 = no disability     Patient will improve pain in right shoulder and neck to 5/10 at worst to improve reaching and sleeping tolerance and restore prior level of function. Eval Status: 8/10 at worst     Pt will have painfree cervical AROM WFL to aid in functional mechanics for ambulation/ADLs. Eval Status:       Cervical Left Right   Flexion To chest      Extension To neutral P! Side bend 25% [facility] [place]    Rotation [facility] WFL P! Long Term Goals:  To be accomplished in 16 treatments:  Patient will improve FOTO score by full points to improve functional tolerance for reaching and dressing. Eval Status: FOTO to be taken at visit 2   FOTO score = an established functional score where 100 = no disability     Pt will have painfree UE AROM WFL to aid in functional mechanics for ambulation/ADLs. Eval Status:   Shoulder Left Right   Flexion [facility]    100   ER [facility] WFL   IR [facility] WFL      Pt will have 4/5 Geoff UE strength to return to goals of improved reaching and dressing . Eval Status:   Shoulder Left (1-5) Right (1-5)   Shoulder Flexion 3+ 3+ P! Shoulder ABD 3+ 3+ P! Shoulder IR 5 5   Shoulder ER 5 4         Patient will improve pain in right shoulder and neck to 1-2/10 at worst to improve reaching and sleeping tolerance and restore prior level of function.   Eval Status: 8/10 at worst         PLAN  []  Upgrade activities as tolerated     [x]  Continue plan of care  []  Update interventions per flow sheet       []  Discharge due to:_  []  Other:_      Randy Interiano 1/11/2023  9:31 AM    Future Appointments   Date Time Provider Catherine Sanchez   1/11/2023 10:00 AM [facility]   1/13/2023  1:00 PM Iván Razo, [facility]   1/18/2023  1:00 PM [facility]   1/20/2023  1:00 PM Antonina Nieto [facility]   1/25/2023 12:30 PM Rita Sandoval, [facility]   1/27/2023  1:00 PM Rory Jones, [facility]

## 2023-01-12 ENCOUNTER — HOSPITAL ENCOUNTER (OUTPATIENT)
Dept: PHYSICAL THERAPY | Age: 58
Discharge: HOME | End: 2023-01-12
Payer: MEDICARE

## 2023-01-12 PROCEDURE — 97110 THERAPEUTIC EXERCISES: CPT

## 2023-01-12 PROCEDURE — 97530 THERAPEUTIC ACTIVITIES: CPT

## 2023-01-12 PROCEDURE — 97535 SELF CARE MNGMENT TRAINING: CPT

## 2023-01-12 PROCEDURE — 97140 MANUAL THERAPY 1/> REGIONS: CPT

## 2023-01-12 NOTE — PROGRESS NOTES
PT DAILY TREATMENT NOTE    Patient Name: Joon Dietz  Date:2023  : 1965  [x]  Patient  Verified  Payor: Rory Salmeronllor / Plan: [facility] MEDICARE CHOICE PPO/PFFS / Product Type: Managed Care Medicare /    In time:204  Out time:257  Total Treatment Time (min): 53  Total Timed Codes (min): 53  1:1 Treatment Time (MC/BCBS only): 48   Visit #: 3 of 16    Treatment Dx: Cervicalgia [M54.2]    SUBJECTIVE  Pain Level (0-10 scale): 7-810  Any medication changes, allergies to medications, adverse drug reactions, diagnosis change, or new procedure performed?: [x] No    [] Yes (see summary sheet for update)  Subjective functional status/changes:   [] No changes reported  Pt reported that she was in a lot of pain today and wanted to have same manual work that she received last visit. OBJECTIVE  15 min Therapeutic Exercise:  [x] See flow sheet :   Rationale: increase ROM, increase strength, and improve coordination to improve the patients ability to increase ease with ADLs     10 min Therapeutic Activity:  [x]  See flow sheet :   Rationale: increase strength and improve coordination  to improve the patients ability to improve ADL ease and improve c/s ROM for ease with driving      15 min Manual Therapy: In sitting to upper trap sub scap on right shoulder   Rationale: decrease pain, increase ROM, and increase tissue extensibility to improve the patients ability to improve ROM   The manual therapy interventions were performed at a separate and distinct time from the therapeutic activities interventions. 15 min Self Care: Education on need to reform posture in order to improve pain.      Rationale:     education  to improve the patients ability to long term manage pain levels          With   [] TE   [] TA   [] neuro   [] other: Patient Education: [x] Review HEP    [] Progressed/Changed HEP based on:   [] positioning   [] body mechanics   [] transfers   [] heat/ice application    [] other: Pain Level (0-10 scale) post treatment: 7/10    ASSESSMENT/Changes in Function: Patient tolerated treatment session well today. Patient reports that her chiropractor requested that we take over manual skills and manipulate her. PT educated pt that no clinicians in the clinic were skilled and able to perform manipulations. Discussed continued soft tissue work but PT could not perform manipulations. Pt reported that neck pain has significantly improved however right shoulder pain has continued. PT replaced scap retraction exercises with manually assisted scap mobilization in sitting working AAROM on scap retraction. PT finished with light soft tissue mobilization and MLD to right shoulder. Patient continues to make slow progress toward goals and would benefit from continued skilled PT intervention to address remaining deficits outlined in goals below. Patient will continue to benefit from skilled PT services to modify and progress therapeutic interventions, address functional mobility deficits, address ROM deficits, address strength deficits, analyze and address soft tissue restrictions, analyze and cue movement patterns, analyze and modify body mechanics/ergonomics, assess and modify postural abnormalities, address imbalance/dizziness, and instruct in home and community integration to attain remaining goals. [x]  See Plan of Care  []  See progress note/recertification  []  See Discharge Summary         Progress towards goals / Updated goals:  Short Term Goals: To be accomplished in 8  treatments:  Patient will be independent and compliant with HEP to progress toward goals and restore functional mobility. Eval Status: issued at [facility]  Current: upgraded today 12/30/22 progressing 1/11/23 pt reports HEP semi-compliance (pt states that she has  SA spasms after performing HEP)     Patient will improve FOTO score by 1/2 points to improve functional tolerance for exercise.    Eval Status: FOTO to be taken at visit 2  FOTO score = an established functional score where 100 = no disability     Patient will improve pain in right shoulder and neck to 5/10 at worst to improve reaching and sleeping tolerance and restore prior level of function. Eval Status: 8/10 at worst  1/12/23 - 8/10 today right shoulder only. Neck no longer hurting. Pt will have painfree cervical AROM WFL to aid in functional mechanics for ambulation/ADLs. Eval Status:       Cervical Left Right   Flexion To chest      Extension To neutral P! Side bend 25% [facility]    Rotation [facility] WFL P! Long Term Goals: To be accomplished in 16 treatments:  Patient will improve FOTO score by full points to improve functional tolerance for reaching and dressing. Eval Status: FOTO to be taken at visit 2   FOTO score = an established functional score where 100 = no disability     Pt will have painfree UE AROM WFL to aid in functional mechanics for ambulation/ADLs. Eval Status:   Shoulder Left Right   Flexion [facility]    100   ER [facility] WFL   IR [facility] WFL      Pt will have 4/5 Geoff UE strength to return to goals of improved reaching and dressing . Eval Status:   Shoulder Left (1-5) Right (1-5)   Shoulder Flexion 3+ 3+ P! Shoulder ABD 3+ 3+ P! Shoulder IR 5 5   Shoulder ER 5 4         Patient will improve pain in right shoulder and neck to 1-2/10 at worst to improve reaching and sleeping tolerance and restore prior level of function.   Eval Status: 8/10 at worst      PLAN  []  Upgrade activities as tolerated     [x]  Continue plan of care  []  Update interventions per flow sheet       []  Discharge due to:_  []  Other:_      Dan Dance, PT 1/12/2023  10:20 AM    Future Appointments   Date Time Provider Catherine Sanchez   1/12/2023  2:00 PM Jyoti Deras, PT [facility]   1/18/2023  1:00 PM Justin Eli [facility]   1/20/2023  1:00 PM Cinthia Gould, PT [facility]   1/25/2023 12:30 PM Romeo Sandoval PT [facility]   1/27/2023  1:00 PM Elli Sandoval PT [facility]

## 2023-01-13 ENCOUNTER — APPOINTMENT (OUTPATIENT)
Dept: PHYSICAL THERAPY | Age: 58
End: 2023-01-13
Payer: MEDICARE

## 2023-01-18 ENCOUNTER — HOSPITAL ENCOUNTER (OUTPATIENT)
Dept: PHYSICAL THERAPY | Age: 58
Discharge: HOME | End: 2023-01-18
Payer: MEDICARE

## 2023-01-18 PROCEDURE — 97112 NEUROMUSCULAR REEDUCATION: CPT

## 2023-01-18 PROCEDURE — 97530 THERAPEUTIC ACTIVITIES: CPT

## 2023-01-18 PROCEDURE — 97110 THERAPEUTIC EXERCISES: CPT

## 2023-01-18 PROCEDURE — 97140 MANUAL THERAPY 1/> REGIONS: CPT

## 2023-01-18 NOTE — PROGRESS NOTES
PT DAILY TREATMENT NOTE    Patient Name: Lavon Yun  Date:2023  : 1965  [x]  Patient  Verified  Payor: Julian Agustin / Plan: [facility] MEDICARE CHOICE PPO/PFFS / Product Type: Managed Care Medicare /    In time:1:04 PM  Out time:2:14 PM  Total Treatment Time (min): 70  Total Timed Codes (min): 70  1:1 Treatment Time (MC/BCBS only): 70  Visit #: 4 of 16    Treatment Dx: Cervicalgia [M54.2]    SUBJECTIVE  Pain Level (0-10 scale): 4-5/10  Any medication changes, allergies to medications, adverse drug reactions, diagnosis change, or new procedure performed?: [x] No    [] Yes (see summary sheet for update)  Subjective functional status/changes:   [] No changes reported  Patient reports that she is not going to the chiropractor anymore. Patient reports that her pain is decreasing and she is sleeping better. OBJECTIVE      10 min Therapeutic Exercise:  [x] See flow sheet :   Rationale: increase ROM, increase strength, and improve coordination to improve the patients ability to increase ease with ADLs       26 min Therapeutic Activity:  [x]  See flow sheet :  -monitoring pt's vitals and providing pt with water and energy bar post session   Rationale: increase strength and improve coordination  to improve the patients ability to improve ADL ease and improve c/s ROM for ease with driving    10 min Neuromuscular Re-education:  [x]  See flow sheet :   Rationale: increase ROM, increase strength, improve coordination, and increase proprioception  to improve the patients ability to improve posture    24 min Manual Therapy: In supine and sitting with UEs propped: STM and TPR to right pec major/minor, subscap, and anterior deltoid   Rationale: decrease pain, increase ROM, and increase tissue extensibility to improve the patients ability to ease ADL tolerance  The manual therapy interventions were performed at a separate and distinct time from the therapeutic activities interventions. With   [] TE   [] TA   [] neuro   [] other: Patient Education: [x] Review HEP    [] Progressed/Changed HEP based on:   [] positioning   [] body mechanics   [] transfers   [] heat/ice application    [] other:      Other Objective/Functional Measures:      Pain Level (0-10 scale) post treatment: 4    ASSESSMENT/Changes in Function:   Patient reports that pain has been decreasing and that she is sleeping better. Patient's c/s AROM improving. Patient emotional during session today with x2 crying episodes when discussing to therapist how fatigued she becomes completing ADLs as well as her previous independence prior to stroke. Patient required seated rest break, vital check, and energy bar/water towards end of session due to her increased tone and left LE spasms and overall-worked up demeanor. Patient will continue to benefit from skilled PT services to modify and progress therapeutic interventions, address functional mobility deficits, address ROM deficits, address strength deficits, analyze and address soft tissue restrictions, analyze and cue movement patterns, analyze and modify body mechanics/ergonomics, assess and modify postural abnormalities, and instruct in home and community integration to attain remaining goals. [x]  See Plan of Care  []  See progress note/recertification  []  See Discharge Summary         Progress towards goals / Updated goals:  Short Term Goals: To be accomplished in 8  treatments:  Patient will be independent and compliant with HEP to progress toward goals and restore functional mobility. Eval Status: issued at [facility]  Current: upgraded today 12/30/22 progressing 1/11/23 pt reports HEP semi-compliance (pt states that she has  SA spasms after performing HEP)     Patient will improve FOTO score by 1/2 points to improve functional tolerance for exercise.    Eval Status: FOTO to be taken at visit 2  FOTO score = an established functional score where 100 = no disability     Patient will improve pain in right shoulder and neck to 5/10 at worst to improve reaching and sleeping tolerance and restore prior level of function. Eval Status: 8/10 at worst  1/12/23 - 8/10 today right shoulder only. Neck no longer hurting. Pt will have painfree cervical AROM WFL to aid in functional mechanics for ambulation/ADLs. Eval Status:       Cervical Left Right   Flexion To chest      Extension To neutral P! Side bend 25% [facility]    Rotation [facility] WFL P! Current: PROGRESSING 1/18/23 Observationally, c/s AROM increasing      Long Term Goals: To be accomplished in 16 treatments:  Patient will improve FOTO score by full points to improve functional tolerance for reaching and dressing. Eval Status: FOTO to be taken at visit 2   FOTO score = an established functional score where 100 = no disability     Pt will have painfree UE AROM WFL to aid in functional mechanics for ambulation/ADLs. Eval Status:   Shoulder Left Right   Flexion [facility]    100   ER [facility] WFL   IR [facility] WFL      Pt will have 4/5 Geoff UE strength to return to goals of improved reaching and dressing . Eval Status:   Shoulder Left (1-5) Right (1-5)   Shoulder Flexion 3+ 3+ P! Shoulder ABD 3+ 3+ P! Shoulder IR 5 5   Shoulder ER 5 4         Patient will improve pain in right shoulder and neck to 1-2/10 at worst to improve reaching and sleeping tolerance and restore prior level of function.   Eval Status: 8/10 at worst         PLAN  []  Upgrade activities as tolerated     [x]  Continue plan of care  []  Update interventions per flow sheet       []  Discharge due to:_  []  Other:_      Margret Robledo 1/18/2023  10:41 AM    Future Appointments   Date Time Provider Catherine Sanchez   [facility]18[facility]2023  [facility]   1/20/2023  1:00 PM Yue Ochoa, PT [facility]   1/25/2023 12:30 PM Catherine Sandoval, PT [facility]   1/27/2023  1:00 PM Catherine Sandoval, PT [facility] THE [facility]

## 2023-01-20 ENCOUNTER — HOSPITAL ENCOUNTER (OUTPATIENT)
Dept: PHYSICAL THERAPY | Age: 58
Discharge: HOME | End: 2023-01-20
Payer: MEDICARE

## 2023-01-20 PROCEDURE — 97140 MANUAL THERAPY 1/> REGIONS: CPT

## 2023-01-20 PROCEDURE — 97530 THERAPEUTIC ACTIVITIES: CPT

## 2023-01-20 NOTE — PROGRESS NOTES
PT DAILY TREATMENT NOTE    Patient Name: J Luis Rater  Date:2023  : 1965  [x]  Patient  Verified  Payor: Perico Suarez / Plan: [facility] MEDICARE CHOICE PPO/PFFS / Product Type: Managed Care Medicare /    In time:105  Out time:130  Total Treatment Time (min): 25  Total Timed Codes (min): 25  1:1 Treatment Time (MC/BCBS only): 25   Visit #: 14 of 34    Treatment Dx: Cervicalgia [M54.2]    SUBJECTIVE  Pain Level (0-10 scale): 5/10  Any medication changes, allergies to medications, adverse drug reactions, diagnosis change, or new procedure performed?: [x] No    [] Yes (see summary sheet for update)  Subjective functional status/changes:   [] No changes reported  Pt reported that she is feeling much better. OBJECTIVE    10 min Therapeutic Activity:  []  See flow sheet :   Rationale: increase ROM, increase strength, improve coordination, improve balance, and increase proprioception  to improve the patients ability to restore PLOF     15 min Manual Therapy:  STM and TPR to right pec major/minor, subscap, and anterior deltoid in sitting    Rationale: decrease pain, increase ROM, increase tissue extensibility, and decrease edema  to improve the patients ability to restore PLOF  The manual therapy interventions were performed at a separate and distinct time from the therapeutic activities interventions. With   [] TE   [] TA   [] neuro   [] other: Patient Education: [x] Review HEP    [] Progressed/Changed HEP based on:   [] positioning   [] body mechanics   [] transfers   [] heat/ice application    [] other:      Pain Level (0-10 scale) post treatment: 3-4/10    ASSESSMENT/Changes in Function: Patient has been treated for 6 visits since initial Eval. Pt is progressing toward all goals. Pt has gained ROM in neck and shoulder however continues to have pain in right shoulder. Pt has improved in shoulder flexion strength but not in others.   Pt has had mild tolerance to strengthening exercises. Patient continues to make steady progress toward goals and would benefit from continued skilled PT intervention to address remaining deficits outlined in goals below. Patient will continue to benefit from skilled PT services to modify and progress therapeutic interventions, address functional mobility deficits, address ROM deficits, address strength deficits, analyze and address soft tissue restrictions, analyze and cue movement patterns, analyze and modify body mechanics/ergonomics, assess and modify postural abnormalities, and instruct in home and community integration to attain remaining goals. [x]  See Plan of Care  []  See progress note/recertification  []  See Discharge Summary         Progress towards goals / Updated goals:  Short Term Goals: To be accomplished in 8  treatments:  Patient will be independent and compliant with HEP to progress toward goals and restore functional mobility. Eval Status: issued at eval  1/20/23 PN- Pt reports that she rested yesterday,but has otherwise done her exercises every other day 3 times a day      Patient will improve FOTO score by 1/2 points to improve functional tolerance for exercise. Eval Status: FOTO to be taken at visit 2  FOTO score = an established functional score where 100 = no disability  1/20/23 PN - never taken goal Discontinued      Patient will improve pain in right shoulder and neck to 5/10 at worst to improve reaching and sleeping tolerance and restore prior level of function. Eval Status: 8/10 at worst  1/20/23 PN - 6/10 at worst right shoulder pain only         Pt will have painfree cervical AROM WFL to aid in functional mechanics for ambulation/ADLs. Eval Status:       Cervical Left Right   Flexion To chest      Extension To neutral P!      Side bend 25% [facility] [place]    Rotation [facility] WFL P!   1/20/22 PN -   Cervical Left Right   Flexion To chest      Extension To neutral     Side bend 50% [facility] [place]    Rotation [facility] [place] WFL         Long Term Goals: To be accomplished in 16 treatments:  Patient will improve FOTO score by full points to improve functional tolerance for reaching and dressing. Eval Status: FOTO to be taken at visit 2   FOTO score = an established functional score where 100 = no disability  1/20/23 PN - never taken goal Discontinued      Pt will have painfree UE AROM WFL to aid in functional mechanics for ambulation/ADLs. Eval Status:   Shoulder Left Right   Flexion [facility]    100   ER [facility]   IR [facility]    1/20/22 PN- GOAL MET   Shoulder Left Right   Flexion [facility]    130   ER WFL WFL   IR WFL WFL     Pt will have 4/5 Geoff UE strength to return to goals of improved reaching and dressing . Eval Status:   Shoulder Left (1-5) Right (1-5)   Shoulder Flexion 3+ 3+ P! Shoulder ABD 3+ 3+ P! Shoulder IR 5 5   Shoulder ER 5 4    1/20/22 PN -  Shoulder Left (1-5) Right (1-5)   Shoulder Flexion 3+ 4   Shoulder ABD 3+ 3+ P! Shoulder IR 5 5   Shoulder ER 5 4      Patient will improve pain in right shoulder and neck to 1-2/10 at worst to improve reaching and sleeping tolerance and restore prior level of function.   Eval Status: 8/10 at worst    1/20/23 PN - 6/10 at worst right shoulder pain only       PLAN  []  Upgrade activities as tolerated     [x]  Continue plan of care  []  Update interventions per flow sheet       []  Discharge due to:_  []  Other:_      Silva Castillo, PT 1/20/2023  7:46 AM    Future Appointments   Date Time Provider Catherine Sanchez   1/20/2023  1:00 PM Joselin Farley, [facility] THE [facility]   1/25/2023 12:30 PM Gio Best, PT [facility]   [facility]20[facility]  [facility]

## 2023-01-20 NOTE — PROGRESS NOTES
[facility]  [address] Faustino Sousa, [address]  Ph [phone]  Fx [phone]    Physical Therapy Progress Note  Patient name: Madelin Andrews Start of Care: 2022   Referral source: ROBIN Betancourt : 1965                Medical Diagnosis: Cervicalgia [M54.2]    Onset Date:10 days ago                Treatment Diagnosis: Cervicalgia                                              ICD-10: M54.2   Prior Hospitalization: see medical history Provider#: [id number]   Medications: Verified on Patient summary List    Comorbidities: 2 strokes, epilepsy, asthma, arthritis, depression, right shoulder injury 10 years ago, left shoulder injury s/p surgery   Prior Level of Function: functionally independent, rollator, sedentary lifestyle; likes to walk in park with her rollator    Visits from Start of Care: 6    Missed Visits: 0    Updated Goals/Measure of Progress: To be achieved in 10 treatments :    Short Term Goals: To be accomplished in 8  treatments:  Patient will be independent and compliant with HEP to progress toward goals and restore functional mobility. Eval Status: issued at eval  23 PN- Pt reports that she rested yesterday,but has otherwise done her exercises every other day 3 times a day      Patient will improve FOTO score by 1/2 points to improve functional tolerance for exercise. Eval Status: FOTO to be taken at visit 2  FOTO score = an established functional score where 100 = no disability  23 PN - never taken goal Discontinued      Patient will improve pain in right shoulder and neck to 5/10 at worst to improve reaching and sleeping tolerance and restore prior level of function. Eval Status: 8/10 at worst  23 PN - 6/10 at worst right shoulder pain only         Pt will have painfree cervical AROM WFL to aid in functional mechanics for ambulation/ADLs. Eval Status:       Cervical Left Right   Flexion To chest      Extension To neutral P!      Side bend 25% [facility]    Rotation [facility] WFL P!   1/20/22 PN -   Cervical Left Right   Flexion To chest      Extension To neutral     Side bend 50% [facility]    Rotation [facility] WFL          Long Term Goals: To be accomplished in 16 treatments:  Patient will improve FOTO score by full points to improve functional tolerance for reaching and dressing. Eval Status: FOTO to be taken at visit 2   FOTO score = an established functional score where 100 = no disability  1/20/23 PN - never taken goal Discontinued      Pt will have painfree UE AROM WFL to aid in functional mechanics for ambulation/ADLs. Eval Status:   Shoulder Left Right   Flexion [facility]    100   ER [facility]   IR [facility]    1/20/22 PN- GOAL MET   Shoulder Left Right   Flexion [facility]    130   ER WFL WFL   IR WFL WFL      Pt will have 4/5 Geoff UE strength to return to goals of improved reaching and dressing . Eval Status:   Shoulder Left (1-5) Right (1-5)   Shoulder Flexion 3+ 3+ P! Shoulder ABD 3+ 3+ P! Shoulder IR 5 5   Shoulder ER 5 4    1/20/22 PN -  Shoulder Left (1-5) Right (1-5)   Shoulder Flexion 3+ 4   Shoulder ABD 3+ 3+ P! Shoulder IR 5 5   Shoulder ER 5 4      Patient will improve pain in right shoulder and neck to 1-2/10 at worst to improve reaching and sleeping tolerance and restore prior level of function. Eval Status: 8/10 at worst               1/20/23 PN - 6/10 at worst right shoulder pain only       Summary of Care/ Key Functional Changes: Patient has been treated for 6 visits since initial Eval. Pt is progressing toward all goals. Pt has gained ROM in neck and shoulder however continues to have pain in right shoulder. Pt has improved in shoulder flexion strength but not in others. Pt has had mild tolerance to strengthening exercises. Patient continues to make steady progress toward goals and would benefit from continued skilled PT intervention to address remaining deficits outlined in goals below.      Patient will continue to benefit from skilled PT services to modify and progress therapeutic interventions, address functional mobility deficits, address ROM deficits, address strength deficits, analyze and address soft tissue restrictions, analyze and cue movement patterns, analyze and modify body mechanics/ergonomics, assess and modify postural abnormalities, and instruct in home and community integration to attain remaining goals.       ASSESSMENT/RECOMMENDATIONS:  [x]Continue therapy per initial plan/protocol at a frequency of  2 x per week for 10 treatment   []Continue therapy with the following recommended changes:_____________________ _____________________________ ________________________________________  []Discontinue therapy progressing towards or have reached established goals  []Discontinue therapy due to lack of appreciable progress towards goals  []Discontinue therapy due to lack of attendance or compliance  []Await Physician's recommendations/decisions regarding therapy  []Other:________________________________________________________________    Thank you for this referral.   Kayode Campuzano, PT 1/20/2023 7:48 AM

## 2023-01-25 ENCOUNTER — HOSPITAL ENCOUNTER (OUTPATIENT)
Dept: PHYSICAL THERAPY | Age: 58
Discharge: HOME | End: 2023-01-25
Payer: MEDICARE

## 2023-01-25 PROCEDURE — 97140 MANUAL THERAPY 1/> REGIONS: CPT

## 2023-01-25 PROCEDURE — 97112 NEUROMUSCULAR REEDUCATION: CPT

## 2023-01-25 PROCEDURE — 97110 THERAPEUTIC EXERCISES: CPT

## 2023-01-25 NOTE — PROGRESS NOTES
PT DAILY TREATMENT NOTE    Patient Name: Madelin Andrews  Date:2023  : 1965  [x]  Patient  Verified  Payor: Malcolm Smallwoodranda / Plan: [facility] MEDICARE CHOICE PPO/PFFS / Product Type: Managed Care Medicare /    In time:1232  Out time:1 25  Total Treatment Time (min): 53  Total Timed Codes (min): 53  1:1 Treatment Time (MC/BCBS only): 48   Visit #: 7 of 16    Treatment Dx: Cervicalgia [M54.2]    SUBJECTIVE  Pain Level (0-10 scale): 3/10 cervical and 5/10 shoulders  Any medication changes, allergies to medications, adverse drug reactions, diagnosis change, or new procedure performed?: [x] No    [] Yes (see summary sheet for update)  Subjective functional status/changes:   [] No changes reported  Patient reports now using two muscle relaxers /day and 2 500 mg tablets of Tylenol/day. She has increased her isometric reps to 30 . OBJECTIVE      24 min Therapeutic Exercise:  [] See flow sheet :   Rationale: increase ROM, increase strength, and improve coordination to improve the patients ability to decrease pain       10 min Neuromuscular Re-education:  []  See flow sheet [de-identified] breathing techniques to decrease sympathetic nervous system activation   Rationale: breathing patterns was instructed to stimulate the vagus nerve to help decrease sympathetic nervous system response. 19 min Manual Therapy:  MFR and trigger    Rationale: decrease pain, increase ROM, increase tissue extensibility, and decrease trigger points to improve the patients ability to perform ADLs with increased ease  The manual therapy interventions were performed at a separate and distinct time from the therapeutic activities interventions.               With   [] TE   [] TA   [] neuro   [] other: Patient Education: [x] Review HEP    [] Progressed/Changed HEP based on:   [] positioning   [] body mechanics   [] transfers   [] heat/ice application    [] other:           Pain Level (0-10 scale) post treatment: shoulder and neck = 3/10    ASSESSMENT/Changes in Function: Patient tolerated treatment session well today. Patient had no complaints with addition of open books to exercise program to accomplish increased thoracic movement and anterior chain stretching. Breathing patterns was instructed to stimulate the vagus nerve to help decrease sympathetic nervous system response. Patient continues to make steady progress toward goals and would benefit from continued skilled PT intervention to address remaining deficits outlined in goals below. Patient will continue to benefit from skilled PT services to modify and progress therapeutic interventions, address functional mobility deficits, address ROM deficits, address strength deficits, analyze and address soft tissue restrictions, analyze and cue movement patterns, analyze and modify body mechanics/ergonomics, and assess and modify postural abnormalities to attain remaining goals. [x]  See Plan of Care  []  See progress note/recertification  []  See Discharge Summary         Progress towards goals / Updated goals:  Short Term Goals: To be accomplished in 8  treatments:  Patient will be independent and compliant with HEP to progress toward goals and restore functional mobility. Eval Status: issued at eval  1/20/23 PN- Pt reports that she rested yesterday,but has otherwise done her exercises every other day 3 times a day   1/25/2023. Patient reports compliance with HEP  Progressing     Patient will improve FOTO score by 1/2 points to improve functional tolerance for exercise. Eval Status: FOTO to be taken at visit 2  FOTO score = an established functional score where 100 = no disability  1/20/23 PN - never taken goal Discontinued      Patient will improve pain in right shoulder and neck to 5/10 at worst to improve reaching and sleeping tolerance and restore prior level of function.   Eval Status: 8/10 at worst  1/20/23 PN - 6/10 at worst right shoulder pain only         Pt will have painfree cervical AROM WFL to aid in functional mechanics for ambulation/ADLs. Eval Status:       Cervical Left Right   Flexion To chest      Extension To neutral P! Side bend 25% [facility]    Rotation [facility] WFL P!   1/20/22 PN -   Cervical Left Right   Flexion To chest      Extension To neutral     Side bend 50% [facility]    Rotation [facility] WFL          Long Term Goals: To be accomplished in 16 treatments:  Patient will improve FOTO score by full points to improve functional tolerance for reaching and dressing. Eval Status: FOTO to be taken at visit 2   FOTO score = an established functional score where 100 = no disability  1/20/23 PN - never taken goal Discontinued      Pt will have painfree UE AROM WFL to aid in functional mechanics for ambulation/ADLs. Eval Status:   Shoulder Left Right   Flexion [facility]    100   ER [facility]   IR [facility]    1/20/22 PN- GOAL MET   Shoulder Left Right   Flexion [facility]    130   ER WFL WFL   IR WFL WFL      Pt will have 4/5 Geoff UE strength to return to goals of improved reaching and dressing . Eval Status:   Shoulder Left (1-5) Right (1-5)   Shoulder Flexion 3+ 3+ P! Shoulder ABD 3+ 3+ P! Shoulder IR 5 5   Shoulder ER 5 4    1/20/22 PN -  Shoulder Left (1-5) Right (1-5)   Shoulder Flexion 3+ 4   Shoulder ABD 3+ 3+ P! Shoulder IR 5 5   Shoulder ER 5 4      Patient will improve pain in right shoulder and neck to 1-2/10 at worst to improve reaching and sleeping tolerance and restore prior level of function.   Eval Status: 8/10 at worst               1/20/23 PN - 6/10 at worst right shoulder pain only       PLAN  []  Upgrade activities as tolerated     [x]  Continue plan of care  []  Update interventions per flow sheet       []  Discharge due to:_  []  Other:_      Shamika Wray, PT 1/25/2023  10:26 AM    Future Appointments   Date Time Provider Catherine Sanchez   1/25/2023 12:30 PM Crispin Dalton, [address] [facility]   1/27/2023  1:00 PM [facility]   2/1/2023 1:00 PM [facility]   2/3/2023  1:00 PM [facility]   2/8/2023  1:00 PM [facility]   2/10/2023  1:00 PM [facility]   2/15/2023  1:00 PM [facility]   2/17/2023  1:00 PM [facility]   2/22/2023  1:00 PM [facility]   2/24/2023  1:00 PM [facility]

## 2023-01-27 ENCOUNTER — HOSPITAL ENCOUNTER (OUTPATIENT)
Dept: PHYSICAL THERAPY | Age: 58
Discharge: HOME | End: 2023-01-27
Payer: MEDICARE

## 2023-01-27 PROCEDURE — 97530 THERAPEUTIC ACTIVITIES: CPT

## 2023-01-27 PROCEDURE — 97110 THERAPEUTIC EXERCISES: CPT

## 2023-01-27 PROCEDURE — 97140 MANUAL THERAPY 1/> REGIONS: CPT

## 2023-01-27 PROCEDURE — 97112 NEUROMUSCULAR REEDUCATION: CPT

## 2023-01-27 NOTE — PROGRESS NOTES
PT DAILY TREATMENT NOTE    Patient Name: Nguyen Ambrocio  Date:2023  : 1965  [x]  Patient  Verified  Payor: Annalise Rosales / Plan: [facility] [employer] MEDICARE CHOICE PPO/PFFS / Product Type: Managed Care Medicare /    In time:1:09 PM  Out time:2:02 PM  Total Treatment Time (min): 53  Total Timed Codes (min): 53  1:1 Treatment Time (MC/BCBS only): 48   Visit #: 8 of 16    Treatment Dx: Cervicalgia [M54.2]    SUBJECTIVE  Pain Level (0-10 scale): 3  Any medication changes, allergies to medications, adverse drug reactions, diagnosis change, or new procedure performed?: [x] No    [] Yes (see summary sheet for update)  Subjective functional status/changes:   [] No changes reported  Patient reports that she no longer has \"an acute burn\" but instead a \"deep toothache-like pain\". OBJECTIVE      10 min Therapeutic Exercise:  [x] See flow sheet :   Rationale: increase ROM, increase strength, and improve coordination to improve the patients ability to increase ease with ADLs    10 min Therapeutic Activity:  [x]  See flow sheet :   Rationale: increase strength and improve coordination  to improve the patients ability to improve ease with driving     10 min Neuromuscular Re-education:  [x]  See flow sheet :   Rationale: increase strength, improve coordination, improve balance, and increase proprioception  to improve the patients ability to improve postural awareness    23 min Manual Therapy: In sitting with arms propped up on rollator, STM and TPR to right pec major/minor, teres minor, and supraspinatus   Rationale: decrease pain, increase ROM, and increase tissue extensibility to improve the patients ability to ease ADL tolerance  The manual therapy interventions were performed at a separate and distinct time from the therapeutic activities interventions.             With   [] TE   [] TA   [] neuro   [] other: Patient Education: [x] Review HEP    [] Progressed/Changed HEP based on:   [] positioning   [] body mechanics   [] transfers   [] heat/ice application    [] other:      Other Objective/Functional Measures:   -pt does well with session today with lights dimmed and white noise machine playing in background     Pain Level (0-10 scale) post treatment: 2    ASSESSMENT/Changes in Function:   Noted decreased tension along right pec major/minor with manual today. Patient reports decreasing neck pain. Observationally, c/s rotation ROM is improving. Diaphragmatic 360 degree breathing today helped to de-stimulate patient. Patient will continue to benefit from skilled PT services to modify and progress therapeutic interventions, address functional mobility deficits, address ROM deficits, address strength deficits, analyze and address soft tissue restrictions, analyze and cue movement patterns, analyze and modify body mechanics/ergonomics, assess and modify postural abnormalities, and instruct in home and community integration to attain remaining goals. [x]  See Plan of Care  []  See progress note/recertification  []  See Discharge Summary         Progress towards goals / Updated goals:  Short Term Goals: To be accomplished in 8  treatments:  Patient will be independent and compliant with HEP to progress toward goals and restore functional mobility. Eval Status: issued at eval  1/20/23 PN- Pt reports that she rested yesterday,but has otherwise done her exercises every other day 3 times a day   1/25/2023. Patient reports compliance with HEP  Progressing     Patient will improve FOTO score by 1/2 points to improve functional tolerance for exercise. Eval Status: FOTO to be taken at visit 2  FOTO score = an established functional score where 100 = no disability  1/20/23 PN - never taken goal Discontinued      Patient will improve pain in right shoulder and neck to 5/10 at worst to improve reaching and sleeping tolerance and restore prior level of function.   Eval Status: 8/10 at worst  1/20/23 PN - 6/10 at worst right shoulder pain only   1/27/23 -met pt reports 2-3/10 worst pain        Pt will have painfree cervical AROM WFL to aid in functional mechanics for ambulation/ADLs. Eval Status:       Cervical Left Right   Flexion To chest      Extension To neutral P! Side bend 25% [facility]    Rotation [facility] WFL P!   1/20/22 PN -   Cervical Left Right   Flexion To chest      Extension To neutral     Side bend 50% [facility]    Rotation [facility] WFL          Long Term Goals: To be accomplished in 16 treatments:  Patient will improve FOTO score by full points to improve functional tolerance for reaching and dressing. Eval Status: FOTO to be taken at visit 2   FOTO score = an established functional score where 100 = no disability  1/20/23 PN - never taken goal Discontinued      Pt will have painfree UE AROM WFL to aid in functional mechanics for ambulation/ADLs. Eval Status:   Shoulder Left Right   Flexion [facility]    100   ER [facility]   IR [facility]    1/20/22 PN- GOAL MET   Shoulder Left Right   Flexion [facility]    130   ER WFL WFL   IR WFL WFL      Pt will have 4/5 Geoff UE strength to return to goals of improved reaching and dressing . Eval Status:   Shoulder Left (1-5) Right (1-5)   Shoulder Flexion 3+ 3+ P! Shoulder ABD 3+ 3+ P! Shoulder IR 5 5   Shoulder ER 5 4    1/20/22 PN -  Shoulder Left (1-5) Right (1-5)   Shoulder Flexion 3+ 4   Shoulder ABD 3+ 3+ P! Shoulder IR 5 5   Shoulder ER 5 4      Patient will improve pain in right shoulder and neck to 1-2/10 at worst to improve reaching and sleeping tolerance and restore prior level of function.   Eval Status: 8/10 at worst               1/20/23 PN - 6/10 at worst right shoulder pain only       PLAN  []  Upgrade activities as tolerated     [x]  Continue plan of care  []  Update interventions per flow sheet       []  Discharge due to:_  []  Other:_      Eduard Hernandez 1/27/2023  7:47 AM    Future Appointments   Date Time Provider Catherine Sanchez   1/27/2023  1:00 PM Dave [address] [facility]   2/1/2023  1:00 PM [facility]   2/3/2023  1:00 PM [facility]   2/8/2023  1:00 PM [facility]   2/10/2023  1:00 PM [facility]   2/15/2023  1:00 PM [facility]   2/17/2023  1:00 PM [facility]   2/22/2023  1:00 PM [facility]   2/24/2023  1:00 PM [facility]

## 2023-02-01 ENCOUNTER — HOSPITAL ENCOUNTER (OUTPATIENT)
Dept: PHYSICAL THERAPY | Age: 58
Discharge: HOME | End: 2023-02-01
Payer: MEDICARE

## 2023-02-01 PROCEDURE — 97112 NEUROMUSCULAR REEDUCATION: CPT

## 2023-02-01 PROCEDURE — 97140 MANUAL THERAPY 1/> REGIONS: CPT

## 2023-02-01 PROCEDURE — 97110 THERAPEUTIC EXERCISES: CPT

## 2023-02-01 NOTE — PROGRESS NOTES
PT DAILY TREATMENT NOTE    Patient Name: Antoine Barraza  Date:2023  : 1965  [x]  Patient  Verified  Payor: Madi Flanagan / Plan: [facility] [place] MEDICARE CHOICE PPO/PFFS / Product Type: Managed Care Medicare /    In time:104  Out time:200  Total Treatment Time (min): 56  Total Timed Codes (min): 56  1:1 Treatment Time (MC/BCBS only): 64   Visit #: 9 of 16    Treatment Dx: Cervicalgia [M54.2]    SUBJECTIVE  Pain Level (0-10 scale): 4/10  Any medication changes, allergies to medications, adverse drug reactions, diagnosis change, or new procedure performed?: [x] No    [] Yes (see summary sheet for update)  Subjective functional status/changes:   [] No changes reported  Pt reports increased tone and tension since last visit due to soreness after doing more exercises after last visit. OBJECTIVE      13 min Therapeutic Exercise:  [x] See flow sheet :   Rationale: increase ROM, increase strength, and improve coordination to improve the patients ability to increase ease with ADLs      10 min Neuromuscular Re-education:  [x]  See flow sheet :   Rationale: increase strength, improve coordination, improve balance, and increase proprioception  to improve the patients ability to improve postural awareness     23 min Manual Therapy: In sitting STM and TPR to upper trap, teres minor, and supraspinatus; scapular mobilizations   Rationale: decrease pain, increase ROM, and increase tissue extensibility to improve the patients ability to ease ADL tolerance  The manual therapy interventions were performed at a separate and distinct time from the therapeutic activities interventions.           With   [x] TE   [] TA   [x] neuro   [] other: Patient Education: [x] Review HEP    [] Progressed/Changed HEP based on:   [] positioning   [] body mechanics   [] transfers   [] heat/ice application    [] other:      Pain Level (0-10 scale) post treatment: 4/10    ASSESSMENT/Changes in Function: Patient tolerated treatment session well today. Patient showed improved cervical ROM today and is reporting improved posture. Pt did report that pain is now in her left posterior arm due to increased pain after no money exercise. Patient continues to make slow progress toward goals and would benefit from continued skilled PT intervention to address remaining deficits outlined in goals below. Patient will continue to benefit from skilled PT services to modify and progress therapeutic interventions, address functional mobility deficits, address ROM deficits, address strength deficits, analyze and address soft tissue restrictions, analyze and cue movement patterns, analyze and modify body mechanics/ergonomics, assess and modify postural abnormalities, address imbalance/dizziness, and instruct in home and community integration to attain remaining goals. [x]  See Plan of Care  []  See progress note/recertification  []  See Discharge Summary         Progress towards goals / Updated goals:  Short Term Goals: To be accomplished in 8  treatments:  Patient will be independent and compliant with HEP to progress toward goals and restore functional mobility. Eval Status: issued at eval  1/20/23 PN- Pt reports that she rested yesterday,but has otherwise done her exercises every other day 3 times a day   1/25/2023. Patient reports compliance with HEP  Progressing     Patient will improve FOTO score by 1/2 points to improve functional tolerance for exercise. Eval Status: FOTO to be taken at visit 2  FOTO score = an established functional score where 100 = no disability  1/20/23 PN - never taken goal Discontinued      Patient will improve pain in right shoulder and neck to 5/10 at worst to improve reaching and sleeping tolerance and restore prior level of function.   Eval Status: 8/10 at worst  1/20/23 PN - 6/10 at worst right shoulder pain only   1/27/23 -met pt reports 2-3/10 worst pain        Pt will have painfree cervical AROM WFL to aid in functional mechanics for ambulation/ADLs. Eval Status:       Cervical Left Right   Flexion To chest      Extension To neutral P! Side bend 25% [facility]    Rotation [facility] WFL P!   1/20/22 PN -   Cervical Left Right   Flexion To chest      Extension To neutral     Side bend 50% [facility]    Rotation [facility]     2/1/23 - progressing   Cervical Left Right   Flexion To chest      Extension Just past neutral      Side bend [facility]    Rotation [facility] WFL         Long Term Goals: To be accomplished in 16 treatments:  Patient will improve FOTO score by full points to improve functional tolerance for reaching and dressing. Eval Status: FOTO to be taken at visit 2   FOTO score = an established functional score where 100 = no disability  1/20/23 PN - never taken goal Discontinued      Pt will have painfree UE AROM WFL to aid in functional mechanics for ambulation/ADLs. Eval Status:   Shoulder Left Right   Flexion [facility]    100   ER [facility]   IR [facility]    1/20/22 PN- GOAL MET   Shoulder Left Right   Flexion [facility]    130   ER WFL WFL   IR WFL WFL      Pt will have 4/5 Geoff UE strength to return to goals of improved reaching and dressing . Eval Status:   Shoulder Left (1-5) Right (1-5)   Shoulder Flexion 3+ 3+ P! Shoulder ABD 3+ 3+ P! Shoulder IR 5 5   Shoulder ER 5 4    1/20/22 PN -  Shoulder Left (1-5) Right (1-5)   Shoulder Flexion 3+ 4   Shoulder ABD 3+ 3+ P! Shoulder IR 5 5   Shoulder ER 5 4      Patient will improve pain in right shoulder and neck to 1-2/10 at worst to improve reaching and sleeping tolerance and restore prior level of function.   Eval Status: 8/10 at worst               1/20/23 PN - 6/10 at worst right shoulder pain only       PLAN  []  Upgrade activities as tolerated     [x]  Continue plan of care  []  Update interventions per flow sheet       []  Discharge due to:_  []  Other:_      Gauri Scott, PT 2/1/2023  9:37 AM    Future Appointments   Date Time Provider Catherine Sanchez 2/1/2023  1:00 PM Chris Nielsen, [address] [facility]   2/3/2023  1:00 PM [facility]   2/8/2023  1:00 PM [facility]   2/10/2023  1:00 PM [facility]   2/15/2023  1:00 PM [facility]   2/17/2023  1:00 PM [facility]   2/22/2023  1:00 PM [facility]   2/24/2023  1:00 PM [facility]

## 2023-02-03 ENCOUNTER — HOSPITAL ENCOUNTER (OUTPATIENT)
Dept: PHYSICAL THERAPY | Age: 58
Discharge: HOME | End: 2023-02-03
Payer: MEDICARE

## 2023-02-03 PROCEDURE — 97140 MANUAL THERAPY 1/> REGIONS: CPT

## 2023-02-03 PROCEDURE — 97530 THERAPEUTIC ACTIVITIES: CPT

## 2023-02-03 NOTE — PROGRESS NOTES
PT DAILY TREATMENT NOTE    Patient Name: Ana Miller  Date:2/3/2023  : 1965  [x]  Patient  Verified  Payor: Lisa Llanes / Plan: [facility] MEDICARE CHOICE PPO/PFFS / Product Type: Managed Care Medicare /    In time:1:03 PM  Out time:1:45 PM  Total Treatment Time (min): 42  Total Timed Codes (min): 42  1:1 Treatment Time (MC/BCBS only): 42   Visit #: 10 of 16    Treatment Dx: Cervicalgia [M54.2]    SUBJECTIVE  Pain Level (0-10 scale): 2  Any medication changes, allergies to medications, adverse drug reactions, diagnosis change, or new procedure performed?: [x] No    [] Yes (see summary sheet for update)  Subjective functional status/changes:   [] No changes reported  Patient reports that she almost fell yesterday at 's. Patient reports that the hair dressing chair collapsed and that several hair dressers were holding her up by her armpits. OBJECTIVE    18 min Therapeutic Activity:  [x]  See flow sheet :  -BP check: 130/70 mmHg  -Education regarding eating prior to therapy   Rationale: increase strength and improve coordination  to improve the patients ability to improve ease with driving     awareness    24 min Manual Therapy: In seated:  STM Bilaterally to: anterior/middle/posterior deltoids, pec major/minor, and supraspinatus   Rationale: decrease pain, increase ROM, and increase tissue extensibility to improve the patients ability to ease ADL tolerance  The manual therapy interventions were performed at a separate and distinct time from the therapeutic activities interventions.             With   [] TE   [] TA   [] neuro   [] other: Patient Education: [x] Review HEP    [] Progressed/Changed HEP based on:   [] positioning   [] body mechanics   [] transfers   [] heat/ice application    [] other:      Other Objective/Functional Measures:      Pain Level (0-10 scale) post treatment: -2/10    ASSESSMENT/Changes in Function:   Patient continues to present to therapy with increased tone and tension. Patient arrives to session with complaints of fatigue and soreness--pt stating that she nearly fell yesterday at  (pt was reclined in chair and as chair was being pushed to upright, it malfunctioned, and she nearly slid out of chair). Patient requires small snack, water, and vital check prior to beginning session today. Session today focused on 360 diaphragmatic breathe followed by manual. Patient was educated to eat something prior to therapy for improved endurance/tolerance to session. Patient's pain is decreasing over last few visits. Patient will continue to benefit from skilled PT services to modify and progress therapeutic interventions, address functional mobility deficits, address ROM deficits, address strength deficits, analyze and address soft tissue restrictions, analyze and cue movement patterns, analyze and modify body mechanics/ergonomics, assess and modify postural abnormalities, and instruct in home and community integration to attain remaining goals. [x]  See Plan of Care  []  See progress note/recertification  []  See Discharge Summary         Progress towards goals / Updated goals:  Short Term Goals: To be accomplished in 8  treatments:  Patient will be independent and compliant with HEP to progress toward goals and restore functional mobility. Eval Status: issued at eval  1/20/23 PN- Pt reports that she rested yesterday,but has otherwise done her exercises every other day 3 times a day   1/25/2023. Patient reports compliance with HEP  Progressing     Patient will improve FOTO score by 1/2 points to improve functional tolerance for exercise.    Eval Status: FOTO to be taken at visit 2  FOTO score = an established functional score where 100 = no disability  1/20/23 PN - never taken goal Discontinued      Patient will improve pain in right shoulder and neck to 5/10 at worst to improve reaching and sleeping tolerance and restore prior level of function. Eval Status: 8/10 at worst  1/20/23 PN - 6/10 at worst right shoulder pain only   1/27/23 -met pt reports 2-3/10 worst pain        Pt will have painfree cervical AROM WFL to aid in functional mechanics for ambulation/ADLs. Eval Status:       Cervical Left Right   Flexion To chest      Extension To neutral P! Side bend 25% [facility] WFL P!   1/20/22 PN -   Cervical Left Right   Flexion To chest      Extension To neutral     Side bend 50% [facility]     2/1/23 - progressing   Cervical Left Right   Flexion To chest      Extension Just past neutral      Side bend [facility] WFL          Long Term Goals: To be accomplished in 16 treatments:  Patient will improve FOTO score by full points to improve functional tolerance for reaching and dressing. Eval Status: FOTO to be taken at visit 2   FOTO score = an established functional score where 100 = no disability  1/20/23 PN - never taken goal Discontinued      Pt will have painfree UE AROM WFL to aid in functional mechanics for ambulation/ADLs. Eval Status:   Shoulder Left Right   Flexion [facility]    100   ER [facility]   IR [facility]    1/20/22 PN- GOAL MET   Shoulder Left Right   Flexion [facility]    130   ER WFL WFL   IR WFL WFL      Pt will have 4/5 Geoff UE strength to return to goals of improved reaching and dressing . Eval Status:   Shoulder Left (1-5) Right (1-5)   Shoulder Flexion 3+ 3+ P! Shoulder ABD 3+ 3+ P! Shoulder IR 5 5   Shoulder ER 5 4    1/20/22 PN -  Shoulder Left (1-5) Right (1-5)   Shoulder Flexion 3+ 4   Shoulder ABD 3+ 3+ P! Shoulder IR 5 5   Shoulder ER 5 4      Patient will improve pain in right shoulder and neck to 1-2/10 at worst to improve reaching and sleeping tolerance and restore prior level of function.   Eval Status: 8/10 at worst               1/20/23 PN - 6/10 at worst right shoulder pain only    2/3/23- PROGRESSING pain gradually decreasing over visits          PLAN  [] Upgrade activities as tolerated     [x]  Continue plan of care  []  Update interventions per flow sheet       []  Discharge due to:_  []  Other:_      Swapna Cuevas 2/3/2023  7:48 AM    Future Appointments   Date Time Provider Catherine Sanchez   2/3/2023  1:00 PM [facility]   2/8/2023  1:00 PM [facility]   2/10/2023  1:00 PM [facility]   2/15/2023  1:00 PM [facility]   2/17/2023  1:00 PM [facility]   2/22/2023  1:00 PM [facility]   2/24/2023  1:00 PM [facility]

## 2023-02-08 ENCOUNTER — APPOINTMENT (OUTPATIENT)
Dept: PHYSICAL THERAPY | Age: 58
End: 2023-02-08
Payer: MEDICARE

## 2023-02-10 ENCOUNTER — APPOINTMENT (OUTPATIENT)
Dept: PHYSICAL THERAPY | Age: 58
End: 2023-02-10
Payer: MEDICARE

## 2023-02-15 ENCOUNTER — APPOINTMENT (OUTPATIENT)
Dept: PHYSICAL THERAPY | Age: 58
End: 2023-02-15
Payer: MEDICARE

## 2023-02-15 ENCOUNTER — HOSPITAL ENCOUNTER (OUTPATIENT)
Facility: HOSPITAL | Age: 58
Setting detail: RECURRING SERIES
Discharge: HOME | End: 2023-02-18
Payer: MEDICARE

## 2023-02-15 PROCEDURE — 97140 MANUAL THERAPY 1/> REGIONS: CPT

## 2023-02-15 PROCEDURE — 97530 THERAPEUTIC ACTIVITIES: CPT

## 2023-02-15 PROCEDURE — 97535 SELF CARE MNGMENT TRAINING: CPT

## 2023-02-15 PROCEDURE — 97110 THERAPEUTIC EXERCISES: CPT

## 2023-02-15 NOTE — PROGRESS NOTES
Physical Therapy Discharge Instructions    [facility] [address] Teodoro Dye, [address] [facility]  Ph [phone]  Fx [phone]      Patient: Emma Shields  : 1965      Continue Home Exercise Program 1 times per day for 4-6 weeks, then decrease to 3-5 times per week    Follow up with MD:     [] Upon completion of therapy     [x] As needed      Recommendations:     [x]   Return to activity with home program    []   Return to activity with the following modifications:       []Post Rehab Program    []Join Independent aquatic program     []Return to/join local gym        Additional Comments: Contact us if you have any questions.            Dolores Mazariegos, PTA 2/15/2023 1:19 PM

## 2023-02-15 NOTE — PROGRESS NOTES
PHYSICAL / OCCUPATIONAL THERAPY - DAILY TREATMENT NOTE (updated )    Patient Name: Latonya Jarvis    Date: 2/15/2023    : 1965  Insurance: Payor: Frank Smith / Plan: Eloisa He / Product Type: *No Product type* /      Patient  verified Yes     Visit #   Current / Total 11 16   Time   In / Out 1:02 PM 1:57 PM   Pain   In / Out 2 1-2/10   Subjective Functional Status/Changes: Patient reports that she is feeling really good and is ready to be discharged. Changes to:  Meds, Allergies, Med Hx, Sx Hx? If yes, update Summary List no       TREATMENT AREA =  Cervicalgia [M54.2]    OBJECTIVE         Therapeutic Procedures: Tx Min Billable or 1:1 Min (if diff from Tx Min) Procedure, Rationale, Specifics   10  41631 Therapeutic Exercise (timed):  increase ROM, strength, coordination, balance, and proprioception to improve patient's ability to progress to PLOF and address remaining functional goals. (see flow sheet as applicable)     Details if applicable:       25  50496 Therapeutic Activity (timed):  use of dynamic activities replicating functional movements to increase ROM, strength, coordination, balance, and proprioception in order to improve patient's ability to progress to PLOF and address remaining functional goals. (see flow sheet as applicable)     Details if applicable:    SBA with sit<>stands   SBA with ambulating in and out of waiting room for safety   10  87066 Self Care/Home Management (timed):  improve patient knowledge and understanding of pain reducing techniques, posture/ergonomics, and activity modification  to improve patient's ability to progress to PLOF and address remaining functional goals.   (see flow sheet as applicable)     Details if applicable:  -review of HEP with specifics to include reps and tband resistance   10  68748 Manual Therapy (timed):  decrease pain, increase ROM, and increase tissue extensibility to improve patient's ability to progress to PLOF and address remaining functional goals. The manual therapy interventions were performed at a separate and distinct time from the therapeutic activities interventions . (see flow sheet as applicable)     Details if applicable: In seated: STM to right infraspinatus, teres minor, middle deltoid          Details if applicable:     54  MC BC Totals Reminder: bill using total billable min of TIMED therapeutic procedures (example: do not include dry needle or estim unattended, both untimed codes, in totals to left)  8-22 min = 1 unit; 23-37 min = 2 units; 38-52 min = 3 units; 53-67 min = 4 units; 68-82 min = 5 units   Total Total     [x]  Patient Education billed concurrently with other procedures   [x] Review HEP    [] Progressed/Changed HEP, detail:    [] Other detail:       Objective Information/Functional Measures/Assessment    Patient has been a pleasure to treat and is discharged today. We reviewed HEP with specifics to include reps and tband resistance as well to regress to shoulder isometrics PRN if she is experiencing increased pain on any certain day; patient reporting understanding. Patient requires SBA with sit<>stands and for ambulation in/out of waiting room for safety. Patient has nearly met all goals and reports worst pain at 3/10. Cervical spine AROM WFL with exception of extension. Bilateral UE strength improved nicely since [facility]. Progress toward goals / Updated goals:  []  See Progress Note/Recertification  Short Term Goals: To be accomplished in 8  treatments:  Patient will be independent and compliant with HEP to progress toward goals and restore functional mobility. Eval Status: issued at eval  1/20/23 PN- Pt reports that she rested yesterday,but has otherwise done her exercises every other day 3 times a day   1/25/2023. Patient reports compliance with HEP  Progressing  2/15/23 met, pt is compliant with HEP; she paces herself as needed secondary to pain.       Patient will improve FOTO score by 1/2 points to improve functional tolerance for exercise. Eval Status: FOTO to be taken at visit 2  FOTO score = an established functional score where 100 = no disability  1/20/23 PN - never taken goal Discontinued      Patient will improve pain in right shoulder and neck to 5/10 at worst to improve reaching and sleeping tolerance and restore prior level of function. Eval Status: 8/10 at worst  1/20/23 PN - 6/10 at worst right shoulder pain only   1/27/23 -met pt reports 2-3/10 worst pain        Pt will have painfree cervical AROM WFL to aid in functional mechanics for ambulation/ADLs. Eval Status:       Cervical Left Right   Flexion To chest      Extension To neutral P! Side bend 25% [facility]    Rotation [facility] WFL P!   1/20/22 PN -   Cervical Left Right   Flexion To chest      Extension To neutral     Side bend 50% [facility]     2/1/23 - progressing   Cervical Left Right   Flexion To chest      Extension Just past neutral      Side bend [facility]    Rotation [facility]     2/15/23 no change since last PN  Cervical Left Right   Flexion To chest      Extension Just past neutral      Side bend [facility]    Rotation [facility] WFL         Long Term Goals: To be accomplished in 16 treatments:  Patient will improve FOTO score by full points to improve functional tolerance for reaching and dressing. Eval Status: FOTO to be taken at visit 2   FOTO score = an established functional score where 100 = no disability  1/20/23 PN - never taken goal Discontinued      Pt will have painfree UE AROM WFL to aid in functional mechanics for ambulation/ADLs. Eval Status:   Shoulder Left Right   Flexion [facility]    100   ER [facility]   IR [facility]    1/20/22 PN- GOAL MET   Shoulder Left Right   Flexion [facility]    130   ER WFL WFL   IR WFL WFL      Pt will have 4/5 Marlo UE strength to return to goals of improved reaching and dressing . Eval Status:   Shoulder Left (1-5) Right (1-5)   Shoulder Flexion 3+ 3+ P! Shoulder ABD 3+ 3+ P! Shoulder IR 5 5   Shoulder ER 5 4    1/20/22 PN -  Shoulder Left (1-5) Right (1-5)   Shoulder Flexion 3+ 4   Shoulder ABD 3+ 3+ P! Shoulder IR 5 5   Shoulder ER 5 4    2/15/23 progressing   Shoulder Left (1-5) Right (1-5)   Shoulder Flexion 3+ 4   Shoulder ABD 3+ 4-   Shoulder IR 5 5   Shoulder ER 5 5     Patient will improve pain in right shoulder and neck to 1-2/10 at worst to improve reaching and sleeping tolerance and restore prior level of function.   Eval Status: 8/10 at worst               1/20/23 PN - 6/10 at worst right shoulder pain only               2/3/23- PROGRESSING pain gradually decreasing over visits    2/15/23- nearly met pt reports worst pain at 3/10      PLAN  NO  Continue plan of care  []  Upgrade activities as tolerated  [x]  Discharge due to : pt has made appreciable progress towards goals  []  Other:    Diana Knox PTA    2/15/2023    10:56 AM    Future Appointments   Date Time Provider Domingo Pardo   2/15/2023  1:00 PM [facility]   2/17/2023  1:00 PM [facility]   2/22/2023  1:00 PM Orlando Hudson [facility]   2/24/2023  1:00 PM Diana Knox PTA [facility]

## 2023-02-16 NOTE — PROGRESS NOTES
[facility]  [address] [place], [address]  Ph [phone]  Fx [phone]    Physical Therapy Discharge Summary    Patient name: Jana Ho Start of Care: 2022   Referral source: Toña Erickson, FNP : 1965                Medical Diagnosis: Cervicalgia [M54.2]    Onset Date:10 days ago                Treatment Diagnosis: Cervicalgia                                              ICD-10: M54.2   Prior Hospitalization: see medical history Provider#: [id number]   Medications: Verified on Patient summary List    Comorbidities: 2 strokes, epilepsy, asthma, arthritis, depression, right shoulder injury 10 years ago, left shoulder injury s/p surgery   Prior Level of Function: functionally independent, rollator, sedentary lifestyle; likes to walk in park with her rollator    Visits from Start of Care: 11    Missed Visits: 0    Reporting Period : 22 to 02/15/23    Goals/Measure of Progress:  Short Term Goals: To be accomplished in 8  treatments:  Patient will be independent and compliant with HEP to progress toward goals and restore functional mobility. Eval Status: issued at eval  23 PN- Pt reports that she rested yesterday,but has otherwise done her exercises every other day 3 times a day   2023. Patient reports compliance with HEP  Progressing  2/15/23 met, pt is compliant with HEP; she paces herself as needed secondary to pain. Patient will improve FOTO score by 1/2 points to improve functional tolerance for exercise. Eval Status: FOTO to be taken at visit 2  FOTO score = an established functional score where 100 = no disability  23 PN - never taken goal Discontinued      Patient will improve pain in right shoulder and neck to 5/10 at worst to improve reaching and sleeping tolerance and restore prior level of function.   Eval Status: 8/10 at worst  23 PN - 6/10 at worst right shoulder pain only   23 -met pt reports 2-3/10 worst pain        Pt will have painfree cervical AROM WFL to aid in functional mechanics for ambulation/ADLs. Eval Status:       Cervical Left Right   Flexion To chest      Extension To neutral P! Side bend 25% [facility] WFL P!   1/20/22 PN -   Cervical Left Right   Flexion To chest      Extension To neutral     Side bend 50% [facility]     2/1/23 - progressing   Cervical Left Right   Flexion To chest      Extension Just past neutral      Side bend [facility]     2/15/23 no change since last PN  Cervical Left Right   Flexion To chest      Extension Just past neutral      Side bend [facility] WFL          Long Term Goals: To be accomplished in 16 treatments:  Patient will improve FOTO score by full points to improve functional tolerance for reaching and dressing. Eval Status: FOTO to be taken at visit 2   FOTO score = an established functional score where 100 = no disability  1/20/23 PN - never taken goal Discontinued      Pt will have painfree UE AROM WFL to aid in functional mechanics for ambulation/ADLs. Eval Status:   Shoulder Left Right   Flexion [facility]    100   ER [facility]   IR [facility]    1/20/22 PN- GOAL MET   Shoulder Left Right   Flexion [facility]    130   ER WFL WFL   IR WFL WFL      Pt will have 4/5 Marlo UE strength to return to goals of improved reaching and dressing . Eval Status:   Shoulder Left (1-5) Right (1-5)   Shoulder Flexion 3+ 3+ P! Shoulder ABD 3+ 3+ P! Shoulder IR 5 5   Shoulder ER 5 4    1/20/22 PN -  Shoulder Left (1-5) Right (1-5)   Shoulder Flexion 3+ 4   Shoulder ABD 3+ 3+ P! Shoulder IR 5 5   Shoulder ER 5 4    2/15/23 progressing   Shoulder Left (1-5) Right (1-5)   Shoulder Flexion 3+ 4   Shoulder ABD 3+ 4-   Shoulder IR 5 5   Shoulder ER 5 5      Patient will improve pain in right shoulder and neck to 1-2/10 at worst to improve reaching and sleeping tolerance and restore prior level of function.   Eval Status: 8/10 at worst               1/20/23 PN - 6/10 at worst right shoulder pain only               2/3/23- PROGRESSING pain gradually decreasing over visits               2/15/23- nearly met pt reports worst pain at 3/10      Assessment/ Summary of Care: Pt has been treated for 11 visit to improve neck and shoulder pain. Patient requires SBA with sit<>stands and for ambulation in/out of waiting room for safety. Patient has nearly met all goals and reports worst pain at 3/10. Cervical spine AROM WFL with exception of extension. Bilateral UE strength improved nicely since [facility]. Pt to be DC to HEP.      RECOMMENDATIONS:  [x]Discontinue therapy: [x]Patient has reached or is progressing toward set goals      []Patient is non-compliant or has abdicated      []Due to lack of appreciable progress towards set goals    Michelle Garcia, PT 2/16/2023 4:28 PM

## 2023-02-17 ENCOUNTER — APPOINTMENT (OUTPATIENT)
Facility: HOSPITAL | Age: 58
End: 2023-02-17
Payer: MEDICARE

## 2023-02-17 ENCOUNTER — APPOINTMENT (OUTPATIENT)
Dept: PHYSICAL THERAPY | Age: 58
End: 2023-02-17
Payer: MEDICARE

## 2023-02-22 ENCOUNTER — APPOINTMENT (OUTPATIENT)
Dept: PHYSICAL THERAPY | Age: 58
End: 2023-02-22
Payer: MEDICARE

## 2023-02-22 ENCOUNTER — APPOINTMENT (OUTPATIENT)
Facility: HOSPITAL | Age: 58
End: 2023-02-22
Payer: MEDICARE

## 2023-02-24 ENCOUNTER — APPOINTMENT (OUTPATIENT)
Facility: HOSPITAL | Age: 58
End: 2023-02-24
Payer: MEDICARE

## 2023-02-24 ENCOUNTER — APPOINTMENT (OUTPATIENT)
Dept: PHYSICAL THERAPY | Age: 58
End: 2023-02-24
Payer: MEDICARE

## 2024-07-12 NOTE — PROGRESS NOTES
Dorothy Dietz presents today for   Chief Complaint   Patient presents with    Back Pain     lower back and tailbone       Is someone accompanying this pt? no    Is the patient using any DME equipment during OV? no      Learning Assessment:    Coordination of Care:  1. Have you been to the ER, urgent care clinic since your last visit? no  Hospitalized since your last visit? no    2. Have you seen or consulted any other health care providers outside of the [address] [place] [address] since your last visit? no Include any pap smears or colon screening.  none Admission Reconciliation is Completed  Discharge Reconciliation is Not Complete Admission Reconciliation is Completed  Discharge Reconciliation is Completed

## 2025-05-22 ENCOUNTER — HOSPITAL ENCOUNTER (EMERGENCY)
Facility: HOSPITAL | Age: 60
Discharge: HOME | End: 2025-05-22
Payer: MEDICARE

## 2025-05-22 ENCOUNTER — APPOINTMENT (OUTPATIENT)
Facility: HOSPITAL | Age: 60
End: 2025-05-22
Payer: MEDICARE

## 2025-05-22 VITALS
DIASTOLIC BLOOD PRESSURE: 92 MMHG | OXYGEN SATURATION: 100 % | HEART RATE: 97 BPM | SYSTOLIC BLOOD PRESSURE: 165 MMHG | RESPIRATION RATE: 16 BRPM | TEMPERATURE: 97.5 F

## 2025-05-22 DIAGNOSIS — G89.29 ACUTE EXACERBATION OF CHRONIC LOW BACK PAIN: Primary | ICD-10-CM

## 2025-05-22 DIAGNOSIS — M54.50 ACUTE EXACERBATION OF CHRONIC LOW BACK PAIN: Primary | ICD-10-CM

## 2025-05-22 PROCEDURE — 99284 EMERGENCY DEPT VISIT MOD MDM: CPT

## 2025-05-22 PROCEDURE — 72128 CT CHEST SPINE W/O DYE: CPT

## 2025-05-22 PROCEDURE — 72131 CT LUMBAR SPINE W/O DYE: CPT

## 2025-05-22 PROCEDURE — 6370000000 HC RX 637 (ALT 250 FOR IP)

## 2025-05-22 RX ORDER — ACETAMINOPHEN 500 MG
1000 TABLET ORAL
Status: COMPLETED | OUTPATIENT
Start: 2025-05-22 | End: 2025-05-22

## 2025-05-22 RX ORDER — ACETAMINOPHEN 500 MG
1000 TABLET ORAL EVERY 6 HOURS PRN
Qty: 40 TABLET | Refills: 0 | Status: SHIPPED | OUTPATIENT
Start: 2025-05-22

## 2025-05-22 RX ORDER — OXYCODONE HYDROCHLORIDE 5 MG/1
5 TABLET ORAL EVERY 8 HOURS PRN
Qty: 9 TABLET | Refills: 0 | Status: SHIPPED | OUTPATIENT
Start: 2025-05-22 | End: 2025-05-25

## 2025-05-22 RX ORDER — IBUPROFEN 400 MG/1
800 TABLET, FILM COATED ORAL
Status: COMPLETED | OUTPATIENT
Start: 2025-05-22 | End: 2025-05-22

## 2025-05-22 RX ADMIN — ACETAMINOPHEN 1000 MG: 500 TABLET ORAL at 10:52

## 2025-05-22 RX ADMIN — IBUPROFEN 800 MG: 400 TABLET, FILM COATED ORAL at 10:52

## 2025-05-22 NOTE — DISCHARGE INSTRUCTIONS
Thank you for choosing our Emergency Department for your care.  It is our privilege to care for you in your time of need.  In the next several days, you may receive a survey via email or mailed to your home about your experience with our team.  We would greatly appreciate you taking a few minutes to complete the survey, as we use this information to learn what we have done well and what we could be doing better. Thank you for trusting us with your care!    Below you will find a list of your tests from today's visit.   Labs and Radiology Studies  No results found for this or any previous visit (from the past 12 hours).  CT LUMBAR SPINE WO CONTRAST  Result Date: 5/22/2025  CT OF THE THORACIC AND LUMBAR SPINE WITHOUT CONTRAST: 5/22/2025 10:11 AM INDICATION: Back pain. TECHNIQUE: Axial CT images were obtained of the thoracic and lumbar spine without contrast. Coronal and sagittal reconstructions were performed. CT dose reduction was achieved through use of a standardized protocol tailored for this examination and automatic exposure control for dose modulation. COMPARISON: None. FINDINGS: Thoracic dextroconvexity is minimal. Vertebral body heights are normal. Bovine arch is a normal variant. Degenerative disease causes the following stenoses: C6-C7: Severe bilateral neural foraminal stenoses: Moderate canal stenosis. C7-T1: Right and mild left neural foraminal stenosis. Moderate canal stenosis. T1-T2: Mild right neural foraminal stenosis. Mild canal stenosis. T2-T3: Mild bilateral neural foraminal stenosis. Canal obscured by overlying shoulders. T3-T4: Mild bilateral neural foraminal stenosis. T4-T5: Left and mild right neural foraminal stenosis. T5-T6: Mild right neural foraminal stenosis. T6-T7: Mild right neural foraminal stenosis. T7-T8: No stenosis. T8-T9: Obscured by electrodes of dorsal epidural spinal stimulator device. T9-T10: Obscured by spinal stimulator. T10-T11: Mild canal stenosis. T11-T12: Left and

## 2025-05-22 NOTE — ED PROVIDER NOTES
precautions discussed.  Patient had the opportunity to ask questions, questions answered.  She agrees with plan.  Patient discharged in stable condition. [HW]      ED Course User Index  [HW] Bernie Coy PA-C       Clinical Management Tools:      Smoking Cessation: Not Applicable    Patient was given the following medications:  Medications   acetaminophen (TYLENOL) tablet 1,000 mg (1,000 mg Oral Given 5/22/25 1052)   ibuprofen (ADVIL;MOTRIN) tablet 800 mg (800 mg Oral Given 5/22/25 1052)       CONSULTS: See ED Course/MDM for further details.  None   PROCEDURES   Unless otherwise noted above, none  Procedures    SEPSIS REASSESSMENT & CRITICAL CARE TIME   SEPSIS REASSESSMENT: Patient does NOT meet Sepsis criteria after ED workup    Patient does not meet Critical Care Time, 0 minutes  CLINICAL IMPRESSIONS     1. Acute exacerbation of chronic low back pain       SDOH/DISPOSITION/PLAN   Social Determinants affecting Treatment Plan: None    DISPOSITION Decision To Discharge 05/22/2025 11:30:39 AM   DISPOSITION CONDITION Stable         Discharge Note: The patient is stable for discharge home. The signs, symptoms, diagnosis, and discharge instructions have been discussed, understanding conveyed, and agreed upon. The patient is to follow up as recommended or return to ER should their symptoms worsen.      PATIENT REFERRED TO:  [facility]  [address]  [place] [id number] [phone]    As needed        DISCHARGE MEDICATIONS:     Medication List        START taking these medications      acetaminophen 500 MG tablet  Commonly known as: TYLENOL  Take 2 tablets by mouth every 6 hours as needed for Pain     oxyCODONE 5 MG immediate release tablet  Commonly known as: Roxicodone  Take 1 tablet by mouth every 8 hours as needed for Pain for up to 3 days. Intended supply: 3 days. Take lowest dose possible to manage pain Max Daily Amount: 15 mg            ASK your doctor about these

## 2025-05-22 NOTE — ED TRIAGE NOTES
Patient ambulatory with a wheelchair pushing in front of her for balance. States that she was at the nail salon on swivel, and the  came behind her and dropped her chair with lever all the way causing her to lose her balance and drop.     Denies any falls.     Patient has significant spinal history - neurostimulator, rods and concrete in spine, multiple surgeries.

## (undated) DEVICE — SUTURE VCRL + SZ 2-0 L18IN ABSRB VLT CT-2 1/2 CIR TAPERCUT VCP726D

## (undated) DEVICE — STERILE POLYISOPRENE POWDER-FREE SURGICAL GLOVES: Brand: PROTEXIS

## (undated) DEVICE — (D)BNDG ADHESIVE FABRIC 3/4X3 -- DISC BY MFR USE ITEM 357960

## (undated) DEVICE — MEDIA CONTRAST 10ML 200MG/ML 41%

## (undated) DEVICE — STERILE POLYISOPRENE POWDER-FREE SURGICAL GLOVES WITH EMOLLIENT COATING: Brand: PROTEXIS

## (undated) DEVICE — KENDALL SCD EXPRESS SLEEVES, KNEE LENGTH, MEDIUM: Brand: KENDALL SCD

## (undated) DEVICE — BANDAGE ADH W0.75XL3IN UNIV WVN FAB NAT GEN USE STRP N ADH

## (undated) DEVICE — SINGLE PORT MANIFOLD: Brand: NEPTUNE 2

## (undated) DEVICE — Z DISCONTINUED PER MEDLINE USE 2718072 DRESSING FOAM W5XL12.5CM SIL ADH THN BORDED CNFRM LO PROF

## (undated) DEVICE — REM POLYHESIVE ADULT PATIENT RETURN ELECTRODE: Brand: VALLEYLAB

## (undated) DEVICE — REMOTE CONTROL KIT: Brand: FREELINK™

## (undated) DEVICE — SYR 10ML LUER LOK 1/5ML GRAD --

## (undated) DEVICE — WILSON FRAME STYLE POSITIONING KITS - 	FRAME PAD SLEEVES (SET OF 2) , DRAPE PROTECTOR, BAR PROTECTOR 7" COMFORT FOAM HEADREST, LAMINECTOMY ARM CRADLES: Brand: SOULE MEDICAL

## (undated) DEVICE — DRAPE TWL SURG 16X26IN BLU ORB04] ALLCARE INC]

## (undated) DEVICE — (D)PREP SKN CHLRAPRP APPL 26ML -- CONVERT TO ITEM 371833

## (undated) DEVICE — PACK PROCEDURE SURG LAMINECTOMY SPINE CUST

## (undated) DEVICE — TRAY MYEL SFTY +

## (undated) DEVICE — SOL IRRIGATION INJ NACL 0.9% 500ML BTL

## (undated) DEVICE — SUTURE STRATAFIX SZ 3-0 L30CM NONABSORBABLE UD L19MM FS-2 SXMP2B408

## (undated) DEVICE — DERMABOND SKIN ADH 0.7ML -- DERMABOND ADVANCED 12/BX